# Patient Record
Sex: FEMALE | Race: WHITE | HISPANIC OR LATINO | Employment: OTHER | URBAN - METROPOLITAN AREA
[De-identification: names, ages, dates, MRNs, and addresses within clinical notes are randomized per-mention and may not be internally consistent; named-entity substitution may affect disease eponyms.]

---

## 2017-01-12 ENCOUNTER — GENERIC CONVERSION - ENCOUNTER (OUTPATIENT)
Dept: OTHER | Facility: OTHER | Age: 57
End: 2017-01-12

## 2017-01-20 ENCOUNTER — ALLSCRIPTS OFFICE VISIT (OUTPATIENT)
Dept: OTHER | Facility: OTHER | Age: 57
End: 2017-01-20

## 2017-01-31 ENCOUNTER — TRANSCRIBE ORDERS (OUTPATIENT)
Dept: ADMINISTRATIVE | Facility: HOSPITAL | Age: 57
End: 2017-01-31

## 2017-01-31 DIAGNOSIS — R06.83 SNORES: Primary | ICD-10-CM

## 2017-02-10 ENCOUNTER — GENERIC CONVERSION - ENCOUNTER (OUTPATIENT)
Dept: OTHER | Facility: OTHER | Age: 57
End: 2017-02-10

## 2017-02-14 ENCOUNTER — GENERIC CONVERSION - ENCOUNTER (OUTPATIENT)
Dept: OTHER | Facility: OTHER | Age: 57
End: 2017-02-14

## 2017-02-14 LAB
LEFT EYE DIABETIC RETINOPATHY: NORMAL
RIGHT EYE DIABETIC RETINOPATHY: NORMAL

## 2017-02-22 ENCOUNTER — ALLSCRIPTS OFFICE VISIT (OUTPATIENT)
Dept: OTHER | Facility: OTHER | Age: 57
End: 2017-02-22

## 2017-02-22 DIAGNOSIS — L29.9 PRURITUS: ICD-10-CM

## 2017-02-25 LAB
A/G RATIO (HISTORICAL): 1.5 (CALC) (ref 1–2.5)
ALBUMIN SERPL BCP-MCNC: 4.4 G/DL (ref 3.6–5.1)
ALP SERPL-CCNC: 167 U/L (ref 33–130)
ALT SERPL W P-5'-P-CCNC: 15 U/L (ref 6–29)
AST SERPL W P-5'-P-CCNC: 21 U/L (ref 10–35)
BILIRUB SERPL-MCNC: 0.9 MG/DL (ref 0.2–1.2)
BILIRUBIN DIRECT (HISTORICAL): 0.2 MG/DL
GAMMA GLOBULIN (HISTORICAL): 2.9 G/DL (CALC) (ref 1.9–3.7)
INDIRECT BILIRUBIN (HISTORICAL): 0.7 MG/DL (CALC) (ref 0.2–1.2)
TOTAL PROTEIN (HISTORICAL): 7.3 G/DL (ref 6.1–8.1)

## 2017-02-27 ENCOUNTER — GENERIC CONVERSION - ENCOUNTER (OUTPATIENT)
Dept: OTHER | Facility: OTHER | Age: 57
End: 2017-02-27

## 2017-03-06 ENCOUNTER — ALLSCRIPTS OFFICE VISIT (OUTPATIENT)
Dept: OTHER | Facility: OTHER | Age: 57
End: 2017-03-06

## 2017-03-08 ENCOUNTER — GENERIC CONVERSION - ENCOUNTER (OUTPATIENT)
Dept: OTHER | Facility: OTHER | Age: 57
End: 2017-03-08

## 2017-03-10 ENCOUNTER — GENERIC CONVERSION - ENCOUNTER (OUTPATIENT)
Dept: OTHER | Facility: OTHER | Age: 57
End: 2017-03-10

## 2017-03-13 ENCOUNTER — ALLSCRIPTS OFFICE VISIT (OUTPATIENT)
Dept: OTHER | Facility: OTHER | Age: 57
End: 2017-03-13

## 2017-03-13 LAB — HCG, QUALITATIVE (HISTORICAL): NEGATIVE

## 2017-03-13 PROCEDURE — 87624 HPV HI-RISK TYP POOLED RSLT: CPT | Performed by: FAMILY MEDICINE

## 2017-03-13 PROCEDURE — G0145 SCR C/V CYTO,THINLAYER,RESCR: HCPCS | Performed by: FAMILY MEDICINE

## 2017-03-15 ENCOUNTER — GENERIC CONVERSION - ENCOUNTER (OUTPATIENT)
Dept: OTHER | Facility: OTHER | Age: 57
End: 2017-03-15

## 2017-03-15 ENCOUNTER — LAB REQUISITION (OUTPATIENT)
Dept: LAB | Facility: HOSPITAL | Age: 57
End: 2017-03-15
Payer: COMMERCIAL

## 2017-03-15 DIAGNOSIS — Z12.4 ENCOUNTER FOR SCREENING FOR MALIGNANT NEOPLASM OF CERVIX: ICD-10-CM

## 2017-03-21 LAB — HPV RRNA GENITAL QL NAA+PROBE: ABNORMAL

## 2017-03-22 LAB
LAB AP GYN PRIMARY INTERPRETATION: NORMAL
Lab: NORMAL
PATH INTERP SPEC-IMP: NORMAL

## 2017-04-17 ENCOUNTER — TRANSCRIBE ORDERS (OUTPATIENT)
Dept: SLEEP CENTER | Facility: CLINIC | Age: 57
End: 2017-04-17

## 2017-04-17 ENCOUNTER — HOSPITAL ENCOUNTER (OUTPATIENT)
Dept: SLEEP CENTER | Facility: CLINIC | Age: 57
Discharge: HOME | End: 2017-04-17
Payer: COMMERCIAL

## 2017-04-17 DIAGNOSIS — G47.33 OBSTRUCTIVE SLEEP APNEA (ADULT) (PEDIATRIC): Primary | ICD-10-CM

## 2017-04-17 DIAGNOSIS — R06.83 SNORES: ICD-10-CM

## 2017-04-18 ENCOUNTER — HOSPITAL ENCOUNTER (OUTPATIENT)
Dept: SLEEP CENTER | Facility: CLINIC | Age: 57
Discharge: HOME | End: 2017-04-18
Payer: COMMERCIAL

## 2017-04-18 DIAGNOSIS — G47.33 OBSTRUCTIVE SLEEP APNEA (ADULT) (PEDIATRIC): ICD-10-CM

## 2017-04-18 PROCEDURE — 95810 POLYSOM 6/> YRS 4/> PARAM: CPT

## 2017-04-25 ENCOUNTER — GENERIC CONVERSION - ENCOUNTER (OUTPATIENT)
Dept: OTHER | Facility: OTHER | Age: 57
End: 2017-04-25

## 2017-04-25 ENCOUNTER — LAB CONVERSION - ENCOUNTER (OUTPATIENT)
Dept: OTHER | Facility: OTHER | Age: 57
End: 2017-04-25

## 2017-04-25 LAB
INR PPP: 3.2
PROTHROMBIN TIME: 32.8 SEC (ref 9–11.5)

## 2017-04-26 ENCOUNTER — GENERIC CONVERSION - ENCOUNTER (OUTPATIENT)
Dept: OTHER | Facility: OTHER | Age: 57
End: 2017-04-26

## 2017-04-27 ENCOUNTER — LAB REQUISITION (OUTPATIENT)
Dept: LAB | Facility: HOSPITAL | Age: 57
End: 2017-04-27
Payer: COMMERCIAL

## 2017-04-27 ENCOUNTER — ALLSCRIPTS OFFICE VISIT (OUTPATIENT)
Dept: OTHER | Facility: OTHER | Age: 57
End: 2017-04-27

## 2017-04-27 DIAGNOSIS — Z08 ENCOUNTER FOR FOLLOW-UP EXAMINATION AFTER COMPLETED TREATMENT FOR MALIGNANT NEOPLASM: ICD-10-CM

## 2017-04-27 LAB
BILIRUB UR QL STRIP: NORMAL
CLARITY UR: NORMAL
COLOR UR: YELLOW
GLUCOSE (HISTORICAL): NORMAL
HGB UR QL STRIP.AUTO: NORMAL
KETONES UR STRIP-MCNC: NORMAL MG/DL
LEUKOCYTE ESTERASE UR QL STRIP: NORMAL
NITRITE UR QL STRIP: NORMAL
PH UR STRIP.AUTO: 5 [PH]
PROT UR STRIP-MCNC: NORMAL MG/DL
SP GR UR STRIP.AUTO: 1.01
UROBILINOGEN UR QL STRIP.AUTO: 0.2

## 2017-04-27 PROCEDURE — 88305 TISSUE EXAM BY PATHOLOGIST: CPT | Performed by: FAMILY MEDICINE

## 2017-05-02 ENCOUNTER — ALLSCRIPTS OFFICE VISIT (OUTPATIENT)
Dept: OTHER | Facility: OTHER | Age: 57
End: 2017-05-02

## 2017-05-08 ENCOUNTER — ALLSCRIPTS OFFICE VISIT (OUTPATIENT)
Dept: OTHER | Facility: OTHER | Age: 57
End: 2017-05-08

## 2017-06-01 ENCOUNTER — ALLSCRIPTS OFFICE VISIT (OUTPATIENT)
Dept: OTHER | Facility: OTHER | Age: 57
End: 2017-06-01

## 2017-06-01 DIAGNOSIS — R10.32 LEFT LOWER QUADRANT PAIN: ICD-10-CM

## 2017-06-01 DIAGNOSIS — E11.9 TYPE 2 DIABETES MELLITUS WITHOUT COMPLICATIONS (HCC): ICD-10-CM

## 2017-06-01 DIAGNOSIS — M25.562 PAIN IN LEFT KNEE: ICD-10-CM

## 2017-06-01 DIAGNOSIS — M25.561 PAIN IN RIGHT KNEE: ICD-10-CM

## 2017-06-02 ENCOUNTER — ALLSCRIPTS OFFICE VISIT (OUTPATIENT)
Dept: OTHER | Facility: OTHER | Age: 57
End: 2017-06-02

## 2017-06-07 ENCOUNTER — LAB CONVERSION - ENCOUNTER (OUTPATIENT)
Dept: OTHER | Facility: OTHER | Age: 57
End: 2017-06-07

## 2017-06-07 ENCOUNTER — GENERIC CONVERSION - ENCOUNTER (OUTPATIENT)
Dept: OTHER | Facility: OTHER | Age: 57
End: 2017-06-07

## 2017-06-07 LAB
INR PPP: 3.2
PROTHROMBIN TIME: 32.8 SEC (ref 9–11.5)

## 2017-06-08 ENCOUNTER — LAB CONVERSION - ENCOUNTER (OUTPATIENT)
Dept: OTHER | Facility: OTHER | Age: 57
End: 2017-06-08

## 2017-06-08 LAB
A/G RATIO (HISTORICAL): 1.6 (CALC) (ref 1–2.5)
ALBUMIN SERPL BCP-MCNC: 4.2 G/DL (ref 3.6–5.1)
ALP SERPL-CCNC: 156 U/L (ref 33–130)
ALT SERPL W P-5'-P-CCNC: 14 U/L (ref 6–29)
AST SERPL W P-5'-P-CCNC: 19 U/L (ref 10–35)
BASOPHILS # BLD AUTO: 0.9 %
BASOPHILS # BLD AUTO: 57 CELLS/UL (ref 0–200)
BILIRUB SERPL-MCNC: 0.8 MG/DL (ref 0.2–1.2)
BUN SERPL-MCNC: 12 MG/DL (ref 7–25)
BUN/CREA RATIO (HISTORICAL): ABNORMAL (CALC) (ref 6–22)
CALCIUM SERPL-MCNC: 9.2 MG/DL (ref 8.6–10.4)
CHLORIDE SERPL-SCNC: 101 MMOL/L (ref 98–110)
CHOLEST SERPL-MCNC: 202 MG/DL (ref 125–200)
CHOLEST/HDLC SERPL: 3.3 (CALC)
CO2 SERPL-SCNC: 30 MMOL/L (ref 20–31)
CREAT SERPL-MCNC: 0.8 MG/DL (ref 0.5–1.05)
CREATININE, RANDOM URINE (HISTORICAL): 213 MG/DL (ref 20–320)
DEPRECATED RDW RBC AUTO: 15.4 % (ref 11–15)
EGFR AFRICAN AMERICAN (HISTORICAL): 94 ML/MIN/1.73M2
EGFR-AMERICAN CALC (HISTORICAL): 81 ML/MIN/1.73M2
EOSINOPHIL # BLD AUTO: 139 CELLS/UL (ref 15–500)
EOSINOPHIL # BLD AUTO: 2.2 %
GAMMA GLOBULIN (HISTORICAL): 2.7 G/DL (CALC) (ref 1.9–3.7)
GLUCOSE (HISTORICAL): 160 MG/DL (ref 65–99)
HBA1C MFR BLD HPLC: 7.2 % OF TOTAL HGB
HCT VFR BLD AUTO: 39.8 % (ref 35–45)
HDLC SERPL-MCNC: 62 MG/DL
HGB BLD-MCNC: 12.9 G/DL (ref 11.7–15.5)
LDL CHOLESTEROL (HISTORICAL): 108 MG/DL (CALC)
LYMPHOCYTES # BLD AUTO: 1405 CELLS/UL (ref 850–3900)
LYMPHOCYTES # BLD AUTO: 22.3 %
MAGNESIUM, UR (HISTORICAL): 1.4 MG/DL
MCH RBC QN AUTO: 26.1 PG (ref 27–33)
MCHC RBC AUTO-ENTMCNC: 32.5 G/DL (ref 32–36)
MCV RBC AUTO: 80.5 FL (ref 80–100)
MICROALBUMIN/CREATININE RATIO (HISTORICAL): 7 MCG/MG CREAT
MONOCYTES # BLD AUTO: 391 CELLS/UL (ref 200–950)
MONOCYTES (HISTORICAL): 6.2 %
NEUTROPHILS # BLD AUTO: 4309 CELLS/UL (ref 1500–7800)
NEUTROPHILS # BLD AUTO: 68.4 %
NON-HDL-CHOL (CHOL-HDL) (HISTORICAL): 140 MG/DL (CALC)
PLATELET # BLD AUTO: 257 THOUSAND/UL (ref 140–400)
PMV BLD AUTO: 9.4 FL (ref 7.5–12.5)
POTASSIUM SERPL-SCNC: 3.9 MMOL/L (ref 3.5–5.3)
RBC # BLD AUTO: 4.95 MILLION/UL (ref 3.8–5.1)
SODIUM SERPL-SCNC: 139 MMOL/L (ref 135–146)
TOTAL PROTEIN (HISTORICAL): 6.9 G/DL (ref 6.1–8.1)
TRIGL SERPL-MCNC: 160 MG/DL
TSH SERPL DL<=0.05 MIU/L-ACNC: 1.6 MIU/L (ref 0.4–4.5)
WBC # BLD AUTO: 6.3 THOUSAND/UL (ref 3.8–10.8)

## 2017-06-15 ENCOUNTER — ALLSCRIPTS OFFICE VISIT (OUTPATIENT)
Dept: OTHER | Facility: OTHER | Age: 57
End: 2017-06-15

## 2017-06-15 PROCEDURE — 88305 TISSUE EXAM BY PATHOLOGIST: CPT | Performed by: OBSTETRICS & GYNECOLOGY

## 2017-06-16 ENCOUNTER — LAB REQUISITION (OUTPATIENT)
Dept: LAB | Facility: HOSPITAL | Age: 57
End: 2017-06-16
Payer: COMMERCIAL

## 2017-06-16 DIAGNOSIS — R87.612 LOW GRADE SQUAMOUS INTRAEPITHELIAL LESION ON CYTOLOGIC SMEAR OF CERVIX (LGSIL): ICD-10-CM

## 2017-06-17 ENCOUNTER — GENERIC CONVERSION - ENCOUNTER (OUTPATIENT)
Dept: OTHER | Facility: OTHER | Age: 57
End: 2017-06-17

## 2017-06-20 ENCOUNTER — HOSPITAL ENCOUNTER (OUTPATIENT)
Dept: SLEEP CENTER | Facility: CLINIC | Age: 57
Discharge: HOME | End: 2017-06-20
Payer: COMMERCIAL

## 2017-06-20 DIAGNOSIS — G47.33 OBSTRUCTIVE SLEEP APNEA (ADULT) (PEDIATRIC): ICD-10-CM

## 2017-06-26 ENCOUNTER — APPOINTMENT (OUTPATIENT)
Dept: PHYSICAL THERAPY | Facility: REHABILITATION | Age: 57
End: 2017-06-26
Payer: COMMERCIAL

## 2017-06-26 DIAGNOSIS — M25.561 PAIN IN RIGHT KNEE: ICD-10-CM

## 2017-06-26 DIAGNOSIS — M25.562 PAIN IN LEFT KNEE: ICD-10-CM

## 2017-06-26 PROCEDURE — 97110 THERAPEUTIC EXERCISES: CPT

## 2017-06-26 PROCEDURE — 97162 PT EVAL MOD COMPLEX 30 MIN: CPT

## 2017-06-28 ENCOUNTER — ALLSCRIPTS OFFICE VISIT (OUTPATIENT)
Dept: OTHER | Facility: OTHER | Age: 57
End: 2017-06-28

## 2017-07-03 ENCOUNTER — APPOINTMENT (OUTPATIENT)
Dept: PHYSICAL THERAPY | Facility: REHABILITATION | Age: 57
End: 2017-07-03
Payer: COMMERCIAL

## 2017-07-06 ENCOUNTER — APPOINTMENT (OUTPATIENT)
Dept: PHYSICAL THERAPY | Facility: REHABILITATION | Age: 57
End: 2017-07-06
Payer: COMMERCIAL

## 2017-07-13 ENCOUNTER — APPOINTMENT (OUTPATIENT)
Dept: PHYSICAL THERAPY | Facility: REHABILITATION | Age: 57
End: 2017-07-13
Payer: COMMERCIAL

## 2017-07-13 PROCEDURE — 97110 THERAPEUTIC EXERCISES: CPT

## 2017-07-13 PROCEDURE — 97112 NEUROMUSCULAR REEDUCATION: CPT

## 2017-07-14 ENCOUNTER — GENERIC CONVERSION - ENCOUNTER (OUTPATIENT)
Dept: OTHER | Facility: OTHER | Age: 57
End: 2017-07-14

## 2017-07-14 ENCOUNTER — LAB CONVERSION - ENCOUNTER (OUTPATIENT)
Dept: OTHER | Facility: OTHER | Age: 57
End: 2017-07-14

## 2017-07-14 LAB
INR PPP: 3.6
PROTHROMBIN TIME: 37.3 SEC (ref 9–11.5)

## 2017-07-27 ENCOUNTER — APPOINTMENT (OUTPATIENT)
Dept: PHYSICAL THERAPY | Facility: REHABILITATION | Age: 57
End: 2017-07-27
Payer: COMMERCIAL

## 2017-08-07 ENCOUNTER — LAB CONVERSION - ENCOUNTER (OUTPATIENT)
Dept: OTHER | Facility: OTHER | Age: 57
End: 2017-08-07

## 2017-08-07 LAB
INR PPP: 3.5
PROTHROMBIN TIME: 36.1 SEC (ref 9–11.5)

## 2017-08-08 ENCOUNTER — GENERIC CONVERSION - ENCOUNTER (OUTPATIENT)
Dept: OTHER | Facility: OTHER | Age: 57
End: 2017-08-08

## 2017-08-16 ENCOUNTER — TRANSCRIBE ORDERS (OUTPATIENT)
Dept: ADMINISTRATIVE | Facility: HOSPITAL | Age: 57
End: 2017-08-16

## 2017-08-16 DIAGNOSIS — R10.32 ABDOMINAL PAIN, LEFT LOWER QUADRANT: Primary | ICD-10-CM

## 2017-08-28 ENCOUNTER — GENERIC CONVERSION - ENCOUNTER (OUTPATIENT)
Dept: OTHER | Facility: OTHER | Age: 57
End: 2017-08-28

## 2017-08-30 ENCOUNTER — ALLSCRIPTS OFFICE VISIT (OUTPATIENT)
Dept: OTHER | Facility: OTHER | Age: 57
End: 2017-08-30

## 2017-09-07 ENCOUNTER — GENERIC CONVERSION - ENCOUNTER (OUTPATIENT)
Dept: OTHER | Facility: OTHER | Age: 57
End: 2017-09-07

## 2017-09-07 LAB
INR PPP: 3.9
PROTHROMBIN TIME: 39.6 SEC (ref 9–11.5)

## 2017-09-22 ENCOUNTER — LAB CONVERSION - ENCOUNTER (OUTPATIENT)
Dept: OTHER | Facility: OTHER | Age: 57
End: 2017-09-22

## 2017-09-22 LAB
INR PPP: 2.7
PROTHROMBIN TIME: 27.4 SEC (ref 9–11.5)

## 2017-09-25 ENCOUNTER — GENERIC CONVERSION - ENCOUNTER (OUTPATIENT)
Dept: OTHER | Facility: OTHER | Age: 57
End: 2017-09-25

## 2017-10-12 ENCOUNTER — GENERIC CONVERSION - ENCOUNTER (OUTPATIENT)
Dept: OTHER | Facility: OTHER | Age: 57
End: 2017-10-12

## 2017-11-07 ENCOUNTER — GENERIC CONVERSION - ENCOUNTER (OUTPATIENT)
Dept: OTHER | Facility: OTHER | Age: 57
End: 2017-11-07

## 2017-11-08 ENCOUNTER — GENERIC CONVERSION - ENCOUNTER (OUTPATIENT)
Dept: OTHER | Facility: OTHER | Age: 57
End: 2017-11-08

## 2017-11-08 LAB
INR PPP: 3.5
PROTHROMBIN TIME: 35.8 SEC (ref 9–11.5)

## 2017-12-07 ENCOUNTER — GENERIC CONVERSION - ENCOUNTER (OUTPATIENT)
Dept: OTHER | Facility: OTHER | Age: 57
End: 2017-12-07

## 2017-12-07 DIAGNOSIS — G40.119 LOCALZ-RLTD SYMPTOMATIC EPILEPSY W SMPL PART SZ, INTRACT, WO STATUS (HCC): ICD-10-CM

## 2017-12-07 DIAGNOSIS — E11.9 TYPE 2 DIABETES MELLITUS WITHOUT COMPLICATIONS (HCC): ICD-10-CM

## 2017-12-15 ENCOUNTER — TRANSCRIBE ORDERS (OUTPATIENT)
Dept: ADMINISTRATIVE | Facility: HOSPITAL | Age: 57
End: 2017-12-15

## 2017-12-15 ENCOUNTER — GENERIC CONVERSION - ENCOUNTER (OUTPATIENT)
Dept: OTHER | Facility: OTHER | Age: 57
End: 2017-12-15

## 2017-12-15 ENCOUNTER — ALLSCRIPTS OFFICE VISIT (OUTPATIENT)
Dept: OTHER | Facility: OTHER | Age: 57
End: 2017-12-15

## 2017-12-15 DIAGNOSIS — G40.119 EPILEPSIA PARTIALIS CONTINUA WITH INTRACTABLE EPILEPSY (HCC): Primary | ICD-10-CM

## 2018-01-09 NOTE — RESULT NOTES
Verified Results  (1) URINALYSIS w URINE C/S REFLEX (will reflex a microscopy if leukocytes, occult blood, or nitrites are not within normal limits) [id number] 07:56PM Cheryl Lung Order Number: [id number]     Test Name Result Flag Reference   COLOR Yellow     CLARITY Clear     PH UA 6 0  4 5-8 0   LEUKOCYTE ESTERASE UA Negative  Negative   NITRITE UA Negative  Negative   PROTEIN UA Negative mg/dl  Negative   GLUCOSE UA Negative mg/dl  Negative   KETONES UA Negative mg/dl  Negative   UROBILINOGEN UA 0 2 E U /dl  0 2, 1 0 E U /dl   BILIRUBIN UA Negative  Negative   BLOOD UA Negative  Negative   SPECIFIC GRAVITY UA 1 008  1 003-1 030

## 2018-01-09 NOTE — RESULT NOTES
Verified Results  (1) PT WITH INR [id number] 10:39AM Colie Marsing   REPORT COMMENT:  FASTING:NO     Test Name Result Flag Reference   INR 3 1 H    Reference Range                     0 9-1 1  Moderate-intensity Warfarin Therapy 2 0-3 0  Higher-intensity Warfarin Therapy   3 0-4 0   PT 32 7 sec H 9 0-11 5   For more information on this test, go to:  [email or web address]

## 2018-01-10 NOTE — RESULT NOTES
Verified Results  (1) PT WITH INR [id number] 11:01AM Masha Kruger   REPORT COMMENT:  FASTING:NO     Test Name Result Flag Reference   INR 3 3 H    Reference Range                     0 9-1 1  Moderate-intensity Warfarin Therapy 2 0-3 0  Higher-intensity Warfarin Therapy   3 0-4 0   PT 33 8 sec H 9 0-11 5   For more information on this test, go to:  [email or web address]

## 2018-01-10 NOTE — RESULT NOTES
Verified Results  (Q) CBC (INCLUDES DIFF/PLT) (REFL) [id number] 09:41AM Stewart Roman   REPORT COMMENT:  FASTING:NO     Test Name Result Flag Reference   WHITE BLOOD CELL COUNT 6 3 Thousand/uL  3 8-10 8   RED BLOOD CELL COUNT 4 60 Million/uL  3 80-5 10   HEMOGLOBIN 12 1 g/dL  11 7-15 5   HEMATOCRIT 38 0 %  35 0-45 0   MCV 82 7 fL  80 0-100 0   MCH 26 4 pg L 27 0-33 0   MCHC 31 9 g/dL L 32 0-36 0   RDW 15 3 % H 11 0-15 0   PLATELET COUNT 125 Thousand/uL  140-400   MPV 9 2 fL  7 5-11 5   ABSOLUTE NEUTROPHILS 4473 cells/uL  9451-2471   ABSOLUTE LYMPHOCYTES 1254 cells/uL  850-3900   ABSOLUTE MONOCYTES 384 cells/uL  200-950   ABSOLUTE EOSINOPHILS 120 cells/uL     ABSOLUTE BASOPHILS 69 cells/uL  0-200   NEUTROPHILS 71 0 %     LYMPHOCYTES 19 9 %     MONOCYTES 6 1 %     EOSINOPHILS 1 9 %     BASOPHILS 1 1 %       (Q) LEVETIRACETAM ( KEPPRA) [id number] 09:41AM Stewart Roman     Test Name Result Flag Reference   LEVETIRACETAM (KEPPRA) 18 1 mcg/mL     ** Unable to flag abnormal result(s), please      refer to reference range(s) below:     Therapeutic Levels:     Drug Dosage       Trough             Peak      500 mg BID   3 1-10 0 mcg/mL   10 0-25 0 mcg/mL  1000 mg BID   4 9-37 1 mcg/mL   30 0-40 0 mcg/mL  1500 mg BID   7 0-34 0 mcg/mL   36 1-70 0 mcg/mL        Toxic level:  Not established

## 2018-01-10 NOTE — RESULT NOTES
Verified Results  (1) PT WITH INR [id number] 10:55AM Mae Gallardo   REPORT COMMENT:  FASTING:NO     Test Name Result Flag Reference   INR 3 5 H    Reference Range                     0 9-1 1  Moderate-intensity Warfarin Therapy 2 0-3 0  Higher-intensity Warfarin Therapy   3 0-4 0   PT 35 8 sec H 9 0-11 5   For more information on this test, go to:  [email or web address]

## 2018-01-10 NOTE — RESULT NOTES
Verified Results  (1) PT WITH INR 19Oct2016 09:41AM Lacie Garrison   REPORT COMMENT:  FASTING:NO     Test Name Result Flag Reference   INR 1 8 H    Reference Range                     0 9-1 1  Moderate-intensity Warfarin Therapy 2 0-3 0  Higher-intensity Warfarin Therapy   3 0-4 0   PT 18 2 sec H 9 0-11 5   For more information on this test, go to:  [email or web address]/[email or web address]

## 2018-01-10 NOTE — PROCEDURES
Procedures by Elmira Ledesma MD  at 2016  4:03 PM      Author:  Elmira Ledesma MD Service:  Neurology Author Type:  Physician     Filed:  2016  4:09 PM Date of Service:  2016  4:03 PM Status:  Signed     :  Elmira Ledesma MD (Physician)            ELECTROENCEPHALOGRAM (EEG)      Patient Name:  Rosalino King  MRN: [id number]   :  1960 File #: Nasima Kerns    Age: 61 y o  Encounter #: [id number]   Date performed:            Report date: 2016          Study type: Routine EEG    ICD 10 diagnosis: Spells/Fit NOS R56 9    Start time: 7:52 End time: 11:12     -------------------------------------------------------------------------------------------------------------------   Patient History:  61year old female with mesial temporal sclerosis and history of convulsions and complex partial seizures  She is recently started experiencing  new symptoms as  she falls asleep: she has abdominal rising, anxiety,fear, palpitations,and occasional right arm / leg jerking  Patient did have several episodes of right arm and leg jumping with associated feelings of anxiety during this 3 hour video EEG      -------------------------------------------------------------------------------------------------------------------   Description of Procedure:  ·  32 channel digital recording with electrodes placed according to the International 10-20 system with additional  T1/T2 electrodes, EOG, EKG, and simultaneous  video  The recording was technically satisfactory  -------------------------------------------------------------------------------------------------------------------   Results:  Background Activity:  The recording was performed with the patient awake, drowsy, and asleep  During wakefulness, there were long runs of well regulated/regular,  low amplitude, posteriorly dominant, symmetric 10-12 Hz activity that  did attenuate with eye opening       Drowsiness is characterized by attenuation and irregularity of the background, and the development of irregular intermixed theta slowing  Stage 2 sleep is characterized by symmetric sleep spindles  and K-complexes  -------------------------------------------------------------------------------------------------------------------   Activation Procedures:  Hyperventilation was not performed  Stepped photic stimulation between 1-20 cps was performed and did not induce  any changes  -------------------------------------------------------------------------------------------------------------------   Abnormal Findings:  No focal abnormalities or interictal epileptiform discharges were noted  No electrographic seizures were observed during this recording     -------------------------------------------------------------------------------------------------------------------  Other Findings: The single lead ECG demonstrated a regular rhythm with rare premature narrow complex beats  -------------------------------------------------------------------------------------------------------------------  Events: On several occations the patient experienced jumping of one leg or one arm, usually preceded by anxiety  EEG was normal during all of the episodes  -------------------------------------------------------------------------------------------------------------------   EEG Interpretation:  Normal 3 hour EEG  Several episodes of jerking of one arm or one leg were not epileptic seizures  Scribe Attestation:  Documented by Gamal Snyder, acting as a scribe for Dr Lamin Oconnell on 11/8/2016  at 4:08 PM     Physician Attestation:  All documentation recorded by the scribe accurately reflects the service I personally performed and the decisions made by me  I was present during the time the encounter was recorded and have reviewed all the documentation and confirm that it is all accurate  and representative of the encounter        Jewels Hernandez MD Medical Director  [facility]  Pager # Jon STINSON    Nov 8 2016  4:09PM [facility] Standard Time

## 2018-01-10 NOTE — RESULT NOTES
Message   2014 mammogram WNL  2015 TVUS w/2 cm uterine subserosal fibroid  (+) FOBT    --> Plan for GI referral    Iron-deficiency anemia: Already started on iron last visit  Verified Results  (1) IRON SATURATION %, TIBC 14Apr2016 09:32AM Holly Brito   REPORT COMMENT:  FASTING:NO     Test Name Result Flag Reference   IRON, TOTAL 31 mcg/dL L    IRON BINDING CAPACITY 412 mcg/dL  250-450   % SATURATION 8 % (calc) L 11-50     (1) OP JOSÉ LUIS FERRITIN 14Apr2016 09:32AM Holly Brito   REPORT COMMENT:  FASTING:NO     Test Name Result Flag Reference   FERRITIN 12 ng/mL         Discussion/Summary   2014 mammogram WNL  2015 TVUS w/2 cm uterine subserosal fibroid  (+) FOBT    --> Plan for GI referral      Signatures   Electronically signed by : Esa Hunter DO;  Apr 15 2016 12:25PM EST                       (Author)

## 2018-01-11 NOTE — RESULT NOTES
Verified Results  (1) PT WITH INR [id number] 10:24AM Dejuan Elma   REPORT COMMENT:  FASTING:NO     Test Name Result Flag Reference   INR 2 6 H    Reference Range                     0 9-1 1  Moderate-intensity Warfarin Therapy 2 0-3 0  Higher-intensity Warfarin Therapy   3 0-4 0   PT 27 0 sec H 9 0-11 5   For more information on this test, go to:  [email or web address]

## 2018-01-11 NOTE — RESULT NOTES
Verified Results  (1) THIN PREP PAP WITH IMAGING 06Apr2016 10:18AM Carlos Waterman     Test Name Result Flag Reference   LAB AP CASE REPORT (Report)     Gynecologic Cytology Report            Case: [id number]                  Authorizing Provider: Sherrie Evangelsita DO     Collected:      04/06/2016 1018        First Screen:     Cement Meliton, CT    Received:      04/08/2016 1018        Specimen:  LIQUID-BASED PAP, SCREENING, Cervix   HPV HIGH RISK RESULT (Report)     HPV, High Risk: HPV NEG, HPV16 NEG, HPV18 NEG      Other High Risk HPV Negative, HPV 16 Negative, HPV 18 Negative  HPV types: 16,18,31,33,35,39,45,51,52,56,58,59,66 and 68 DNA are undetectable or below the pre-set threshold  [employer]'s FDA approved Larisa 4800 is utilized with strict adherence to the 's instruction  manual to test for the presence of High-Risk HPV DNA, as well as HPV 16 and HPV 18  This instrument  has been validated by our laboratory and/or by the   A negative result does not preclude the presence of HPV infection because results depend on adequate  specimen collection, absence of inhibitors and sufficient DNA to be detected  Additionally, HPV negative  results are not intended to prevent women from proceeding to colposcopy if clinically warranted  Positive HPV test results indicate the presence of any one or more of the high risk types, but since patients  are often co-infected with low-risk types it does not rule out the presence of low-risk types in patients  with mixed infections  LAB AP GYN PRIMARY INTERPRETATION      Negative for intraepithelial lesion or malignancy   LAB AP GYN SPECIMEN ADEQUACY      Satisfactory for evaluation  Absence of endocervical/transformation zone component     LAB AP GYN ADDITIONAL INFORMATION (Report)     [employer]'s FDA approved ,  and ThinPrep Imaging System are   utilized with strict adherence to the 's instruction manual to   prepare gynecologic and non-gynecologic cytology specimens for the   production of ThinPrep slides as well as for gynecologic ThinPrep imaging  These processes have been validated by our laboratory and/or by the     The Pap test is not a diagnostic procedure and should not be used as the   sole means to detect cervical cancer  It is only a screening procedure to   aid in the detection of cervical cancer and its precursors  Both   false-negative and false-positive results have been experienced  Your   patient's test result should be interpreted in this context together with   the history and clinical findings     LAB AP LMP not given     not given

## 2018-01-11 NOTE — CONSULTS
I had the pleasure of evaluating your patient, Vianney Orozco  My full evaluation follows:      Chief Complaint  Patient here today for 12 mo f/u, Dental Procedure Clearance  Patient c/o occ SOB with walking, occ Swelling in LE  History of Present Illness  The patient states she has been generally stable since the last visit  Comorbid Illnesses: a stroke and mitral stenosis s/p mitral valve valvuloplasty in March 2014  Her valve gradient at that time was 14, reduced to 6mmHg  Valvuloplasty was complicated by a CVA at the time of the procedure with right side weakness  Now s/p [facility] MVR  Interval Events: doing well, no specific complaints today  will be having oral surgery to have wisdom teeth taken out  Symptoms: denies chest pain at rest, denies exertional chest pain, denies dyspnea, denies fatigue, denies exercise intolerance, denies palpitations, denies edema, denies orthopnea, denies claudication, denies dizziness and denies orthostatic dizziness  Associated symptoms: tendency for easy bleeding and tendency for easy bruising, but no syncope, no PND, no TIA symptoms and no recent weight gain  Disease Monitoring: The patient's INR goal is 2 5-3 5  Medications: the patient is adherent with her medication regimen  She denies medication side effects  Review of Systems      Cardiac: No complaints of chest pain, no palpitations, no fainting  Skin: No complaints of nonhealing sores or skin rash  Genitourinary: No complaints of recurrent urinary tract infections, frequent urination at night, difficult urination, blood in urine, kidney stones, loss of bladder control, kidney problems, denies any birth control or hormone replacement, is not post menopausal, not currently pregnant  Psychological: No complaints of feeling depressed, anxiety, panic attacks, or difficulty concentrating     General: No complaints of trouble sleeping, lack of energy, fatigue, appetite changes, weight changes, fever, frequent infections, or night sweats  Respiratory: no shortness of breath, no cough/sputum, no wheezing, no phlegm and no hemoptysis  HEENT: No complaints of serious problems, hearing problems, nose problems, throat problems, or snoring  Gastrointestinal: No complaints of liver problems, nausea, vomiting, heartburn, constipation, bloody stools, diarrhea, problems swallowing, adbominal pain, or rectal bleeding  Hematologic: No complaints of bleeding disorders, anemia, blood clots, or excessive brusing  Neurological: No complaints of numbness, tingling, dizziness, weakness, seizures, headaches, syncope or fainting, AM fatigue, daytime sleepiness, no witnessed apnea episodes  Musculoskeletal: No complaints of arthritis, back pain, or painfull swelling  ROS reviewed  Active Problems    1  Abdominal pain, LLQ (left lower quadrant) (789 04) (R10 32)   2  Abnormal mammogram (793 80) (R92 8)   3  Allergic reaction (995 3) (T78 40XA)   4  Asthma, persistent (493 90) (J45 909)   5  Asymptomatic stenosis of left carotid artery (433 10) (I65 22)   6  Back pain (724 5) (M54 9)   7  Bilateral knee pain (719 46) (M25 561,M25 562)   8  Cervical high risk HPV (human papillomavirus) test positive (795 05) (R87 810)   9  Controlled diabetes mellitus due to underlying condition with diabetic neuropathic   arthropathy, unspecified long term insulin use status (249 60,713 5) (E08 610)   10  Depression with anxiety (300 4) (F41 8)   11  Elevated blood pressure   12  Encounter for follow-up surveillance of cervical cancer (V67 9,V10 41) (G44,Y99 28)   13  Encounter for routine gynecological examination with Papanicolaou smear of cervix    (V72 31,V76 2) (Z01 419)   14  Encounter to discuss test results (V65 49) (Z71 89)   15  Esophageal reflux (530 81) (K21 9)   16  Excessive daytime sleepiness (780 54) (G47 19)   17  Fatigue (780 79) (R53 83)   18  Fecal occult blood test positive (792 1) (R19 5)   19   H/O mitral valve replacement (V43 3) (Z95 2)   20  Hypokalemia (276 8) (E87 6)   21  Insomnia (780 52) (G47 00)   22  Intertrigo (695 89) (L30 4)   23  Iron deficiency anemia, unspecified iron deficiency anemia type (280 9) (D50 9)   24  Itching (698 9) (L29 9)   25  Localization-related epilepsy, intractable (345 51) (G40 119)   26  Low grade squamous intraepithelial lesion (LGSIL) on cervical Pap smear (795 03)    (R87 612)   27  Microcytic hypochromic anemia (280 9) (D50 9)   28  Mitral valve stenosis (394 0) (I05 0)   29  Need for prophylactic vaccination and inoculation against influenza (V04 81) (Z23)   30  Obesity (278 00) (E66 9)   31  Osteoarthritis of knee (715 36) (M17 10)   32  Overactive bladder (596 51) (N32 81)   33  Pap smear for cervical cancer screening (V76 2) (Z12 4)   34  Paronychia of fourth finger, right (681 02) (L03 011)   35  Plantar fasciitis (728 71) (M72 2)   36  Postnasal drip (784 91) (R09 82)   37  Pre-operative cardiovascular examination (V72 81) (Z01 810)   38  Right ankle pain (719 47) (M25 571)   39  SOB (shortness of breath) (786 05) (R06 02)   40  Social problem (V62 9) (Z65 9)   41  Type 2 diabetes mellitus (250 00) (E11 9)   42  Urge incontinence of urine (788 31) (N39 41)   43  Urinary retention (788 20) (R33 9)   44  Urinary urgency (788 63) (R39 15)   45  Urticaria, chronic (708 8) (L50 8)   46  Uterine fibroid (218 9) (D25 9)   47  Visit for screening mammogram (V76 12) (Z12 31)   48  Vitamin D insufficiency (268 9) (E55 9)   49   Xerosis of skin (706 8) (L85 3)    Past Medical History    · History of Abnormal ultrasound of pelvis (793 5) (R93 8)   · Acute pharyngitis (462) (J02 9)   · Acute sinusitis (461 9) (J01 90)   · Acute upper respiratory infection (465 9) (J06 9)   · History of Ankle sprain (845 00) (S93 409A)   · History of Candida rash of groin (112 89) (B37 89)   · History of Cerebral infarction, unspecified (434 91) (I63 9)   · History of Dietary counseling (V65 3) (Z71 3)   · History of Dysuria (788 1) (R30 0)   · Exposure to potentially hazardous body fluids (V15 85) (Z77 21)   · History of Follow-up examination (V67 9) (Z09)   · History of Heat rash (705 1) (L74 0)   · History of Hematoma of arm (923 9) (S40 029A)   · History of dizziness (V13 89) (G82 756)   · History of pneumonia (V12 61) (Z87 01)   · History of upper respiratory infection (V12 09) (Z87 09)   · History of Itchy skin (698 9) (L29 9)   · History of Left ear pain (388 70) (H92 02)   · History of Pap smear for cervical cancer screening (V76 2) (Z12 4)   · Personal history of scoliosis (V13 59) (Z87 39)   · History of Postoperative examination (V67 00) (Z09)   · History of Pre-op testing (V72 84) (Z01 818)   · History of Scar, hypertrophic (701 4) (L91 0)   · History of Vaginal candidiasis (112 1) (B37 3)   · History of Vaginal odor (625 8) (N89 8)   · History of Vision problems (V41 0) (H54 7)    The active problems and past medical history were reviewed and updated today  Surgical History    · History of Mitral Valve Replacement   · History of Tonsillectomy   · History of Transluminal Valvular Angioplasty Mitral Valve   · History of Tubal Ligation    The surgical history was reviewed and updated today         Family History    · Family history of dementia (V17 2) (Z81 8)   · Family history of diabetes mellitus (V18 0) (Z83 3)   · Family history of hypertension (V17 49) (Z82 49)   · Family history of malignant neoplasm of cervix uteri (V16 49) (Z80 49)   · Family history of High cholesterol    · Family history of coronary artery disease (V17 3) (Z82 49)   · Family history of hypertension (V17 49) (Z82 49)   · Family history of myocardial infarction (V17 3) (Z82 49)    · Family history of seizures (V19 8) (Z84 89)   · Family history of Siriasis    · Family history of diabetes mellitus (V18 0) (Z83 3)   · Family history of malignant neoplasm of breast (V16 3) (Z80 3)    · Family history of diabetes mellitus (V18 0) (Z83 3)   · Family history of hypertension (V17 49) (Z82 49)    · Family history of diabetes mellitus (V18 0) (Z83 3)    · Family history of malignant neoplasm of breast (V16 3) (Z80 3)    The family history was reviewed and updated today  Social History    · Caffeine use (V49 89) (F15 90)   · Never smoker   · No alcohol use   · Not currently sexually active   · Single   · Social problem (V62 9) (Z65 9)  The social history was reviewed and updated today  The social history was reviewed and is unchanged  Current Meds   1  Accu-Chek FastClix Lancets Miscellaneous; USE DAILY USE AS DIRECTED DX E11 9; Therapy: 68NWU1845 to (Shakira Newsome)  Requested for: 51Izd1746; Last   Rx:39Ixi3671 Ordered   2  Accu-Chek Darrius SmartView w/Device Kit; ACCU-CHEK DARRIUS SMART VIEW   GLUCOMETER USE DAILY PRN DX E11 9; Therapy: 39ELD1141 to (Evaluate:10Vxq3648)  Requested for: 34Imx0719; Last   Rx:20Zct7294 Ordered   3  Accu-Chek SmartView In Vitro Strip; USE 1 STRIP DAILY dx e11 9; Therapy: 28NWW8187 to (Evaluate:44Xvv3445)  Requested for: 77Jub1535; Last   Rx:91Uhx2493 Ordered   4  All Day Allergy 10 MG Oral Tablet; TAKE ONE TABLET   BY MOUTH   DAILY   AS   DIRECTED; Therapy: 19Wmr7923 to (Last Rx:08Aug2017)  Requested for: 13Jrf8675 Ordered   5  Amoxicillin 500 MG Oral Tablet; TAKE 4 TABLETS 1 HOUR PRIOR TO PROCEDURE; Therapy: 20WQG3796 to (Evaluate:21Mar2017)  Requested for: 20Mar2017; Last   Rx:20Mar2017 Ordered   6  Cetirizine HCl - 10 MG Oral Tablet; TAKE ONE TABLET   BY MOUTH   DAILY   AS   DIRECTED; Therapy: 45LDI2560 to (Last Rx:13Jun2017)  Requested for: 77Ckj9180 Ordered   7  Ferrous Sulfate 325 (65 Fe) MG Oral Tablet; TAKE ONE TABLET BY MOUTH DAILY WITH   FOOD; Therapy: 95QCL5677 to (Last Rx:50Dql4718)  Requested for: 09Lcs1841 Ordered   8  Flovent  MCG/ACT Inhalation Aerosol; INHALE 1 PUFF DAILY  RINSE MOUTH   AFTER USING;    Therapy: 90TTB9828 to (Evaluate:65Sup0036)  Requested for: 19ACR4943; Last   Rx:31Lat7524 Ordered   9  Fluticasone Propionate 50 MCG/ACT Nasal Suspension; USE TWO SPRAYS IN EACH   NOSTRIL ONCE DAILY; Therapy: 62HBT1669 to (Last IP:56RLB3914)  Requested for: 02VDS7201 Ordered   10  HydrOXYzine HCl - 25 MG Oral Tablet; TAKE ONE TABLET   BY MOUTH   EVERY 6    HOURS AS NEEDED; Therapy: 83JOA8445 to (Last Rx:39Nwv0334)  Requested for: 61Lca6302 Ordered   11  Keppra 500 MG Oral Tablet; TAKE 1 TABLET FOUR TIMES A DAY; Therapy: 17OUO1210 to (Last WL:28QQX0580)  Requested for: 74NAE7119 Ordered   12  MetFORMIN HCl - 500 MG Oral Tablet; take 1 tablet by mouth twice a day; Therapy: 78RYR4531 to (Last Rx:55Trz5532)  Requested for: 06AJG1963 Ordered   13  Metoprolol Tartrate 25 MG Oral Tablet; TAKE HALF TABLET   BY MOUTH   TWICE A DAY; Therapy: 21Jun2015 to (Last Rx:44Yru6117)  Requested for: 82Edg5255 Ordered   14  Omeprazole 40 MG Oral Capsule Delayed Release Recorded   15  Potassium Chloride Tahira ER 20 MEQ Oral Tablet Extended Release; take one tablet by    mouth daily; Therapy: 27MFJ2164 to (Last Rx:48Cuf2890)  Requested for: 14Juz5919 Ordered   16  Torsemide 20 MG Oral Tablet; TAKE ONE TABLET   BY MOUTH   DAILY; Therapy: 73AQD1383 to (Last VO:89OTC3187)  Requested for: 01Fnu9020 Ordered   17  Ventolin  (90 Base) MCG/ACT Inhalation Aerosol Solution; INHALE 1 PUFFS    Every 4 hours PRN shortness of breath; Therapy: 77Yor3988 to (Evaluate:24Zrx6388)  Requested for: 96Rzr9492; Last    Rx:27Oqo8960 Ordered   18  Warfarin Sodium 5 MG Oral Tablet; take one tablet by mouth daily; Therapy: 21Jun2015 to (Last Rx:81Tqx8084)  Requested for: 67Liw2359 Ordered    The medication list was reviewed and updated today  Allergies    1   Codeine Derivatives   2  predniSONE    Vitals   Recorded: 30Aug2017 03:56PM   Heart Rate 89   Systolic 327, LUE, Sitting   Diastolic 66, LUE, Sitting   Height 4 ft 11 in   Weight 221 lb 6 oz   BMI Calculated 44 71   BSA Calculated 1 93     Physical Exam    Constitutional - General appearance: No acute distress, well appearing and well nourished  Eyes - Conjunctiva and Sclera examination: Conjunctiva pink, sclera anicteric  Neck - Normal, no JVD   Pulmonary - Respiratory effort: No signs of respiratory distress  Auscultation of lungs: Clear to auscultation  Cardiovascular - Auscultation of heart: Abnormal   The heart rate was normal  The rhythm was regular  Heart sounds: normal S1 and normal S2  Prosthetic valve: prosthetic mitral valve heard  no murmurs were heard  Pedal pulses: Normal, 2+ bilaterally  Examination of extremities for edema and/or varicosities: Normal     Abdomen - Soft  Musculoskeletal - Gait and station: Normal gait  Skin - Skin: Normal without rashes  Skin is warm and well perfused  Neurologic - Speech normal  No focal deficits  Psychiatric - Orientation to person, place, and time: Normal       Results/Data  A 12 lead ECG was performed and was normal    Rhythm and rate:  normal sinus rhythm  P-waves:   the P wave is normal  AL interval is normal    Axis: the QRS axis is normal    QRS: the QRS is normal   ST segment: the ST segments are normal    T waves:   normal       Assessment    1  H/O mitral valve replacement (V43 3) (Z95 2)   2  Mitral valve stenosis (394 0) (I05 0)   3  Pre-operative cardiovascular examination (V72 81) (Z01 810)   4  History of Cerebral infarction, unspecified (434 91) (I63 9)    Plan  H/O mitral valve replacement    · Enoxaparin Sodium 100 MG/ML Subcutaneous Solution; Inject twice  a day as  instructed   Rx By: Rosemary Holding; Dispense: 0 Days ; #:10 X 1 ML Syringe (10 Syringes); Refill: 0; For: H/O mitral valve replacement; BRANNON = N; Verified Transmission to [address] [place] [address]; Msg to Pharmacy: Start injections the day after last dose of coumadin  continue taking injections twice a day  do not take the injection the morning of the surgery   resume injections and coumadin the evening of surgery ; Last Updated By: System, [employer]; 8/30/2017 4:17:48 PM   · Follow-up visit in 1 year Evaluation and Treatment  Follow-up  Status: Hold For -  Scheduling  Requested for: 83Mpe4757   Ordered; For: H/O mitral valve replacement; Ordered ByStephanie Junior Performed:  Due: 76DNZ9986   · A diet that is low in fat, cholesterol, and sodium is considered a cardiac diet ;  Status:Complete;   Done: 71WLO3597   Ordered;  For:H/O mitral valve replacement; Ordered By:Andria Ordoñez;   · Begin or continue regular aerobic exercise  Gradually work up to at least {count1}  sessions of {dur1} of exercise a week ; Status:Complete;   Done: 00AHK4287   Ordered;  For:H/O mitral valve replacement; Ordered By:Andria Ordoñez;  Mitral valve stenosis    · Amoxicillin 500 MG Oral Tablet; TAKE 4 TABLETS 1 HOUR PRIOR TO  PROCEDURE   Rx By: Rosemary Gelelr; Dispense: 1 Days ; #:4 Tablet; Refill: 0; For: Mitral valve stenosis; BRANNON = N; Sent To: [facility]    Discussion/Summary    Severe mitral stenosis: on echo in March 2015 revealing a gradient of 14mmHg  With symptoms of shortness of breath with exertion, s/p mechanical mitral valve replacement, continue coumadin with goal INR 2 5-3 5  Completed cardiac rehab  Encouraged to continue exercise  Pre-op risk eval: She will need antibiotics prior to dental cleanings and dental surgeries  She is at intermediate cardiac risk for low risk wisdom teeth removal  Proceed with planned surgery without any further cardiac work-up  She will need to be anticoagulated with her mechanical MVR, therefore, I will give her a script for Lovenox shots that she will need to start once she is off coumadin  She may stop coumadin 5 days prior to surgery, and start lovenox the day after  She will take her last dose of lovenox pre-op the night before her dental surgery and then resume both lovenox and coumadin the evening after her surgery   She will have her INR checked 3 days later to confirm that it is >2 before stopping the Lovenox shots  h/o CVA: complication from prior valvuloplasty  Stable  The patient was counseled regarding diagnostic results, instructions for management, risk factor reductions, impressions  total time of encounter was 25 minutes and 15 minutes was spent counseling  Thank you very much for allowing me to participate in the care of this patient  If you have any questions, please do not hesitate to contact me        Future Appointments    Signatures   Electronically signed by : JOSE LUIS Keene ; Aug 30 2017  4:22PM EST                       (Author)

## 2018-01-11 NOTE — RESULT NOTES
Verified Results  (1) PT WITH INR 96Rqo3691 12:04PM Justus Foote   REPORT COMMENT:  FASTING:NO     Test Name Result Flag Reference   INR 3 2 H    Reference Range                     0 9-1 1  Moderate-intensity Warfarin Therapy 2 0-3 0  Higher-intensity Warfarin Therapy   3 0-4 0   PT 32 8 sec H 9 0-11 5   For more information on this test, go to:  [email or web address]/[email or web address]

## 2018-01-12 ENCOUNTER — GENERIC CONVERSION - ENCOUNTER (OUTPATIENT)
Dept: OTHER | Facility: OTHER | Age: 58
End: 2018-01-12

## 2018-01-12 ENCOUNTER — HOSPITAL ENCOUNTER (OUTPATIENT)
Dept: NEUROLOGY | Facility: AMBULATORY SURGERY CENTER | Age: 58
Discharge: HOME | End: 2018-01-12
Payer: COMMERCIAL

## 2018-01-12 DIAGNOSIS — G40.119 EPILEPSIA PARTIALIS CONTINUA WITH INTRACTABLE EPILEPSY (HCC): ICD-10-CM

## 2018-01-12 PROCEDURE — 95816 EEG AWAKE AND DROWSY: CPT

## 2018-01-12 NOTE — PROGRESS NOTES
Plan    1  DSMT/MNT Time Record; Status:Complete;   Done: 91JHS3499 01:00PM    Discussion/Summary    PATIENT EDUCATION RECORD   Living Well with Diabetes Class 1 Education Content: What is diabetes, Types of diabetes, How is diabetes diagnosed, Management skills, Role of exercise, Glucose monitoring, Target range goals        Chief Complaint  Florecita attended LWD class 1 this afternoon  End of Encounter Meds    1  Flovent  MCG/ACT Inhalation Aerosol; INHALE 1 PUFF DAILY  RINSE MOUTH   AFTER USING; Therapy: 40KRM4245 to (Evaluate:20Rem8420)  Requested for: 96QXH7915; Last   Rx:35Jkq0306 Ordered   2  Ventolin  (90 Base) MCG/ACT Inhalation Aerosol Solution; INHALE 1 PUFFS   Every 4 hours PRN shortness of breath; Therapy: 16Uwe3161 to (Evaluate:04Oct2016)  Requested for: 07Apr2016; Last   Rx:78Rgk9161 Ordered    3  Potassium Chloride Tahira ER 20 MEQ Oral Tablet Extended Release; TAKE ONE TABLET     BY MOUTH   DAILY; Therapy: 12EQD3963 to (Last Rx:07Nov2016)  Requested for: 23JDF3676 Ordered    4  Gabapentin 300 MG Oral Capsule; take 1 cap at bedtime for 3 days, then 1 cap twice a   day for 3 days, then 1 cap three times a day,;   Therapy: 63IVS2410 to (Last Rx:06Oct2016)  Requested for: 15SYA0768 Ordered   5  Keppra 500 MG Oral Tablet (LevETIRAcetam); TAKE 1 TABLET FOUR TIMES A DAY; Therapy: 28Apr2016 to (Evaluate:95Qrp9759)  Requested for: 83Qgv4925; Last   Rx:73Ang4197 Ordered    6  Ferrous Sulfate 325 (65 Fe) MG Oral Tablet; TAKE 1 TABLET DAILY WITH FOOD; Therapy: 41KYP9123 to (Evaluate:08Cwq4850)  Requested for: 29Noa6507; Last   Rx:38Rcl4557 Ordered    7  Metoprolol Tartrate 25 MG Oral Tablet; TAKE 1/2 TABLET BY MOUTH   TWICE A DAY; Therapy: 21Jun2015 to (Evaluate:13Kvy6765)  Requested for: 69KUC6310; Last   Rx:13Qxu6042; Status: ACTIVE - Transmit to [facility] Ordered   8  Torsemide 20 MG Oral Tablet; TAKE ONE TABLET   BY MOUTH   DAILY;    Therapy: 60UTC5377 to (Last Rx:97Spl4791)  Requested for: 29WBZ2508; Status: ACTIVE -   Transmit to [facility] Ordered   9  Warfarin Sodium 5 MG Oral Tablet (Coumadin); take 1 tablet by mouth once daily as   directed; Therapy: 21Jun2015 to (Evaluate:04Oct2016)  Requested for: 43Vnl6152; Last   Rx:01Tkq0418 Ordered    10  Accu-Chek FastClix Lancets Miscellaneous; USE DAILY USE AS DIRECTED DX E11 9; Therapy: 10NKY0565 to (585 812 351)  Requested for: 74Jcd7844; Last    Rx:92Dvg8079 Ordered   11  Accu-Chek Christina SmartView w/Device Kit; ACCU-CHEK CHRISTINA SMART VIEW    GLUCOMETER USE DAILY PRN DX E11 9; Therapy: 08ZBI0575 to (Evaluate:51Dyy4904)  Requested for: 72Otz3098; Last    Rx:63Fmq6877 Ordered   12  Accu-Chek SmartView In Vitro Strip; USE 1 STRIP DAILY dx e11 9; Therapy: 82JVN9004 to (Evaluate:72Alh1954)  Requested for: 63Zcn7425; Last    Rx:31Pny4430 Ordered   13  Blood Glucose Monitor System w/Device Kit; USE AS DIRECTED; Therapy: 15YGT1496 to (Last Rx:32Pph8744)  Requested for: 54Azw2668 Ordered   14  Blood Glucose Test In Vitro Strip; USE AS DIRECTED; Therapy: 14HSR2016 to (Last Rx:42Vbm7613)  Requested for: 38Fpz6775 Ordered   15  Fingerstix Lancets Miscellaneous; USE AS DIRECTED; Therapy: 60MIF6808 to (Last Rx:42Wtp5224)  Requested for: 29Vwl3968 Ordered   16  MetFORMIN HCl - 500 MG Oral Tablet; take 1 tablet by mouth twice a day; Therapy: 56WZE2885 to (Last Rx:11Ypp5068)  Requested for: 67KHE5181 Ordered    17  Omeprazole 40 MG Oral Capsule Delayed Release Recorded    Future Appointments    Date/Time Provider Specialty Site   11/16/2016 01:00 PM Hilaria Huitron RD Diabetes Educator [facility] ENDOCRINOLOGY   11/14/2016 01:00 PM Mark Hensley RD, LDN Diabetes Educator [facility] ENDOCRINOLOGY   12/15/2016 11:10 AM JOSE LUIS Gonzalez  [address] [place] [address] Rd   01/20/2017 01:00 PM JOSE LUIS Meade   Neurology [facility] NEUROLOGY [facility] 71   04/21/2017 02:00 PM JOSE LUIS Escobar   Neurology [facility] 5156     Signatures   Electronically signed by : Lalo Jonas RD; Nov 10 2016  8:15AM EST                       (Author)    Electronically signed by : Lalo Jonas RD; Nov 10 2016  8:16AM EST                       (Author)    Electronically signed by : JOSE LUIS Alfonso ; Nov 10 2016  1:23PM EST

## 2018-01-12 NOTE — RESULT NOTES
Message   Suni ordered f/u testing to be completed  Verified Results  * MAMMO SCREENING BILATERAL W CAD 20HTL3850 11:47AM Ally Bustos Order Number: [id number]     Test Name Result Flag Reference   MAMMO SCREENING BILATERAL W CAD (Report)     Patient History:   Patient is postmenopausal    Family history of endometrial cancer in mother at age 48, breast    cancer in maternal grandmother at age 79, breast cancer in sister   at age 48, and ovaries removed from maternal mother at age 48  Excisional biopsy  Patient has never smoked  Patient's BMI is 43 2  Reason for exam: screening (asymptomatic)  Screening     Mammo Screening Bilateral W CAD: May 3, 2016 - Check In #:    [de-identified]   Bilateral CC and MLO view(s) were taken  Technologist: RT Vitaly(R)(M)   Prior study comparison: September 10, 2014, digital bilateral    screening mammogram performed at [address] [place] [address]  May 17, 2013, digital bilateral screening mammogram, performed at   [facility]  April 26, 2012, digital bilateral    screening mammogram, performed at [facility]  April 26, 2011, digital bilateral screening mammogram, performed at    [facility]  October 20, 2010, bilateral digital    diagnostic mammogram, performed at [facility]  April 26, 2010, digital bilateral screening mammogram, performed at    [facility]      There are scattered fibroglandular densities  No dominant soft    tissue mass, architectural distortion or suspicious    calcifications are noted in either breast  There is a tissue    asymmetry in the upper right breast middle 3rd in depth    approximately 5 cm from the nipple  Questionable correlate in    the outer right breast on the CC view  Further evaluation with    compression views and ultrasound if warranted at that that time    are recommended   There is a grouping of new calcifications    within the upper outer left breast middle 3rd in depth  Further    evaluation with magnification views are recommended  The skin    and nipple contours are within normal limits  ASSESSMENT: BiRad:0 - Incomplete: needs additional imaging    evaluation     Recommendation:   Further imaging of both breasts  A breast health care nurse from our facility will be contacting    the patient regarding the need for additional imaging  Analyzed by CAD     8-10% of cancers will be missed on mammography  Management of a    palpable abnormality must be based on clinical grounds  Patients   will be notified of their results via letter from our facility  Accredited by [employer] of Radiology and FDA  Transcription Location: [facility] 98: [id number]     Risk Value(s):   Tyrer-Cuzick 10 Year: 6 275%, Tyrer-Cuzick Lifetime: 17 693%,    Myriad Table: 1 5%, ARNOLD 5 Year: 3 0%, NCI Lifetime: 17 6%, MRS    : Based on personal and/or family history,    consideration of hereditary risk assessment may be warranted

## 2018-01-12 NOTE — MISCELLANEOUS
Provider Comments  Provider Comments:   PT NO SHOW FOR APPT      Signatures   Electronically signed by :  John Argueta, ; Mar 24 2016 11:50AM EST                       (Author)    Electronically signed by : JOSE LUIS Brower ; Mar 25 2016  9:24AM EST                       (Author)

## 2018-01-12 NOTE — RESULT NOTES
Verified Results  (1) PT WITH INR [id number] 10:46AM Fernanda Read   REPORT COMMENT:  FASTING:NO     Test Name Result Flag Reference   INR 2 6 H    Reference Range                     0 9-1 1  Moderate-intensity Warfarin Therapy 2 0-3 0  Higher-intensity Warfarin Therapy   3 0-4 0   PT 26 9 sec H 9 0-11 5   For more information on this test, go to:  http://[employer]/faq/[email or web address]

## 2018-01-12 NOTE — PROCEDURES
ELECTROENCEPHALOGRAM (EEG)      Patient Name:  Stuart Leonard  MRN: [id number]   :  1960 File #: Marcos Ngo    Age: 60 y o  Encounter #: [id number]   Date performed: 2018            Report date: 2018          Study type: Routine EEG    ICD 10 diagnosis: Spells/Fit NOS R56 9    Start time: 1233 End time: 7325     -------------------------------------------------------------------------------------------------------------------   Patient History:   61year old female with localization related epilepsy diagnosed about 15 years ago, recent new event: staring spells unclear if seizures    -------------------------------------------------------------------------------------------------------------------   Description of Procedure:  · 32 channel digital recording with electrodes placed according to the International 10-20 system with additional T1/T2 electrodes, EOG, EKG, and simultaneous video  A monitoring technologist supervised the continuous recording  The recording was technically satisfactory  -------------------------------------------------------------------------------------------------------------------   Results:   Background Activity:   During wakefulness, background activity consists of continuous, well-formed, low voltage, posteriorly dominant, symmetric, reactive 10 Hz alpha activity with AP gradient present  During drowsiness and light sleep, background activity attenuated and was replaced by diffusely distributed theta activity  Activation Procedures:   Hyperventilation was not performed  Stepped photic stimulation between 1-30 cps was performed and did not alter the tracing  Abnormal Findings:  No focal abnormalities nor interictal epileptiform discharges were noted  No electrographic seizures occurred during this recording  Other findings: The single lead EKG demonstrated a regular  rhythm      Events:   No push button events occurred during this study     -------------------------------------------------------------------------------------------------------------------   Interpretation:    Normal 34 minute awake and drowsy EEG            Susanna Manzo, [address] [facility]  Pager # [phone]

## 2018-01-13 VITALS
TEMPERATURE: 96.1 F | WEIGHT: 217.5 LBS | HEIGHT: 59 IN | BODY MASS INDEX: 43.85 KG/M2 | DIASTOLIC BLOOD PRESSURE: 74 MMHG | RESPIRATION RATE: 16 BRPM | HEART RATE: 60 BPM | SYSTOLIC BLOOD PRESSURE: 110 MMHG

## 2018-01-13 VITALS
HEART RATE: 81 BPM | OXYGEN SATURATION: 96 % | HEIGHT: 59 IN | BODY MASS INDEX: 45.41 KG/M2 | SYSTOLIC BLOOD PRESSURE: 126 MMHG | TEMPERATURE: 97.3 F | DIASTOLIC BLOOD PRESSURE: 78 MMHG | RESPIRATION RATE: 18 BRPM | WEIGHT: 225.25 LBS

## 2018-01-13 VITALS
SYSTOLIC BLOOD PRESSURE: 138 MMHG | DIASTOLIC BLOOD PRESSURE: 87 MMHG | WEIGHT: 221 LBS | BODY MASS INDEX: 44.55 KG/M2 | HEIGHT: 59 IN

## 2018-01-13 VITALS
DIASTOLIC BLOOD PRESSURE: 84 MMHG | RESPIRATION RATE: 16 BRPM | BODY MASS INDEX: 44 KG/M2 | HEART RATE: 68 BPM | WEIGHT: 218.25 LBS | TEMPERATURE: 97.9 F | HEIGHT: 59 IN | SYSTOLIC BLOOD PRESSURE: 130 MMHG

## 2018-01-13 VITALS
SYSTOLIC BLOOD PRESSURE: 126 MMHG | HEART RATE: 84 BPM | DIASTOLIC BLOOD PRESSURE: 80 MMHG | WEIGHT: 222 LBS | BODY MASS INDEX: 44.84 KG/M2 | TEMPERATURE: 97.2 F | RESPIRATION RATE: 16 BRPM

## 2018-01-13 VITALS
RESPIRATION RATE: 18 BRPM | HEIGHT: 59 IN | BODY MASS INDEX: 45.16 KG/M2 | DIASTOLIC BLOOD PRESSURE: 80 MMHG | HEART RATE: 76 BPM | SYSTOLIC BLOOD PRESSURE: 114 MMHG | WEIGHT: 224 LBS | TEMPERATURE: 96.7 F

## 2018-01-13 VITALS
SYSTOLIC BLOOD PRESSURE: 119 MMHG | BODY MASS INDEX: 45.24 KG/M2 | HEIGHT: 59 IN | WEIGHT: 224.38 LBS | DIASTOLIC BLOOD PRESSURE: 77 MMHG

## 2018-01-13 NOTE — PROGRESS NOTES
Plan    1  DSMT/MNT Time Record; Status:Complete;   Done: [id number] 01:56PM    Discussion/Summary    PATIENT EDUCATION RECORD   Indication for Services: type 2 Diabetes Mellitus  She is ready to learn  Diabetes Disease Process:   She understands the pathophysiology of diabetes: Method: Instruction  Response: Needs Review   Discussed patient's type of diabetes: Method: Instruction  Response: Verbalizes Understanding   Discussed diagnosis criteria: Method: Instruction  Response: Verbalizes Understanding   Discussed treatment goals: Method: Instruction  Response: Verbalizes Understanding   Discussed benefits of control: Method: Instruction  Response: Verbalizes Understanding   Discussed treatment options: Method: Instruction  Response: Verbalizes Understanding   Healthy Eating:   Discussed general nutrition topics: Method: Instruction  Response: Verbalizes Understanding   Being Active:   Stated the benefits of exercise: Method: Instruction  Response: Verbalizes Understanding      Chief Complaint  Newly diagnosed type 2 diabetes - appointment for class assessment      History of Present Illness  Recently diagnosed with type 2 diabetes, A1c 7 5  Session today covered the following topics, type 2 diabetes, target ranges, metformin, and monitoring  She does walk a short distance for exercise and is willing to increase her activity  She will be attending classes  Results/Data  DSMT/MNT Time Record [id number] 01:56PM Serapio Koyanagi     Test Name Result Flag Reference   Date of Service 8/7/16/     Start - Stop Time 1 pm- 2 pm     Total MInutes 60     Group Or Individual Instruction ind       End of Encounter Meds    1  HydrOXYzine HCl - 25 MG Oral Tablet; TAKE 1 TABLET Every 6 hours PRN; Therapy: 16CPZ1370 to (Evaluate:16Zif8184)  Requested for: 53ZEW5494; Last   Rx:01Nwu9772 Ordered    2  Cetirizine HCl - 10 MG Oral Tablet; take 1 tablet at bedtime;    Therapy: 02EKH0662 to (Evaluate:85Xpi7496)  Requested for: 07OTI5454; Last   Rx:15Jun2016 Ordered   3  Flovent  MCG/ACT Inhalation Aerosol; INHALE 1 PUFF DAILY  RINSE MOUTH   AFTER USING; Therapy: 85UGY8423 to (Evaluate:36Imm2357)  Requested for: 52SRP0994; Last   Rx:91Xrj2258 Ordered   4  Ventolin  (90 Base) MCG/ACT Inhalation Aerosol Solution; INHALE 1 PUFFS   Every 4 hours PRN shortness of breath; Therapy: 63Kuz1574 to (Evaluate:32Zpo4847)  Requested for: 70Auk6741; Last   Rx:07Apr2016 Ordered    5  Klor-Con M20 20 MEQ Oral Tablet Extended Release; TAKE 1 TABLET DAILY; Therapy: 32IIY2696 to (Evaluate:13Oxj6771)  Requested for: 31HOH3019; Last   Rx:87Mtn1409 Ordered   6  Potassium Chloride Tahira ER 20 MEQ Oral Tablet Extended Release; TAKE ONE TABLET     BY MOUTH   DAILY; Therapy: 68EKG1890 to (Last Rx:03Rdd0485)  Requested for: 60ARZ1291 Ordered    7  Zolpidem Tartrate 5 MG Oral Tablet; TAKE 1 TABLET AT  BEDTIME AS NEEDED FOR   INSOMNIA; Therapy: 80GWS3185 to (Evaluate:23Hrj4765); Last Rx:96Cvj1986 Ordered    8  Ammonium Lactate 12 % External Cream; APPLY  AND RUB  IN A THIN FILM TO   AFFECTED AREAS TWICE DAILY  (AM AND PM); Therapy: 33LYP3657 to (Evaluate:41Vrg7091)  Requested for: 20NVT5979; Last   Rx:07Rxi5551 Ordered    9  Keppra 500 MG Oral Tablet (LevETIRAcetam); TAKE 1 TABLET FOUR TIMES A DAY; Therapy: 21Xhh2181 to (Evaluate:26Wjy8013)  Requested for: 78Kdz8535; Last   Rx:74Ipe3609 Ordered    10  Ferrous Sulfate 325 (65 Fe) MG Oral Tablet; TAKE 1 TABLET DAILY WITH FOOD; Therapy: 51AMT4436 to (Evaluate:28Bcy9116)  Requested for: 20Apr2016; Last    Rx:95Thp5022 Ordered    11  Metoprolol Tartrate 25 MG Oral Tablet; TAKE 1/2 TABLET BY MOUTH   TWICE A DAY; Therapy: 75Qbq2257 to (Evaluate:71Mis4315)  Requested for: 19SHP5212; Last    Rx:31Tuq5521; Status: ACTIVE - Transmit to [facility] Verification Ordered   12  Torsemide 20 MG Oral Tablet; TAKE ONE TABLET   BY MOUTH   DAILY;     Therapy: 72BWR9900 to (Last Rx:19Jul2016) Requested for: 16RIS1831; Status: ACTIVE -    Transmit to [facility] Verification Ordered   13  Warfarin Sodium 5 MG Oral Tablet (Coumadin); take 1 tablet by mouth once daily as    directed; Therapy: 73Npc0511 to (Evaluate:32Awg2697)  Requested for: 65Pqa8004; Last    Rx:07Crs6816 Ordered    14  Accu-Chek FastClix Lancets Miscellaneous; USE DAILY USE AS DIRECTED DX E11 9; Therapy: 27BRO1175 to ([phone])  Requested for: 21Wlr8087; Last    Rx:70Fbi0887 Ordered   15  Accu-Chek Darrius SmartView w/Device Kit; ACCU-CHEK DARRIUS SMART VIEW    GLUCOMETER USE DAILY PRN DX E11 9; Therapy: 83TRN2486 to (Evaluate:19Zlb8696)  Requested for: 04Dcq7258; Last    Rx:52Iuk3998 Ordered   16  Accu-Chek SmartView In Vitro Strip; USE 1 STRIP DAILY dx e11 9; Therapy: 89JOI6578 to ([phone])  Requested for: 44Dwi9370; Last    Rx:05Mab0550 Ordered   17  Blood Glucose Monitor System w/Device Kit; USE AS DIRECTED; Therapy: 44KVO3597 to (Last Rx:45Nrf7218)  Requested for: 91Hrg0435 Ordered   18  Blood Glucose Test In Vitro Strip; USE AS DIRECTED; Therapy: 89QED0547 to (Last Rx:67Xgy6236)  Requested for: 80Fzs9395 Ordered   19  Fingerstix Lancets Miscellaneous; USE AS DIRECTED; Therapy: 89CDS4535 to (Last Rx:27Zjv7445)  Requested for: 47Ixk8322 Ordered   20  MetFORMIN HCl - 500 MG Oral Tablet; TAKE 1 TABLET DAILY WITH FOOD; Therapy: 83Qyr3897 to (Evaluate:67Fbo0997)  Requested for: 41Hdq5348; Last    Rx:73Vew2769 Ordered    21  Vitamin D (Ergocalciferol) 79588 UNIT Oral Capsule; TAKE 1 CAPSULE WEEKLY; Therapy: 86Leg4153 to (Last Rx:67Gvj0460)  Requested for: 02Vrc7873 Ordered    22  Multiple Vitamins TABS; Therapy: (Recorded:10Iqq0337) to Recorded   23   Omeprazole 40 MG Oral Capsule Delayed Release Recorded    Future Appointments    Date/Time Provider Specialty Site   09/07/2016 01:00 PM Noah Gonzales RD Diabetes Educator [facility] [address] [facility] ENDOCRINOLOGY   09/21/2016 01:00 PM Noah Gonzales RD Diabetes Educator [facility] ENDOCRINOLOGY   09/14/2016 01:00 PM Angel Borja RD, LDN Diabetes Educator [facility] ENDOCRINOLOGY   09/28/2016 01:00 PM Angel Borja RD, SCOTT Diabetes Educator [facility] ENDOCRINOLOGY   09/15/2016 10:30 AM JOSE LUIS Cobian  [address] [place] [address]   04/21/2017 02:00 PM JOSE LUIS Peters  Neurology [address] [facility]   08/12/2016 02:20 PM JOS ELUIS Bradford  [address]     Signatures   Electronically signed by : DUNIA Brown;  Aug 15 2016 11:56AM EST                       (Author)    Electronically signed by : JOSE LUIS Tran ; Aug 17 2016 12:54PM EST

## 2018-01-13 NOTE — RESULT NOTES
Verified Results  (1) PT WITH INR 42KTR3279 12:56PM Dellar Apley   REPORT COMMENT:  FASTING:NO     Test Name Result Flag Reference   INR 3 6 H    Reference Range                     0 9-1 1  Moderate-intensity Warfarin Therapy 2 0-3 0  Higher-intensity Warfarin Therapy   3 0-4 0   PT 38 0 sec H 9 0-11 5   For more information on this test, go to:  http://[employer]/faq/IDX401

## 2018-01-13 NOTE — MISCELLANEOUS
Provider Comments  Provider Comments:   Dear Ludy Mckeon,    We called you about your scheduled appointment for today but were unable to reach you  It is very important that you follow up with us so that we can assess your physical and nutritional safety  Please call our office at [phone] to reschedule your appointment       Sincerely,     [facility]          Signatures   Electronically signed by : Debra Branch, ; Oct 12 2017 10:23AM EST                       (Author)

## 2018-01-14 VITALS
DIASTOLIC BLOOD PRESSURE: 78 MMHG | WEIGHT: 222 LBS | SYSTOLIC BLOOD PRESSURE: 120 MMHG | HEIGHT: 59 IN | BODY MASS INDEX: 44.76 KG/M2

## 2018-01-14 VITALS
HEIGHT: 59 IN | BODY MASS INDEX: 44.63 KG/M2 | DIASTOLIC BLOOD PRESSURE: 66 MMHG | SYSTOLIC BLOOD PRESSURE: 126 MMHG | WEIGHT: 221.38 LBS | HEART RATE: 89 BPM

## 2018-01-14 VITALS
DIASTOLIC BLOOD PRESSURE: 84 MMHG | HEART RATE: 76 BPM | RESPIRATION RATE: 16 BRPM | OXYGEN SATURATION: 99 % | SYSTOLIC BLOOD PRESSURE: 122 MMHG | HEIGHT: 59 IN | TEMPERATURE: 97.6 F | BODY MASS INDEX: 45.18 KG/M2 | WEIGHT: 224.13 LBS

## 2018-01-14 VITALS
HEART RATE: 96 BPM | RESPIRATION RATE: 16 BRPM | WEIGHT: 218 LBS | SYSTOLIC BLOOD PRESSURE: 118 MMHG | DIASTOLIC BLOOD PRESSURE: 76 MMHG | HEIGHT: 59 IN | BODY MASS INDEX: 43.95 KG/M2

## 2018-01-14 NOTE — RESULT NOTES
Verified Results  (1) PT WITH INR 07Jun2017 09:47AM Dejuan Elam   REPORT COMMENT:  FASTING:YES     Test Name Result Flag Reference   INR 3 2 H    Reference Range                     0 9-1 1  Moderate-intensity Warfarin Therapy 2 0-3 0  Higher-intensity Warfarin Therapy   3 0-4 0   PT 32 8 sec H 9 0-11 5   For more information on this test, go to:  http://[employer]/faq/MKU912

## 2018-01-14 NOTE — RESULT NOTES
Message   Discussed results and manged case at f/u appt       Verified Results  (1) TRANSFERRIN 56Szb3337 09:32AM Holly Brito   REPORT COMMENT:  FASTING:NO     Test Name Result Flag Reference   TRANSFERRIN 346 mg/dL H 188-341

## 2018-01-15 ENCOUNTER — GENERIC CONVERSION - ENCOUNTER (OUTPATIENT)
Dept: OTHER | Facility: OTHER | Age: 58
End: 2018-01-15

## 2018-01-15 NOTE — RESULT NOTES
Verified Results  (1) PT WITH INR 86Ena8697 10:47AM Masha Kruger   REPORT COMMENT:  FASTING:NO     Test Name Result Flag Reference   INR 3 5 H    Reference Range                     0 9-1 1  Moderate-intensity Warfarin Therapy 2 0-3 0  Higher-intensity Warfarin Therapy   3 0-4 0   PT 36 1 sec H 9 0-11 5   For more information on this test, go to:  http://[employer]/faq/ZAR274

## 2018-01-15 NOTE — RESULT NOTES
Verified Results  (1) PT WITH INR 05Nrv4256 10:15AM Dylan Cleverly   REPORT COMMENT:  FASTING:NO     Test Name Result Flag Reference   INR 3 9 H    Reference Range                     0 9-1 1  Moderate-intensity Warfarin Therapy 2 0-3 0  Higher-intensity Warfarin Therapy   3 0-4 0   PT 39 6 sec H 9 0-11 5   For more information on this test, go to:  http://[employer]/faq/GKC441

## 2018-01-15 NOTE — RESULT NOTES
Verified Results  (1) OCCULT BLOOD, FECAL IMMUNOCHEMICAL TEST 46Vjg1996 10:13AM Angela Burnham   Performed by Fecal Immunochemical Test      Test Name Result Flag Reference   OCCULT BLD, FECAL IMMUNOLOGICAL Positive A Negative   POSITIVE CONTROL Positive     NEGATIVE CONTROL Negative

## 2018-01-15 NOTE — RESULT NOTES
Verified Results  (Q) CBC (INCLUDES DIFF/PLT) (REFL) 93VBW4824 09:41AM Bee Valdes   REPORT COMMENT:  FASTING:NO     Test Name Result Flag Reference   WHITE BLOOD CELL COUNT 6 3 Thousand/uL  3 8-10 8   RED BLOOD CELL COUNT 4 60 Million/uL  3 80-5 10   HEMOGLOBIN 12 1 g/dL  11 7-15 5   HEMATOCRIT 38 0 %  35 0-45 0   MCV 82 7 fL  80 0-100 0   MCH 26 4 pg L 27 0-33 0   MCHC 31 9 g/dL L 32 0-36 0   RDW 15 3 % H 11 0-15 0   PLATELET COUNT 699 Thousand/uL  140-400   MPV 9 2 fL  7 5-11 5   ABSOLUTE NEUTROPHILS 4473 cells/uL  9969-4419   ABSOLUTE LYMPHOCYTES 1254 cells/uL  850-3900   ABSOLUTE MONOCYTES 384 cells/uL  200-950   ABSOLUTE EOSINOPHILS 120 cells/uL     ABSOLUTE BASOPHILS 69 cells/uL  0-200   NEUTROPHILS 71 0 %     LYMPHOCYTES 19 9 %     MONOCYTES 6 1 %     EOSINOPHILS 1 9 %     BASOPHILS 1 1 %

## 2018-01-15 NOTE — RESULT NOTES
Verified Results  (1) THIN PREP PAP WITH IMAGING 06Apr2016 10:18AM Asia Cameron     Test Name Result Flag Reference   LAB AP CASE REPORT (Report)     Gynecologic Cytology Report            Case: [id number]                  Authorizing Provider: Kofi Carias DO     Collected:      04/06/2016 1018        First Screen:     ROSIE Chahal    Received:      04/08/2016 1018        Specimen:  LIQUID-BASED PAP, SCREENING, Cervix   HPV HIGH RISK RESULT (Report)     HPV, High Risk: HPV NEG, HPV16 NEG, HPV18 NEG      Other High Risk HPV Negative, HPV 16 Negative, HPV 18 Negative  HPV types: 16,18,31,33,35,39,45,51,52,56,58,59,66 and 68 DNA are undetectable or below the pre-set threshold  Roche?s FDA approved Larisa 4800 is utilized with strict adherence to the ?s instruction  manual to test for the presence of High-Risk HPV DNA, as well as HPV 16 and HPV 18  This instrument  has been validated by our laboratory and/or by the   A negative result does not preclude the presence of HPV infection because results depend on adequate  specimen collection, absence of inhibitors and sufficient DNA to be detected  Additionally, HPV negative  results are not intended to prevent women from proceeding to colposcopy if clinically warranted  Positive HPV test results indicate the presence of any one or more of the high risk types, but since patients  are often co-infected with low-risk types it does not rule out the presence of low-risk types in patients  with mixed infections  LAB AP GYN PRIMARY INTERPRETATION      Negative for intraepithelial lesion or malignancy   LAB AP GYN SPECIMEN ADEQUACY      Satisfactory for evaluation  Absence of endocervical/transformation zone component     LAB AP GYN ADDITIONAL INFORMATION (Report)     [employer] FDA approved ,  and ThinPrep Imaging System are   utilized with strict adherence to the 's instruction manual to   prepare gynecologic and non-gynecologic cytology specimens for the   production of ThinPrep slides as well as for gynecologic ThinPrep imaging  These processes have been validated by our laboratory and/or by the     The Pap test is not a diagnostic procedure and should not be used as the   sole means to detect cervical cancer  It is only a screening procedure to   aid in the detection of cervical cancer and its precursors  Both   false-negative and false-positive results have been experienced  Your   patient's test result should be interpreted in this context together with   the history and clinical findings     LAB AP LMP

## 2018-01-15 NOTE — PROGRESS NOTES
Assessment  Assessed    1  Mitral valve stenosis (394 0) (I05 0)   2  History of Mitral Valve Replacement   3  History of Cerebral infarction, unspecified (434 91) (I63 9)    Plan  Mitral valve stenosis    · Amoxicillin 500 MG Oral Tablet; TAKE 4 TABLETS 1 HOUR BEFORE DENTAL  APPOINTMENT   Rx By: Saumya Martinez; Dispense: 1 Days ; #:4 Tablet; Refill: 0; For: Mitral valve stenosis; BRANNON = N; Sent To: [facility]  · Follow-up visit in 6 months Evaluation and Treatment  Follow-up  Status: Hold For -  Scheduling  Requested for: 21XIR7214   Ordered; For: Mitral valve stenosis; Ordered By: Saumya Martinez Performed:  Due: 65FGM5148   · A diet that is low in fat, cholesterol, and sodium is considered a cardiac diet ;  Status:Complete;   Done: 69SWE6874 11:47AM   Ordered; For:Mitral valve stenosis; Ordered By:Andria Ordoñez;   · Begin or continue regular aerobic exercise  Gradually work up to at least count1  sessions of dur1 of exercise a week ; Status:Complete;   Done: 78AIO0144 11:47AM   Ordered; For:Mitral valve stenosis; Ordered By:Andria Ordoñez; Discussion/Summary  Cardiology Discussion Summary Free Text Note Form [facility]:   Severe mitral stenosis: on echo in March 2015 revealing a gradient of 14mmHg  With symptoms of shortness of breath with exertion, s/p mechanical mitral valve replacement, continue coumadin with goal INR 2 5-3 5  Completed cardiac rehab  Encouraged to continue exercise  She will need antibiotics prior to dental cleanings  h/o CVA: complication from prior valvuloplasty  Stable  Counseling Documentation With Imm: The patient was counseled regarding diagnostic results, instructions for management, risk factor reductions, impressions  total time of encounter was 25 minutes and 15 minutes was spent counseling  Chief Complaint  Chief Complaint Free Text Note Form: Pt here for a 4 month f/u  Pt c/o Right leg cramps at night, swelling of her legs, headaches, and dizziness  History of Present Illness  Cardiology (Follow-Up): The patient states she has been generally stable since the last visit  Comorbid Illnesses: a stroke and mitral stenosis s/p mitral valve valvuloplasty in March 2014  Her valve gradient at that time was 14, reduced to 6mmHg  Valvuloplasty was complicated by a CVA at the time of the procedure with right side weakness  Now s/p [facility]  Interval Events: completed cardiac rehab, feels well  s/p bronchitis and antibiotics  Symptoms: denies chest pain at rest, denies exertional chest pain, resolved dyspnea, resolved fatigue, resolved exercise intolerance, denies palpitations, denies edema, denies orthopnea, denies claudication, denies dizziness and denies orthostatic dizziness  Associated symptoms: no syncope, no PND, no tendency for easy bleeding, no tendency for easy bruising, no TIA symptoms and no recent weight gain  Disease Monitoring: The patient's INR goal is 2 5-3 5  Medications: the patient is adherent with her medication regimen  She denies medication side effects  Review of Systems  Cardiology Female ROS:     Cardiac: No complaints of chest pain, no palpitations, no fainting  Skin: No complaints of nonhealing sores or skin rash  Genitourinary: No complaints of recurrent urinary tract infections, frequent urination at night, difficult urination, blood in urine, kidney stones, loss of bladder control, kidney problems, denies any birth control or hormone replacement, is not post menopausal, not currently pregnant  Psychological: No complaints of feeling depressed, anxiety, panic attacks, or difficulty concentrating  General: No complaints of trouble sleeping, lack of energy, fatigue, appetite changes, weight changes, fever, frequent infections, or night sweats  Respiratory: no shortness of breath, no cough/sputum, no wheezing, no phlegm and no hemoptysis     HEENT: No complaints of serious problems, hearing problems, nose problems, throat problems, or snoring  Gastrointestinal: No complaints of liver problems, nausea, vomiting, heartburn, constipation, bloody stools, diarrhea, problems swallowing, adbominal pain, or rectal bleeding  Hematologic: No complaints of bleeding disorders, anemia, blood clots, or excessive brusing  Neurological: No complaints of numbness, tingling, dizziness, weakness, seizures, headaches, syncope or fainting, AM fatigue, daytime sleepiness, no witnessed apnea episodes  Musculoskeletal: No complaints of arthritis, back pain, or painfull swelling  ROS Reviewed:   ROS reviewed  Active Problems  Problems    1  Abnormal ultrasound of pelvis (793 5) (R93 8)   2  Asthma (493 90) (J45 909)   3  Asymptomatic stenosis of left carotid artery (433 10) (I65 22)   4  Back pain (724 5) (M54 9)   5  Bacterial vaginosis (616 10,041 9) (N76 0,A49 9)   6  Candida rash of groin (112 89) (B37 89)   7  Cough (786 2) (R05)   8  Dental disorder (525 9) (K08 9)   9  Depression with anxiety (300 4) (F41 8)   10  Dizziness (780 4) (R42)   11  Drug-induced constipation (564 09,E980 5) (K59 09)   12  Elevated blood pressure (796 2) (I10)   13  Elevated fasting glucose (790 21) (R73 01)   14  Encounter for routine gynecological examination with Papanicolaou smear of cervix    (V72 31,V76 2) (Z01 419,Z12 4)   15  Esophageal reflux (530 81) (K21 9)   16  Exposure to potentially hazardous body fluids (V15 85) (Z77 21)   17  Fatigue (780 79) (R53 83)   18  Follow-up examination (V67 9) (Z09)   19  Hypokalemia (276 8) (E87 6)   20  Intertrigo (695 89) (L30 4)   21  Left lower quadrant pain (789 04) (R10 32)   22  Localization-related epilepsy, intractable (345 51) (G40 119)   23  Mitral stenosis (394 0) (I05 0)   24  Mitral valve stenosis (394 0) (I05 0)   25  Obesity (278 00) (E66 9)   26  Osteoarthritis of knee (715 36) (M17 9)   27  Overactive bladder (596 51) (N32 81)   28  Paronychia of fourth finger, right (371 02) (L03 011)   29  Postoperative examination (V67 00) (Z09)   30  Scar, hypertrophic (701 4) (L91 0)   31  SOB (shortness of breath) (786 05) (R06 02)   32  Social problem (V62 9) (Z60 9)   33  Upper respiratory infection (465 9) (J06 9)   34  Urge incontinence of urine (788 31) (N39 41)   35  Urinary retention (788 20) (R33 9)   36  Urinary urgency (788 63) (R39 15)   37  Uterine fibroid (218 9) (D25 9)   38  Vaginal odor (625 8) (N94 89)   39  Visit for screening mammogram (V76 12) (Z12 31)   40  Xerosis of skin (706 8) (L85 3)    Past Medical History  Problems    1  Acute pharyngitis (462) (J02 9)   2  Acute sinusitis (461 9) (J01 90)   3  Acute upper respiratory infection (465 9) (J06 9)   4  History of Cerebral infarction, unspecified (434 91) (I63 9)   5  History of pneumonia (V12 61) (Z87 01)   6  Personal history of scoliosis (V13 59) (Z87 39)   7  History of Pre-op testing (V72 84) (Z01 818)   8  History of Vision problems (V41 0) (H54 7)  Active Problems And Past Medical History Reviewed: The active problems and past medical history were reviewed and updated today  Surgical History  Problems    1  History of Mitral Valve Replacement   2  History of Tonsillectomy   3  History of Transluminal Valvular Angioplasty Mitral Valve   4  History of Tubal Ligation  Surgical History Reviewed: The surgical history was reviewed and updated today  Family History  Mother    1  Family history of dementia (V17 2) (Z81 8)   2  Family history of diabetes mellitus (V18 0) (Z83 3)   3  Family history of hypertension (V17 49) (Z82 49)   4  Family history of malignant neoplasm of cervix uteri (V16 49) (Z80 49)   5  Family history of High cholesterol  Father    6  Family history of coronary artery disease (V17 3) (Z82 49)   7  Family history of hypertension (V17 49) (Z82 49)   8  Family history of myocardial infarction (V17 3) (Z82 49)  Son    5  Family history of seizures (V19 8) (Z84 89)   10   Family history of Siriasis  Sister 11  Family history of diabetes mellitus (V18 0) (Z83 3)   12  Family history of malignant neoplasm of breast (V16 3) (Z80 3)  Brother    15  Family history of diabetes mellitus (V18 0) (Z83 3)   14  Family history of hypertension (V17 49) (Z82 49)  Grandmother    13  Family history of diabetes mellitus (V18 0) (Z83 3)  Maternal Grandmother    12  Family history of malignant neoplasm of breast (V16 3) (Z80 3)  Family History Reviewed: The family history was reviewed and updated today  Social History  Problems    · Caffeine use (V49 89) (F15 90)   · Never smoker   · No alcohol use   · Not currently sexually active   · Single   · Social problem (V62 9) (Z60 9)  Social History Reviewed: The social history was reviewed and updated today  Current Meds   1  Aspirin 81 MG Oral Tablet Delayed Release; TAKE 1 TABLET DAILY; Therapy: 98LJQ3366 to (Evaluate:78Nku5107)  Requested for: 20Jan2016; Last   Rx:20Jan2016 Ordered   2  Cetirizine HCl - 10 MG Oral Tablet; take 1 tablet at bedtime; Therapy: 32FOQ4220 to (Jaswinder Cook)  Requested for: 11KRI7603; Last   Rx:07Wjl7705 Ordered   3  [facility] Stool Softener 100 MG Oral Capsule; TAKE 1 CAPSULE TWICE DAILY AS NEEDED; Therapy: 50OUA5845 to (Marika Chan)  Requested for: 90EQO2506; Last   Rx:46Ndr9196 Ordered   4  Fish Oil CAPS; Therapy: (Recorded:13Oct2015) to Recorded   5  Flovent  MCG/ACT Inhalation Aerosol; INHALE 1 PUFF DAILY  RINSE MOUTH   AFTER USING; Therapy: 17WUS1057 to (Evaluate:14Mar2016)  Requested for: 54UNV9123; Last   Rx:14Jan2016 Ordered   6  Keppra 500 MG Oral Tablet; TAKE 1 TABLET FOUR TIMES A DAY  Requested for:   95Qaq5022; Last Rx:89Xyw2652 Ordered   7  Klor-Con M20 20 MEQ Oral Tablet Extended Release; TAKE 1 TABLET DAILY; Therapy: 30XFN0517 to (Evaluate:47Uah6870)  Requested for: 88EXL0166; Last   Rx:86Bav8137 Ordered   8  Metoprolol Tartrate 25 MG Oral Tablet; take 1/2 tablet twice a day;    Therapy: 21Jun2015 to (Evaluate:14Mar2016)  Requested for: 69RTU7037; Last   Rx:61Ykl6189 Ordered   9  Multiple Vitamins TABS; Therapy: (Recorded:87Qbw2350) to Recorded   10  Omeprazole 20 MG Oral Capsule Delayed Release; TAKE 1 CAPSULE DAILY; Therapy: 65Rwo8591 to (Evaluate:12Zgf0938)  Requested for: 20Jan2016; Last    Rx:19Suk7859 Ordered   11  Torsemide 20 MG Oral Tablet; TAKE 1 TABLET DAILY; Therapy: 43JSX8808 to (Evaluate:98Jgp4334)  Requested for: 68Yxc7514; Last    Rx:39Iig5642 Ordered   12  Ventolin  (90 Base) MCG/ACT Inhalation Aerosol Solution; INHALE 1 PUFFS    Every 4 hours PRN shortness of breath; Therapy: 15Nia9131 to (Evaluate:13Hwc8579)  Requested for: 46ZYW7375; Last    Rx:26Mar2015 Ordered   13  Warfarin Sodium 5 MG Oral Tablet; take 1 tablet by mouth once daily as directed; Therapy: 21Jun2015 to (Evaluate:19Oct2015)  Requested for: 21Jun2015; Last    FO:22AYC5723 Ordered  Medication List Reviewed: The medication list was reviewed and updated today  Allergies  Medication    1  Codeine Derivatives    Vitals  Vital Signs [Data Includes: Current Encounter]    Recorded: 84OKS4291 11:36AM   Heart Rate 80, L Radial   Systolic 530, RUE, Sitting   Diastolic 80, RUE, Sitting   Height 4 ft 11 5 in   Weight 212 lb 6 oz   BMI Calculated 42 18   BSA Calculated 1 9     Physical Exam    Constitutional - General appearance: No acute distress, well appearing and well nourished  Eyes - Conjunctiva and Sclera examination: Conjunctiva pink, sclera anicteric  Neck - Normal, no JVD   Pulmonary - Respiratory effort: No signs of respiratory distress  Auscultation of lungs: Clear to auscultation  Cardiovascular - Auscultation of heart: Abnormal   The heart rate was normal  The rhythm was regular  Heart sounds: normal S1 and normal S2  Prosthetic valve: prosthetic mitral valve heard  no murmurs were heard  Pedal pulses: Normal, 2+ bilaterally    Examination of extremities for edema and/or varicosities: Normal     Abdomen - Soft  Musculoskeletal - Gait and station: Normal gait  Skin - Skin: Normal without rashes  Skin is warm and well perfused  Neurologic - Speech normal  No focal deficits  Psychiatric - Orientation to person, place, and time: Normal       Results/Data  ECG Report:   Rhythm and rate:  normal sinus rhythm  P-waves: left atrial hypertrophy (FARIDEH)   SD interval is normal    Axis: the QRS axis is normal    QRS: the QRS is normal   ST segment: the ST segments are normal    T waves:   normal       Future Appointments    Date/Time Provider Specialty Site   02/08/2016 10:30 AM JOSE LUIS [place]  [address]   04/19/2016 02:00 PM JOSE LUIS [place]   [facility]     Signatures   Electronically signed by : JOSE LUIS [place] ; Feb  3 2016 11:53AM EST                       (Author)

## 2018-01-15 NOTE — CONSULTS
I had the pleasure of evaluating your patient, Tara Nikita  My full evaluation follows:      Chief Complaint  Pt here for a 4 month f/u  Pt c/o Right leg cramps at night, swelling of her legs, headaches, and dizziness  History of Present Illness  The patient states she has been generally stable since the last visit  Comorbid Illnesses: a stroke and mitral stenosis s/p mitral valve valvuloplasty in March 2014  Her valve gradient at that time was 14, reduced to 6mmHg  Valvuloplasty was complicated by a CVA at the time of the procedure with right side weakness  Now s/p [facility] MVR  Interval Events: completed cardiac rehab, feels well  s/p bronchitis and antibiotics  Symptoms: denies chest pain at rest, denies exertional chest pain, resolved dyspnea, resolved fatigue, resolved exercise intolerance, denies palpitations, denies edema, denies orthopnea, denies claudication, denies dizziness and denies orthostatic dizziness  Associated symptoms: no syncope, no PND, no tendency for easy bleeding, no tendency for easy bruising, no TIA symptoms and no recent weight gain  Disease Monitoring: The patient's INR goal is 2 5-3 5  Medications: the patient is adherent with her medication regimen  She denies medication side effects  Review of Systems      Cardiac: No complaints of chest pain, no palpitations, no fainting  Skin: No complaints of nonhealing sores or skin rash  Genitourinary: No complaints of recurrent urinary tract infections, frequent urination at night, difficult urination, blood in urine, kidney stones, loss of bladder control, kidney problems, denies any birth control or hormone replacement, is not post menopausal, not currently pregnant  Psychological: No complaints of feeling depressed, anxiety, panic attacks, or difficulty concentrating  General: No complaints of trouble sleeping, lack of energy, fatigue, appetite changes, weight changes, fever, frequent infections, or night sweats  Respiratory: no shortness of breath, no cough/sputum, no wheezing, no phlegm and no hemoptysis  HEENT: No complaints of serious problems, hearing problems, nose problems, throat problems, or snoring  Gastrointestinal: No complaints of liver problems, nausea, vomiting, heartburn, constipation, bloody stools, diarrhea, problems swallowing, adbominal pain, or rectal bleeding  Hematologic: No complaints of bleeding disorders, anemia, blood clots, or excessive brusing  Neurological: No complaints of numbness, tingling, dizziness, weakness, seizures, headaches, syncope or fainting, AM fatigue, daytime sleepiness, no witnessed apnea episodes  Musculoskeletal: No complaints of arthritis, back pain, or painfull swelling  ROS reviewed  Active Problems    1  Abnormal ultrasound of pelvis (793 5) (R93 8)   2  Asthma (493 90) (J45 909)   3  Asymptomatic stenosis of left carotid artery (433 10) (I65 22)   4  Back pain (724 5) (M54 9)   5  Bacterial vaginosis (616 10,041 9) (N76 0,A49 9)   6  Candida rash of groin (112 89) (B37 89)   7  Cough (786 2) (R05)   8  Dental disorder (525 9) (K08 9)   9  Depression with anxiety (300 4) (F41 8)   10  Dizziness (780 4) (R42)   11  Drug-induced constipation (564 09,E980 5) (K59 09)   12  Elevated blood pressure (796 2) (I10)   13  Elevated fasting glucose (790 21) (R73 01)   14  Encounter for routine gynecological examination with Papanicolaou smear of cervix    (V72 31,V76 2) (Z01 419,Z12 4)   15  Esophageal reflux (530 81) (K21 9)   16  Exposure to potentially hazardous body fluids (V15 85) (Z77 21)   17  Fatigue (780 79) (R53 83)   18  Follow-up examination (V67 9) (Z09)   19  Hypokalemia (276 8) (E87 6)   20  Intertrigo (695 89) (L30 4)   21  Left lower quadrant pain (789 04) (R10 32)   22  Localization-related epilepsy, intractable (345 51) (G40 119)   23  Mitral stenosis (394 0) (I05 0)   24  Mitral valve stenosis (394 0) (I05 0)   25   Obesity (278 00) (E66 9) 26  Osteoarthritis of knee (715 36) (M17 9)   27  Overactive bladder (596 51) (N32 81)   28  Paronychia of fourth finger, right (681 02) (L03 011)   29  Postoperative examination (V67 00) (Z09)   30  Scar, hypertrophic (701 4) (L91 0)   31  SOB (shortness of breath) (786 05) (R06 02)   32  Social problem (V62 9) (Z60 9)   33  Upper respiratory infection (465 9) (J06 9)   34  Urge incontinence of urine (788 31) (N39 41)   35  Urinary retention (788 20) (R33 9)   36  Urinary urgency (788 63) (R39 15)   37  Uterine fibroid (218 9) (D25 9)   38  Vaginal odor (625 8) (N94 89)   39  Visit for screening mammogram (V76 12) (Z12 31)   40  Xerosis of skin (706 8) (L85 3)    Past Medical History    · Acute pharyngitis (462) (J02 9)   · Acute sinusitis (461 9) (J01 90)   · Acute upper respiratory infection (465 9) (J06 9)   · History of Cerebral infarction, unspecified (434 91) (I63 9)   · History of pneumonia (V12 61) (Z87 01)   · Personal history of scoliosis (V13 59) (Z87 39)   · History of Pre-op testing (V72 84) (Z01 818)   · History of Vision problems (V41 0) (H54 7)    The active problems and past medical history were reviewed and updated today  Surgical History    · History of Mitral Valve Replacement   · History of Tonsillectomy   · History of Transluminal Valvular Angioplasty Mitral Valve   · History of Tubal Ligation    The surgical history was reviewed and updated today         Family History    · Family history of dementia (V17 2) (Z81 8)   · Family history of diabetes mellitus (V18 0) (Z83 3)   · Family history of hypertension (V17 49) (Z82 49)   · Family history of malignant neoplasm of cervix uteri (V16 49) (Z80 49)   · Family history of High cholesterol    · Family history of coronary artery disease (V17 3) (Z82 49)   · Family history of hypertension (V17 49) (Z82 49)   · Family history of myocardial infarction (V17 3) (Z82 49)    · Family history of seizures (V19 8) (Z84 89)   · Family history of Siriasis    · Family history of diabetes mellitus (V18 0) (Z83 3)   · Family history of malignant neoplasm of breast (V16 3) (Z80 3)    · Family history of diabetes mellitus (V18 0) (Z83 3)   · Family history of hypertension (V17 49) (Z82 49)    · Family history of diabetes mellitus (V18 0) (Z83 3)    · Family history of malignant neoplasm of breast (V16 3) (Z80 3)    The family history was reviewed and updated today  Social History    · Caffeine use (V49 89) (F15 90)   · Never smoker   · No alcohol use   · Not currently sexually active   · Single   · Social problem (V62 9) (Z60 9)  The social history was reviewed and updated today  Current Meds   1  Aspirin 81 MG Oral Tablet Delayed Release; TAKE 1 TABLET DAILY; Therapy: 32BWF4182 to (Evaluate:19Ncs3687)  Requested for: 20Jan2016; Last   Rx:20Jan2016 Ordered   2  Cetirizine HCl - 10 MG Oral Tablet; take 1 tablet at bedtime; Therapy: 90HWI2504 to (Yuri Radford)  Requested for: 68UIT0443; Last   Rx:81Szj8095 Ordered   3  [employer] Stool Softener 100 MG Oral Capsule; TAKE 1 CAPSULE TWICE DAILY AS NEEDED; Therapy: 69YEA6004 to ([phone])  Requested for: 57ZOU5644; Last   Rx:55Opn6713 Ordered   4  Fish Oil CAPS; Therapy: (Recorded:13Oct2015) to Recorded   5  Flovent  MCG/ACT Inhalation Aerosol; INHALE 1 PUFF DAILY  RINSE MOUTH   AFTER USING; Therapy: 12ZYW2587 to (Evaluate:14Mar2016)  Requested for: 94TSG3704; Last   Rx:14Jan2016 Ordered   6  Keppra 500 MG Oral Tablet; TAKE 1 TABLET FOUR TIMES A DAY  Requested for:   76Czj4798; Last Rx:71Cuy4201 Ordered   7  Klor-Con M20 20 MEQ Oral Tablet Extended Release; TAKE 1 TABLET DAILY; Therapy: 72RUP2533 to (Evaluate:85Qtk1751)  Requested for: 08CBM6465; Last   Rx:63Uqn4308 Ordered   8  Metoprolol Tartrate 25 MG Oral Tablet; take 1/2 tablet twice a day; Therapy: 21Jun2015 to (Evaluate:14Mar2016)  Requested for: 66WSM3745; Last   Rx:53Bpt9318 Ordered   9   Multiple Vitamins TABS; Therapy: (Recorded:13Oct2015) to Recorded   10  Omeprazole 20 MG Oral Capsule Delayed Release; TAKE 1 CAPSULE DAILY; Therapy: 73Dvk7477 to (Evaluate:00Vsc8772)  Requested for: 20Jan2016; Last    Rx:20Jan2016 Ordered   11  Torsemide 20 MG Oral Tablet; TAKE 1 TABLET DAILY; Therapy: 80PSB7813 to (Evaluate:35Suy8643)  Requested for: 06Dnl1382; Last    Rx:82Mdf6124 Ordered   12  Ventolin  (90 Base) MCG/ACT Inhalation Aerosol Solution; INHALE 1 PUFFS    Every 4 hours PRN shortness of breath; Therapy: 23Qnn2392 to (Evaluate:77Eyw7949)  Requested for: 99APB2536; Last    Rx:26Mar2015 Ordered   13  Warfarin Sodium 5 MG Oral Tablet; take 1 tablet by mouth once daily as directed; Therapy: 21Jun2015 to (Evaluate:03Ypt9913)  Requested for: 21Jun2015; Last    Rx:21Jun2015 Ordered    The medication list was reviewed and updated today  Allergies    1  Codeine Derivatives    Vitals   Recorded: 22EBW0907 11:36AM   Heart Rate 80, L Radial   Systolic 076, RUE, Sitting   Diastolic 80, RUE, Sitting   Height 4 ft 11 5 in   Weight 212 lb 6 oz   BMI Calculated 42 18   BSA Calculated 1 9     Physical Exam    Constitutional - General appearance: No acute distress, well appearing and well nourished  Eyes - Conjunctiva and Sclera examination: Conjunctiva pink, sclera anicteric  Neck - Normal, no JVD   Pulmonary - Respiratory effort: No signs of respiratory distress  Auscultation of lungs: Clear to auscultation  Cardiovascular - Auscultation of heart: Abnormal   The heart rate was normal  The rhythm was regular  Heart sounds: normal S1 and normal S2  Prosthetic valve: prosthetic mitral valve heard  no murmurs were heard  Pedal pulses: Normal, 2+ bilaterally  Examination of extremities for edema and/or varicosities: Normal     Abdomen - Soft  Musculoskeletal - Gait and station: Normal gait  Skin - Skin: Normal without rashes  Skin is warm and well perfused  Neurologic - Speech normal  No focal deficits  Psychiatric - Orientation to person, place, and time: Normal       Results/Data    Rhythm and rate:  normal sinus rhythm  P-waves: left atrial hypertrophy (FARIDEH)   NM interval is normal    Axis: the QRS axis is normal    QRS: the QRS is normal   ST segment: the ST segments are normal    T waves:   normal       Assessment    1  Mitral valve stenosis (394 0) (I05 0)   2  History of Mitral Valve Replacement   · MVR with 27 mm Arun [employer] Optiform mechanical prosthetic   3  History of Cerebral infarction, unspecified (434 91) (I63 9)    Plan  Mitral valve stenosis    · Amoxicillin 500 MG Oral Tablet; TAKE 4 TABLETS 1 HOUR BEFORE DENTAL  APPOINTMENT   Rx By: Dora Willams; Dispense: 1 Days ; #:4 Tablet; Refill: 0; For: Mitral valve stenosis; BRANNON = N; [id number]  · Follow-up visit in 6 months Evaluation and Treatment  Follow-up  Status: Hold For -  Scheduling  Requested for: 17WKO3970   Ordered; For: Mitral valve stenosis; Ordered By: Dora Willams Performed:  Due: 75OCA4642   · A diet that is low in fat, cholesterol, and sodium is considered a cardiac diet ;  Status:Complete;   Done: 79BPH6872 11:47AM   Ordered; For:Mitral valve stenosis; Ordered By:Andria Ordoñez;   · Begin or continue regular aerobic exercise  Gradually work up to at least count1  sessions of dur1 of exercise a week ; Status:Complete;   Done: 96ZDL1567 11:47AM   Ordered; For:Mitral valve stenosis; Ordered By:Andria Ordoñez; Discussion/Summary    Severe mitral stenosis: on echo in March 2015 revealing a gradient of 14mmHg  With symptoms of shortness of breath with exertion, s/p mechanical mitral valve replacement, continue coumadin with goal INR 2 5-3 5  Completed cardiac rehab  Encouraged to continue exercise  She will need antibiotics prior to dental cleanings  h/o CVA: complication from prior valvuloplasty  Stable     The patient was counseled regarding diagnostic results, instructions for management, risk factor reductions, impressions  total time of encounter was 25 minutes and 15 minutes was spent counseling  Thank you very much for allowing me to participate in the care of this patient  If you have any questions, please do not hesitate to contact me        Future Appointments    Signatures   Electronically signed by : JOSE LUIS Shultz ; Feb  3 2016 11:53AM EST                       (Author)

## 2018-01-16 NOTE — RESULT NOTES
Verified Results  (1) HEPATIC FUNCTION PANEL [id number] 10:28AM Lala Rahman   REPORT COMMENT:  FASTING:NO     Test Name Result Flag Reference   PROTEIN, TOTAL 7 3 g/dL  6 1-8 1   ALBUMIN 4 4 g/dL  3 6-5 1   GLOBULIN 2 9 g/dL (calc)  1 9-3 7   ALBUMIN/GLOBULIN RATIO 1 5 (calc)  1 0-2 5   BILIRUBIN, TOTAL 0 9 mg/dL  0 2-1 2   BILIRUBIN, DIRECT 0 2 mg/dL  < OR = 0 2   BILIRUBIN, INDIRECT 0 7 mg/dL (calc)  0 2-1 2   ALKALINE PHOSPHATASE 167 U/L H    AST 21 U/L  10-35   ALT 15 U/L  6-29

## 2018-01-16 NOTE — MISCELLANEOUS
Reason For Visit  Reason For Visit Free Text Note Form: Received referral from MD to arrange diabetic education for pt  Pt  referred to [facility]  per her request and appointments obtained for 8/8/16 and 8/9/16 to begin diabetic and nutrition counseling  Supportive counseling provided to pt  Pt  reports she will utilize [employer] for transportation to appointments  She denies further needs at this time  Will continue to be available to provide support  Active Problems    1  Abnormal mammogram (793 80) (R92 8)   2  Abnormal ultrasound of pelvis (793 5) (R93 8)   3  Acute maxillary sinusitis (461 0) (J01 00)   4  Allergic reaction (995 3) (T78 40XA)   5  Asthma (493 90) (J45 909)   6  Asymptomatic stenosis of left carotid artery (433 10) (I65 22)   7  Back pain (724 5) (M54 9)   8  Bacterial vaginosis (616 10,041 9) (N76 0,B96 89)   9  Candida rash of groin (112 89) (B37 89)   10  Cervical cancer screening (V76 2) (Z12 4)   11  Cervical high risk HPV (human papillomavirus) test positive (795 05) (R87 810)   12  Cough (786 2) (R05)   13  Dental disorder (525 9) (K08 9)   14  Depression with anxiety (300 4) (F41 8)   15  Dietary counseling (V65 3) (Z71 3)   16  Dizziness (780 4) (R42)   17  Drug-induced constipation (564 09,E980 5) (K59 09)   18  Elevated blood pressure (401 9) (I10)   19  Elevated fasting glucose (790 21) (R73 01)   20  Encounter for follow-up surveillance of cervical cancer (V67 9,V10 41) (I89,H44 27)   21  Encounter for routine gynecological examination with Papanicolaou smear of cervix    (V72 31,V76 2) (Z01 419)   22  Esophageal reflux (530 81) (K21 9)   23  Exposure to potentially hazardous body fluids (V15 85) (Z77 21)   24  Fatigue (780 79) (R53 83)   25  Fecal occult blood test positive (792 1) (R19 5)   26  Follow-up examination (V67 9) (Z09)   27  Heat rash (705 1) (L74 0)   28  Hematoma of arm (923 9) (S40 029A)   29  Hypokalemia (276 8) (E87 6)   30   Insomnia (780 52) (G47 00)   31  Intertrigo (695 89) (L30 4)   32  Iron deficiency anemia, unspecified iron deficiency anemia type (280 9) (D50 9)   33  Itchy skin (698 9) (L29 9)   34  Left ear pain (388 70) (H92 02)   35  Left lower quadrant pain (789 04) (R10 32)   36  Localization-related epilepsy, intractable (345 51) (G40 119)   37  Microcytic hypochromic anemia (280 9) (D50 9)   38  Mitral valve stenosis (394 0) (I05 0)   39  Obesity (278 00) (E66 9)   40  Osteoarthritis of knee (715 36) (M17 9)   41  Overactive bladder (596 51) (N32 81)   42  Pap smear for cervical cancer screening (V76 2) (Z12 4)   43  Paronychia of fourth finger, right (681 02) (L03 011)   44  Postoperative examination (V67 00) (Z09)   45  Scar, hypertrophic (701 4) (L91 0)   46  SOB (shortness of breath) (786 05) (R06 02)   47  Social problem (V62 9) (Z65 9)   48  Type 2 diabetes mellitus (250 00) (E11 9)   49  Upper respiratory infection (465 9) (J06 9)   50  Urge incontinence of urine (788 31) (N39 41)   51  Urinary retention (788 20) (R33 9)   52  Urinary urgency (788 63) (R39 15)   53  Uterine fibroid (218 9) (D25 9)   54  Vaginal candidiasis (112 1) (B37 3)   55  Vaginal odor (625 8) (N89 8)   56  Visit for screening mammogram (V76 12) (Z12 31)   57  Vitamin D insufficiency (268 9) (E55 9)   58  Xerosis of skin (706 8) (L85 3)    Current Meds   1  Ammonium Lactate 12 % External Cream; APPLY  AND RUB  IN A THIN FILM TO   AFFECTED AREAS TWICE DAILY  (AM AND PM); Therapy: 86TUW5002 to (Evaluate:11Hrh0874)  Requested for: 24LWN6774; Last   Rx:15Rzz1278 Ordered   2  Blood Glucose Monitor System w/Device Kit; USE AS DIRECTED; Therapy: 86XMZ1467 to (Last Rx:63Ncr5808)  Requested for: 76Jbc9083 Ordered   3  Blood Glucose Test In Vitro Strip; USE AS DIRECTED; Therapy: 16YOE7491 to (Last Rx:08Srl3888)  Requested for: 18Qqn4799 Ordered   4  Cetirizine HCl - 10 MG Oral Tablet; take 1 tablet at bedtime;    Therapy: 00YXN5506 to (Evaluate:67Yzp7300)  Requested for: 57LVU0786; Last   Rx:95Nxt8370 Ordered   5  Ferrous Sulfate 325 (65 Fe) MG Oral Tablet; TAKE 1 TABLET DAILY WITH FOOD; Therapy: 86PJB4771 to (Evaluate:23Pvm5860)  Requested for: 55Sso7911; Last   Rx:07Ptz7651 Ordered   6  Fingerstix Lancets Miscellaneous; USE AS DIRECTED; Therapy: 50OWP0432 to (Last Rx:84Vkj8902)  Requested for: 24Puw4016 Ordered   7  Flovent  MCG/ACT Inhalation Aerosol; INHALE 1 PUFF DAILY  RINSE MOUTH   AFTER USING; Therapy: 43KEG1743 to (Evaluate:37Ify5445)  Requested for: 18LKK5678; Last   Rx:22Osg2963 Ordered   8  HydrOXYzine HCl - 25 MG Oral Tablet; TAKE 1 TABLET Every 6 hours PRN; Therapy: 79JYN4966 to (Evaluate:15Oao1030)  Requested for: 09POB7093; Last   Rx:04Hgt2472 Ordered   9  Keppra 500 MG Oral Tablet (LevETIRAcetam); TAKE 1 TABLET FOUR TIMES A DAY; Therapy: 98Kce1306 to (Evaluate:28Mrm5128)  Requested for: 83Zpd3472; Last   Rx:61Ldf0879 Ordered   10  Klor-Con M20 20 MEQ Oral Tablet Extended Release; TAKE 1 TABLET DAILY; Therapy: 16RWY6224 to (Evaluate:98Ing4577)  Requested for: 71PAY0438; Last    Rx:73Rbp5430 Ordered   11  MetFORMIN HCl - 500 MG Oral Tablet; TAKE 1 TABLET DAILY WITH FOOD; Therapy: 25Rpk4500 to (Evaluate:96Zqs4982)  Requested for: 30Gji9011; Last    Rx:37Buj1811 Ordered   12  Metoprolol Tartrate 25 MG Oral Tablet; TAKE 1/2 TABLET BY MOUTH   TWICE A DAY; Therapy: 08Qme4990 to (Evaluate:77Egs6017)  Requested for: 02WZU7031; Last    Rx:61Ykd7182; Status: ACTIVE - Transmit to [facility] Verification Ordered   13  Multiple Vitamins TABS; Therapy: (Recorded:95Poe6228) to Recorded   14  Omeprazole 40 MG Oral Capsule Delayed Release Recorded   15  Potassium Chloride Tahira ER 20 MEQ Oral Tablet Extended Release; TAKE ONE TABLET      BY MOUTH   DAILY; Therapy: 49ANP4190 to (Last Rx:01Agl9156)  Requested for: 86XRM5583 Ordered   16  Torsemide 20 MG Oral Tablet; TAKE ONE TABLET   BY MOUTH   DAILY;     Therapy: 95CYE4981 to (Last Rx: 70CEK4762)  Requested for: 34INV9525; Status: ACTIVE -    Transmit to [facility] Verification Ordered   17  Ventolin  (90 Base) MCG/ACT Inhalation Aerosol Solution; INHALE 1 PUFFS    Every 4 hours PRN shortness of breath; Therapy: 27Mxv6607 to (Evaluate:04Oct2016)  Requested for: 07Apr2016; Last    Rx:07Apr2016 Ordered   18  Vitamin D (Ergocalciferol) 24459 UNIT Oral Capsule; TAKE 1 CAPSULE WEEKLY; Therapy: 20Apr2016 to (Last Rx:20Apr2016)  Requested for: 20Apr2016 Ordered   19  Warfarin Sodium 5 MG Oral Tablet (Coumadin); take 1 tablet by mouth once daily as    directed; Therapy: 43Hnx3145 to (Evaluate:04Oct2016)  Requested for: 07Apr2016; Last    Rx:07Apr2016 Ordered   20  Zolpidem Tartrate 5 MG Oral Tablet; TAKE 1 TABLET AT  BEDTIME AS NEEDED FOR    INSOMNIA; Therapy: 37ZTP6107 to (Evaluate:18Apr2016); Last Rx:91Sbm5871 Ordered    Allergies    1   Codeine Derivatives    Signatures   Electronically signed by : SATISH Francois; Jul 29 2016 11:39AM EST                       (Author)

## 2018-01-16 NOTE — MISCELLANEOUS
Provider Comments  Pt no showed for class on 11-14-16        Signatures   Electronically signed by : Lary Alvarez, ; Nov 15 2016  8:19AM EST                       (Author)

## 2018-01-17 NOTE — RESULT NOTES
Verified Results  (1) PT WITH INR 05Oct2016 09:56AM Winston Gordon   REPORT COMMENT:  FASTING:NO     Test Name Result Flag Reference   INR 1 7 H    Reference Range                     0 9-1 1  Moderate-intensity Warfarin Therapy 2 0-3 0  Higher-intensity Warfarin Therapy   3 0-4 0   PT 17 5 sec H 9 0-11 5   For more information on this test, go to:  [email or web address]

## 2018-01-17 NOTE — RESULT NOTES
Verified Results  (1) PT WITH INR [id number] 10:46AM Tabatha Mane   REPORT COMMENT:  FASTING:UNKNOWN     Test Name Result Flag Reference   INR 3 1 H    Reference Range                     0 9-1 1  Moderate-intensity Warfarin Therapy 2 0-3 0  Higher-intensity Warfarin Therapy   3 0-4 0   PT 32 6 sec H 9 0-11 5   For more information on this test, go to:  [email or web address]/[email or web address]

## 2018-01-18 NOTE — RESULT NOTES
Verified Results  (1) PT WITH INR 91BZE4661 10:24AM Natasha Miller   REPORT COMMENT:  FASTING:NO     Test Name Result Flag Reference   INR 3 1 H    Reference Range                     0 9-1 1  Moderate-intensity Warfarin Therapy 2 0-3 0  Higher-intensity Warfarin Therapy   3 0-4 0   PT 32 1 sec H 9 0-11 5   For more information on this test, go to:  [email or web address]/[email or web address]

## 2018-01-20 ENCOUNTER — ANTICOAG VISIT (OUTPATIENT)
Dept: OTHER | Facility: HOSPITAL | Age: 58
End: 2018-01-20

## 2018-01-20 DIAGNOSIS — Z95.2 HISTORY OF PROSTHETIC HEART VALVE: ICD-10-CM

## 2018-01-23 NOTE — PROGRESS NOTES
Recorded as Task  Date: 01/15/2018 02:18 PM, Created By: Gali Lopez  Task Name: Care Coordination  Assigned To: Gali Lopez  Regarding Patient: Moraima Babcock, Status: Active  Comment:   Gali Lopez -  15 Graham 2018 2:18 PM    TASK CREATED  EEG is normal   Is the patient still having recurrent staring spells? How often are they happening? If she is still having staring spells, the plan was to refer her for a 72 hours ambulatory since,  the routine EEG was insufficient in capturing a clinical event or seizure  Is she willing to have a 72 hours ambulatory EEG? Rachelle Soliman - 15 Graham 2018 2:55 PM    TASK REPLIED TO: Previously Assigned To Neurology Clinical,Team  Patient  made aware  She reports she is no longer having the starring spells  Electronically signed Luc STINSON    Graham 15 2018  3:21PM EST Author

## 2018-01-24 VITALS
DIASTOLIC BLOOD PRESSURE: 80 MMHG | HEART RATE: 88 BPM | BODY MASS INDEX: 44.17 KG/M2 | HEIGHT: 60 IN | RESPIRATION RATE: 16 BRPM | WEIGHT: 225 LBS | SYSTOLIC BLOOD PRESSURE: 120 MMHG | TEMPERATURE: 96.4 F

## 2018-01-24 VITALS
DIASTOLIC BLOOD PRESSURE: 73 MMHG | RESPIRATION RATE: 16 BRPM | HEIGHT: 59 IN | BODY MASS INDEX: 45.36 KG/M2 | HEART RATE: 70 BPM | SYSTOLIC BLOOD PRESSURE: 132 MMHG | WEIGHT: 225 LBS

## 2018-01-25 DIAGNOSIS — L50.0 ALLERGIC URTICARIA: Primary | ICD-10-CM

## 2018-01-25 DIAGNOSIS — J30.89 CHRONIC NONSEASONAL ALLERGIC RHINITIS DUE TO OTHER ALLERGEN: Primary | ICD-10-CM

## 2018-01-25 RX ORDER — CETIRIZINE HYDROCHLORIDE 10 MG/1
TABLET, FILM COATED ORAL
Qty: 30 TABLET | Refills: 1 | Status: SHIPPED | OUTPATIENT
Start: 2018-01-25 | End: 2018-01-30 | Stop reason: SDUPTHER

## 2018-01-26 ENCOUNTER — TELEPHONE (OUTPATIENT)
Dept: FAMILY MEDICINE CLINIC | Facility: CLINIC | Age: 58
End: 2018-01-26

## 2018-01-26 RX ORDER — HYDROXYZINE HYDROCHLORIDE 25 MG/1
TABLET, FILM COATED ORAL
Qty: 60 TABLET | Refills: 3 | Status: SHIPPED | OUTPATIENT
Start: 2018-01-26 | End: 2018-01-30 | Stop reason: SDUPTHER

## 2018-01-30 DIAGNOSIS — L50.0 ALLERGIC URTICARIA: Primary | ICD-10-CM

## 2018-01-30 DIAGNOSIS — J30.89 ENVIRONMENTAL AND SEASONAL ALLERGIES: ICD-10-CM

## 2018-01-30 DIAGNOSIS — J30.89 CHRONIC NONSEASONAL ALLERGIC RHINITIS DUE TO OTHER ALLERGEN: ICD-10-CM

## 2018-01-30 RX ORDER — CETIRIZINE HYDROCHLORIDE 10 MG/1
10 TABLET ORAL DAILY
Qty: 30 TABLET | Refills: 2 | Status: SHIPPED | OUTPATIENT
Start: 2018-01-30 | End: 2018-04-23 | Stop reason: SDUPTHER

## 2018-01-30 RX ORDER — FLUTICASONE PROPIONATE 110 UG/1
AEROSOL, METERED RESPIRATORY (INHALATION)
COMMUNITY
Start: 2015-06-02 | End: 2018-02-02 | Stop reason: SDUPTHER

## 2018-01-30 RX ORDER — HYDROXYZINE HYDROCHLORIDE 25 MG/1
TABLET, FILM COATED ORAL
COMMUNITY
Start: 2016-07-15 | End: 2018-03-08

## 2018-01-30 RX ORDER — HYDROXYZINE HYDROCHLORIDE 25 MG/1
25 TABLET, FILM COATED ORAL EVERY 6 HOURS PRN
Qty: 60 TABLET | Refills: 0 | Status: SHIPPED | OUTPATIENT
Start: 2018-01-30 | End: 2018-02-26 | Stop reason: SDUPTHER

## 2018-01-30 RX ORDER — LEVETIRACETAM 500 MG/1
1 TABLET ORAL 4 TIMES DAILY
COMMUNITY
Start: 2016-04-28 | End: 2018-01-30

## 2018-01-30 RX ORDER — DICYCLOMINE HCL 20 MG
1 TABLET ORAL 2 TIMES DAILY
COMMUNITY
Start: 2017-06-01 | End: 2018-03-08

## 2018-01-30 RX ORDER — OMEPRAZOLE 40 MG/1
40 CAPSULE, DELAYED RELEASE ORAL DAILY
COMMUNITY
End: 2019-05-30 | Stop reason: SDUPTHER

## 2018-01-30 RX ORDER — POTASSIUM CHLORIDE 20 MEQ/1
1 TABLET, EXTENDED RELEASE ORAL DAILY
COMMUNITY
Start: 2016-07-19 | End: 2018-02-02 | Stop reason: ALTCHOICE

## 2018-01-31 ENCOUNTER — ANTICOAG VISIT (OUTPATIENT)
Dept: CARDIOLOGY CLINIC | Facility: CLINIC | Age: 58
End: 2018-01-31

## 2018-01-31 DIAGNOSIS — Z95.2 HISTORY OF PROSTHETIC HEART VALVE: ICD-10-CM

## 2018-01-31 LAB
INR PPP: 3.3
INR PPP: 3.3 (ref 0.86–1.16)
PROTHROMBIN TIME: 34.1 SEC (ref 9–11.5)

## 2018-02-01 ENCOUNTER — TELEPHONE (OUTPATIENT)
Dept: FAMILY MEDICINE CLINIC | Facility: CLINIC | Age: 58
End: 2018-02-01

## 2018-02-01 LAB
ALBUMIN/CREAT UR: 5 MCG/MG CREAT
BASOPHILS # BLD AUTO: 90 CELLS/UL (ref 0–200)
BASOPHILS NFR BLD AUTO: 1.5 %
CHOLEST SERPL-MCNC: 214 MG/DL
CHOLEST/HDLC SERPL: 4 (CALC)
CREAT UR-MCNC: 114 MG/DL (ref 20–320)
EOSINOPHIL # BLD AUTO: 192 CELLS/UL (ref 15–500)
EOSINOPHIL NFR BLD AUTO: 3.2 %
ERYTHROCYTE [DISTWIDTH] IN BLOOD BY AUTOMATED COUNT: 14.3 % (ref 11–15)
HBA1C MFR BLD: 6.3 % OF TOTAL HGB
HCT VFR BLD AUTO: 40.7 % (ref 35–45)
HDLC SERPL-MCNC: 54 MG/DL
HGB BLD-MCNC: 13.3 G/DL (ref 11.7–15.5)
LDLC SERPL CALC-MCNC: 127 MG/DL (CALC)
LYMPHOCYTES # BLD AUTO: 1440 CELLS/UL (ref 850–3900)
LYMPHOCYTES NFR BLD AUTO: 24 %
MCH RBC QN AUTO: 25.9 PG (ref 27–33)
MCHC RBC AUTO-ENTMCNC: 32.7 G/DL (ref 32–36)
MCV RBC AUTO: 79.2 FL (ref 80–100)
MICROALBUMIN UR-MCNC: 0.6 MG/DL
MONOCYTES # BLD AUTO: 390 CELLS/UL (ref 200–950)
MONOCYTES NFR BLD AUTO: 6.5 %
NEUTROPHILS # BLD AUTO: 3888 CELLS/UL (ref 1500–7800)
NEUTROPHILS NFR BLD AUTO: 64.8 %
NONHDLC SERPL-MCNC: 160 MG/DL (CALC)
PLATELET # BLD AUTO: 274 THOUSAND/UL (ref 140–400)
PMV BLD REES-ECKER: 11.5 FL (ref 7.5–12.5)
RBC # BLD AUTO: 5.14 MILLION/UL (ref 3.8–5.1)
TRIGL SERPL-MCNC: 192 MG/DL
TSH SERPL-ACNC: 1.85 MIU/L (ref 0.4–4.5)
WBC # BLD AUTO: 6 THOUSAND/UL (ref 3.8–10.8)

## 2018-02-01 NOTE — TELEPHONE ENCOUNTER
Pt calling to speak with Dr Lucio Else about hip pain, she didn't want appt has no transportaion  I told her Dr would rather she be seen   She insists Dr Lucio Else call her

## 2018-02-02 DIAGNOSIS — J45.30 ASTHMA IN ADULT, MILD PERSISTENT, UNCOMPLICATED: ICD-10-CM

## 2018-02-02 DIAGNOSIS — M25.551 HIP PAIN, RIGHT: Primary | ICD-10-CM

## 2018-02-02 RX ORDER — FLUTICASONE PROPIONATE 110 UG/1
2 AEROSOL, METERED RESPIRATORY (INHALATION) 2 TIMES DAILY
Qty: 1 INHALER | Refills: 5 | Status: SHIPPED | OUTPATIENT
Start: 2018-02-02 | End: 2018-09-04 | Stop reason: SDUPTHER

## 2018-02-02 NOTE — TELEPHONE ENCOUNTER
I called patient, and gave her the name and number of orthopedics (Dr Theda Habermann at [facility]) to make appointment for R hip pain  Please mail referral I just put in for her      Thanks,  Tate Ochoa

## 2018-02-03 LAB
ALBUMIN SERPL-MCNC: 4.2 G/DL (ref 3.6–5.1)
ALBUMIN/GLOB SERPL: 1.4 (CALC) (ref 1–2.5)
ALP SERPL-CCNC: 145 U/L (ref 33–130)
ALT SERPL-CCNC: 13 U/L (ref 6–29)
AST SERPL-CCNC: 18 U/L (ref 10–35)
BILIRUB SERPL-MCNC: 0.6 MG/DL (ref 0.2–1.2)
BUN SERPL-MCNC: 16 MG/DL (ref 7–25)
BUN/CREAT SERPL: ABNORMAL (CALC) (ref 6–22)
CALCIUM SERPL-MCNC: 9.7 MG/DL (ref 8.6–10.4)
CHLORIDE SERPL-SCNC: 104 MMOL/L (ref 98–110)
CO2 SERPL-SCNC: 29 MMOL/L (ref 20–31)
CREAT SERPL-MCNC: 0.82 MG/DL (ref 0.5–1.05)
ERYTHROCYTE [DISTWIDTH] IN BLOOD BY AUTOMATED COUNT: 14.1 % (ref 11–15)
GLOBULIN SER CALC-MCNC: 2.9 G/DL (CALC) (ref 1.9–3.7)
GLUCOSE SERPL-MCNC: 119 MG/DL (ref 65–99)
HCT VFR BLD AUTO: 40 % (ref 35–45)
HGB BLD-MCNC: 13.2 G/DL (ref 11.7–15.5)
LEVETIRACETAM SERPL-MCNC: 20.8 MCG/ML
MCH RBC QN AUTO: 26 PG (ref 27–33)
MCHC RBC AUTO-ENTMCNC: 33 G/DL (ref 32–36)
MCV RBC AUTO: 78.7 FL (ref 80–100)
PLATELET # BLD AUTO: 265 THOUSAND/UL (ref 140–400)
PMV BLD REES-ECKER: 11.1 FL (ref 7.5–12.5)
POTASSIUM SERPL-SCNC: 4.2 MMOL/L (ref 3.5–5.3)
PROT SERPL-MCNC: 7.1 G/DL (ref 6.1–8.1)
RBC # BLD AUTO: 5.08 MILLION/UL (ref 3.8–5.1)
SL AMB EGFR AFRICAN AMERICAN: 91 ML/MIN/1.73M2
SL AMB EGFR NON AFRICAN AMERICAN: 78 ML/MIN/1.73M2
SODIUM SERPL-SCNC: 139 MMOL/L (ref 135–146)
WBC # BLD AUTO: 6.4 THOUSAND/UL (ref 3.8–10.8)

## 2018-02-12 ENCOUNTER — APPOINTMENT (OUTPATIENT)
Dept: RADIOLOGY | Facility: CLINIC | Age: 58
End: 2018-02-12
Payer: COMMERCIAL

## 2018-02-12 ENCOUNTER — OFFICE VISIT (OUTPATIENT)
Dept: OBGYN CLINIC | Facility: MEDICAL CENTER | Age: 58
End: 2018-02-12
Payer: COMMERCIAL

## 2018-02-12 DIAGNOSIS — M54.5 LOW BACK PAIN, UNSPECIFIED BACK PAIN LATERALITY, UNSPECIFIED CHRONICITY, WITH SCIATICA PRESENCE UNSPECIFIED: Primary | ICD-10-CM

## 2018-02-12 DIAGNOSIS — E87.6 HYPOKALEMIA: Primary | ICD-10-CM

## 2018-02-12 DIAGNOSIS — M25.551 HIP PAIN, RIGHT: ICD-10-CM

## 2018-02-12 DIAGNOSIS — M25.551 PAIN IN RIGHT HIP: ICD-10-CM

## 2018-02-12 PROCEDURE — 72100 X-RAY EXAM L-S SPINE 2/3 VWS: CPT

## 2018-02-12 PROCEDURE — 73502 X-RAY EXAM HIP UNI 2-3 VIEWS: CPT

## 2018-02-12 PROCEDURE — 99204 OFFICE O/P NEW MOD 45 MIN: CPT | Performed by: ORTHOPAEDIC SURGERY

## 2018-02-12 RX ORDER — POTASSIUM CHLORIDE 20 MEQ/1
TABLET, EXTENDED RELEASE ORAL
Qty: 30 TABLET | Refills: 1 | Status: SHIPPED | OUTPATIENT
Start: 2018-02-12 | End: 2018-03-08

## 2018-02-12 RX ORDER — ACETAMINOPHEN 500 MG
TABLET ORAL
Qty: 60 TABLET | Refills: 2 | Status: SHIPPED | OUTPATIENT
Start: 2018-02-12 | End: 2019-05-30 | Stop reason: SDUPTHER

## 2018-02-12 NOTE — PROGRESS NOTES
Assessment/Plan     1  Low back pain, unspecified back pain laterality, unspecified chronicity, with sciatica presence unspecified    2  Hip pain, right    3  Pain in right hip      Orders Placed This Encounter   Procedures    XR hip/pelv 2-3 vws right if performed    XR spine lumbar 2 or 3 views injury    Ambulatory referral to Physical Therapy     Patient will go to physical therapy  She will take Tylenol as needed for pain control  She will continue with Tylenol p m  at night  She knows not to exceed 3000 mg of Tylenol a day  She will follow up in 6 weeks or sooner if needed  We may consider injection and/or MRI at that time  Return in about 6 weeks (around 3/26/2018)  History of Present Illness   Chief complaint:   Chief Complaint   Patient presents with    Right Hip - Pain    Lower Back - Pain       HPI: Rolando Muniz is a 60 y o  female that c/o right hip pain  She states that her pain is on the side of her torso in in her low back  If in radiates up the lateral aspect of her hip and into the lateral aspect of her thigh  It does not radiate past her knee  She has had pain for past 5 years over the past 2-3 weeks is worsened  No trauma or falls  Her pain is intermittent and severe at times  Prolonged sitting and standing as well as stairs worsens her pain  She describes as a sharp, stabbing pain  Going from a sitting to standing position also worsens her pain  She denies any numbness or tingling in her right lower extremity  She denies any recent bowel or bladder changes  She denies saddle anesthesia  She has tried Tylenol as needed  She is unable to take NSAIDs secondary to being on warfarin  She has a history of heart surgery  She has not had injections or surgery on her back or hip       ROS:    See HPI for musculoskeletal review, HA, difficulty concentrating  All other systems reviewed are negative     Historical Information   Past Medical History:   Diagnosis Date    Asthma     Heart disease     Seizures (HCC)      Past Surgical History:   Procedure Laterality Date    MITRAL VALVE REPLACEMENT      TONSILLECTOMY       Social History   History   Alcohol Use No     History   Drug Use No     History   Smoking Status    Never Smoker   Smokeless Tobacco    Never Used     Family History:   Family History   Problem Relation Age of Onset    Cancer Mother     Heart disease Father        Meds/Allergies     (Not in a hospital admission)  Allergies   Allergen Reactions    Codeine     Prednisone Itching    Codeine Polt-Chlorphen Polt Er Irritability, Itching and Rash       Objective   Vitals: There were no vitals taken for this visit  ,There is no height or weight on file to calculate BMI      PE:  AAOx 3  WDWN  Hearing intact, no drainage from eyes  Regular rate  no audible wheezing  no abdominal distension  LE compartments soft, skin intact    right hip:   No dislocation/deformity  full ROM  Pain with extreme flexion and ER  No pain with IR  Negative impingement sign  No crepitus, no snapping  Generalized TTP over lateral thigh, greater trochanter, abductor musculature  AT/GS intact    Back:    No TTP over lumbar spinous processes, paraspinal musculature  Negative SLR      Imaging Studies: I have personally reviewed pertinent films in PACS  XR R hip:  No acute osseous abnormality  XR lumbar spine: multi-level degenerative changes

## 2018-02-26 DIAGNOSIS — L50.0 ALLERGIC URTICARIA: ICD-10-CM

## 2018-02-26 RX ORDER — HYDROXYZINE HYDROCHLORIDE 25 MG/1
TABLET, FILM COATED ORAL
Qty: 60 TABLET | Refills: 3 | Status: SHIPPED | OUTPATIENT
Start: 2018-02-26 | End: 2018-07-18 | Stop reason: SDUPTHER

## 2018-02-26 NOTE — PROCEDURES
Procedures by Ebonie Saha MD  at 2018  4:29 PM      Author:  Ebonie Saha MD Service:  Neurology Author Type:  Physician    Filed:  2018  4:38 PM Date of Service:  2018  4:29 PM Status:  Signed    :  Ebonie Saha MD (Physician)        Procedure Orders:       1  EEG Routine and awake [[id number]] ordered by  at 12/15/17 1651                  ELECTROENCEPHALOGRAM (EEG)      Patient Name:  Vargas Ramsey  MRN: [id number]   :  1960 File #: [id number]   Age: 60 y o  Encounter #: [id number]   Date performed: 2018            Report date: 2018          Study type: Routine EEG    ICD 10 diagnosis: Spells/Fit NOS R56 9    Start time: 1233 End time: 2360     -------------------------------------------------------------------------------------------------------------------   Patient History:   61year old female with localization related epilepsy diagnosed about 15 years ago, recent new event: staring spells unclear if seizures    -------------------------------------------------------------------------------------------------------------------   Description of Procedure:  ·  32 channel digital recording with electrodes placed according to the International 10-20 system with additional  T1/T2 electrodes, EOG, EKG, and simultaneous  video  A monitoring technologist supervised the continuous recording  The recording was technically satisfactory  -------------------------------------------------------------------------------------------------------------------   Results:   Background Activity:   During wakefulness, background activity consists of continuous, well-formed,  low voltage, posteriorly dominant, symmetric, reactive 10 Hz alpha activity  with AP gradient present  During drowsiness and light sleep, background activity attenuated and was replaced by diffusely distributed theta activity  Activation Procedures:   Hyperventilation was not performed      Stepped photic stimulation between 1-30 cps was performed and did not alter the tracing  Abnormal Findings:  No focal abnormalities nor interictal epileptiform discharges were noted  No electrographic seizures occurred during this recording  Other findings: The single lead EKG demonstrated a regular  rhythm  Events:   No push button events occurred during this study  -------------------------------------------------------------------------------------------------------------------   Interpretation:    Normal 34 minute  awake and drowsy EEG  Pippa Phelps MD   [address] [facility]  Pager # [phone]    Jan 12 2018  4:38PM [facility] Standard Time

## 2018-02-27 ENCOUNTER — TELEPHONE (OUTPATIENT)
Dept: FAMILY MEDICINE CLINIC | Facility: CLINIC | Age: 58
End: 2018-02-27

## 2018-02-27 NOTE — TELEPHONE ENCOUNTER
PT ASKED TO SPEAK WITH DR CUMMINGS REGARDING HAVING HEADACHES FOR 3 WEEKS AND NOW SHE'S DIZZY WITH CHILLS     OFFERED HER AN APPT SHE SAID SHE HAS NO TRANSPORTATION

## 2018-02-28 DIAGNOSIS — I05.0 MITRAL VALVE STENOSIS, UNSPECIFIED ETIOLOGY: Primary | ICD-10-CM

## 2018-03-01 NOTE — TELEPHONE ENCOUNTER
I called patient and answered all question  Advice her to take tylenol 500 every 6 hours around the clock for the next 2 days  If headache, dizziness persist, please let me know by Friday

## 2018-03-08 ENCOUNTER — HOSPITAL ENCOUNTER (EMERGENCY)
Facility: HOSPITAL | Age: 58
Discharge: HOME | End: 2018-03-08
Attending: EMERGENCY MEDICINE | Admitting: EMERGENCY MEDICINE
Payer: COMMERCIAL

## 2018-03-08 ENCOUNTER — APPOINTMENT (EMERGENCY)
Dept: RADIOLOGY | Facility: HOSPITAL | Age: 58
End: 2018-03-08
Payer: COMMERCIAL

## 2018-03-08 VITALS
TEMPERATURE: 99.5 F | WEIGHT: 219 LBS | BODY MASS INDEX: 44.15 KG/M2 | DIASTOLIC BLOOD PRESSURE: 70 MMHG | HEART RATE: 95 BPM | RESPIRATION RATE: 16 BRPM | HEIGHT: 59 IN | OXYGEN SATURATION: 97 % | SYSTOLIC BLOOD PRESSURE: 136 MMHG

## 2018-03-08 DIAGNOSIS — J06.9 UPPER RESPIRATORY INFECTION: ICD-10-CM

## 2018-03-08 DIAGNOSIS — R50.9 FEVER: ICD-10-CM

## 2018-03-08 DIAGNOSIS — J11.1 INFLUENZA-LIKE ILLNESS: Primary | ICD-10-CM

## 2018-03-08 LAB
ANION GAP SERPL CALCULATED.3IONS-SCNC: 7 MMOL/L (ref 4–13)
APTT PPP: 70 SECONDS (ref 23–35)
BASOPHILS # BLD AUTO: 0.03 THOUSANDS/ΜL (ref 0–0.1)
BASOPHILS NFR BLD AUTO: 1 % (ref 0–1)
BUN SERPL-MCNC: 9 MG/DL (ref 5–25)
CALCIUM SERPL-MCNC: 8.7 MG/DL (ref 8.3–10.1)
CHLORIDE SERPL-SCNC: 104 MMOL/L (ref 100–108)
CO2 SERPL-SCNC: 29 MMOL/L (ref 21–32)
CREAT SERPL-MCNC: 0.79 MG/DL (ref 0.6–1.3)
EOSINOPHIL # BLD AUTO: 0.08 THOUSAND/ΜL (ref 0–0.61)
EOSINOPHIL NFR BLD AUTO: 2 % (ref 0–6)
ERYTHROCYTE [DISTWIDTH] IN BLOOD BY AUTOMATED COUNT: 14.7 % (ref 11.6–15.1)
GFR SERPL CREATININE-BSD FRML MDRD: 82 ML/MIN/1.73SQ M
GLUCOSE SERPL-MCNC: 159 MG/DL (ref 65–140)
HCT VFR BLD AUTO: 38.7 % (ref 34.8–46.1)
HGB BLD-MCNC: 12.8 G/DL (ref 11.5–15.4)
INR PPP: 2.7 (ref 0.86–1.16)
LYMPHOCYTES # BLD AUTO: 0.64 THOUSANDS/ΜL (ref 0.6–4.47)
LYMPHOCYTES NFR BLD AUTO: 16 % (ref 14–44)
MCH RBC QN AUTO: 26.6 PG (ref 26.8–34.3)
MCHC RBC AUTO-ENTMCNC: 33.1 G/DL (ref 31.4–37.4)
MCV RBC AUTO: 80 FL (ref 82–98)
MONOCYTES # BLD AUTO: 0.33 THOUSAND/ΜL (ref 0.17–1.22)
MONOCYTES NFR BLD AUTO: 8 % (ref 4–12)
NEUTROPHILS # BLD AUTO: 2.84 THOUSANDS/ΜL (ref 1.85–7.62)
NEUTS SEG NFR BLD AUTO: 73 % (ref 43–75)
NRBC BLD AUTO-RTO: 0 /100 WBCS
PLATELET # BLD AUTO: 195 THOUSANDS/UL (ref 149–390)
PMV BLD AUTO: 10 FL (ref 8.9–12.7)
POTASSIUM SERPL-SCNC: 3.7 MMOL/L (ref 3.5–5.3)
PROTHROMBIN TIME: 29 SECONDS (ref 12.1–14.4)
RBC # BLD AUTO: 4.82 MILLION/UL (ref 3.81–5.12)
SODIUM SERPL-SCNC: 140 MMOL/L (ref 136–145)
WBC # BLD AUTO: 3.93 THOUSAND/UL (ref 4.31–10.16)

## 2018-03-08 PROCEDURE — 96361 HYDRATE IV INFUSION ADD-ON: CPT

## 2018-03-08 PROCEDURE — 85610 PROTHROMBIN TIME: CPT | Performed by: EMERGENCY MEDICINE

## 2018-03-08 PROCEDURE — 96360 HYDRATION IV INFUSION INIT: CPT

## 2018-03-08 PROCEDURE — 99284 EMERGENCY DEPT VISIT MOD MDM: CPT

## 2018-03-08 PROCEDURE — 71045 X-RAY EXAM CHEST 1 VIEW: CPT

## 2018-03-08 PROCEDURE — 85025 COMPLETE CBC W/AUTO DIFF WBC: CPT | Performed by: EMERGENCY MEDICINE

## 2018-03-08 PROCEDURE — 85730 THROMBOPLASTIN TIME PARTIAL: CPT | Performed by: EMERGENCY MEDICINE

## 2018-03-08 PROCEDURE — 36415 COLL VENOUS BLD VENIPUNCTURE: CPT | Performed by: EMERGENCY MEDICINE

## 2018-03-08 PROCEDURE — 80048 BASIC METABOLIC PNL TOTAL CA: CPT | Performed by: EMERGENCY MEDICINE

## 2018-03-08 RX ORDER — LEVETIRACETAM 500 MG/1
500 TABLET ORAL 3 TIMES DAILY
COMMUNITY
End: 2018-12-12 | Stop reason: SDUPTHER

## 2018-03-08 RX ORDER — ACETAMINOPHEN 325 MG/1
975 TABLET ORAL ONCE
Status: COMPLETED | OUTPATIENT
Start: 2018-03-08 | End: 2018-03-08

## 2018-03-08 RX ORDER — TRAMADOL HYDROCHLORIDE 50 MG/1
50 TABLET ORAL EVERY 6 HOURS PRN
COMMUNITY
End: 2019-09-26

## 2018-03-08 RX ORDER — WARFARIN SODIUM 5 MG/1
5 TABLET ORAL
COMMUNITY
End: 2018-05-22 | Stop reason: SDUPTHER

## 2018-03-08 RX ORDER — WARFARIN SODIUM 2.5 MG/1
2.5 TABLET ORAL
COMMUNITY
End: 2020-04-06 | Stop reason: SDUPTHER

## 2018-03-08 RX ADMIN — ACETAMINOPHEN 975 MG: 325 TABLET, FILM COATED ORAL at 12:42

## 2018-03-08 RX ADMIN — SODIUM CHLORIDE 1000 ML: 0.9 INJECTION, SOLUTION INTRAVENOUS at 12:49

## 2018-03-08 NOTE — ED PROVIDER NOTES
Emergency Department Note- Guzman Eastman 60 y o  female MRN: [id number]    Unit/Bed#: ED 26 Encounter: [id number]        History of Present Illness   HPI:  Guzman Eastman is a 60 y o  female who presents with flu-like illness, patient states she had a fever yesterday  The patient has had a productive cough no hemoptysis she has had body aches mild frontal headache past medical history includes mitral valve replacement with mechanical valve and hypertension she is on Coumadin  Patient states she has had a viral illness approximately 3 weeks ago got better and over the last couple days has gotten worse again  She denies chest pain she denies shortness of breath she denies abdominal pain she denies vomiting or diarrhea there have been multiple sick contacts in the household    The patient did not have influenza vaccine        Review of Systems  REVIEW OF SYSTEMS  Constitutional: positive fever, chills, no weight loss   Eyes:  Denies visual changes, denies eye pain    HENT:   positive nasal congestion or sore throat   Respiratory:  Denies positive cough or negative shortness of breath, denies hemoptysis    Cardiovascular:  Denies chest pain, palpitations, or leg edema    GI:  Denies abdominal pain, nausea, vomiting, bloody stools, melena, or diarrhea   :  Denies dysuria, hematuria, polyuria   Musculoskeletal:  Denies back pain or joint pain   Integument:  Denies rash, denies color change    Neurologic:  Denies headache, focal weakness or sensory changes   Endocrine:  Denies polyuria or polydipsia   Lymphatic:  Denies swollen glands   Psychiatric:  Denies depression or anxiety     All system reviewed and negative except as noted above or in HPI    Historical Information   Past Medical History:   Diagnosis Date    Asthma     Heart disease     [facility] ([facility] [address]     [facility]      Past Surgical History:   Procedure Laterality Date    MITRAL VALVE REPLACEMENT      TONSILLECTOMY       Social History History   Alcohol Use No     History   Drug Use No     History   Smoking Status    Never Smoker   Smokeless Tobacco    Never Used     Family History:   Family History   Problem Relation Age of Onset   [place] Cancer Mother     Dementia Mother     Diabetes Mother     Hypertension Mother     Hyperlipidemia Mother     Heart disease Father     Coronary artery disease Father     Hypertension Father     Heart attack Father     Diabetes Sister     Breast cancer Sister     Diabetes Brother     Hypertension Brother     Hyperlipidemia Brother     Diabetes Maternal Grandmother     Breast cancer Maternal Grandmother        Meds/Allergies     (Not in a hospital admission)  Allergies   Allergen Reactions    Codeine     Prednisone Itching    Codeine Polt-Chlorphen Polt Er Irritability, Itching and Rash       Objective   Vitals: Blood pressure 136/70, pulse 95, temperature 99 5 °F (37 5 °C), temperature source Oral, resp  rate 16, height 4' 11" (1 499 m), weight 99 3 kg (219 lb), SpO2 97 %  PHYSICAL EXAM  Constitutional:  Well developed, well nourished, no acute distress, non toxic appearance   Eyes:   PERRL, EOMI, conjunctiva normal, sclera anicteric, no proptosis   HENT:  Atraumatic, external ears normal, nose normal, oropharynx moist, no pharyngeal exudates  Neck  no JVD, no bruits,  normal range of motion, no tenderness, supple, thyroid normal    Respiratory:  No respiratory distress, normal breath sounds B/L, no rales, no wheezing     Cardiovascular:  NSR, no M/G/R  GI:  Soft,  Normal BS,  ND,  NT,  No mass or bruits   :  No costovertebral angle tenderness   Extremities: No edema, no tenderness, no deformities   Normal pulses   Musculoskeletal:   Back - no midline tenderness,  FROM  Upper and lower extremities   SKIN:   no rash, warm and dry    Neurologic:  Alert & oriented x 3, CN 2-12 intact, normal motor function, normal sensory function, no focal deficits noted, gait normal   Psychiatric:  Speech and behavior appropriate normal judgement and insight      Lab Results: Lab Results: I have personally reviewed pertinent lab results  Labs Reviewed   CBC AND DIFFERENTIAL - Abnormal        Result Value Ref Range Status    WBC 3 93 (*) 4 31 - 10 16 Thousand/uL Final    RBC 4 82  3 81 - 5 12 Million/uL Final    Hemoglobin 12 8  11 5 - 15 4 g/dL Final    Hematocrit 38 7  34 8 - 46 1 % Final    MCV 80 (*) 82 - 98 fL Final    MCH 26 6 (*) 26 8 - 34 3 pg Final    MCHC 33 1  31 4 - 37 4 g/dL Final    RDW 14 7  11 6 - 15 1 % Final    MPV 10 0  8 9 - 12 7 fL Final    Platelets 575  484 - 390 Thousands/uL Final    nRBC 0  /100 WBCs Final    Neutrophils Relative 73  43 - 75 % Final    Lymphocytes Relative 16  14 - 44 % Final    Monocytes Relative 8  4 - 12 % Final    Eosinophils Relative 2  0 - 6 % Final    Basophils Relative 1  0 - 1 % Final    Neutrophils Absolute 2 84  1 85 - 7 62 Thousands/µL Final    Lymphocytes Absolute 0 64  0 60 - 4 47 Thousands/µL Final    Monocytes Absolute 0 33  0 17 - 1 22 Thousand/µL Final    Eosinophils Absolute 0 08  0 00 - 0 61 Thousand/µL Final    Basophils Absolute 0 03  0 00 - 0 10 Thousands/µL Final   PROTIME-INR - Abnormal     Protime 29 0 (*) 12 1 - 14 4 seconds Final    INR 2 70 (*) 0 86 - 1 16 Final   APTT - Abnormal     PTT 70 (*) 23 - 35 seconds Final    Narrative: Therapeutic Heparin Range = 60-90 seconds   BASIC METABOLIC PANEL - Abnormal     Sodium 140  136 - 145 mmol/L Final    Potassium 3 7  3 5 - 5 3 mmol/L Final    Chloride 104  100 - 108 mmol/L Final    CO2 29  21 - 32 mmol/L Final    Anion Gap 7  4 - 13 mmol/L Final    BUN 9  5 - 25 mg/dL Final    Creatinine 0 79  0 60 - 1 30 mg/dL Final    Comment: Standardized to IDMS reference method    Glucose 159 (*) 65 - 140 mg/dL Final    Comment:   If the patient is fasting, the ADA then defines impaired fasting glucose as > 100 mg/dL and diabetes as > or equal to 123 mg/dL    Specimen collection should occur prior to Sulfasalazine administration due to the potential for falsely depressed results  Specimen collection should occur prior to Sulfapyridine administration due to the potential for falsely elevated results  Calcium 8 7  8 3 - 10 1 mg/dL Final    eGFR 82  ml/min/1 73sq m Final    Narrative:     National Kidney Disease Education Program recommendations are as follows:  GFR calculation is accurate only with a steady state creatinine  Chronic Kidney disease less than 60 ml/min/1 73 sq  meters  Kidney failure less than 15 ml/min/1 73 sq  meters  Imaging: I have personally reviewed pertinent reports  XR chest portable   Final Result      No acute cardiopulmonary disease  Workstation performed: [id number]           EKG, Pathology, and Other Studies: I have personally reviewed pertinent films in PACS  ED Course        Assessment/Plan     ED Medical Decision Making:  Flu-like illness rule out pneumonia Will check chest x-ray and labs will give IV fluids and Tylenol will re-evaluate  1  Influenza-like illness    2  Fever    3   Upper respiratory infection           Peri Anne MD  03/08/18 9146

## 2018-03-08 NOTE — DISCHARGE INSTRUCTIONS
Acute Bronchitis   WHAT YOU NEED TO KNOW:   Acute bronchitis is swelling and irritation in the air passages of your lungs  This irritation may cause you to cough or have other breathing problems  Acute bronchitis often starts because of another illness, such as a cold or the flu  The illness spreads from your nose and throat to your windpipe and airways  Bronchitis is often called a chest cold  Acute bronchitis lasts about 3 to 6 weeks and is usually not a serious illness  Your cough can last for several weeks  DISCHARGE INSTRUCTIONS:   Return to the emergency department if:   · You cough up blood  · Your lips or fingernails turn blue  · You feel like you are not getting enough air when you breathe  Contact your healthcare provider if:   · You have a fever  · Your breathing problems do not go away or get worse  · Your cough does not get better within 4 weeks  · You have questions or concerns about your condition or care  Self-care:   · Get more rest   Rest helps your body to heal  Slowly start to do more each day  Rest when you feel it is needed  · Avoid irritants in the air  Avoid chemicals, fumes, and dust  Wear a face mask if you must work around dust or fumes  Stay inside on days when air pollution levels are high  If you have allergies, stay inside when pollen counts are high  Do not use aerosol products, such as spray-on deodorant, bug spray, and hair spray  · Do not smoke or be around others who smoke  Nicotine and other chemicals in cigarettes and cigars damages the cilia that move mucus out of your lungs  Ask your healthcare provider for information if you currently smoke and need help to quit  E-cigarettes or smokeless tobacco still contain nicotine  Talk to your healthcare provider before you use these products  · Drink liquids as directed  Liquids help keep your air passages moist and help you cough up mucus   You may need to drink more liquids when you have acute bronchitis  Ask how much liquid to drink each day and which liquids are best for you  · Use a humidifier or vaporizer  Use a cool mist humidifier or a vaporizer to increase air moisture in your home  This may make it easier for you to breathe and help decrease your cough  Decrease risk for acute bronchitis:   · Get the vaccinations you need  Ask your healthcare provider if you should get vaccinated against the flu or pneumonia  · Prevent the spread of germs  You can decrease your risk of acute bronchitis and other illnesses by doing the following:     [facility] AUTHORITY your hands often with soap and water  Carry germ-killing hand lotion or gel with you  You can use the lotion or gel to clean your hands when soap and water are not available  ¨ Do not touch your eyes, nose, or mouth unless you have washed your hands first     ¨ Always cover your mouth when you cough to prevent the spread of germs  It is best to cough into a tissue or your shirt sleeve instead of into your hand  Ask those around you cover their mouths when they cough  ¨ Try to avoid people who have a cold or the flu  If you are sick, stay away from others as much as possible  Medicines: Your healthcare provider may  give you any of the following:  · Ibuprofen or acetaminophen  are medicines that help lower your fever  They are available without a doctor's order  Ask your healthcare provider which medicine is right for you  Ask how much to take and how often to take it  Follow directions  These medicines can cause stomach bleeding if not taken correctly  Ibuprofen can cause kidney damage  Do not take ibuprofen if you have kidney disease, an ulcer, or allergies to aspirin  Acetaminophen can cause liver damage  Do not take more than 4,000 milligrams in 24 hours  · Decongestants  help loosen mucus in your lungs and make it easier to cough up  This can help you breathe easier  · Cough suppressants  decrease your urge to cough   If your cough produces mucus, do not take a cough suppressant unless your healthcare provider tells you to  Your healthcare provider may suggest that you take a cough suppressant at night so you can rest     · Inhalers  may be given  Your healthcare provider may give you one or more inhalers to help you breathe easier and cough less  An inhaler gives your medicine to open your airways  Ask your healthcare provider to show you how to use your inhaler correctly  · Take your medicine as directed  Contact your healthcare provider if you think your medicine is not helping or if you have side effects  Tell him of her if you are allergic to any medicine  Keep a list of the medicines, vitamins, and herbs you take  Include the amounts, and when and why you take them  Bring the list or the pill bottles to follow-up visits  Carry your medicine list with you in case of an emergency  Follow up with your healthcare provider as directed:  Write down questions you have so you will remember to ask them during your follow-up visits  © 2017 [address] [employer] Information is for End User's use only and may not be sold, redistributed or otherwise used for commercial purposes  All illustrations and images included in CareNotes® are the copyrighted property of [employer]  or [employer]  The above information is an  only  It is not intended as medical advice for individual conditions or treatments  Talk to your doctor, nurse or pharmacist before following any medical regimen to see if it is safe and effective for you

## 2018-03-09 ENCOUNTER — ANTICOAG VISIT (OUTPATIENT)
Dept: CARDIOLOGY CLINIC | Facility: CLINIC | Age: 58
End: 2018-03-09

## 2018-03-09 DIAGNOSIS — Z95.2 HISTORY OF PROSTHETIC HEART VALVE: ICD-10-CM

## 2018-03-11 DIAGNOSIS — E11.9 TYPE 2 DIABETES MELLITUS WITHOUT COMPLICATION, WITHOUT LONG-TERM CURRENT USE OF INSULIN (HCC): Primary | ICD-10-CM

## 2018-03-11 PROBLEM — R09.82 POSTNASAL DRIP: Status: ACTIVE | Noted: 2017-03-06

## 2018-03-11 PROBLEM — R87.612 LOW GRADE SQUAMOUS INTRAEPITHELIAL LESION (LGSIL) ON CERVICAL PAP SMEAR: Status: ACTIVE | Noted: 2017-04-27

## 2018-03-11 PROBLEM — G47.19 EXCESSIVE DAYTIME SLEEPINESS: Status: ACTIVE | Noted: 2017-01-20

## 2018-03-11 PROBLEM — G47.21 SLEEP DISORDER, CIRCADIAN, DELAYED SLEEP PHASE TYPE: Status: ACTIVE | Noted: 2017-12-15

## 2018-03-11 RX ORDER — TORSEMIDE 20 MG/1
1 TABLET ORAL DAILY
COMMUNITY
Start: 2018-02-12 | End: 2018-04-13 | Stop reason: SDUPTHER

## 2018-03-12 ENCOUNTER — OFFICE VISIT (OUTPATIENT)
Dept: FAMILY MEDICINE CLINIC | Facility: CLINIC | Age: 58
End: 2018-03-12
Payer: COMMERCIAL

## 2018-03-12 VITALS
DIASTOLIC BLOOD PRESSURE: 76 MMHG | BODY MASS INDEX: 43.04 KG/M2 | TEMPERATURE: 97.5 F | WEIGHT: 219.2 LBS | SYSTOLIC BLOOD PRESSURE: 122 MMHG | RESPIRATION RATE: 20 BRPM | HEIGHT: 60 IN | HEART RATE: 72 BPM

## 2018-03-12 DIAGNOSIS — M79.10 MYALGIA: ICD-10-CM

## 2018-03-12 DIAGNOSIS — R05.9 COUGH: Primary | ICD-10-CM

## 2018-03-12 DIAGNOSIS — R50.9 FEVER, UNSPECIFIED FEVER CAUSE: ICD-10-CM

## 2018-03-12 PROCEDURE — 87798 DETECT AGENT NOS DNA AMP: CPT | Performed by: FAMILY MEDICINE

## 2018-03-12 PROCEDURE — 99213 OFFICE O/P EST LOW 20 MIN: CPT | Performed by: FAMILY MEDICINE

## 2018-03-12 RX ORDER — FLUTICASONE PROPIONATE 50 MCG
1 SPRAY, SUSPENSION (ML) NASAL DAILY
Qty: 16 G | Refills: 1 | Status: SHIPPED | OUTPATIENT
Start: 2018-03-12 | End: 2019-05-30 | Stop reason: SDUPTHER

## 2018-03-12 NOTE — PROGRESS NOTES
Maria Eugenia Graham 1960 female MRN: [id number]    Family Medicine Acute Visit    ASSESSMENT/PLAN  Problem List Items Addressed This Visit     Cough - Primary     - In setting of fever, mylagias, diarrhea, could possibly be the flu    - Flu PCR sent; however, pt is too far out from onset of symptoms for Tamiflu treatment  - No evidence of asthma exacerbation at this time --> no shortness of breath, wheezes on exam   - Could also be a sinusitis, given nasal turbinate edema on exam, which is causing post-nasal drip and throat irritation --> will send Flonase to promote nasal drainage instead of post-nasal drip, and encouraged good hydration to thin mucus   - Pt will follow up on Friday --> if symptoms still persistent, could consider antibiotic treatment of sinusitis given length of symptoms          Relevant Medications    fluticasone (FLONASE) 50 mcg/act nasal spray    Other Relevant Orders    Influenza A/B and RSV by PCR      Other Visit Diagnoses     Myalgia        Relevant Orders    Influenza A/B and RSV by PCR    Fever, unspecified fever cause        Relevant Orders    Influenza A/B and RSV by PCR                Future Appointments  Date Time Provider Izabel Villalobos   3/19/2018 2:00 PM Efrem Elkins, PT [facility] [place]   3/21/2018 1:20 PM Dax Canales [facility]   3/26/2018 11:45 AM Trina Amaya DO [facility]   5/21/2018 11:30 AM Walter Arredondo MD [facility]          SUBJECTIVE  CC: Cold Like Symptoms      HPI:  Maria Eugenia Graham is a 60 y o  female who presents for an acute visit  Dry cough x3-4 weeks, (+) sore throat (+) head pressure, (+) fever/chills   (+) nausea without vomiting, (+) loose stools, (+) myalgias      Seen in ER 3/9 -- diagnosed with viral illness and discharged to home      Pt did not have her flu shot this year     Review of Systems   Constitutional: Positive for chills, fatigue and fever     HENT: Positive for congestion, ear pain, postnasal drip, rhinorrhea, sinus pain and sore throat  Respiratory: Positive for cough  Negative for shortness of breath  Gastrointestinal: Positive for nausea  Negative for vomiting         Historical Information   The patient history was reviewed as follows:  Past Medical History:   Diagnosis Date    Abnormal ultrasound of pelvis     last assessed - 26Mar2015    Asthma     Cerebral infarction [facility])     unspecified; last assessed - 30Aug2017    Dizziness     last assessed - 25Aug2015    Dysuria     last assessed - [id number]    Exposure to potentially hazardous body fluids     last assessed - [id number]    Heart disease     Hematoma     of arm; last assessed - 20Jun2016    Personal history of scoliosis     Pneumonia     Scar, hypertrophic     last assessed - [id number]    [facility]ures [facility] [address] [facility]     Type 2 diabetes mellitus ([facility] [address] )     Vision problems          Past Surgical History:   Procedure Laterality Date    MITRAL VALVE REPLACEMENT  06/17/2015    MVR with 27 mm [employer] Optiform mechanical prostethic;  last assessed - [id number]    TONSILLECTOMY      TRANSLUMINAL ANGIOPLASTY      mitral valve; last assessed - 01Aug2014   [address] [place]    last assessed - [id number]     Family History   Problem Relation Age of Onset    Cancer Mother     Dementia Mother     Diabetes Mother     Hypertension Mother     Hyperlipidemia Mother     Cervical cancer Mother     Heart disease Father     Coronary artery disease Father     Hypertension Father     Heart attack Father     Diabetes Sister     Breast cancer Sister     Diabetes Brother     Hypertension Brother     Hyperlipidemia Brother     Diabetes Maternal Grandmother     Breast cancer Maternal Grandmother     Diabetes Other     Seizures Son     Psoriasis Son       Social History   History   Alcohol Use No     History   Drug Use No     History   Smoking Status    Never Smoker   Smokeless Tobacco    Never Used       Medications:     Current Outpatient Prescriptions:     acetaminophen (TYLENOL) 500 mg tablet, Take 2 tablets by mouth daily as needed for pain, Disp: 60 tablet, Rfl: 2    cetirizine (ALL DAY ALLERGY) 10 mg tablet, Take 1 tablet (10 mg total) by mouth daily As directed, Disp: 30 tablet, Rfl: 2    fluticasone (FLOVENT HFA) 110 MCG/ACT inhaler, Inhale 2 puffs 2 (two) times a day, Disp: 1 Inhaler, Rfl: 5    hydrOXYzine HCL (ATARAX) 25 mg tablet, TAKE ONE TABLET BY MOUTH EVERY 6 HOURS AS NEEDED, Disp: 60 tablet, Rfl: 3    levETIRAcetam (KEPPRA) 500 mg tablet, Take 500 mg by mouth 3 (three) times a day, Disp: , Rfl:     metFORMIN (GLUCOPHAGE) 500 mg tablet, TAKE ONE TABLET BY MOUTH TWICE A DAY, Disp: 180 tablet, Rfl: 2    metoprolol tartrate (LOPRESSOR) 25 mg tablet, Take 0 5 tablets (12 5 mg total) by mouth every 12 (twelve) hours, Disp: 30 tablet, Rfl: 5    omeprazole (PriLOSEC) 40 MG capsule, Take by mouth, Disp: , Rfl:     torsemide (DEMADEX) 20 mg tablet, Take 1 tablet by mouth daily, Disp: , Rfl:     traMADol (ULTRAM) 50 mg tablet, Take 50 mg by mouth every 6 (six) hours as needed for moderate pain, Disp: , Rfl:     warfarin (COUMADIN) 2 5 mg tablet, Take 2 5 mg by mouth daily, Disp: , Rfl:     warfarin (COUMADIN) 5 mg tablet, Take 5 mg by mouth, Disp: , Rfl:     Allergies   Allergen Reactions    Codeine     Prednisone Itching    Codeine Polt-Chlorphen Polt Er Irritability, Itching and Rash       OBJECTIVE  Vitals:   Vitals:    03/12/18 1440   BP: 122/76   Pulse: 72   Resp: 20   Temp: 97 5 °F (36 4 °C)   Weight: 99 4 kg (219 lb 3 2 oz)   Height: 5' 0 1" (1 527 m)         Physical Exam   Constitutional: She appears well-developed and well-nourished  No distress  HENT:   Right Ear: External ear normal    Left Ear: External ear normal    Nose: Mucosal edema present  Mouth/Throat: Posterior oropharyngeal erythema (mild) present  No oropharyngeal exudate  Cardiovascular: Normal rate and regular rhythm      Pulmonary/Chest: Effort normal  No respiratory distress  Diffuse crackles without decreased breath sounds, wheezes   Abdominal: Soft  Bowel sounds are normal  She exhibits no distension  There is no tenderness  There is no rebound and no guarding  Neurological: She is alert  Skin: Skin is warm and dry  No rash noted  Psychiatric: She has a normal mood and affect                    Mia Hatch DO, PGY-2  [facility]   3/12/2018

## 2018-03-12 NOTE — ASSESSMENT & PLAN NOTE
- In setting of fever, mylagias, diarrhea, could possibly be the flu    - Flu PCR sent; however, pt is too far out from onset of symptoms for Tamiflu treatment  - No evidence of asthma exacerbation at this time --> no shortness of breath, wheezes on exam   - Could also be a sinusitis, given nasal turbinate edema on exam, which is causing post-nasal drip and throat irritation --> will send Flonase to promote nasal drainage instead of post-nasal drip, and encouraged good hydration to thin mucus   - Pt will follow up on Friday --> if symptoms still persistent, could consider antibiotic treatment of sinusitis given length of symptoms

## 2018-03-13 LAB
FLUAV AG SPEC QL: NORMAL
FLUBV AG SPEC QL: NORMAL
RSV B RNA SPEC QL NAA+PROBE: NORMAL

## 2018-03-16 ENCOUNTER — OFFICE VISIT (OUTPATIENT)
Dept: FAMILY MEDICINE CLINIC | Facility: CLINIC | Age: 58
End: 2018-03-16
Payer: COMMERCIAL

## 2018-03-16 VITALS
HEART RATE: 78 BPM | RESPIRATION RATE: 14 BRPM | TEMPERATURE: 96.6 F | HEIGHT: 60 IN | DIASTOLIC BLOOD PRESSURE: 70 MMHG | WEIGHT: 220.4 LBS | SYSTOLIC BLOOD PRESSURE: 130 MMHG | BODY MASS INDEX: 43.27 KG/M2

## 2018-03-16 DIAGNOSIS — J45.30 MILD PERSISTENT ASTHMA WITHOUT COMPLICATION: ICD-10-CM

## 2018-03-16 DIAGNOSIS — J06.9 VIRAL URI WITH COUGH: Primary | ICD-10-CM

## 2018-03-16 PROCEDURE — 99213 OFFICE O/P EST LOW 20 MIN: CPT | Performed by: FAMILY MEDICINE

## 2018-03-16 NOTE — PROGRESS NOTES
Wei Morrison 1960 female MRN: [id number]    Family medicine Follow up Visit        ASSESSMENT/PLAN  Problem List Items Addressed This Visit     Asthma, persistent      Patient has mild persistent asthma  Advised patient to use albuterol inhaler every 4-6 hours for shortness of breath, wheezing, cough  Advised to use Flovent inhaler 2 puffs b i d  with spacer  Advised to wash mouth every time after inhaler use  Discussed asthma action plan with patient  Will be provided with copy of asthma action plan on discharge with AVS          Viral URI with cough - Primary       Symptoms are improving  Advised to continue supportive care  Drink 6-8 glasses of water to maintain hydration  Take tylenol every 6 hours for fever and body aches  Frequent hand washing to prevent spread of infection  Honey with hot water or tea for cough  Return to office and ER precautions discussed  Follow-up with Dr Naa Galan in 3 months for chronic problems  Future Appointments  Date Time Provider Izabel Villalobos   3/19/2018 2:00 PM Uzair Baird, PT [facility]   3/21/2018 1:20 PM Mary Rivera MD [facility]   3/26/2018 11:45 AM Emory Preciado DO [facility]   5/21/2018 11:30 AM Jerrell Obrien MD [facility]        SUBJECTIVE  CC:  Follow-up for cough, myalgias, fever  HPI  Wei Morrison is a 60 y o  female   here for follow-up for cough, myalgias, fever  Patient was seen in office 4 days ago for  URI symptoms  Flu was ordered, patient is flu and RSV negative  She reports feeling better, her myalgias and postnasal drip are better but still having some cough  She has history of asthma and uses Flovent, she reported that she used albuterol inhaler last night and she felt better, was able to sleep  She denies fever  She has been eating and drinking as per normal     Review of Systems   Constitutional: Negative  HENT: Negative  Eyes: Negative      Respiratory: Positive for cough  Negative for shortness of breath and wheezing  Cardiovascular: Negative  Gastrointestinal: Negative  Genitourinary: Negative  Musculoskeletal: Negative  Allergic/Immunologic: Negative  Neurological: Negative  Hematological: Negative  Psychiatric/Behavioral: Negative          Historical Information   The patient history was reviewed as follows:  Past Medical History:   Diagnosis Date    Abnormal ultrasound of pelvis     last assessed - 26Mar2015    Asthma     Cerebral infarction [facility])     unspecified; last assessed - [id number]    Dizziness     last assessed - [id number]    Dysuria     last assessed - [id number]    Exposure to potentially hazardous body fluids     last assessed - [id number]    Heart disease     Hematoma     of arm; last assessed - 20Jun2016    Personal history of scoliosis     Pneumonia     Scar, hypertrophic     last assessed - [id number]    [facility] [address] [facility]     Type 2 diabetes mellitus ([facility] [address] [facility]     Vision problems      Past Surgical History:   Procedure Laterality Date    MITRAL VALVE REPLACEMENT  06/17/2015    MVR with 27 mm [employer] Optiform mechanical prostethic;  last assessed - [id number]    TONSILLECTOMY      TRANSLUMINAL ANGIOPLASTY      mitral valve; last assessed - [id number]    [address]    last assessed - [id number]     Family History   Problem Relation Age of Onset    Cancer Mother     Dementia Mother     Diabetes Mother     Hypertension Mother     Hyperlipidemia Mother     Cervical cancer Mother     Heart disease Father     Coronary artery disease Father     Hypertension Father     Heart attack Father     Diabetes Sister     Breast cancer Sister     Diabetes Brother     Hypertension Brother     Hyperlipidemia Brother     Diabetes Maternal Grandmother     Breast cancer Maternal Grandmother     Diabetes Other     Seizures Son     Psoriasis Son       Social History   History   Alcohol Use No     History   Drug Use No     History   Smoking Status    Never Smoker   Smokeless Tobacco    Never Used       Medications:   Meds/Allergies   Current Outpatient Prescriptions   Medication Sig Dispense Refill    acetaminophen (TYLENOL) 500 mg tablet Take 2 tablets by mouth daily as needed for pain 60 tablet 2    cetirizine (ALL DAY ALLERGY) 10 mg tablet Take 1 tablet (10 mg total) by mouth daily As directed 30 tablet 2    fluticasone (FLONASE) 50 mcg/act nasal spray 1 spray into each nostril daily 16 g 1    fluticasone (FLOVENT HFA) 110 MCG/ACT inhaler Inhale 2 puffs 2 (two) times a day 1 Inhaler 5    hydrOXYzine HCL (ATARAX) 25 mg tablet TAKE ONE TABLET BY MOUTH EVERY 6 HOURS AS NEEDED 60 tablet 3    levETIRAcetam (KEPPRA) 500 mg tablet Take 500 mg by mouth 3 (three) times a day      metFORMIN (GLUCOPHAGE) 500 mg tablet TAKE ONE TABLET BY MOUTH TWICE A  tablet 2    metoprolol tartrate (LOPRESSOR) 25 mg tablet Take 0 5 tablets (12 5 mg total) by mouth every 12 (twelve) hours 30 tablet 5    omeprazole (PriLOSEC) 40 MG capsule Take by mouth      torsemide (DEMADEX) 20 mg tablet Take 1 tablet by mouth daily      traMADol (ULTRAM) 50 mg tablet Take 50 mg by mouth every 6 (six) hours as needed for moderate pain      warfarin (COUMADIN) 2 5 mg tablet Take 2 5 mg by mouth daily      warfarin (COUMADIN) 5 mg tablet Take 5 mg by mouth       No current facility-administered medications for this visit          Allergies   Allergen Reactions    Codeine     Prednisone Itching    Codeine Polt-Chlorphen Polt Er Irritability, Itching and Rash       OBJECTIVE  Vitals:   Vitals:    03/16/18 1336   BP: 130/70   BP Location: Left arm   Patient Position: Sitting   Cuff Size: Large   Pulse: 78   Resp: 14   Temp: (!) 96 6 °F (35 9 °C)   TempSrc: Tympanic   Weight: 100 kg (220 lb 6 4 oz)   Height: 4' 11 8" (1 519 m)       Invasive Devices          No matching active lines, drains, or airways          Physical Exam   Constitutional: She is oriented to person, place, and time  She appears well-developed and well-nourished  HENT:   Head: Normocephalic and atraumatic  Right Ear: External ear normal    Left Ear: External ear normal    Nose: Mucosal edema and rhinorrhea present  Mouth/Throat: Oropharynx is clear and moist  No oropharyngeal exudate  Eyes: Conjunctivae are normal  Right eye exhibits no discharge  Left eye exhibits no discharge  Neck: Neck supple  Cardiovascular: Normal rate, regular rhythm and normal heart sounds  Pulmonary/Chest: Effort normal and breath sounds normal  No respiratory distress  Mild expiratory wheezing   Abdominal: Soft  Bowel sounds are normal    Musculoskeletal: Normal range of motion  She exhibits no deformity  Lymphadenopathy:     She has no cervical adenopathy  Neurological: She is alert and oriented to person, place, and time  She exhibits normal muscle tone  Skin: Skin is warm  No rash noted  Psychiatric: She has a normal mood and affect   Her behavior is normal           _____________________________________________________________________     Himanshu Elkins MD, PGY-2  [address]   3/16/2018

## 2018-03-16 NOTE — ASSESSMENT & PLAN NOTE
Symptoms are improving  Advised to continue supportive care  Drink 6-8 glasses of water to maintain hydration  Take tylenol every 6 hours for fever and body aches  Frequent hand washing to prevent spread of infection  Honey with hot water or tea for cough  Return to office and ER precautions discussed

## 2018-03-16 NOTE — ASSESSMENT & PLAN NOTE
Patient has mild persistent asthma  Advised patient to use albuterol inhaler every 4-6 hours for shortness of breath, wheezing, cough  Advised to use Flovent inhaler 2 puffs b i d  with spacer  Advised to wash mouth every time after inhaler use  Discussed asthma action plan with patient    Will be provided with copy of asthma action plan on discharge with AVS

## 2018-03-16 NOTE — PATIENT INSTRUCTIONS
Asthma Action Plan for Morgan Hernandez    Printed: 3/16/2018  Doctor's Name: Crissy Sheldon MD, Phone Number: [phone]    Please bring this plan and all your medications to each visit to our office or the emergency room  GREEN ZONE: Doing Well   No cough, wheeze, chest tightness or shortness of breath during the day or night  Can do your usual activities      Take these long-term-control medicines each day   Medicine How much to take When to take it   Flovent (brown) 2 puffs Twice daily  Take these medicines before exercise if your asthma is exercise-induced   Medicine How much to take When to take it   albuterol (PROVENTIL,VENTOLIN) 2 puffs 20 minutes before exercise   (blue)         YELLOW ZONE: Asthma is Getting Worse   Cough, wheeze, chest tightness or shortness of breath or  Waking at night due to asthma, or  Can do some, but not all, usual activities,   First:  Take quick-relief medicine - and keep taking your GREEN ZONE medicines  · Take the albuterol (PROVENTIL,VENTOLIN) (blue) inhaler 2 puffs every 4-6 hours  Second: If your symptoms (and peak flows) return to Green Zone after 1 hour of above treatment, continue monitoring to be sure you stay in the green zone     -Or,     If your symptoms (and peak flows) do not return to Green Zone after 1 hour of above treatment:  · Take the albuterol (PROVENTIL,VENTOLIN) (blue) inhaler 2 puffs every 20 minutes for up to 1 hour  Call the doctor to schedule appointment  RED ZONE: Medical Alert! Very short of breath, or  Quick relief medications have not helped, or  Cannot do usual activities, or  Symptoms are same or worse after 24 hours in the Yellow Zone,    First, take these medicines:  · Take the albuterol (PROVENTIL,VENTOLIN) inhaler 2 puffs every 20 minutes for up to 1 hour  Then call your medical provider NOW!  Go to the hospital or call an ambulance if:  You are still in the Red Zone after 15 minutes, AND  You have not reached your medical provider    DANGER SIGNS   Trouble walking and talking due to shortness of breath, or  Lips or fingernails are blue    Take 2 puffs of your quick relief medicine, AND  Go to the hospital or call for an ambulance (call 911) NOW!

## 2018-03-19 ENCOUNTER — EVALUATION (OUTPATIENT)
Dept: PHYSICAL THERAPY | Age: 58
End: 2018-03-19
Payer: COMMERCIAL

## 2018-03-19 DIAGNOSIS — M25.551 PAIN IN RIGHT HIP: ICD-10-CM

## 2018-03-19 DIAGNOSIS — M54.5 LOW BACK PAIN, UNSPECIFIED BACK PAIN LATERALITY, UNSPECIFIED CHRONICITY, WITH SCIATICA PRESENCE UNSPECIFIED: ICD-10-CM

## 2018-03-19 PROCEDURE — G8978 MOBILITY CURRENT STATUS: HCPCS | Performed by: PHYSICAL THERAPIST

## 2018-03-19 PROCEDURE — G8979 MOBILITY GOAL STATUS: HCPCS | Performed by: PHYSICAL THERAPIST

## 2018-03-19 PROCEDURE — 97163 PT EVAL HIGH COMPLEX 45 MIN: CPT | Performed by: PHYSICAL THERAPIST

## 2018-03-19 NOTE — PROGRESS NOTES
PT Evaluation     Today's date: 3/19/2018  Patient name: Anastasiia Ureña  : 1960  MRN: [id number]  Referring provider: Eugene Anderson DO  Dx:   Encounter Diagnosis     ICD-10-CM    1  Pain in right hip M25 551 Ambulatory referral to Physical Therapy   2  Low back pain, unspecified back pain laterality, unspecified chronicity, with sciatica presence unspecified M54 5 Ambulatory referral to Physical Therapy                  Assessment  Impairments: activity intolerance, impaired physical strength and pain with function  Functional limitations: squatting, bending, lifting, standing and sitting for greater than 30 min, ambulating long distances  Patient is irritable  Assessment details: 60 yr old female patient reports to PT with R sided low back and R hip pain  Patient had reproduction of pain during lumbar flexion and lumbar extension, and patient's lumbar extension was limited  Patient had positive scours and emmett on R, indicating some of patient's symptoms are originating from her R hip  Neural tension tests were negative and patient's hip passive ROM was WNL  Patient had increased irritability during objective measures as all lumbar CPA mobility assessment increased patient's symptoms  Patient was also TTP to her R greater trochanter and glut med region, but was able to complete SLS without increase in symptoms  Patient's primary impairment seems to be core and hip weakness, as patient had decreased symptoms when incorporating an abdominal brace into her movements  Patient will benefit from skilled PT services to address current impairments and functional limitations to help patient return to her PLOF  Understanding of Dx/Px/POC: good   Prognosis: fair  Prognosis details: chronicity    Goals  STG  1  Patient will be independent with completion of HEP throughout therapy  2  Patient will bend over to pick something up without increase in symptoms in 3 weeks  LTG  1   Patient will have at worst 6/10 pain when completing functional activities in 6 weeks  2  Patient will increase her hip strength by at least 1/2 grade so patient can ambulate longer distances without discomfort in 6 weeks  Plan  Patient would benefit from: skilled PT  Planned therapy interventions: abdominal trunk stabilization, home exercise program, graded exercise, graded activity, flexibility, functional ROM exercises, joint mobilization, manual therapy, neuromuscular re-education, strengthening, stretching, therapeutic activities and therapeutic exercise  Frequency: 2x week  Duration in weeks: 6  Treatment plan discussed with: patient        Subjective Evaluation    History of Present Illness  Mechanism of injury: Patient reports to PT with right sided low back and right hip pain which patient states began "a long time ago "  Patient states she is unable to lie on her R side without pain, and is unable to stand or sit for longer than 30 minutes without increase in her symptoms  Patient denies numbness and tingling  Patient states she tries to walk to remain active, but walking also aggravates her symptoms  Patient states she received an xray which showed some mild degenerative changes in her lumbar spine and R hip  Patient is most limited in squatting, bending, and lifting things off the floor  Patient has trouble sleeping due to her pain  Quality of life: good    Pain  Current pain ratin  At best pain ratin  At worst pain rating: 10  Location: right low back and right hip  Quality: pressure and sharp  Relieving factors: medications, change in position, relaxation and rest  Aggravating factors: standing, sitting, walking, stair climbing and lifting  Progression: worsening          Objective     Palpation   Left   No palpable tenderness to the quadratus lumborum  Tenderness of the erector spinae and piriformis  Right   No palpable tenderness to the quadratus lumborum   Tenderness of the erector spinae, gluteus medius and piriformis  Tenderness     Right Hip   Tenderness in the greater trochanter  Additional Tenderness Details  Patient symptoms were very irritable during lumbar mobility testing, as L1-L5 were painful   Patient was TTP to R greater trochanter region, but didn't have any increase pain when completed SLS on R    Active Range of Motion     Lumbar   Flexion: WFL and with pain  Extension: 20 degrees with pain  Left Hip   Flexion: WFL    Right Hip   Flexion: WFL    Passive Range of Motion   Left Hip   Flexion: WFL  Extension: [place] [place]  External rotation (prone): [facility] [place]  Internal rotation (prone): WFL    Right Hip   Flexion: WFL  Extension: WFL  External rotation (prone): [employer] [place]  Internal rotation (prone): [employer] [place]    Joint Play     Right Hip     Hypomobile in the long axis distraction    Comments  Right long axis distraction comments: helped relieve some of patient's symptoms  Strength/Myotome Testing     Left Hip   Planes of Motion   Flexion: 3+  Extension: 3  Abduction: 3-    Right Hip   Planes of Motion   Flexion: 3+  Extension: 3  Abduction: 3-    Left Knee   Extension: 4    Right Knee   Extension: 4    Left Ankle/Foot   Dorsiflexion: 4    Right Ankle/Foot   Dorsiflexion: 4    Tests       Thoracic   Negative slump  Lumbar   Negative slump  Left   Negative crossed SLR, femoral stretch and passive SLR  Right   Negative crossed SLR, femoral stretch and passive SLR  Left Hip   Negative EVE, femoral nerve tension and scour  Right Hip   Positive EVE and scour  Negative femoral nerve tension       Additional Tests Details  Both repeated flexion and repeated extension increased patient's pain, but didn't provoke any neural symptoms into LEs      Flowsheet Rows    Flowsheet Row Most Recent Value   PT/OT G-Codes   Current Score  42   Projected Score  50   FOTO information reviewed  Yes   Assessment Type  Evaluation   G code set  Mobility: Walking & Moving Around   Mobility: Walking and Moving Around Current Status ()  CK   Mobility: Walking and Moving Around Goal Status ()  CK          Precautions: none    Daily Treatment Diary     Manual                                                                                   Exercise Diary  3/19            Prone brace w/ altern LE 10x            Standing brace with altern LE 10x            Prone press ups 10x            clamshells 10x                                                                                                                                                                                                                                Modalities                                                         4/6/18 - Patient is being absent discharged due to not showing up to scheduled appointments for the last 4 visits

## 2018-03-23 ENCOUNTER — APPOINTMENT (OUTPATIENT)
Dept: PHYSICAL THERAPY | Age: 58
End: 2018-03-23
Payer: COMMERCIAL

## 2018-03-30 ENCOUNTER — APPOINTMENT (OUTPATIENT)
Dept: PHYSICAL THERAPY | Age: 58
End: 2018-03-30
Payer: COMMERCIAL

## 2018-04-13 DIAGNOSIS — I08.0 MITRAL AND AORTIC VALVE DISEASE: Primary | ICD-10-CM

## 2018-04-16 RX ORDER — TORSEMIDE 20 MG/1
20 TABLET ORAL DAILY
Qty: 90 TABLET | Refills: 3 | Status: SHIPPED | OUTPATIENT
Start: 2018-04-16 | End: 2020-04-06 | Stop reason: SDUPTHER

## 2018-04-23 DIAGNOSIS — J30.9 ALLERGIC RHINITIS, UNSPECIFIED SEASONALITY, UNSPECIFIED TRIGGER: Primary | ICD-10-CM

## 2018-04-23 RX ORDER — CETIRIZINE HYDROCHLORIDE 10 MG/1
TABLET, FILM COATED ORAL
Qty: 30 TABLET | Refills: 5 | Status: SHIPPED | OUTPATIENT
Start: 2018-04-23 | End: 2018-11-18 | Stop reason: SDUPTHER

## 2018-05-07 ENCOUNTER — TRANSCRIBE ORDERS (OUTPATIENT)
Dept: LAB | Facility: HOSPITAL | Age: 58
End: 2018-05-07

## 2018-05-07 ENCOUNTER — ANTICOAG VISIT (OUTPATIENT)
Dept: CARDIOLOGY CLINIC | Facility: CLINIC | Age: 58
End: 2018-05-07

## 2018-05-07 ENCOUNTER — APPOINTMENT (OUTPATIENT)
Dept: LAB | Facility: HOSPITAL | Age: 58
End: 2018-05-07
Attending: INTERNAL MEDICINE
Payer: COMMERCIAL

## 2018-05-07 DIAGNOSIS — Z95.2 HEART VALVE REPLACED BY TRANSPLANT: Primary | ICD-10-CM

## 2018-05-07 DIAGNOSIS — Z95.2 HEART VALVE REPLACED BY TRANSPLANT: ICD-10-CM

## 2018-05-07 DIAGNOSIS — E11.8 TYPE 2 DIABETES MELLITUS WITH COMPLICATION, WITH LONG-TERM CURRENT USE OF INSULIN (HCC): Primary | ICD-10-CM

## 2018-05-07 DIAGNOSIS — Z95.2 HISTORY OF PROSTHETIC HEART VALVE: ICD-10-CM

## 2018-05-07 DIAGNOSIS — Z79.4 TYPE 2 DIABETES MELLITUS WITH COMPLICATION, WITH LONG-TERM CURRENT USE OF INSULIN (HCC): Primary | ICD-10-CM

## 2018-05-07 LAB
INR PPP: 2.8 (ref 0.86–1.16)
PROTHROMBIN TIME: 29.9 SECONDS (ref 12.1–14.4)

## 2018-05-07 PROCEDURE — 36415 COLL VENOUS BLD VENIPUNCTURE: CPT

## 2018-05-07 PROCEDURE — 85610 PROTHROMBIN TIME: CPT

## 2018-05-09 DIAGNOSIS — E87.6 HYPOKALEMIA: ICD-10-CM

## 2018-05-09 RX ORDER — POTASSIUM CHLORIDE 20 MEQ/1
TABLET, EXTENDED RELEASE ORAL
Qty: 30 TABLET | Refills: 1 | Status: SHIPPED | OUTPATIENT
Start: 2018-05-09 | End: 2018-05-18 | Stop reason: ALTCHOICE

## 2018-05-10 ENCOUNTER — TELEPHONE (OUTPATIENT)
Dept: FAMILY MEDICINE CLINIC | Facility: CLINIC | Age: 58
End: 2018-05-10

## 2018-05-10 DIAGNOSIS — R53.83 FATIGUE, UNSPECIFIED TYPE: ICD-10-CM

## 2018-05-10 DIAGNOSIS — G40.119 EPILEPSIA PARTIALIS CONTINUA WITH INTRACTABLE EPILEPSY (HCC): Primary | ICD-10-CM

## 2018-05-10 DIAGNOSIS — G47.21 CIRCADIAN RHYTHM SLEEP DISORDER, DELAYED SLEEP PHASE TYPE: ICD-10-CM

## 2018-05-10 NOTE — TELEPHONE ENCOUNTER
Calling from [facility] neurology they need us to put an order in epic  Dx codes g40 119, g47 21, r53 83  Pt has no appointment on 05/21  [phone]

## 2018-05-18 ENCOUNTER — OFFICE VISIT (OUTPATIENT)
Dept: CARDIOLOGY CLINIC | Facility: CLINIC | Age: 58
End: 2018-05-18
Payer: COMMERCIAL

## 2018-05-18 VITALS
BODY MASS INDEX: 44.49 KG/M2 | HEART RATE: 76 BPM | DIASTOLIC BLOOD PRESSURE: 78 MMHG | WEIGHT: 220.7 LBS | OXYGEN SATURATION: 98 % | SYSTOLIC BLOOD PRESSURE: 118 MMHG | HEIGHT: 59 IN

## 2018-05-18 DIAGNOSIS — I05.0 MITRAL VALVE STENOSIS, UNSPECIFIED ETIOLOGY: Primary | ICD-10-CM

## 2018-05-18 DIAGNOSIS — Z95.2 HISTORY OF PROSTHETIC HEART VALVE: ICD-10-CM

## 2018-05-18 DIAGNOSIS — Z86.73 HISTORY OF STROKE: ICD-10-CM

## 2018-05-18 DIAGNOSIS — Z95.2 S/P MVR (MITRAL VALVE REPLACEMENT): Primary | ICD-10-CM

## 2018-05-18 PROCEDURE — 99214 OFFICE O/P EST MOD 30 MIN: CPT | Performed by: INTERNAL MEDICINE

## 2018-05-18 NOTE — PROGRESS NOTES
Cardiology Follow Up    [facility]  1960  [id number]  [address] [facility] MELANY Clarke6 [address] [place] [address] [address]    1  Mitral valve stenosis, unspecified etiology     2  History of prosthetic heart valve     3  History of stroke         Interval History: Patient feels well, no complaints  stroke and mitral stenosis s/p mitral valve valvuloplasty in March 2014  Her valve gradient at that time was 14, reduced to 6mmHg  Valvuloplasty was complicated by a CVA at the time of the procedure with right side weakness  [facility]       Patient Active Problem List   Diagnosis    History of prosthetic heart valve    Abnormal mammogram    Asthma, persistent    Asymptomatic stenosis of left carotid artery    Cervical high risk HPV (human papillomavirus) test positive    Controlled diabetes mellitus due to underlying condition with diabetic neuropathic arthropathy (HCC)    Depression with anxiety    Elevated blood pressure reading    Esophageal reflux    Excessive daytime sleepiness    Fatigue    Hypokalemia    Fecal occult blood test positive    Insomnia    Intertrigo    Iron deficiency anemia    Localization-related epilepsy, intractable (HCC)    Low grade squamous intraepithelial lesion (LGSIL) on cervical Pap smear    Microcytic hypochromic anemia    Mitral valve stenosis    Obesity    Osteoarthritis of knee    Overactive bladder    Plantar fasciitis    Postnasal drip    Sleep disorder, circadian, delayed sleep phase type    Urge incontinence of urine    Urinary retention    Urticaria, chronic    Uterine fibroid    Vitamin D insufficiency    Xerosis of skin    Cough    Viral URI with cough    History of stroke     Past Medical History:   Diagnosis Date    Abnormal ultrasound of pelvis     last assessed - [id number]    Asthma     Cerebral infarction ([facility] [address] )     unspecified; last assessed - [id number]    Dizziness     last assessed - [id number]    Dysuria     last assessed - [id number]    Exposure to potentially hazardous body fluids     last assessed - 58Rew2259    Heart disease     Hematoma     of arm; last assessed - 20Jun2016    Personal history of scoliosis     Pneumonia     Scar, hypertrophic     last assessed - [id number]    Seizures ([facility] [address] [facility]     Type 2 diabetes mellitus ([facility] [address] )     Vision problems      Social History     Social History    Marital status: Single     Spouse name: N/A    Number of children: N/A    Years of education: N/A     Occupational History    Not on file       Social History Main Topics    Smoking status: Never Smoker    Smokeless tobacco: Never Used    Alcohol use No    Drug use: No    Sexual activity: Not Currently     Other Topics Concern    Not on file     Social History Narrative    Caffeine use      Family History   Problem Relation Age of Onset   [facility] Cancer Mother     Dementia Mother     Diabetes Mother     Hypertension Mother     Hyperlipidemia Mother     Cervical cancer Mother     Heart disease Father     Coronary artery disease Father     Hypertension Father     Heart attack Father     Diabetes Sister     Breast cancer Sister     Diabetes Brother     Hypertension Brother     Hyperlipidemia Brother     Diabetes Maternal Grandmother     Breast cancer Maternal Grandmother     Diabetes Other     Seizures Son     Psoriasis Son      Past Surgical History:   Procedure Laterality Date    MITRAL VALVE REPLACEMENT  06/17/2015    MVR with 27 mm [employer] Optiform mechanical prostethic;  last assessed - [id number]    TONSILLECTOMY      TRANSLUMINAL ANGIOPLASTY      mitral valve; last assessed - 01Aug2014   [address] [place]    last assessed - 01Aug2014       Current Outpatient Prescriptions:     acetaminophen (TYLENOL) 500 mg tablet, Take 2 tablets by mouth daily as needed for pain, Disp: 60 tablet, Rfl: 2    ALL DAY ALLERGY 10 MG tablet, TAKE ONE TABLET BY MOUTH DAILY AS DIRECTED, Disp: 30 tablet, Rfl: 5    fluticasone (FLOVENT HFA) 110 MCG/ACT inhaler, Inhale 2 puffs 2 (two) times a day, Disp: 1 Inhaler, Rfl: 5    glucose blood (ACCU-CHEK SMARTVIEW) test strip, Use daily as instructed, Disp: 100 each, Rfl: 5    hydrOXYzine HCL (ATARAX) 25 mg tablet, TAKE ONE TABLET BY MOUTH EVERY 6 HOURS AS NEEDED, Disp: 60 tablet, Rfl: 3    levETIRAcetam (KEPPRA) 500 mg tablet, Take 500 mg by mouth 3 (three) times a day, Disp: , Rfl:     metFORMIN (GLUCOPHAGE) 500 mg tablet, TAKE ONE TABLET BY MOUTH TWICE A DAY, Disp: 180 tablet, Rfl: 2    metoprolol tartrate (LOPRESSOR) 25 mg tablet, Take 0 5 tablets (12 5 mg total) by mouth every 12 (twelve) hours, Disp: 30 tablet, Rfl: 5    omeprazole (PriLOSEC) 40 MG capsule, Take by mouth, Disp: , Rfl:     torsemide (DEMADEX) 20 mg tablet, Take 1 tablet (20 mg total) by mouth daily, Disp: 90 tablet, Rfl: 3    warfarin (COUMADIN) 2 5 mg tablet, Take 2 5 mg by mouth daily, Disp: , Rfl:     warfarin (COUMADIN) 5 mg tablet, Take 5 mg by mouth, Disp: , Rfl:     fluticasone (FLONASE) 50 mcg/act nasal spray, 1 spray into each nostril daily, Disp: 16 g, Rfl: 1    traMADol (ULTRAM) 50 mg tablet, Take 50 mg by mouth every 6 (six) hours as needed for moderate pain, Disp: , Rfl:   Allergies   Allergen Reactions    Codeine     Prednisone Itching    Codeine Polt-Chlorphen Polt Er Irritability, Itching and Rash       Labs:  Appointment on 05/07/2018   Component Date Value    Protime 05/07/2018 29 9*    INR 05/07/2018 2 80*   Office Visit on 03/12/2018   Component Date Value    INFLU A PCR 03/12/2018 None Detected     INFLU B PCR 03/12/2018 None Detected     RSV PCR 03/12/2018 None Detected    Admission on 03/08/2018, Discharged on 03/08/2018   Component Date Value    WBC 03/08/2018 3 93*    RBC 03/08/2018 4 82     Hemoglobin 03/08/2018 12 8     Hematocrit 03/08/2018 38 7     MCV 03/08/2018 80*    MCH 03/08/2018 26 6*    MCHC 03/08/2018 33 1     RDW 03/08/2018 14 7     MPV 03/08/2018 10 0     Platelets 70/[birth year]/1943 195     nRBC 03/08/2018 0     Neutrophils Relative 03/08/2018 73     Lymphocytes Relative 03/08/2018 16     Monocytes Relative 03/08/2018 8     Eosinophils Relative 03/08/2018 2     Basophils Relative 03/08/2018 1     Neutrophils Absolute 03/08/2018 2 84     Lymphocytes Absolute 03/08/2018 0 64     Monocytes Absolute 03/08/2018 0 33     Eosinophils Absolute 03/08/2018 0 08     Basophils Absolute 03/08/2018 0 03     Protime 03/08/2018 29 0*    INR 03/08/2018 2 70*    PTT 03/08/2018 70*    Sodium 03/08/2018 140     Potassium 03/08/2018 3 7     Chloride 03/08/2018 104     CO2 03/08/2018 29     Anion Gap 03/08/2018 7     BUN 03/08/2018 9     Creatinine 03/08/2018 0 79     Glucose 03/08/2018 159*    Calcium 03/08/2018 8 7     eGFR 03/08/2018 82    Orders Only on 01/31/2018   Component Date Value    SL AMB GLUCOSE 01/31/2018 119*    BUN 01/31/2018 16     Creatinine, Serum 01/31/2018 0 82     eGFR Non African American 01/31/2018 78     SL AMB EGFR  AMER* 01/31/2018 91     SL AMB BUN/CREATININE RA* 11/74/4869 NOT APPLICABLE     SL AMB SODIUM 01/31/2018 139     SL AMB POTASSIUM 01/31/2018 4 2     SL AMB CHLORIDE 01/31/2018 104     SL AMB CARBON DIOXIDE 01/31/2018 29     SL AMB CALCIUM 01/31/2018 9 7     SL AMB PROTEIN, TOTAL 01/31/2018 7 1     Serum Albumin 01/31/2018 4 2     SL AMB GLOBULIN 01/31/2018 2 9     SL AMB ALBUMIN/GLOBULIN * 01/31/2018 1 4     SL AMB BILIRUBIN, TOTAL 01/31/2018 0 6     SL AMB ALKALINE PHOSPHAT* 01/31/2018 145*    SL AMB AST 01/31/2018 18     SL AMB ALT 01/31/2018 13     Levetiracetam Lvl 01/31/2018 20 8     SL AMB LAB WHITE BLOOD C* 01/31/2018 6 4     SL AMB LAB RED BLOOD ROMERO* 01/31/2018 5 08     Hemoglobin 01/31/2018 13 2     Hematocrit 01/31/2018 40 0     MCV 01/31/2018 78 7*    MCH 01/31/2018 26 0*    MCHC 01/31/2018 33 0  RDW 01/31/2018 14 1     Platelet Count 70/88/4029 265     SL AMB MPV 01/31/2018 11 1    Orders Only on 01/31/2018   Component Date Value    Total Cholesterol 01/31/2018 214*    SL AMB HDL CHOLESTEROL 01/31/2018 54     Triglycerides 01/31/2018 192*    SL AMB LDL-CHOLESTEROL 01/31/2018 127*    SL AMB CHOL/HDLC RATIO 01/31/2018 4 0     SL AMB NON HDL CHOLESTER* 01/31/2018 160*    Creatinine, Urine 01/31/2018 114     Microalbum  ,U,Random 01/31/2018 0 6     Microalb/Creat Ratio 01/31/2018 5     SL AMB LAB WHITE BLOOD C* 01/31/2018 6 0     SL AMB LAB RED BLOOD ROMERO* 01/31/2018 5 14*    Hemoglobin 01/31/2018 13 3     Hematocrit 01/31/2018 40 7     MCV 01/31/2018 79 2*    MCH 01/31/2018 25 9*    MCHC 01/31/2018 32 7     RDW 01/31/2018 14 3     Platelet Count 15/73/9704 274     SL AMB MPV 01/31/2018 11 5     Neutrophils (Absolute) 01/31/2018 3888     Lymphocytes (Absolute) 01/31/2018 1440     Monocytes (Absolute) 01/31/2018 390     Eosinophils (Absolute) 01/31/2018 192     Basophils (Absolute) 01/31/2018 90     Neutrophils 01/31/2018 64 8     Lymphocytes 01/31/2018 24 0     Monocytes 01/31/2018 6 5     Eosinophils 01/31/2018 3 2     Basophils 01/31/2018 1 5     TSH 01/31/2018 1 85     Hemoglobin A1C 01/31/2018 6 3*   Anticoag visit on 01/31/2018   Component Date Value    INR 01/31/2018 3 30*   Orders Only on 01/31/2018   Component Date Value    INR 01/31/2018 3 3*    SL AMB PROTHROMBIN TIME 01/31/2018 34 1*     Lab Results   Component Value Date    CHOL 202 (H) 06/07/2017    TRIG 192 (H) 01/31/2018    HDL 62 06/07/2017     Imaging: No results found  Review of Systems:  Review of Systems   Constitutional: Negative for activity change, appetite change, chills, diaphoresis, fatigue and unexpected weight change  HENT: Negative for hearing loss, nosebleeds and sore throat  Eyes: Negative for photophobia and visual disturbance     Respiratory: Negative for cough, chest tightness, shortness of breath and wheezing  Cardiovascular: Negative for chest pain, palpitations and leg swelling  Gastrointestinal: Negative for abdominal pain, diarrhea, nausea and vomiting  Endocrine: Negative for polyuria  Genitourinary: Negative for dysuria, frequency and hematuria  Musculoskeletal: Negative for arthralgias, back pain, gait problem and neck pain  Skin: Negative for pallor and rash  Neurological: Negative for dizziness, syncope and headaches  Hematological: Does not bruise/bleed easily  Psychiatric/Behavioral: Negative for behavioral problems and confusion  Physical Exam:  Physical Exam   Constitutional: She is oriented to person, place, and time  She appears well-developed and well-nourished  HENT:   Head: Normocephalic and atraumatic  Nose: Nose normal    Eyes: EOM are normal  Pupils are equal, round, and reactive to light  No scleral icterus  Neck: Normal range of motion  Neck supple  No JVD present  Cardiovascular: Normal rate and regular rhythm  Exam reveals no gallop and no friction rub  Murmur heard  Systolic murmur is present with a grade of 1/6   +mechanical click   Pulmonary/Chest: Effort normal and breath sounds normal  No respiratory distress  She has no wheezes  She has no rales  Abdominal: Soft  Bowel sounds are normal  She exhibits no distension  There is no tenderness  Musculoskeletal: Normal range of motion  She exhibits no edema or deformity  Neurological: She is alert and oriented to person, place, and time  No cranial nerve deficit  Skin: Skin is warm and dry  No rash noted  She is not diaphoretic  Psychiatric: She has a normal mood and affect  Her behavior is normal    Vitals reviewed  Blood pressure 118/78, pulse 76, height 4' 11" (1 499 m), weight 100 kg (220 lb 11 2 oz), SpO2 98 %  Discussion/Summary:  Severe mitral stenosis: on echo in March 2015 revealing a gradient of 14mmHg   With symptoms of shortness of breath with exertion, s/p mechanical mitral valve replacement, continue coumadin with goal INR 2 5-3 5  Completed cardiac rehab  Encouraged to continue exercise        h/o CVA: complication from prior valvuloplasty  Stable

## 2018-05-22 DIAGNOSIS — Z95.2 H/O MITRAL VALVE REPLACEMENT: Primary | ICD-10-CM

## 2018-05-23 RX ORDER — WARFARIN SODIUM 5 MG/1
TABLET ORAL
Qty: 60 TABLET | Refills: 5 | Status: SHIPPED | OUTPATIENT
Start: 2018-05-23 | End: 2019-06-26 | Stop reason: SDUPTHER

## 2018-06-04 ENCOUNTER — APPOINTMENT (OUTPATIENT)
Dept: LAB | Facility: HOSPITAL | Age: 58
End: 2018-06-04
Attending: INTERNAL MEDICINE
Payer: COMMERCIAL

## 2018-06-04 ENCOUNTER — ANTICOAG VISIT (OUTPATIENT)
Dept: CARDIOLOGY CLINIC | Facility: CLINIC | Age: 58
End: 2018-06-04

## 2018-06-04 DIAGNOSIS — Z95.2 HISTORY OF PROSTHETIC HEART VALVE: ICD-10-CM

## 2018-06-07 ENCOUNTER — OFFICE VISIT (OUTPATIENT)
Dept: FAMILY MEDICINE CLINIC | Facility: CLINIC | Age: 58
End: 2018-06-07
Payer: COMMERCIAL

## 2018-06-07 VITALS
HEART RATE: 84 BPM | DIASTOLIC BLOOD PRESSURE: 82 MMHG | TEMPERATURE: 96.2 F | RESPIRATION RATE: 16 BRPM | WEIGHT: 222 LBS | BODY MASS INDEX: 43.59 KG/M2 | SYSTOLIC BLOOD PRESSURE: 124 MMHG | HEIGHT: 60 IN

## 2018-06-07 DIAGNOSIS — R53.83 FATIGUE, UNSPECIFIED TYPE: ICD-10-CM

## 2018-06-07 DIAGNOSIS — E11.8 TYPE 2 DIABETES MELLITUS WITH COMPLICATION, WITHOUT LONG-TERM CURRENT USE OF INSULIN (HCC): Primary | ICD-10-CM

## 2018-06-07 PROCEDURE — 99214 OFFICE O/P EST MOD 30 MIN: CPT | Performed by: FAMILY MEDICINE

## 2018-06-07 PROCEDURE — 3725F SCREEN DEPRESSION PERFORMED: CPT | Performed by: FAMILY MEDICINE

## 2018-06-07 NOTE — ASSESSMENT & PLAN NOTE
Lab in January was normal, but will recheck  Advice patient to try maintaining 3 meals daily, advice on what a healthy plate looks like (1/2 fruit, veggie, 1/4 grain, 1/4 good protein)  Patient agrees to try a healthier diet

## 2018-06-14 DIAGNOSIS — G40.019 LOCALIZATION-RELATED EPILEPSY, INTRACTABLE (HCC): Primary | ICD-10-CM

## 2018-06-14 RX ORDER — LEVETIRACETAM 500 MG
500 TABLET ORAL 4 TIMES DAILY
Qty: 120 TABLET | Refills: 3 | Status: SHIPPED | OUTPATIENT
Start: 2018-06-14 | End: 2018-11-02 | Stop reason: SDUPTHER

## 2018-06-14 RX ORDER — LEVETIRACETAM 500 MG/1
500 TABLET ORAL 4 TIMES DAILY
Qty: 120 TABLET | Refills: 5 | Status: CANCELLED | OUTPATIENT
Start: 2018-06-14

## 2018-07-16 ENCOUNTER — APPOINTMENT (OUTPATIENT)
Dept: LAB | Facility: HOSPITAL | Age: 58
End: 2018-07-16
Attending: INTERNAL MEDICINE
Payer: COMMERCIAL

## 2018-07-16 ENCOUNTER — ANTICOAG VISIT (OUTPATIENT)
Dept: CARDIOLOGY CLINIC | Facility: CLINIC | Age: 58
End: 2018-07-16

## 2018-07-16 DIAGNOSIS — Z95.2 S/P MVR (MITRAL VALVE REPLACEMENT): ICD-10-CM

## 2018-07-16 DIAGNOSIS — G40.119 LOCALZ-RLTD SYMPTOMATIC EPILEPSY W SMPL PART SZ, INTRACT, WO STATUS (HCC): ICD-10-CM

## 2018-07-16 DIAGNOSIS — Z95.2 HISTORY OF PROSTHETIC HEART VALVE: ICD-10-CM

## 2018-07-16 DIAGNOSIS — E11.9 TYPE 2 DIABETES MELLITUS WITHOUT COMPLICATIONS (HCC): ICD-10-CM

## 2018-07-16 DIAGNOSIS — R53.83 FATIGUE, UNSPECIFIED TYPE: ICD-10-CM

## 2018-07-16 DIAGNOSIS — E11.8 TYPE 2 DIABETES MELLITUS WITH COMPLICATION, WITHOUT LONG-TERM CURRENT USE OF INSULIN (HCC): ICD-10-CM

## 2018-07-16 LAB
ALBUMIN SERPL BCP-MCNC: 3.5 G/DL (ref 3.5–5)
ALP SERPL-CCNC: 134 U/L (ref 46–116)
ALT SERPL W P-5'-P-CCNC: 19 U/L (ref 12–78)
ANION GAP SERPL CALCULATED.3IONS-SCNC: 3 MMOL/L (ref 4–13)
AST SERPL W P-5'-P-CCNC: 20 U/L (ref 5–45)
BASOPHILS # BLD AUTO: 0.09 THOUSANDS/ΜL (ref 0–0.1)
BASOPHILS NFR BLD AUTO: 1 % (ref 0–1)
BILIRUB SERPL-MCNC: 0.68 MG/DL (ref 0.2–1)
BUN SERPL-MCNC: 11 MG/DL (ref 5–25)
CALCIUM SERPL-MCNC: 9.3 MG/DL (ref 8.3–10.1)
CHLORIDE SERPL-SCNC: 104 MMOL/L (ref 100–108)
CHOLEST SERPL-MCNC: 190 MG/DL (ref 50–200)
CO2 SERPL-SCNC: 31 MMOL/L (ref 21–32)
CREAT SERPL-MCNC: 0.83 MG/DL (ref 0.6–1.3)
CREAT UR-MCNC: 64.2 MG/DL
EOSINOPHIL # BLD AUTO: 0.18 THOUSAND/ΜL (ref 0–0.61)
EOSINOPHIL NFR BLD AUTO: 3 % (ref 0–6)
ERYTHROCYTE [DISTWIDTH] IN BLOOD BY AUTOMATED COUNT: 14.5 % (ref 11.6–15.1)
EST. AVERAGE GLUCOSE BLD GHB EST-MCNC: 131 MG/DL
GFR SERPL CREATININE-BSD FRML MDRD: 77 ML/MIN/1.73SQ M
GLUCOSE P FAST SERPL-MCNC: 121 MG/DL (ref 65–99)
HBA1C MFR BLD: 6.2 % (ref 4.2–6.3)
HCT VFR BLD AUTO: 39 % (ref 34.8–46.1)
HDLC SERPL-MCNC: 54 MG/DL (ref 40–60)
HGB BLD-MCNC: 12.2 G/DL (ref 11.5–15.4)
IMM GRANULOCYTES # BLD AUTO: 0.01 THOUSAND/UL (ref 0–0.2)
IMM GRANULOCYTES NFR BLD AUTO: 0 % (ref 0–2)
INR PPP: 3.24 (ref 0.86–1.17)
LDLC SERPL CALC-MCNC: 108 MG/DL (ref 0–100)
LYMPHOCYTES # BLD AUTO: 1.42 THOUSANDS/ΜL (ref 0.6–4.47)
LYMPHOCYTES NFR BLD AUTO: 22 % (ref 14–44)
MCH RBC QN AUTO: 25.5 PG (ref 26.8–34.3)
MCHC RBC AUTO-ENTMCNC: 31.3 G/DL (ref 31.4–37.4)
MCV RBC AUTO: 82 FL (ref 82–98)
MICROALBUMIN UR-MCNC: 5.4 MG/L (ref 0–20)
MICROALBUMIN/CREAT 24H UR: 8 MG/G CREATININE (ref 0–30)
MONOCYTES # BLD AUTO: 0.47 THOUSAND/ΜL (ref 0.17–1.22)
MONOCYTES NFR BLD AUTO: 7 % (ref 4–12)
NEUTROPHILS # BLD AUTO: 4.41 THOUSANDS/ΜL (ref 1.85–7.62)
NEUTS SEG NFR BLD AUTO: 67 % (ref 43–75)
NONHDLC SERPL-MCNC: 136 MG/DL
NRBC BLD AUTO-RTO: 0 /100 WBCS
PLATELET # BLD AUTO: 262 THOUSANDS/UL (ref 149–390)
PMV BLD AUTO: 10.8 FL (ref 8.9–12.7)
POTASSIUM SERPL-SCNC: 4 MMOL/L (ref 3.5–5.3)
PROT SERPL-MCNC: 7.5 G/DL (ref 6.4–8.2)
PROTHROMBIN TIME: 33.1 SECONDS (ref 11.8–14.2)
RBC # BLD AUTO: 4.78 MILLION/UL (ref 3.81–5.12)
SODIUM SERPL-SCNC: 138 MMOL/L (ref 136–145)
TRIGL SERPL-MCNC: 138 MG/DL
TSH SERPL DL<=0.05 MIU/L-ACNC: 2.42 UIU/ML (ref 0.36–3.74)
WBC # BLD AUTO: 6.58 THOUSAND/UL (ref 4.31–10.16)

## 2018-07-16 PROCEDURE — 36415 COLL VENOUS BLD VENIPUNCTURE: CPT

## 2018-07-16 PROCEDURE — 80053 COMPREHEN METABOLIC PANEL: CPT

## 2018-07-16 PROCEDURE — 3061F NEG MICROALBUMINURIA REV: CPT | Performed by: FAMILY MEDICINE

## 2018-07-16 PROCEDURE — 82043 UR ALBUMIN QUANTITATIVE: CPT | Performed by: FAMILY MEDICINE

## 2018-07-16 PROCEDURE — 84443 ASSAY THYROID STIM HORMONE: CPT

## 2018-07-16 PROCEDURE — 82570 ASSAY OF URINE CREATININE: CPT | Performed by: FAMILY MEDICINE

## 2018-07-16 PROCEDURE — 80061 LIPID PANEL: CPT

## 2018-07-16 PROCEDURE — 85610 PROTHROMBIN TIME: CPT

## 2018-07-16 PROCEDURE — 85025 COMPLETE CBC W/AUTO DIFF WBC: CPT

## 2018-07-16 PROCEDURE — 83036 HEMOGLOBIN GLYCOSYLATED A1C: CPT

## 2018-07-18 DIAGNOSIS — L50.0 ALLERGIC URTICARIA: ICD-10-CM

## 2018-07-19 RX ORDER — HYDROXYZINE HYDROCHLORIDE 25 MG/1
TABLET, FILM COATED ORAL
Qty: 60 TABLET | Refills: 0 | Status: SHIPPED | OUTPATIENT
Start: 2018-07-19 | End: 2018-09-13 | Stop reason: SDUPTHER

## 2018-07-26 ENCOUNTER — TELEPHONE (OUTPATIENT)
Dept: FAMILY MEDICINE CLINIC | Facility: CLINIC | Age: 58
End: 2018-07-26

## 2018-07-26 NOTE — TELEPHONE ENCOUNTER
Patient calling about recent lab work  Her INR is managed by cardiology  Reviewed labs with her, she is concerned that in the last few days she has been experiencing episodes of her sugar dropping during the day  She states her nutrition is adequate  Do you have any suggestions?

## 2018-08-01 NOTE — TELEPHONE ENCOUNTER
Spoke with patient, states her blood sugars are running 103-108 in mornings, she has had several events during the day when her sugar drops, she becomes shakey  She would really like to speak with you personally, are you able to call?

## 2018-08-02 DIAGNOSIS — E11.9 TYPE 2 DIABETES MELLITUS WITHOUT COMPLICATION, WITHOUT LONG-TERM CURRENT USE OF INSULIN (HCC): Primary | ICD-10-CM

## 2018-08-02 RX ORDER — LANCETS
EACH MISCELLANEOUS
Qty: 100 EACH | Refills: 0 | Status: SHIPPED | OUTPATIENT
Start: 2018-08-02 | End: 2019-05-30 | Stop reason: SDUPTHER

## 2018-08-16 DIAGNOSIS — I05.0 MITRAL VALVE STENOSIS, UNSPECIFIED ETIOLOGY: ICD-10-CM

## 2018-08-27 ENCOUNTER — OFFICE VISIT (OUTPATIENT)
Dept: FAMILY MEDICINE CLINIC | Facility: CLINIC | Age: 58
End: 2018-08-27
Payer: COMMERCIAL

## 2018-08-27 VITALS
SYSTOLIC BLOOD PRESSURE: 122 MMHG | HEART RATE: 84 BPM | BODY MASS INDEX: 43.78 KG/M2 | DIASTOLIC BLOOD PRESSURE: 78 MMHG | WEIGHT: 223 LBS | RESPIRATION RATE: 16 BRPM | TEMPERATURE: 98 F | HEIGHT: 60 IN

## 2018-08-27 DIAGNOSIS — R07.89 CHEST WALL PAIN: Primary | ICD-10-CM

## 2018-08-27 DIAGNOSIS — E08.610 CONTROLLED DIABETES MELLITUS DUE TO UNDERLYING CONDITION WITH DIABETIC NEUROPATHIC ARTHROPATHY, WITHOUT LONG-TERM CURRENT USE OF INSULIN (HCC): ICD-10-CM

## 2018-08-27 DIAGNOSIS — M25.432 WRIST SWELLING, LEFT: ICD-10-CM

## 2018-08-27 PROCEDURE — 99214 OFFICE O/P EST MOD 30 MIN: CPT | Performed by: FAMILY MEDICINE

## 2018-08-27 PROCEDURE — 3008F BODY MASS INDEX DOCD: CPT | Performed by: FAMILY MEDICINE

## 2018-08-27 NOTE — ASSESSMENT & PLAN NOTE
Lab Results   Component Value Date    HGBA1C 6 2 07/16/2018       No results for input(s): POCGLU in the last 72 hours  Blood Sugar Average: Last 72 hrs:  Fasting average is 115, doing well, continue metformin, recheck lab prior to next visit in 6 months

## 2018-08-27 NOTE — PROGRESS NOTES
Lashonda Mayers 1960 female MRN: [id number]    Family Medicine Follow-up Visit    ASSESSMENT/PLAN  Controlled diabetes mellitus due to underlying condition with diabetic neuropathic arthropathy [facility])  Lab Results   Component Value Date    HGBA1C 6 2 07/16/2018       No results for input(s): POCGLU in the last 72 hours  Blood Sugar Average: Last 72 hrs:  Fasting average is 115, doing well, continue metformin, recheck lab prior to next visit in 6 months  Wrist swelling, left  Tender to palpation at wrist and distal forearm with boggy swelling at dorsal wrist   Check xray to rule out fracture, possible swelling of the Dino quervain tendinitis  Continue wrist splint for support  Chest wall pain  History of cardiac surgery (open): check xray, reassurance given to patient  Future Appointments  Date Time Provider Izabel Villalobos   9/4/2018 11:30 AM Isaac Hernandez MD [facility]          SUBJECTIVE  CC: Follow-up      HPI:  Patient presents for follow-up visit, [place] [employer] form, and left-sided wrist pain and swelling  Sometimes the wrist develops a throbbing sensation  She has some SOB but no CP  She had a colonoscopy two years ago and was told she has a small hernia and a polyp  She additionally mentions that when lying on her stomach or leaning forward she has significant soreness over her chest in the center and on the right side  Review of Systems   Constitutional: Positive for fatigue  Negative for fever  HENT: Negative for ear pain and postnasal drip  Eyes: Negative for visual disturbance  Respiratory: Negative for chest tightness and shortness of breath  Cardiovascular: Negative for chest pain  Musculoskeletal: Positive for joint swelling and myalgias  Tender at dorsal wrist   Skin: Negative for color change  Neurological: Negative for dizziness, syncope and weakness         Historical Information   The patient history was reviewed as follows:    Past Medical History:   Diagnosis Date    Abnormal ultrasound of pelvis     last assessed - 26Mar2015    Asthma     Cerebral infarction [facility])     unspecified; last assessed - 30Aug2017    Dizziness     last assessed - [id number]    Dysuria     last assessed - [id number]    Exposure to potentially hazardous body fluids     last assessed - 09Yox9200    Heart disease     Hematoma     of arm; last assessed - 20Jun2016    Personal history of scoliosis     Pneumonia     Scar, hypertrophic     last assessed - [id number]    Seizures ([facility] [address] )     Type 2 diabetes mellitus ([facility] [address] )     Vision problems      Past Surgical History:   Procedure Laterality Date    MITRAL VALVE REPLACEMENT  06/17/2015    MVR with 27 mm [employer] Optiform mechanical prostethic;  last assessed - 02OTH1721    TONSILLECTOMY      TRANSLUMINAL ANGIOPLASTY      mitral valve; last assessed - 01Aug2014    TUBAL LIGATION  1986    last assessed - 01Aug2014     Family History   Problem Relation Age of Onset    Cancer Mother     Dementia Mother     Diabetes Mother     Hypertension Mother     Hyperlipidemia Mother     Cervical cancer Mother     Heart disease Father     Coronary artery disease Father     Hypertension Father     Heart attack Father     Diabetes Sister     Breast cancer Sister     Diabetes Brother     Hypertension Brother     Hyperlipidemia Brother     Diabetes Maternal Grandmother     Breast cancer Maternal Grandmother     Diabetes Other     Seizures Son     Psoriasis Son       Social History   History   Alcohol Use No     History   Drug Use No     History   Smoking Status    Never Smoker   Smokeless Tobacco    Never Used       Medications:     Current Outpatient Prescriptions:     acetaminophen (TYLENOL) 500 mg tablet, Take 2 tablets by mouth daily as needed for pain, Disp: 60 tablet, Rfl: 2    ALL DAY ALLERGY 10 MG tablet, TAKE ONE TABLET BY MOUTH DAILY AS DIRECTED, Disp: 30 tablet, Rfl: 5    fluticasone (FLONASE) 50 mcg/act nasal spray, 1 spray into each nostril daily, Disp: 16 g, Rfl: 1    fluticasone (FLOVENT HFA) 110 MCG/ACT inhaler, Inhale 2 puffs 2 (two) times a day, Disp: 1 Inhaler, Rfl: 5    glucose blood (ACCU-CHEK SMARTVIEW) test strip, Use daily as instructed, Disp: 100 each, Rfl: 5    hydrOXYzine HCL (ATARAX) 25 mg tablet, TAKE ONE TABLET BY MOUTH EVERY 6 HOURS AS NEEDED, Disp: 60 tablet, Rfl: 0    KEPPRA 500 MG tablet, Take 1 tablet (500 mg total) by mouth 4 (four) times a day, Disp: 120 tablet, Rfl: 3    Lancets (ACCU-CHEK MULTICLIX) lancets, Use as instructed, Disp: 100 each, Rfl: 0    levETIRAcetam (KEPPRA) 500 mg tablet, Take 500 mg by mouth 3 (three) times a day, Disp: , Rfl:     metFORMIN (GLUCOPHAGE) 500 mg tablet, TAKE ONE TABLET BY MOUTH TWICE A DAY, Disp: 180 tablet, Rfl: 2    metoprolol tartrate (LOPRESSOR) 25 mg tablet, TAKE HALF TABLET BY MOUTH EVERY 12 HOURS, Disp: 30 tablet, Rfl: 5    omeprazole (PriLOSEC) 40 MG capsule, Take by mouth, Disp: , Rfl:     torsemide (DEMADEX) 20 mg tablet, Take 1 tablet (20 mg total) by mouth daily, Disp: 90 tablet, Rfl: 3    traMADol (ULTRAM) 50 mg tablet, Take 50 mg by mouth every 6 (six) hours as needed for moderate pain, Disp: , Rfl:     warfarin (COUMADIN) 2 5 mg tablet, Take 2 5 mg by mouth daily, Disp: , Rfl:     warfarin (COUMADIN) 5 mg tablet, TAKE ONE TABLET BY MOUTH DAILY, Disp: 60 tablet, Rfl: 5  Allergies   Allergen Reactions    Codeine     Prednisone Itching    Codeine Polt-Chlorphen Polt Er Irritability, Itching and Rash       OBJECTIVE    Vitals:   Vitals:    08/27/18 1132   BP: 122/78   Pulse: 84   Resp: 16   Temp: 98 °F (36 7 °C)   Weight: 101 kg (223 lb)   Height: 5' (1 524 m)           Physical Exam   Constitutional: She is oriented to person, place, and time  She appears well-developed and well-nourished  HENT:   Head: Normocephalic     Right Ear: External ear normal    Left Ear: External ear normal    Eyes: Pupils are equal, round, and reactive to light  Neck: Normal range of motion  Neck supple  Cardiovascular: Normal rate, regular rhythm and normal heart sounds  Pulmonary/Chest: Effort normal and breath sounds normal    Abdominal: Soft  Bowel sounds are normal    Musculoskeletal: She exhibits edema and tenderness  She exhibits no deformity  Left wrist boggy at dorsal side, tender to palpation, somewhat decreased strength due to pain  Feet:   Right Foot:   Skin Integrity: Negative for ulcer, skin breakdown, erythema, warmth, callus or dry skin  Left Foot:   Skin Integrity: Negative for ulcer, skin breakdown, erythema, warmth, callus or dry skin  Neurological: She is alert and oriented to person, place, and time  Skin: Skin is warm  Psychiatric: She has a normal mood and affect  Patient's shoes and socks removed  Right Foot/Ankle   Right Foot Inspection  Skin Exam: skin normal and skin intact no dry skin, no warmth, no callus, no erythema, no maceration, no abnormal color, no pre-ulcer, no ulcer and no callus                          Toe Exam: ROM and strength within normal limits  Sensory       Monofilament testing: intact      Left Foot/Ankle  Left Foot Inspection  Skin Exam: skin normal and skin intactno dry skin, no warmth, no erythema, no maceration, normal color, no pre-ulcer, no ulcer and no callus                         Toe Exam: ROM and strength within normal limits                   Sensory       Monofilament: intact    Assign Risk Category:  ; No loss of protective sensation;        Risk: 0

## 2018-08-27 NOTE — ASSESSMENT & PLAN NOTE
Tender to palpation at wrist and distal forearm with boggy swelling at dorsal wrist   Check xray to rule out fracture, possible swelling of the Dino quervain tendinitis  Continue wrist splint for support

## 2018-08-28 ENCOUNTER — ANTICOAG VISIT (OUTPATIENT)
Dept: CARDIOLOGY CLINIC | Facility: CLINIC | Age: 58
End: 2018-08-28

## 2018-08-28 ENCOUNTER — TRANSCRIBE ORDERS (OUTPATIENT)
Dept: RADIOLOGY | Facility: HOSPITAL | Age: 58
End: 2018-08-28

## 2018-08-28 ENCOUNTER — APPOINTMENT (OUTPATIENT)
Dept: LAB | Facility: HOSPITAL | Age: 58
End: 2018-08-28
Attending: INTERNAL MEDICINE
Payer: COMMERCIAL

## 2018-08-28 ENCOUNTER — HOSPITAL ENCOUNTER (OUTPATIENT)
Dept: RADIOLOGY | Facility: HOSPITAL | Age: 58
Discharge: HOME | End: 2018-08-28
Attending: FAMILY MEDICINE
Payer: COMMERCIAL

## 2018-08-28 DIAGNOSIS — M25.432 WRIST SWELLING, LEFT: ICD-10-CM

## 2018-08-28 DIAGNOSIS — R07.89 CHEST WALL PAIN: ICD-10-CM

## 2018-08-28 DIAGNOSIS — Z95.2 HISTORY OF PROSTHETIC HEART VALVE: ICD-10-CM

## 2018-08-28 PROCEDURE — 71046 X-RAY EXAM CHEST 2 VIEWS: CPT

## 2018-08-28 PROCEDURE — 73110 X-RAY EXAM OF WRIST: CPT

## 2018-08-28 PROCEDURE — 73090 X-RAY EXAM OF FOREARM: CPT

## 2018-09-04 DIAGNOSIS — J45.30 ASTHMA IN ADULT, MILD PERSISTENT, UNCOMPLICATED: ICD-10-CM

## 2018-09-06 ENCOUNTER — TELEPHONE (OUTPATIENT)
Dept: FAMILY MEDICINE CLINIC | Facility: CLINIC | Age: 58
End: 2018-09-06

## 2018-09-06 NOTE — TELEPHONE ENCOUNTER
Patient requesting a call back from Dr Any Bradshaw in regards to Xray results wrist and chest she had done last Thurs  Cindy Rm

## 2018-09-06 NOTE — TELEPHONE ENCOUNTER
I did call pt to let her know that the results are all normal  CXR shows intact sternotomy wires from valve replacement  Pt still having pain in the r anterior wrist and states there is a lump present and she also states she still has r chest discomfort she really did not want to discuss further with a nurse she wanted to speak to you directly as she said several times  Please call patient Thank you

## 2018-09-07 NOTE — TELEPHONE ENCOUNTER
I called and discussed result with patient  Patient also report of feeling chills, tired, feel like under the weather  I recommend her to call for an appointment and she agrees

## 2018-09-13 DIAGNOSIS — L50.0 ALLERGIC URTICARIA: ICD-10-CM

## 2018-09-15 RX ORDER — HYDROXYZINE HYDROCHLORIDE 25 MG/1
TABLET, FILM COATED ORAL
Qty: 60 TABLET | Refills: 1 | Status: SHIPPED | OUTPATIENT
Start: 2018-09-15 | End: 2018-11-18 | Stop reason: SDUPTHER

## 2018-09-18 RX ORDER — DEXAMETHASONE 4 MG/1
TABLET ORAL
Qty: 1 INHALER | Refills: 5 | Status: SHIPPED | OUTPATIENT
Start: 2018-09-18 | End: 2019-02-22 | Stop reason: SDUPTHER

## 2018-09-20 ENCOUNTER — TELEPHONE (OUTPATIENT)
Dept: NEUROLOGY | Facility: CLINIC | Age: 58
End: 2018-09-20

## 2018-09-20 NOTE — TELEPHONE ENCOUNTER
PA needed for [place] from [facility]      Rx bin [id number]  [facility] claimci  Id [id number]  [phone]

## 2018-11-02 DIAGNOSIS — G40.019 LOCALIZATION-RELATED EPILEPSY, INTRACTABLE (HCC): ICD-10-CM

## 2018-11-02 RX ORDER — LEVETIRACETAM 500 MG
TABLET ORAL
Qty: 120 TABLET | Refills: 3 | Status: SHIPPED | OUTPATIENT
Start: 2018-11-02 | End: 2019-02-22 | Stop reason: SDUPTHER

## 2018-11-09 ENCOUNTER — TELEPHONE (OUTPATIENT)
Dept: FAMILY MEDICINE CLINIC | Facility: CLINIC | Age: 58
End: 2018-11-09

## 2018-11-09 NOTE — TELEPHONE ENCOUNTER
Pt requesting to speak with Dr Janet Lewis regarding needing a letter for [place] for bus pass  She said they are giving her a hard time about it   She spoke with Mari Garrison at [phone] and she told her she can't be evaluated until July 2019

## 2018-11-12 NOTE — TELEPHONE ENCOUNTER
Is there some information that you feel would be helpful to get this pt door to door service by bus  Please review  Thank you  WE can write letter if you feel this is valid with current diagnosis to include   Thank you

## 2018-11-13 ENCOUNTER — APPOINTMENT (OUTPATIENT)
Dept: LAB | Facility: HOSPITAL | Age: 58
End: 2018-11-13
Attending: INTERNAL MEDICINE
Payer: COMMERCIAL

## 2018-11-13 ENCOUNTER — ANTICOAG VISIT (OUTPATIENT)
Dept: CARDIOLOGY CLINIC | Facility: CLINIC | Age: 58
End: 2018-11-13

## 2018-11-13 DIAGNOSIS — Z95.2 HISTORY OF PROSTHETIC HEART VALVE: ICD-10-CM

## 2018-11-13 DIAGNOSIS — Z95.2 S/P MVR (MITRAL VALVE REPLACEMENT): ICD-10-CM

## 2018-11-13 DIAGNOSIS — E08.610 CONTROLLED DIABETES MELLITUS DUE TO UNDERLYING CONDITION WITH DIABETIC NEUROPATHIC ARTHROPATHY, WITHOUT LONG-TERM CURRENT USE OF INSULIN (HCC): ICD-10-CM

## 2018-11-13 LAB
CREAT UR-MCNC: 139 MG/DL
INR PPP: 5.03 (ref 0.86–1.17)
MICROALBUMIN UR-MCNC: 8.4 MG/L (ref 0–20)
MICROALBUMIN/CREAT 24H UR: 6 MG/G CREATININE (ref 0–30)
PROTHROMBIN TIME: 46.5 SECONDS (ref 11.8–14.2)
TSH SERPL DL<=0.05 MIU/L-ACNC: 2.19 UIU/ML (ref 0.36–3.74)

## 2018-11-13 PROCEDURE — 84443 ASSAY THYROID STIM HORMONE: CPT

## 2018-11-13 PROCEDURE — 85610 PROTHROMBIN TIME: CPT

## 2018-11-13 PROCEDURE — 82043 UR ALBUMIN QUANTITATIVE: CPT | Performed by: FAMILY MEDICINE

## 2018-11-13 PROCEDURE — 82570 ASSAY OF URINE CREATININE: CPT | Performed by: FAMILY MEDICINE

## 2018-11-13 PROCEDURE — 3061F NEG MICROALBUMINURIA REV: CPT | Performed by: FAMILY MEDICINE

## 2018-11-13 PROCEDURE — 36415 COLL VENOUS BLD VENIPUNCTURE: CPT

## 2018-11-13 NOTE — TELEPHONE ENCOUNTER
Pt is calling about this again today and is saying she would like a call from Dr Mingo Oconnell about letter

## 2018-11-14 NOTE — TELEPHONE ENCOUNTER
I will be in and out for a lots of meeting tomorrow as well  Please call patient and ask what specific information she has to write a letter  I know she has been quite tired and exhausted lately, also leg pain  I am happy to help with the letter, it's just I don't know if I can get around to call her at all tomorrow

## 2018-11-16 NOTE — TELEPHONE ENCOUNTER
Patient returning missed call to nurse  Patient states, the letter must indicate the need for [place] [employer] service please include diagnosis and specilaist she sees for whatever reason      Dr Daniel Virgen cardiologist    Dr Maggie Tyler neurology    Eye doctor? cataracts     Dr Francis Bloodgood monitoring diabetes     Please fax to following:    Roya Hernandez     [phone] (o         [address] W/FAX #

## 2018-11-18 DIAGNOSIS — J30.9 ALLERGIC RHINITIS, UNSPECIFIED SEASONALITY, UNSPECIFIED TRIGGER: ICD-10-CM

## 2018-11-18 DIAGNOSIS — L50.0 ALLERGIC URTICARIA: ICD-10-CM

## 2018-11-19 RX ORDER — HYDROXYZINE HYDROCHLORIDE 25 MG/1
TABLET, FILM COATED ORAL
Qty: 60 TABLET | Refills: 1 | Status: SHIPPED | OUTPATIENT
Start: 2018-11-19 | End: 2019-01-14 | Stop reason: SDUPTHER

## 2018-11-19 RX ORDER — CETIRIZINE HYDROCHLORIDE 10 MG/1
TABLET ORAL
Qty: 30 TABLET | Refills: 6 | Status: SHIPPED | OUTPATIENT
Start: 2018-11-19 | End: 2019-02-22 | Stop reason: SDUPTHER

## 2018-12-05 ENCOUNTER — ANTICOAG VISIT (OUTPATIENT)
Dept: CARDIOLOGY CLINIC | Facility: CLINIC | Age: 58
End: 2018-12-05

## 2018-12-05 DIAGNOSIS — Z95.2 HISTORY OF PROSTHETIC HEART VALVE: ICD-10-CM

## 2018-12-05 RX ORDER — ACETAMINOPHEN 500 MG
500 TABLET ORAL EVERY 6 HOURS PRN
COMMUNITY
End: 2018-12-12 | Stop reason: SDUPTHER

## 2018-12-10 ENCOUNTER — APPOINTMENT (OUTPATIENT)
Dept: LAB | Facility: HOSPITAL | Age: 58
End: 2018-12-10
Attending: INTERNAL MEDICINE
Payer: COMMERCIAL

## 2018-12-10 ENCOUNTER — ANTICOAG VISIT (OUTPATIENT)
Dept: CARDIOLOGY CLINIC | Facility: CLINIC | Age: 58
End: 2018-12-10

## 2018-12-10 DIAGNOSIS — Z95.2 HISTORY OF PROSTHETIC HEART VALVE: ICD-10-CM

## 2018-12-12 ENCOUNTER — OFFICE VISIT (OUTPATIENT)
Dept: CARDIOLOGY CLINIC | Facility: CLINIC | Age: 58
End: 2018-12-12
Payer: COMMERCIAL

## 2018-12-12 VITALS
WEIGHT: 223.8 LBS | DIASTOLIC BLOOD PRESSURE: 82 MMHG | SYSTOLIC BLOOD PRESSURE: 132 MMHG | HEART RATE: 71 BPM | BODY MASS INDEX: 45.12 KG/M2 | HEIGHT: 59 IN

## 2018-12-12 DIAGNOSIS — I05.0 MITRAL VALVE STENOSIS, UNSPECIFIED ETIOLOGY: Primary | ICD-10-CM

## 2018-12-12 DIAGNOSIS — Z95.2 H/O MITRAL VALVE REPLACEMENT WITH MECHANICAL VALVE: ICD-10-CM

## 2018-12-12 PROCEDURE — 99214 OFFICE O/P EST MOD 30 MIN: CPT | Performed by: STUDENT IN AN ORGANIZED HEALTH CARE EDUCATION/TRAINING PROGRAM

## 2018-12-12 PROCEDURE — 93000 ELECTROCARDIOGRAM COMPLETE: CPT | Performed by: STUDENT IN AN ORGANIZED HEALTH CARE EDUCATION/TRAINING PROGRAM

## 2018-12-12 NOTE — PROGRESS NOTES
Cardiology Consultation     Nasreen Groves  [id number]  1960  [facility]      1  Mitral valve stenosis, unspecified etiology  POCT ECG   2  H/O mitral valve replacement with mechanical valve  Echo complete with contrast if indicated    CBC and Platelet       Discussion/Summary:  Ms Keyonna Palomino is a 53-year-old female with past medical history significant for rheumatic mitral stenosis status post balloon valvuloplasty in 2014 in [place] and then requiring mechanical mitral valve replacement with 27 mm [employer] in June 2015, type 2 diabetes, asthma  1  Mitral stenosis with BMV in 2014 in [place] and MVR with 27 mm [employer] in June 2015  - will get a baseline echo to establish gradients across the valve  - currently on Coumadin, INR being managed per Coumadin Clinic, goal INR 2 5-3 5   - on metoprolol tartrate 12 5 mg twice daily, p r n  Torsemide   - Patient reports symptoms of fatigue- check CBC given that she is on Coumadin with h/o iron deficiency  2  Type II Diabetes  - On Metformin  - Lipid panel- LDL-108, HDL-54, discussed lifestyle changes, will repeat panel in next visit and if LDL>100 recommend statin therapy, cath in 2015 with no coronary artery disease    3  H/o CVA with right sided weakness- now with no residual at the time of BMV for MS in 2014  4  Asthma    F/u in six months  History of Present Illness: Ms Keyonna Palomino is a 53-year-old female with past medical history significant for rheumatic mitral stenosis status post balloon valvuloplasty in 2014 in [place] and then requiring mechanical mitral valve replacement with 27 mm [employer] in June 2015, type 2 diabetes, asthma    Patient used to live in [place] prior at which time given symptoms of shortness of breath with volume overload she was seen at [facility] found to have rheumatic mitral stenosis and underwent balloon valvuloplasty in 2014  BMV was complicated by stroke with no residual deficits now, at the time of the procedure she had right-sided weakness  She then moved to [place], had symptoms of shortness of breath with volume overload with a repeat echo showing severe mitral stenosis with diastolic doming consistent with rheumatic appearance with a mean gradient of 15  Patient then underwent replacement with a mechanical 27 mm [employer] valve in June 2015  Since then, patient has been doing well  She now presents for a routine 6 month follow-up visit  She denies any symptoms of chest pain or pressure  No shortness of breath at rest or with exertion  No dizziness or lightheadedness  No swelling of the lower extremities  Been compliant with her medications  No dark-colored stools  No falls  She reports symptoms of fatigue for the past few months  She has been taking care of her mom who was sick in the hospital and she passed away recently- which according to the patient she has been handling well  She lives in a group living facility  No smoking or alcohol or drug use  Family history of CAD in father, aunts and uncles  F/u with our Coumadin clinic here  Previous patient of Dr Prince Mccarthy      EKG in clinic today-normal sinus rhythm  Normal ST T waves        Patient Active Problem List   Diagnosis    History of prosthetic heart valve    Abnormal mammogram    Asthma, persistent    Asymptomatic stenosis of left carotid artery    Cervical high risk HPV (human papillomavirus) test positive    Controlled diabetes mellitus due to underlying condition with diabetic neuropathic arthropathy (HCC)    Depression with anxiety    Elevated blood pressure reading    Esophageal reflux    Excessive daytime sleepiness    Fatigue    Hypokalemia    Fecal occult blood test positive    Insomnia    Intertrigo    Iron deficiency anemia    Localization-related epilepsy, intractable (HCC)    Low grade squamous intraepithelial lesion (LGSIL) on cervical Pap smear    Microcytic hypochromic anemia    Mitral valve stenosis    Obesity    Osteoarthritis of knee    Overactive bladder    Plantar fasciitis    Postnasal drip    Sleep disorder, circadian, delayed sleep phase type    Urge incontinence of urine    Urinary retention    Urticaria, chronic    Uterine fibroid    Vitamin D insufficiency    Xerosis of skin    Cough    Viral URI with cough    History of stroke    Wrist swelling, left    Chest wall pain     Past Medical History:   Diagnosis Date    Abnormal ultrasound of pelvis     last assessed - 26Mar2015    Asthma     Cerebral infarction [facility])     unspecified; last assessed - [id number]    Dizziness     last assessed - [id number]    Dysuria     last assessed - [id number]    Exposure to potentially hazardous body fluids     last assessed - [id number]    Heart disease     Hematoma     of arm; last assessed - 20Jun2016    Mitral valve stenosis     Personal history of scoliosis     Pneumonia     Scar, hypertrophic     last assessed - [id number]    Seizures ([facility] [address] )     Type 2 diabetes mellitus ([facility] [address] )     Vision problems      Social History     Social History    Marital status: Single     Spouse name: N/A    Number of children: N/A    Years of education: N/A     Occupational History    Not on file       Social History Main Topics    Smoking status: Never Smoker    Smokeless tobacco: Never Used    Alcohol use No    Drug use: No    Sexual activity: Not Currently     Other Topics Concern    Not on file     Social History Narrative    Caffeine use      Family History   Problem Relation Age of Onset    Cancer Mother     Dementia Mother     Diabetes Mother     Hypertension Mother     Hyperlipidemia Mother     Cervical cancer Mother     Heart disease Father     Coronary artery disease Father     Hypertension Father     Heart attack Father     Diabetes Sister     Breast cancer Sister    [facility] Diabetes Brother     Hypertension Brother     Hyperlipidemia Brother     Diabetes Maternal Grandmother     Breast cancer Maternal Grandmother     Diabetes Other     Seizures Son     Psoriasis Son      Past Surgical History:   Procedure Laterality Date    MITRAL VALVE REPLACEMENT  06/17/2015    MVR with 27 mm [employer] Optiform mechanical prostethic;  last assessed - [id number]    TONSILLECTOMY      TRANSLUMINAL ANGIOPLASTY      mitral valve; last assessed - 01Aug2014    TUBAL LIGATION  1986    last assessed - 01Aug2014       Current Outpatient Prescriptions:     acetaminophen (TYLENOL) 500 mg tablet, Take 2 tablets by mouth daily as needed for pain, Disp: 60 tablet, Rfl: 2    cetirizine (ZyrTEC) 10 mg tablet, TAKE ONE TABLET BY MOUTH DAILY AS DIRECTED, Disp: 30 tablet, Rfl: 6    FLOVENT  MCG/ACT inhaler, INHALE 2 PUFFS BY MOUTH TWICE A DAY, Disp: 1 Inhaler, Rfl: 5    fluticasone (FLONASE) 50 mcg/act nasal spray, 1 spray into each nostril daily, Disp: 16 g, Rfl: 1    glucose blood (ACCU-CHEK SMARTVIEW) test strip, Use daily as instructed, Disp: 100 each, Rfl: 5    hydrOXYzine HCL (ATARAX) 25 mg tablet, TAKE ONE TABLET BY MOUTH EVERY 6 HOURS AS NEEDED, Disp: 60 tablet, Rfl: 1    KEPPRA 500 MG tablet, TAKE ONE TABLET BY MOUTH FOUR TIMES A DAY (Patient taking differently: TAKE ONE TABLET BY MOUTH THREE TIMES A DAY), Disp: 120 tablet, Rfl: 3    Lancets (ACCU-CHEK MULTICLIX) lancets, Use as instructed, Disp: 100 each, Rfl: 0    metFORMIN (GLUCOPHAGE) 500 mg tablet, TAKE ONE TABLET BY MOUTH TWICE A DAY, Disp: 180 tablet, Rfl: 2    metoprolol tartrate (LOPRESSOR) 25 mg tablet, TAKE HALF TABLET BY MOUTH EVERY 12 HOURS, Disp: 30 tablet, Rfl: 5    omeprazole (PriLOSEC) 40 MG capsule, Take 40 mg by mouth daily  , Disp: , Rfl:     torsemide (DEMADEX) 20 mg tablet, Take 1 tablet (20 mg total) by mouth daily (Patient taking differently: Take 20 mg by mouth as needed  ), Disp: 90 tablet, Rfl: 3    traMADol (ULTRAM) 50 mg tablet, Take 50 mg by mouth every 6 (six) hours as needed for moderate pain, Disp: , Rfl:     warfarin (COUMADIN) 2 5 mg tablet, Take 2 5 mg by mouth daily, Disp: , Rfl:     warfarin (COUMADIN) 5 mg tablet, TAKE ONE TABLET BY MOUTH DAILY, Disp: 60 tablet, Rfl: 5  Allergies   Allergen Reactions    Codeine     Prednisone Itching    Codeine Polt-Chlorphen Polt Er Irritability, Itching and Rash         Labs: Ancillary Orders on 11/23/2018   Component Date Value    Protime 12/10/2018 35 4*    INR 12/10/2018 3 54*   Appointment on 11/13/2018   Component Date Value    TSH 3RD GENERATON 11/13/2018 2 190     Protime 11/13/2018 46 5*    INR 11/13/2018 5 03*   Office Visit on 08/27/2018   Component Date Value    Creatinine, Ur 11/13/2018 139 0     Microalbum  ,U,Random 11/13/2018 8 4     Microalb Creat Ratio 11/13/2018 6    Ancillary Orders on 07/27/2018   Component Date Value    Protime 08/28/2018 35 2*    INR 08/28/2018 3 51*   Appointment on 07/16/2018   Component Date Value    Hemoglobin A1C 07/16/2018 6 2     EAG 07/16/2018 131     Cholesterol 07/16/2018 190     Triglycerides 07/16/2018 138     HDL, Direct 07/16/2018 54     LDL Calculated 07/16/2018 108*    Non-HDL-Chol (CHOL-HDL) 07/16/2018 136     TSH 3RD GENERATON 07/16/2018 2 420     WBC 07/16/2018 6 58     RBC 07/16/2018 4 78     Hemoglobin 07/16/2018 12 2     Hematocrit 07/16/2018 39 0     MCV 07/16/2018 82     MCH 07/16/2018 25 5*    MCHC 07/16/2018 31 3*    RDW 07/16/2018 14 5     MPV 07/16/2018 10 8     Platelets 40/98/2719 262     nRBC 07/16/2018 0     Neutrophils Relative 07/16/2018 67     Immat GRANS % 07/16/2018 0     Lymphocytes Relative 07/16/2018 22     Monocytes Relative 07/16/2018 7     Eosinophils Relative 07/16/2018 3     Basophils Relative 07/16/2018 1     Neutrophils Absolute 07/16/2018 4 41     Immature Grans Absolute 07/16/2018 0 01     Lymphocytes Absolute 07/16/2018 1 42  Monocytes Absolute 07/16/2018 0 47     Eosinophils Absolute 07/16/2018 0 18     Basophils Absolute 07/16/2018 0 09     Sodium 07/16/2018 138     Potassium 07/16/2018 4 0     Chloride 07/16/2018 104     CO2 07/16/2018 31     ANION GAP 07/16/2018 3*    BUN 07/16/2018 11     Creatinine 07/16/2018 0 83     Glucose, Fasting 07/16/2018 121*    Calcium 07/16/2018 9 3     AST 07/16/2018 20     ALT 07/16/2018 19     Alkaline Phosphatase 07/16/2018 134*    Total Protein 07/16/2018 7 5     Albumin 07/16/2018 3 5     Total Bilirubin 07/16/2018 0 68     eGFR 07/16/2018 77     Protime 07/16/2018 33 1*    INR 07/16/2018 3 24*        Imaging: No results found  ECG: normal sinus rhythm  Normal ST T waves    Review of Systems:  Review of Systems   Constitutional: Positive for fatigue  Negative for activity change, appetite change, chills, diaphoresis and fever  HENT: Negative for congestion  Respiratory: Negative for cough, chest tightness, shortness of breath and wheezing  Cardiovascular: Negative for chest pain, palpitations and leg swelling  Gastrointestinal: Negative for abdominal pain, diarrhea, nausea and vomiting  Genitourinary: Negative for difficulty urinating and dysuria  Musculoskeletal: Negative for back pain  Skin: Negative for color change and rash  Neurological: Negative for dizziness, syncope, facial asymmetry, weakness, light-headedness, numbness and headaches  Psychiatric/Behavioral: Negative for confusion           Vitals:    12/12/18 1302   BP: 132/82   BP Location: Right arm   Patient Position: Sitting   Cuff Size: Large   Pulse: 71   Weight: 102 kg (223 lb 12 8 oz)   Height: 4' 11" (1 499 m)     Vitals:    12/12/18 1302   Weight: 102 kg (223 lb 12 8 oz)     Height: 4' 11" (149 9 cm)     Physical Exam:  General appearance:  Appears stated age, alert, well appearing and in no distress  HEENT:  PERRLA, EOMI, no scleral icterus, no conjunctival pallor  NECK:  Supple, No elevated JVP, no thyromegaly, no carotid bruits  HEART:  Regular rate and rhythm, normal mechanical valve click  LUNGS:  Clear to auscultation bilaterally  ABDOMEN:  Soft, non-tender, positive bowel sounds, no rebound or guarding, no organomegaly   EXTREMITIES:  No edema  VASCULAR:  Normal pedal pulses   SKIN: No lesions or rashes on exposed skin  NEURO:  CN II-XII intact, no focal deficits

## 2018-12-28 ENCOUNTER — APPOINTMENT (OUTPATIENT)
Dept: LAB | Facility: HOSPITAL | Age: 58
End: 2018-12-28
Attending: INTERNAL MEDICINE
Payer: COMMERCIAL

## 2018-12-28 ENCOUNTER — ANTICOAG VISIT (OUTPATIENT)
Dept: CARDIOLOGY CLINIC | Facility: CLINIC | Age: 58
End: 2018-12-28

## 2018-12-28 DIAGNOSIS — Z95.2 HISTORY OF PROSTHETIC HEART VALVE: ICD-10-CM

## 2019-01-09 ENCOUNTER — TELEPHONE (OUTPATIENT)
Dept: FAMILY MEDICINE CLINIC | Facility: CLINIC | Age: 59
End: 2019-01-09

## 2019-01-09 NOTE — TELEPHONE ENCOUNTER
Patient was last seen 8/2018 and was to follow up in 6 months  Please schedule follow up appointment  Thank you

## 2019-01-11 ENCOUNTER — ANTICOAG VISIT (OUTPATIENT)
Dept: CARDIOLOGY CLINIC | Facility: CLINIC | Age: 59
End: 2019-01-11

## 2019-01-11 ENCOUNTER — APPOINTMENT (OUTPATIENT)
Dept: LAB | Facility: HOSPITAL | Age: 59
End: 2019-01-11
Attending: INTERNAL MEDICINE
Payer: COMMERCIAL

## 2019-01-11 DIAGNOSIS — Z95.2 HISTORY OF PROSTHETIC HEART VALVE: ICD-10-CM

## 2019-01-12 ENCOUNTER — TRANSCRIBE ORDERS (OUTPATIENT)
Dept: ADMINISTRATIVE | Facility: HOSPITAL | Age: 59
End: 2019-01-12

## 2019-01-14 DIAGNOSIS — L50.0 ALLERGIC URTICARIA: ICD-10-CM

## 2019-01-14 RX ORDER — HYDROXYZINE HYDROCHLORIDE 25 MG/1
TABLET, FILM COATED ORAL
Qty: 60 TABLET | Refills: 1 | Status: SHIPPED | OUTPATIENT
Start: 2019-01-14 | End: 2019-04-24 | Stop reason: SDUPTHER

## 2019-01-18 ENCOUNTER — HOSPITAL ENCOUNTER (EMERGENCY)
Facility: HOSPITAL | Age: 59
Discharge: HOME | End: 2019-01-18
Attending: EMERGENCY MEDICINE
Payer: COMMERCIAL

## 2019-01-18 ENCOUNTER — APPOINTMENT (EMERGENCY)
Dept: RADIOLOGY | Facility: HOSPITAL | Age: 59
End: 2019-01-18
Payer: COMMERCIAL

## 2019-01-18 VITALS
HEART RATE: 80 BPM | BODY MASS INDEX: 45.42 KG/M2 | SYSTOLIC BLOOD PRESSURE: 135 MMHG | RESPIRATION RATE: 16 BRPM | DIASTOLIC BLOOD PRESSURE: 80 MMHG | OXYGEN SATURATION: 100 % | TEMPERATURE: 99.1 F | WEIGHT: 224.87 LBS

## 2019-01-18 DIAGNOSIS — B34.9 VIRAL SYNDROME: Primary | ICD-10-CM

## 2019-01-18 LAB
ANION GAP SERPL CALCULATED.3IONS-SCNC: 7 MMOL/L (ref 4–13)
APTT PPP: 63 SECONDS (ref 26–38)
BASOPHILS # BLD AUTO: 0.06 THOUSANDS/ΜL (ref 0–0.1)
BASOPHILS NFR BLD AUTO: 1 % (ref 0–1)
BUN SERPL-MCNC: 9 MG/DL (ref 5–25)
CALCIUM SERPL-MCNC: 9.3 MG/DL (ref 8.3–10.1)
CHLORIDE SERPL-SCNC: 104 MMOL/L (ref 100–108)
CO2 SERPL-SCNC: 28 MMOL/L (ref 21–32)
CREAT SERPL-MCNC: 0.9 MG/DL (ref 0.6–1.3)
EOSINOPHIL # BLD AUTO: 0.14 THOUSAND/ΜL (ref 0–0.61)
EOSINOPHIL NFR BLD AUTO: 2 % (ref 0–6)
ERYTHROCYTE [DISTWIDTH] IN BLOOD BY AUTOMATED COUNT: 14.7 % (ref 11.6–15.1)
GFR SERPL CREATININE-BSD FRML MDRD: 70 ML/MIN/1.73SQ M
GLUCOSE SERPL-MCNC: 204 MG/DL (ref 65–140)
HCT VFR BLD AUTO: 39.2 % (ref 34.8–46.1)
HGB BLD-MCNC: 12.4 G/DL (ref 11.5–15.4)
IMM GRANULOCYTES # BLD AUTO: 0.01 THOUSAND/UL (ref 0–0.2)
IMM GRANULOCYTES NFR BLD AUTO: 0 % (ref 0–2)
INR PPP: 3.07 (ref 0.86–1.17)
LYMPHOCYTES # BLD AUTO: 0.91 THOUSANDS/ΜL (ref 0.6–4.47)
LYMPHOCYTES NFR BLD AUTO: 14 % (ref 14–44)
MCH RBC QN AUTO: 25.6 PG (ref 26.8–34.3)
MCHC RBC AUTO-ENTMCNC: 31.6 G/DL (ref 31.4–37.4)
MCV RBC AUTO: 81 FL (ref 82–98)
MONOCYTES # BLD AUTO: 0.41 THOUSAND/ΜL (ref 0.17–1.22)
MONOCYTES NFR BLD AUTO: 6 % (ref 4–12)
NEUTROPHILS # BLD AUTO: 4.89 THOUSANDS/ΜL (ref 1.85–7.62)
NEUTS SEG NFR BLD AUTO: 77 % (ref 43–75)
NRBC BLD AUTO-RTO: 0 /100 WBCS
PLATELET # BLD AUTO: 214 THOUSANDS/UL (ref 149–390)
PMV BLD AUTO: 10.2 FL (ref 8.9–12.7)
POTASSIUM SERPL-SCNC: 4.1 MMOL/L (ref 3.5–5.3)
PROTHROMBIN TIME: 31.7 SECONDS (ref 11.8–14.2)
RBC # BLD AUTO: 4.85 MILLION/UL (ref 3.81–5.12)
SODIUM SERPL-SCNC: 139 MMOL/L (ref 136–145)
WBC # BLD AUTO: 6.42 THOUSAND/UL (ref 4.31–10.16)

## 2019-01-18 PROCEDURE — 96361 HYDRATE IV INFUSION ADD-ON: CPT

## 2019-01-18 PROCEDURE — 36415 COLL VENOUS BLD VENIPUNCTURE: CPT | Performed by: EMERGENCY MEDICINE

## 2019-01-18 PROCEDURE — 80048 BASIC METABOLIC PNL TOTAL CA: CPT | Performed by: EMERGENCY MEDICINE

## 2019-01-18 PROCEDURE — 96374 THER/PROPH/DIAG INJ IV PUSH: CPT

## 2019-01-18 PROCEDURE — 85730 THROMBOPLASTIN TIME PARTIAL: CPT | Performed by: EMERGENCY MEDICINE

## 2019-01-18 PROCEDURE — 85610 PROTHROMBIN TIME: CPT | Performed by: EMERGENCY MEDICINE

## 2019-01-18 PROCEDURE — 94640 AIRWAY INHALATION TREATMENT: CPT

## 2019-01-18 PROCEDURE — 85025 COMPLETE CBC W/AUTO DIFF WBC: CPT | Performed by: EMERGENCY MEDICINE

## 2019-01-18 PROCEDURE — 71046 X-RAY EXAM CHEST 2 VIEWS: CPT

## 2019-01-18 PROCEDURE — 99284 EMERGENCY DEPT VISIT MOD MDM: CPT

## 2019-01-18 RX ORDER — KETOROLAC TROMETHAMINE 30 MG/ML
15 INJECTION, SOLUTION INTRAMUSCULAR; INTRAVENOUS ONCE
Status: COMPLETED | OUTPATIENT
Start: 2019-01-18 | End: 2019-01-18

## 2019-01-18 RX ORDER — ALBUTEROL SULFATE 2.5 MG/3ML
5 SOLUTION RESPIRATORY (INHALATION) ONCE
Status: COMPLETED | OUTPATIENT
Start: 2019-01-18 | End: 2019-01-18

## 2019-01-18 RX ADMIN — KETOROLAC TROMETHAMINE 15 MG: 30 INJECTION, SOLUTION INTRAMUSCULAR at 13:02

## 2019-01-18 RX ADMIN — SODIUM CHLORIDE 1000 ML: 0.9 INJECTION, SOLUTION INTRAVENOUS at 13:01

## 2019-01-18 RX ADMIN — ALBUTEROL SULFATE 5 MG: 2.5 SOLUTION RESPIRATORY (INHALATION) at 12:58

## 2019-01-18 RX ADMIN — IPRATROPIUM BROMIDE 0.5 MG: 0.5 SOLUTION RESPIRATORY (INHALATION) at 12:58

## 2019-01-18 NOTE — ED ATTENDING ATTESTATION
Arline Gandhi MD, saw and evaluated the patient  I have discussed the patient with the resident/non-physician practitioner and agree with the resident's/non-physician practitioner's findings, Plan of Care, and MDM as documented in the resident's/non-physician practitioner's note, except where noted  All available labs and Radiology studies were reviewed  At this point I agree with the current assessment done in the Emergency Department  I have conducted an independent evaluation of this patient a history and physical is as follows:  Patient has history of some asthma  Patient states that she has been having increased cough this has been persistent for several days no fevers patient has persisted with cough that is dry in nature chest pain only when coughing PE alert heart regular wheezes at the bases of her lungs bilaterally abdomen soft nondistended nontender MDM will check chest x-ray treat wheezing re-evaluate      Critical Care Time  CritCare Time    Procedures

## 2019-01-18 NOTE — DISCHARGE INSTRUCTIONS
Viral Syndrome, Ambulatory Care   GENERAL INFORMATION:   Viral syndrome  is a term healthcare providers use for general symptoms of a viral infection that has no clear cause  Common symptoms include the following:   · Fever and chills, or a rash    · Runny or stuffy nose     · Cough, sore throat, or hoarseness     · Headache, or pain and pressure around your eyes     · Muscle aches and joint pain     · Shortness of breath or wheezing     · Abdominal pain, cramps, and diarrhea     · Nausea, vomiting, or loss of appetite  Seek immediate care for the following symptoms:   · Continued vomiting and diarrhea    · Chest pain or trouble breathing  Treatment for a viral syndrome  may include medicines to decrease fever, cough, or stuffy nose  You may also need medicines to relieve a rash, itching, or help treat the viral infection  Manage your symptoms:  Drink liquids as directed to help prevent dehydration  Ask how much liquid to drink each day and which liquids are best for you  You may need to drink an oral rehydration solution (ORS)  An ORS has the right amounts of water, salts, and sugar you need to replace body fluids  Prevent the spread of viral syndrome:   · Wash your hands often  Use soap and water  Wash your hands after you use the bathroom, change a child's diapers, or sneeze  Wash your hands before you prepare or eat food  Use a gel hand  if soap and water are not available  · Wear a mask  to help prevent spreading the virus to others  · Cook and handle food properly  Cook food completely through  Clean food preparation surfaces with a disinfectant  · Ask about vaccinations  You may need an influenza (flu) vaccine, pneumococcal vaccine, or meningococcal vaccine  These vaccines help prevent the flu, pneumonia, and meningococcal disease  Follow up with your healthcare provider as directed:  Write down your questions so you remember to ask them during your visits     CARE AGREEMENT:   You have the right to help plan your care  Learn about your health condition and how it may be treated  Discuss treatment options with your caregivers to decide what care you want to receive  You always have the right to refuse treatment  The above information is an  only  It is not intended as medical advice for individual conditions or treatments  Talk to your doctor, nurse or pharmacist before following any medical regimen to see if it is safe and effective for you  © 2014 [address] is for End User's use only and may not be sold, redistributed or otherwise used for commercial purposes  All illustrations and images included in CareNotes® are the copyrighted property of [employer]  or Roldan Bucio

## 2019-01-18 NOTE — ED PROVIDER NOTES
History  Chief Complaint   Patient presents with    Flu Symptoms     Coughing, Sneezing, Runny Nose, Pain from coughing     69-year-old female presents for evaluation of cough, congestion and myalgias for started 6 days ago  Patient reports associated chest pain only when coughing  Denies shortness of breath  She has not taken any medications prior to arrival   Has not received her flu shot yet this year  No alleviating or aggravating factors  Patient is on Coumadin for history of mechanical mitral valve repair  Prior to Admission Medications   Prescriptions Last Dose Informant Patient Reported? Taking?    FLOVENT  MCG/ACT inhaler  Self No No   Sig: INHALE 2 PUFFS BY MOUTH TWICE A DAY   KEPPRA 500 MG tablet  Self No No   Sig: TAKE ONE TABLET BY MOUTH FOUR TIMES A DAY   Patient taking differently: TAKE ONE TABLET BY MOUTH THREE TIMES A DAY   Lancets (ACCU-CHEK MULTICLIX) lancets  Self No No   Sig: Use as instructed   acetaminophen (TYLENOL) 500 mg tablet  Self No No   Sig: Take 2 tablets by mouth daily as needed for pain   cetirizine (ZyrTEC) 10 mg tablet  Self No No   Sig: TAKE ONE TABLET BY MOUTH DAILY AS DIRECTED   fluticasone (FLONASE) 50 mcg/act nasal spray  Self No No   Si spray into each nostril daily   glucose blood (ACCU-CHEK SMARTVIEW) test strip  Self No No   Sig: Use daily as instructed   hydrOXYzine HCL (ATARAX) 25 mg tablet   No No   Sig: TAKE ONE TABLET BY MOUTH EVERY 6 HOURS AS NEEDED   metFORMIN (GLUCOPHAGE) 500 mg tablet  Self No No   Sig: TAKE ONE TABLET BY MOUTH TWICE A DAY   metoprolol tartrate (LOPRESSOR) 25 mg tablet  Self No No   Sig: TAKE HALF TABLET BY MOUTH EVERY 12 HOURS   omeprazole (PriLOSEC) 40 MG capsule  Self Yes No   Sig: Take 40 mg by mouth daily     torsemide (DEMADEX) 20 mg tablet  Self No No   Sig: Take 1 tablet (20 mg total) by mouth daily   Patient taking differently: Take 20 mg by mouth as needed     traMADol (ULTRAM) 50 mg tablet  Self Yes No   Sig: Take 50 mg by mouth every 6 (six) hours as needed for moderate pain   warfarin (COUMADIN) 2 5 mg tablet  Self Yes No   Sig: Take 2 5 mg by mouth daily   warfarin (COUMADIN) 5 mg tablet  Self No No   Sig: TAKE ONE TABLET BY MOUTH DAILY      Facility-Administered Medications: None       Past Medical History:   Diagnosis Date    Abnormal ultrasound of pelvis     last assessed - [id number]    Asthma     Cerebral infarction [facility])     unspecified; last assessed - 92Ocr8810    Dizziness     last assessed - 73Yjb3464    Dysuria     last assessed - 37Oix3230    Exposure to potentially hazardous body fluids     last assessed - 04Gne8640    Heart disease     Hematoma     of arm; last assessed - 20Jun2016    Mitral valve stenosis     Personal history of scoliosis     Pneumonia     Scar, hypertrophic     last assessed - 27PBY8201    [facility]ures [facility] [address] [facility]     Type 2 diabetes mellitus ([facility] [address] )     Vision problems        Past Surgical History:   Procedure Laterality Date    MITRAL VALVE REPLACEMENT  06/17/2015    MVR with 27 mm [employer] Optiform mechanical prostethic;  last assessed - [id number]    TONSILLECTOMY      TRANSLUMINAL ANGIOPLASTY      mitral valve; last assessed - 90Kgg1606    TUBAL LIGATION  1986    last assessed - 61Uwl6556       Family History   Problem Relation Age of Onset    Cancer Mother     Dementia Mother     Diabetes Mother     Hypertension Mother     Hyperlipidemia Mother     Cervical cancer Mother     Heart disease Father     Coronary artery disease Father     Hypertension Father     Heart attack Father     Diabetes Sister     Breast cancer Sister     Diabetes Brother     Hypertension Brother     Hyperlipidemia Brother     Diabetes Maternal Grandmother     Breast cancer Maternal Grandmother     Diabetes Other     Seizures Son     Psoriasis Son      I have reviewed and agree with the history as documented      Social History   Substance Use Topics    Smoking status: Never Smoker    Smokeless tobacco: Never Used    Alcohol use No        Review of Systems   Constitutional: Positive for chills  Negative for diaphoresis and fever  HENT: Positive for congestion and rhinorrhea  Eyes: Negative for pain and visual disturbance  Respiratory: Positive for cough and wheezing  Negative for shortness of breath  Cardiovascular: Positive for chest pain  Negative for leg swelling  Gastrointestinal: Negative for abdominal pain, diarrhea, nausea and vomiting  Genitourinary: Negative for difficulty urinating, dysuria, frequency and urgency  Musculoskeletal: Negative for back pain, neck pain and neck stiffness  Skin: Negative for color change and rash  Neurological: Negative for syncope, numbness and headaches  All other systems reviewed and are negative  Physical Exam  ED Triage Vitals   Temperature Pulse Respirations Blood Pressure SpO2   01/18/19 1427 01/18/19 1311 01/18/19 1311 01/18/19 1311 01/18/19 1254   99 1 °F (37 3 °C) 80 16 135/80 99 %      Temp Source Heart Rate Source Patient Position - Orthostatic VS BP Location FiO2 (%)   01/18/19 1427 -- -- -- --   Oral          Pain Score       --                  Orthostatic Vital Signs  Vitals:    01/18/19 1311   BP: 135/80   Pulse: 80       Physical Exam   Constitutional: She is oriented to person, place, and time  She appears well-developed and well-nourished  No distress  HENT:   Head: Normocephalic and atraumatic  Mouth/Throat: Oropharynx is clear and moist  No oropharyngeal exudate  Eyes: Pupils are equal, round, and reactive to light  Conjunctivae and EOM are normal    Neck: Normal range of motion  Neck supple  Cardiovascular: Normal rate and regular rhythm  Pulmonary/Chest: Effort normal  No respiratory distress  She has wheezes  She has no rales  She exhibits tenderness  Anterior chest wall tenderness  Mild wheezing in bilateral bases   Abdominal: Soft  Bowel sounds are normal  There is no tenderness  There is no guarding  Musculoskeletal: Normal range of motion  She exhibits no edema or tenderness  Neurological: She is alert and oriented to person, place, and time  No cranial nerve deficit  Skin: Skin is warm  She is not diaphoretic  No erythema  Psychiatric: She has a normal mood and affect  Her behavior is normal    Nursing note and vitals reviewed  ED Medications  Medications   sodium chloride 0 9 % bolus 1,000 mL (0 mL Intravenous Stopped 1/18/19 1401)   ketorolac (TORADOL) injection 15 mg (15 mg Intravenous Given 1/18/19 1302)   albuterol inhalation solution 5 mg (5 mg Nebulization Given 1/18/19 1258)   ipratropium (ATROVENT) 0 02 % inhalation solution 0 5 mg (0 5 mg Nebulization Given 1/18/19 1258)       Diagnostic Studies  Results Reviewed     Procedure Component Value Units Date/Time    Basic metabolic panel [[id number]]  (Abnormal) Collected:  01/18/19 1302    Lab Status:  Final result Specimen:  Blood from Arm, Left Updated:  01/18/19 1328     Sodium 139 mmol/L      Potassium 4 1 mmol/L      Chloride 104 mmol/L      CO2 28 mmol/L      ANION GAP 7 mmol/L      BUN 9 mg/dL      Creatinine 0 90 mg/dL      Glucose 204 (H) mg/dL      Calcium 9 3 mg/dL      eGFR 70 ml/min/1 73sq m     Narrative:         National Kidney Disease Education Program recommendations are as follows:  GFR calculation is accurate only with a steady state creatinine  Chronic Kidney disease less than 60 ml/min/1 73 sq  meters  Kidney failure less than 15 ml/min/1 73 sq  meters      Protime-INR [[id number]]  (Abnormal) Collected:  01/18/19 1302    Lab Status:  Final result Specimen:  Blood from Arm, Left Updated:  01/18/19 1327     Protime 31 7 (H) seconds      INR 3 07 (H)    APTT [[id number]]  (Abnormal) Collected:  01/18/19 1302    Lab Status:  Final result Specimen:  Blood from Arm, Left Updated:  01/18/19 1327     PTT 63 (H) seconds     CBC and differential [[id number]]  (Abnormal) Collected:  01/18/19 1302    Lab Status: Final result Specimen:  Blood from Arm, Left Updated:  19 1311     WBC 6 42 Thousand/uL      RBC 4 85 Million/uL      Hemoglobin 12 4 g/dL      Hematocrit 39 2 %      MCV 81 (L) fL      MCH 25 6 (L) pg      MCHC 31 6 g/dL      RDW 14 7 %      MPV 10 2 fL      Platelets 497 Thousands/uL      nRBC 0 /100 WBCs      Neutrophils Relative 77 (H) %      Immat GRANS % 0 %      Lymphocytes Relative 14 %      Monocytes Relative 6 %      Eosinophils Relative 2 %      Basophils Relative 1 %      Neutrophils Absolute 4 89 Thousands/µL      Immature Grans Absolute 0 01 Thousand/uL      Lymphocytes Absolute 0 91 Thousands/µL      Monocytes Absolute 0 41 Thousand/µL      Eosinophils Absolute 0 14 Thousand/µL      Basophils Absolute 0 06 Thousands/µL                  XR chest 2 views   ED Interpretation by Hunter Fagan DO ([id number])   No acute pulm dz  Mech valve present      Final Result by River Mattson MD ( [id number])      No acute cardiopulmonary disease  Workstation performed: [id number]               Procedures  Procedures      Phone Consults  ED Phone Contact    ED Course  ED Course as of    1415 Upon reassessment, patient reports improvement of symptoms  1427 Mild wheezing now completely resolved  Patient refusing decadron at this time  MDM  Number of Diagnoses or Management Options  Viral syndrome:   Diagnosis management comments: 27-year-old female presenting with cough, congestion rhinorrhea with myalgias  symptoms consistent with viral URI, possibly influenza  Patient does not currently work and is on disability  She has no contact with elderly, children  Will obtain labs to check INR, symptomatic control with fluids, Toradol and DuoNeb treatment  Patient is currently refusing oral medications because she states any oral medication causes her to have a seizure  Obtain chest x-ray to assess for overlying pneumonia      Upon reassessment, patient has mild wheezing has completely resolved and she reports improvement of symptoms  She is stable for discharge  Follow up with primary care provider  Mananjanice Time    Disposition  Final diagnoses:   Viral syndrome     Time reflects when diagnosis was documented in both MDM as applicable and the Disposition within this note     Time User Action Codes Description Comment    1/18/2019  2:25 PM Dhaval Echeverria Add [B34 9] Viral syndrome       ED Disposition     ED Disposition Condition Comment    Discharge  [facility] discharge to home/self care      Condition at discharge: Stable        Follow-up Information     Follow up With Specialties Details Why Contact Info Additional Information    David Olsen MD Family Medicine, Obstetrics and Gynecology, Obstetrics, Gynecology In 2 days For reassessment [address] [facility] [phone]       [facility] [address] Emergency Department Emergency Medicine  If symptoms worsen [address]  [phone]  ED, [address] [place] [address], [phone]          Discharge Medication List as of 1/18/2019  2:27 PM      CONTINUE these medications which have NOT CHANGED    Details   acetaminophen (TYLENOL) 500 mg tablet Take 2 tablets by mouth daily as needed for pain, Normal      cetirizine (ZyrTEC) 10 mg tablet TAKE ONE TABLET BY MOUTH DAILY AS DIRECTED, Normal      FLOVENT  MCG/ACT inhaler INHALE 2 PUFFS BY MOUTH TWICE A DAY, Normal      fluticasone (FLONASE) 50 mcg/act nasal spray 1 spray into each nostril daily, Starting Mon 3/12/2018, Normal      glucose blood (ACCU-CHEK SMARTVIEW) test strip Use daily as instructed, Normal      hydrOXYzine HCL (ATARAX) 25 mg tablet TAKE ONE TABLET BY MOUTH EVERY 6 HOURS AS NEEDED, Normal      KEPPRA 500 MG tablet TAKE ONE TABLET BY MOUTH FOUR TIMES A DAY, Normal      Lancets (ACCU-CHEK MULTICLIX) lancets Use as instructed, Normal      metFORMIN (GLUCOPHAGE) 500 mg tablet TAKE ONE TABLET BY MOUTH TWICE A DAY, Normal      metoprolol tartrate (LOPRESSOR) 25 mg tablet TAKE HALF TABLET BY MOUTH EVERY 12 HOURS, Normal      omeprazole (PriLOSEC) 40 MG capsule Take 40 mg by mouth daily  , Historical Med      torsemide (DEMADEX) 20 mg tablet Take 1 tablet (20 mg total) by mouth daily, Starting Mon 4/16/2018, Until Tue 4/16/2019, Normal      traMADol (ULTRAM) 50 mg tablet Take 50 mg by mouth every 6 (six) hours as needed for moderate pain, Historical Med      !! warfarin (COUMADIN) 2 5 mg tablet Take 2 5 mg by mouth daily, Historical Med      !! warfarin (COUMADIN) 5 mg tablet TAKE ONE TABLET BY MOUTH DAILY, Normal       !! - Potential duplicate medications found  Please discuss with provider  No discharge procedures on file  ED Provider  Attending physically available and [facility]  I managed the patient along with the ED Attending      Electronically Signed by         Deanne Mar DO  01/18/19 [address] [place] [address], DO  01/18/19 5570

## 2019-01-28 ENCOUNTER — APPOINTMENT (OUTPATIENT)
Dept: LAB | Facility: HOSPITAL | Age: 59
End: 2019-01-28
Attending: INTERNAL MEDICINE
Payer: COMMERCIAL

## 2019-01-28 ENCOUNTER — ANTICOAG VISIT (OUTPATIENT)
Dept: CARDIOLOGY CLINIC | Facility: CLINIC | Age: 59
End: 2019-01-28

## 2019-01-28 DIAGNOSIS — Z95.2 HISTORY OF PROSTHETIC HEART VALVE: ICD-10-CM

## 2019-01-28 DIAGNOSIS — E08.610 CONTROLLED DIABETES MELLITUS DUE TO UNDERLYING CONDITION WITH DIABETIC NEUROPATHIC ARTHROPATHY, WITHOUT LONG-TERM CURRENT USE OF INSULIN (HCC): ICD-10-CM

## 2019-01-28 LAB
ALBUMIN SERPL BCP-MCNC: 3.7 G/DL (ref 3.5–5)
ALP SERPL-CCNC: 150 U/L (ref 46–116)
ALT SERPL W P-5'-P-CCNC: 21 U/L (ref 12–78)
ANION GAP SERPL CALCULATED.3IONS-SCNC: 3 MMOL/L (ref 4–13)
AST SERPL W P-5'-P-CCNC: 20 U/L (ref 5–45)
BILIRUB SERPL-MCNC: 0.79 MG/DL (ref 0.2–1)
BUN SERPL-MCNC: 14 MG/DL (ref 5–25)
CALCIUM SERPL-MCNC: 9.3 MG/DL (ref 8.3–10.1)
CHLORIDE SERPL-SCNC: 105 MMOL/L (ref 100–108)
CHOLEST SERPL-MCNC: 201 MG/DL (ref 50–200)
CO2 SERPL-SCNC: 28 MMOL/L (ref 21–32)
CREAT SERPL-MCNC: 0.89 MG/DL (ref 0.6–1.3)
EST. AVERAGE GLUCOSE BLD GHB EST-MCNC: 154 MG/DL
GFR SERPL CREATININE-BSD FRML MDRD: 71 ML/MIN/1.73SQ M
GLUCOSE P FAST SERPL-MCNC: 157 MG/DL (ref 65–99)
HBA1C MFR BLD: 7 % (ref 4.2–6.3)
HDLC SERPL-MCNC: 53 MG/DL (ref 40–60)
LDLC SERPL CALC-MCNC: 118 MG/DL (ref 0–100)
NONHDLC SERPL-MCNC: 148 MG/DL
POTASSIUM SERPL-SCNC: 4.3 MMOL/L (ref 3.5–5.3)
PROT SERPL-MCNC: 7.5 G/DL (ref 6.4–8.2)
SODIUM SERPL-SCNC: 136 MMOL/L (ref 136–145)
TRIGL SERPL-MCNC: 151 MG/DL

## 2019-01-28 PROCEDURE — 80053 COMPREHEN METABOLIC PANEL: CPT

## 2019-01-28 PROCEDURE — 80061 LIPID PANEL: CPT

## 2019-01-28 PROCEDURE — 83036 HEMOGLOBIN GLYCOSYLATED A1C: CPT

## 2019-02-07 ENCOUNTER — OFFICE VISIT (OUTPATIENT)
Dept: FAMILY MEDICINE CLINIC | Facility: CLINIC | Age: 59
End: 2019-02-07

## 2019-02-07 VITALS
RESPIRATION RATE: 16 BRPM | SYSTOLIC BLOOD PRESSURE: 122 MMHG | TEMPERATURE: 96.9 F | HEIGHT: 59 IN | WEIGHT: 224.8 LBS | HEART RATE: 72 BPM | BODY MASS INDEX: 45.32 KG/M2 | DIASTOLIC BLOOD PRESSURE: 70 MMHG

## 2019-02-07 DIAGNOSIS — E11.9 TYPE 2 DIABETES MELLITUS WITHOUT COMPLICATION, WITHOUT LONG-TERM CURRENT USE OF INSULIN (HCC): ICD-10-CM

## 2019-02-07 DIAGNOSIS — I05.0 MITRAL VALVE STENOSIS, UNSPECIFIED ETIOLOGY: ICD-10-CM

## 2019-02-07 DIAGNOSIS — G47.19 EXCESSIVE DAYTIME SLEEPINESS: Primary | ICD-10-CM

## 2019-02-07 DIAGNOSIS — E08.610 CONTROLLED DIABETES MELLITUS DUE TO UNDERLYING CONDITION WITH DIABETIC NEUROPATHIC ARTHROPATHY, WITHOUT LONG-TERM CURRENT USE OF INSULIN (HCC): ICD-10-CM

## 2019-02-07 PROCEDURE — 99214 OFFICE O/P EST MOD 30 MIN: CPT | Performed by: FAMILY MEDICINE

## 2019-02-07 NOTE — ASSESSMENT & PLAN NOTE
Advice patient that she should have a repeat sleep assessment with specialist   Patient would like to consider this at next visit

## 2019-02-07 NOTE — ASSESSMENT & PLAN NOTE
Lab Results   Component Value Date    HGBA1C 7 0 (H) 01/28/2019       No results for input(s): POCGLU in the last 72 hours  Blood Sugar Average: Last 72 hrs:  HbA1c7 0%; given patient age, and comorbidity, recommend for HbA1c to be closer to 6 5%  Will increase metformin to 500 mg TID, advice patient to not to skip lunch  Also recommend statin since her ASCVD is 7 9% and she is in statin benefit group  Patient would like to think about it and let me know by next visit

## 2019-02-07 NOTE — PROGRESS NOTES
Carylon Leventhal 1960 female MRN: [id number]    Family Medicine Follow-up Visit    ASSESSMENT/PLAN  Controlled diabetes mellitus due to underlying condition with diabetic neuropathic arthropathy [facility])  Lab Results   Component Value Date    HGBA1C 7 0 (H) 01/28/2019       No results for input(s): POCGLU in the last 72 hours  Blood Sugar Average: Last 72 hrs:  HbA1c7 0%; given patient age, and comorbidity, recommend for HbA1c to be closer to 6 5%  Will increase metformin to 500 mg TID, advice patient to not to skip lunch  Also recommend statin since her ASCVD is 7 9% and she is in statin benefit group  Patient would like to think about it and let me know by next visit  Excessive daytime sleepiness  Advice patient that she should have a repeat sleep assessment with specialist   Patient would like to consider this at next visit  Mitral valve stenosis  Continue on coumadin, follow up with cardiologist   Will have echocardiogram next week  Future Appointments  Date Time Provider Izabel Villalobos   2/11/2019 11:00 AM BE [facility] ECHO 2 BE [facility] [address]   5/13/2019 10:50 AM Abigail King MD  FP BE [facility]          SUBJECTIVE  CC: Follow-up  chronic condition    HPI:    Patient is experiencing one year of tiredness and fatigue  Patient will fall asleep during crocheting for 5 - 10 minutes  Patient experiences a little trouble falling asleep every night  Patient requires walking in order to fall asleep  Patient has not been taking Torsemide, feels tired and dry  Experiences diarrhea and dizziness when taking Torsemide  Upon physical examination, both wrists are swollen due to water  No pain or limited range of motion  Review of Systems   Constitutional: Positive for activity change and fatigue  Negative for appetite change, fever and unexpected weight change  HENT: Negative for congestion, rhinorrhea and sore throat  Eyes: Negative for visual disturbance     Respiratory: Negative for cough, chest tightness and shortness of breath  Cardiovascular: Negative for chest pain and palpitations  Gastrointestinal: Negative for abdominal pain, constipation, diarrhea and nausea  Endocrine: Negative for polyuria  Musculoskeletal: Negative for arthralgias and gait problem  Skin: Negative for color change  Neurological: Negative for seizures and light-headedness  Hematological: Negative for adenopathy  Psychiatric/Behavioral: Negative for agitation         Historical Information   The patient history was reviewed as follows:    Past Medical History:   Diagnosis Date    Abnormal ultrasound of pelvis     last assessed - 26Mar2015    Asthma     Cerebral infarction [facility])     unspecified; last assessed - 37Hxt6544    Dizziness     last assessed - 33Gqh4962    Dysuria     last assessed - 92Tvk3743    Exposure to potentially hazardous body fluids     last assessed - 19Fqo4276    Heart disease     Hematoma     of arm; last assessed - 20Jun2016    Mitral valve stenosis     Personal history of scoliosis     Pneumonia     Scar, hypertrophic     last assessed - 38GPD1180    Se[facility]ures ([facility] [address] [facility]     Type 2 diabetes mellitus ([facility] [address] )     Vision problems      Past Surgical History:   Procedure Laterality Date    MITRAL VALVE REPLACEMENT  06/17/2015    MVR with 27 mm [employer] Optiform mechanical prostethic;  last assessed - 91JMM4179    TONSILLECTOMY      TRANSLUMINAL ANGIOPLASTY      mitral valve; last assessed - 90Gpx4442    TUBAL LIGATION  1986    last assessed - 12Bsg9912     Family History   Problem Relation Age of Onset    Cancer Mother     Dementia Mother     Diabetes Mother     Hypertension Mother     Hyperlipidemia Mother     Cervical cancer Mother     Heart disease Father     Coronary artery disease Father     Hypertension Father     Heart attack Father     Diabetes Sister     Breast cancer Sister     Diabetes Brother     Hypertension Brother     Hyperlipidemia Brother     Diabetes Maternal Grandmother     Breast cancer Maternal Grandmother     Diabetes Other     Seizures Son     Psoriasis Son       Social History   History   Alcohol Use No     History   Drug Use No     History   Smoking Status    Never Smoker   Smokeless Tobacco    Never Used       Medications:     Current Outpatient Prescriptions:     acetaminophen (TYLENOL) 500 mg tablet, Take 2 tablets by mouth daily as needed for pain, Disp: 60 tablet, Rfl: 2    cetirizine (ZyrTEC) 10 mg tablet, TAKE ONE TABLET BY MOUTH DAILY AS DIRECTED, Disp: 30 tablet, Rfl: 6    FLOVENT  MCG/ACT inhaler, INHALE 2 PUFFS BY MOUTH TWICE A DAY, Disp: 1 Inhaler, Rfl: 5    fluticasone (FLONASE) 50 mcg/act nasal spray, 1 spray into each nostril daily, Disp: 16 g, Rfl: 1    glucose blood (ACCU-CHEK SMARTVIEW) test strip, Use daily as instructed, Disp: 100 each, Rfl: 5    hydrOXYzine HCL (ATARAX) 25 mg tablet, TAKE ONE TABLET BY MOUTH EVERY 6 HOURS AS NEEDED, Disp: 60 tablet, Rfl: 1    KEPPRA 500 MG tablet, TAKE ONE TABLET BY MOUTH FOUR TIMES A DAY (Patient taking differently: TAKE ONE TABLET BY MOUTH THREE TIMES A DAY), Disp: 120 tablet, Rfl: 3    Lancets (ACCU-CHEK MULTICLIX) lancets, Use as instructed, Disp: 100 each, Rfl: 0    metFORMIN (GLUCOPHAGE) 500 mg tablet, 1 tablet 3 times daily, Disp: 270 tablet, Rfl: 2    metoprolol tartrate (LOPRESSOR) 25 mg tablet, TAKE HALF TABLET BY MOUTH EVERY 12 HOURS, Disp: 30 tablet, Rfl: 5    omeprazole (PriLOSEC) 40 MG capsule, Take 40 mg by mouth daily  , Disp: , Rfl:     torsemide (DEMADEX) 20 mg tablet, Take 1 tablet (20 mg total) by mouth daily (Patient taking differently: Take 20 mg by mouth as needed  ), Disp: 90 tablet, Rfl: 3    traMADol (ULTRAM) 50 mg tablet, Take 50 mg by mouth every 6 (six) hours as needed for moderate pain, Disp: , Rfl:     warfarin (COUMADIN) 2 5 mg tablet, Take 2 5 mg by mouth daily, Disp: , Rfl:     warfarin (COUMADIN) 5 mg tablet, TAKE ONE TABLET BY MOUTH DAILY, Disp: 60 tablet, Rfl: 5  Allergies   Allergen Reactions    Codeine     Prednisone Itching    Codeine Polt-Chlorphen Polt Er Irritability, Itching and Rash       OBJECTIVE    Vitals:   Vitals:    02/07/19 1021   BP: 122/70   BP Location: Left arm   Patient Position: Sitting   Cuff Size: Large   Pulse: 72   Resp: 16   Temp: (!) 96 9 °F (36 1 °C)   TempSrc: Tympanic   Weight: 102 kg (224 lb 12 8 oz)   Height: 4' 11" (1 499 m)           Physical Exam

## 2019-02-22 DIAGNOSIS — I05.0 MITRAL VALVE STENOSIS, UNSPECIFIED ETIOLOGY: ICD-10-CM

## 2019-02-22 DIAGNOSIS — G40.019 LOCALIZATION-RELATED EPILEPSY, INTRACTABLE (HCC): ICD-10-CM

## 2019-02-22 DIAGNOSIS — M25.551 PAIN IN RIGHT HIP: ICD-10-CM

## 2019-02-22 DIAGNOSIS — J30.9 ALLERGIC RHINITIS, UNSPECIFIED SEASONALITY, UNSPECIFIED TRIGGER: ICD-10-CM

## 2019-02-22 DIAGNOSIS — M54.5 LOW BACK PAIN, UNSPECIFIED BACK PAIN LATERALITY, UNSPECIFIED CHRONICITY, WITH SCIATICA PRESENCE UNSPECIFIED: ICD-10-CM

## 2019-02-22 DIAGNOSIS — J45.30 ASTHMA IN ADULT, MILD PERSISTENT, UNCOMPLICATED: ICD-10-CM

## 2019-02-22 RX ORDER — ACETAMINOPHEN 500 MG
TABLET ORAL
Qty: 60 TABLET | Refills: 2 | OUTPATIENT
Start: 2019-02-22

## 2019-02-22 RX ORDER — DEXAMETHASONE 4 MG/1
TABLET ORAL
Qty: 1 INHALER | Refills: 5 | Status: SHIPPED | OUTPATIENT
Start: 2019-02-22 | End: 2019-05-30 | Stop reason: SDUPTHER

## 2019-02-22 RX ORDER — CETIRIZINE HYDROCHLORIDE 10 MG/1
TABLET ORAL
Qty: 30 TABLET | Refills: 6 | Status: SHIPPED | OUTPATIENT
Start: 2019-02-22 | End: 2019-05-30 | Stop reason: SDUPTHER

## 2019-02-22 RX ORDER — LEVETIRACETAM 500 MG
TABLET ORAL
Qty: 120 TABLET | Refills: 3 | Status: SHIPPED | OUTPATIENT
Start: 2019-02-22 | End: 2020-03-10 | Stop reason: SDUPTHER

## 2019-02-22 NOTE — TELEPHONE ENCOUNTER
Can offer her follow-up with either Dr Terrell Lynn or Dr Reji Iglesias who are still seeing patients in [place]  But re-assure her that Lillian Villafana will be discussing her care with me on the next follow-up appointment  We are incorporating CRNPs into the practice as alternating visits (ALFREDONP then MD, etc)

## 2019-02-22 NOTE — TELEPHONE ENCOUNTER
Called patient and offered appointment with Dr Yazmin Frazier, Dr Tanya Whitman, and Haritha Lebron  Patient agreed to see Haritha Lebron in [facility] in 4/19/19 at 9:30 am  I advised patient once more that Haritha Memory will be discussing her care with Dr Lindsay Wlech on the next follow-up appointment

## 2019-02-22 NOTE — TELEPHONE ENCOUNTER
Kofi Us MD  You 1 hour ago (12:19 PM)      Please reach out to patient for follow-up appointment  Received a refill request for Keppra, she missed her last appointment in December 2018   She can be scheduled with Ashanti Huston  Please reach out to patient and assist with scheduling as indicated  Thank you!

## 2019-02-22 NOTE — TELEPHONE ENCOUNTER
Called patient and offered an appointment with Eufemia Gatica and patient only wants to see Dr Olivier Ponce  Patient does not have [place] transportation and cannot travel to any other locations other than [facility]  I advised patient that Dr Olivier Ponce no longer sees patients in [facility]  Patient then stated that she would like to find another Doctor in [facility] and if she finds a ride to [facility], she will call to make an appointment with Dr Olivier Ponce

## 2019-03-28 ENCOUNTER — TELEPHONE (OUTPATIENT)
Dept: FAMILY MEDICINE CLINIC | Facility: CLINIC | Age: 59
End: 2019-03-28

## 2019-03-28 NOTE — TELEPHONE ENCOUNTER
Please review chart, do you think patient qualifies for door to door service  If you approve, I will draft note

## 2019-03-28 NOTE — TELEPHONE ENCOUNTER
Patient Requesting a letter from Dr Kevin Barton regarding current diagnoses that would qualify for door to door services related to all medical appt and further [facility] bus evaluation services  Please make attention to:   Angel Summers      [facility] [address], [address] [facility]      [place],  [address]         [phone] (F)        [phone] (f)

## 2019-04-01 ENCOUNTER — ANTICOAG VISIT (OUTPATIENT)
Dept: CARDIOLOGY CLINIC | Facility: CLINIC | Age: 59
End: 2019-04-01

## 2019-04-01 ENCOUNTER — APPOINTMENT (OUTPATIENT)
Dept: LAB | Facility: HOSPITAL | Age: 59
End: 2019-04-01
Payer: COMMERCIAL

## 2019-04-01 DIAGNOSIS — Z95.2 HISTORY OF PROSTHETIC HEART VALVE: ICD-10-CM

## 2019-04-01 DIAGNOSIS — E11.9 TYPE 2 DIABETES MELLITUS WITHOUT COMPLICATION, WITHOUT LONG-TERM CURRENT USE OF INSULIN (HCC): ICD-10-CM

## 2019-04-01 LAB
ALBUMIN SERPL BCP-MCNC: 3.6 G/DL (ref 3.5–5)
ALP SERPL-CCNC: 137 U/L (ref 46–116)
ALT SERPL W P-5'-P-CCNC: 21 U/L (ref 12–78)
ANION GAP SERPL CALCULATED.3IONS-SCNC: 1 MMOL/L (ref 4–13)
AST SERPL W P-5'-P-CCNC: 22 U/L (ref 5–45)
BILIRUB SERPL-MCNC: 0.66 MG/DL (ref 0.2–1)
BUN SERPL-MCNC: 11 MG/DL (ref 5–25)
CALCIUM SERPL-MCNC: 9.4 MG/DL (ref 8.3–10.1)
CHLORIDE SERPL-SCNC: 107 MMOL/L (ref 100–108)
CO2 SERPL-SCNC: 31 MMOL/L (ref 21–32)
CREAT SERPL-MCNC: 0.84 MG/DL (ref 0.6–1.3)
EST. AVERAGE GLUCOSE BLD GHB EST-MCNC: 146 MG/DL
GFR SERPL CREATININE-BSD FRML MDRD: 76 ML/MIN/1.73SQ M
GLUCOSE P FAST SERPL-MCNC: 129 MG/DL (ref 65–99)
HBA1C MFR BLD: 6.7 % (ref 4.2–6.3)
POTASSIUM SERPL-SCNC: 4 MMOL/L (ref 3.5–5.3)
PROT SERPL-MCNC: 7.4 G/DL (ref 6.4–8.2)
SODIUM SERPL-SCNC: 139 MMOL/L (ref 136–145)

## 2019-04-01 PROCEDURE — 83036 HEMOGLOBIN GLYCOSYLATED A1C: CPT

## 2019-04-01 PROCEDURE — 80053 COMPREHEN METABOLIC PANEL: CPT

## 2019-04-01 NOTE — TELEPHONE ENCOUNTER
Letter signed by Dr Selwyn Guerra to Aliya @ [phone], original mailed to [facility] [address], [address] [address]   [id number]

## 2019-04-08 NOTE — TELEPHONE ENCOUNTER
Patient calling to thank doctor for the letter written to [facility]   Every thing was on the letter that was needed except the last part  Letter must indicate:   A REQUEST FOR EVALUATION TO CONTINUE DOOR TO DOOR [facility]

## 2019-04-24 DIAGNOSIS — L50.0 ALLERGIC URTICARIA: ICD-10-CM

## 2019-04-24 RX ORDER — HYDROXYZINE HYDROCHLORIDE 25 MG/1
TABLET, FILM COATED ORAL
Qty: 60 TABLET | Refills: 1 | Status: SHIPPED | OUTPATIENT
Start: 2019-04-24 | End: 2019-05-30 | Stop reason: SDUPTHER

## 2019-05-09 ENCOUNTER — HOSPITAL ENCOUNTER (OUTPATIENT)
Dept: NON INVASIVE DIAGNOSTICS | Facility: CLINIC | Age: 59
Discharge: HOME | End: 2019-05-09
Payer: COMMERCIAL

## 2019-05-09 DIAGNOSIS — Z95.2 H/O MITRAL VALVE REPLACEMENT WITH MECHANICAL VALVE: ICD-10-CM

## 2019-05-09 PROCEDURE — 93306 TTE W/DOPPLER COMPLETE: CPT

## 2019-05-09 PROCEDURE — 93306 TTE W/DOPPLER COMPLETE: CPT | Performed by: INTERNAL MEDICINE

## 2019-05-10 ENCOUNTER — ANTICOAG VISIT (OUTPATIENT)
Dept: CARDIOLOGY CLINIC | Facility: CLINIC | Age: 59
End: 2019-05-10

## 2019-05-10 ENCOUNTER — APPOINTMENT (OUTPATIENT)
Dept: LAB | Facility: HOSPITAL | Age: 59
End: 2019-05-10
Attending: INTERNAL MEDICINE
Payer: COMMERCIAL

## 2019-05-10 DIAGNOSIS — Z95.2 HISTORY OF PROSTHETIC HEART VALVE: ICD-10-CM

## 2019-05-30 ENCOUNTER — OFFICE VISIT (OUTPATIENT)
Dept: FAMILY MEDICINE CLINIC | Facility: CLINIC | Age: 59
End: 2019-05-30

## 2019-05-30 VITALS
DIASTOLIC BLOOD PRESSURE: 70 MMHG | RESPIRATION RATE: 16 BRPM | HEIGHT: 59 IN | SYSTOLIC BLOOD PRESSURE: 112 MMHG | WEIGHT: 224.6 LBS | BODY MASS INDEX: 45.28 KG/M2 | TEMPERATURE: 97.7 F | HEART RATE: 76 BPM

## 2019-05-30 DIAGNOSIS — L50.0 ALLERGIC URTICARIA: ICD-10-CM

## 2019-05-30 DIAGNOSIS — R05.9 COUGH: ICD-10-CM

## 2019-05-30 DIAGNOSIS — J06.9 VIRAL UPPER RESPIRATORY TRACT INFECTION: ICD-10-CM

## 2019-05-30 DIAGNOSIS — R60.0 LOWER EXTREMITY EDEMA: ICD-10-CM

## 2019-05-30 DIAGNOSIS — E08.610 CONTROLLED DIABETES MELLITUS DUE TO UNDERLYING CONDITION WITH DIABETIC NEUROPATHIC ARTHROPATHY, WITHOUT LONG-TERM CURRENT USE OF INSULIN (HCC): ICD-10-CM

## 2019-05-30 DIAGNOSIS — E11.9 TYPE 2 DIABETES MELLITUS WITHOUT COMPLICATION, WITHOUT LONG-TERM CURRENT USE OF INSULIN (HCC): Primary | ICD-10-CM

## 2019-05-30 DIAGNOSIS — J30.9 ALLERGIC RHINITIS, UNSPECIFIED SEASONALITY, UNSPECIFIED TRIGGER: ICD-10-CM

## 2019-05-30 DIAGNOSIS — K21.9 GASTROESOPHAGEAL REFLUX DISEASE, ESOPHAGITIS PRESENCE NOT SPECIFIED: ICD-10-CM

## 2019-05-30 DIAGNOSIS — M25.551 PAIN IN RIGHT HIP: ICD-10-CM

## 2019-05-30 DIAGNOSIS — M54.5 LOW BACK PAIN, UNSPECIFIED BACK PAIN LATERALITY, UNSPECIFIED CHRONICITY, WITH SCIATICA PRESENCE UNSPECIFIED: ICD-10-CM

## 2019-05-30 DIAGNOSIS — J45.30 ASTHMA IN ADULT, MILD PERSISTENT, UNCOMPLICATED: ICD-10-CM

## 2019-05-30 PROCEDURE — 3725F SCREEN DEPRESSION PERFORMED: CPT | Performed by: FAMILY MEDICINE

## 2019-05-30 PROCEDURE — 99214 OFFICE O/P EST MOD 30 MIN: CPT | Performed by: FAMILY MEDICINE

## 2019-05-30 RX ORDER — FLUTICASONE PROPIONATE 110 UG/1
2 AEROSOL, METERED RESPIRATORY (INHALATION) 2 TIMES DAILY
Qty: 1 INHALER | Refills: 5 | Status: SHIPPED | OUTPATIENT
Start: 2019-05-30 | End: 2019-12-05 | Stop reason: SDUPTHER

## 2019-05-30 RX ORDER — HYDROXYZINE HYDROCHLORIDE 25 MG/1
25 TABLET, FILM COATED ORAL EVERY 6 HOURS PRN
Qty: 60 TABLET | Refills: 1 | Status: SHIPPED | OUTPATIENT
Start: 2019-05-30 | End: 2019-08-14 | Stop reason: SDUPTHER

## 2019-05-30 RX ORDER — OMEPRAZOLE 40 MG/1
40 CAPSULE, DELAYED RELEASE ORAL DAILY
Qty: 90 CAPSULE | Refills: 1 | Status: SHIPPED | OUTPATIENT
Start: 2019-05-30 | End: 2019-12-05 | Stop reason: SDUPTHER

## 2019-05-30 RX ORDER — CETIRIZINE HYDROCHLORIDE 10 MG/1
10 TABLET ORAL DAILY
Qty: 90 TABLET | Refills: 3 | Status: SHIPPED | OUTPATIENT
Start: 2019-05-30 | End: 2020-04-06 | Stop reason: SDUPTHER

## 2019-05-30 RX ORDER — ACETAMINOPHEN 500 MG
TABLET ORAL
Qty: 180 TABLET | Refills: 3 | Status: SHIPPED | OUTPATIENT
Start: 2019-05-30 | End: 2020-08-17 | Stop reason: SDUPTHER

## 2019-05-30 RX ORDER — FLUTICASONE PROPIONATE 50 MCG
1 SPRAY, SUSPENSION (ML) NASAL DAILY
Qty: 16 G | Refills: 1 | Status: SHIPPED | OUTPATIENT
Start: 2019-05-30 | End: 2020-04-06 | Stop reason: SDUPTHER

## 2019-05-30 RX ORDER — LANCETS
EACH MISCELLANEOUS
Qty: 100 EACH | Refills: 0 | Status: SHIPPED | OUTPATIENT
Start: 2019-05-30 | End: 2019-09-05 | Stop reason: SDUPTHER

## 2019-06-12 ENCOUNTER — OFFICE VISIT (OUTPATIENT)
Dept: CARDIOLOGY CLINIC | Facility: CLINIC | Age: 59
End: 2019-06-12
Payer: COMMERCIAL

## 2019-06-12 VITALS
DIASTOLIC BLOOD PRESSURE: 72 MMHG | BODY MASS INDEX: 44.96 KG/M2 | WEIGHT: 223 LBS | HEART RATE: 71 BPM | SYSTOLIC BLOOD PRESSURE: 110 MMHG | HEIGHT: 59 IN

## 2019-06-12 DIAGNOSIS — E78.5 DYSLIPIDEMIA: ICD-10-CM

## 2019-06-12 DIAGNOSIS — Z95.2 H/O MITRAL VALVE REPLACEMENT WITH MECHANICAL VALVE: Primary | ICD-10-CM

## 2019-06-12 PROCEDURE — 99214 OFFICE O/P EST MOD 30 MIN: CPT | Performed by: INTERNAL MEDICINE

## 2019-06-14 DIAGNOSIS — M54.5 LOW BACK PAIN, UNSPECIFIED BACK PAIN LATERALITY, UNSPECIFIED CHRONICITY, WITH SCIATICA PRESENCE UNSPECIFIED: ICD-10-CM

## 2019-06-14 DIAGNOSIS — M25.551 PAIN IN RIGHT HIP: ICD-10-CM

## 2019-06-14 RX ORDER — ACETAMINOPHEN 500 MG
TABLET ORAL
Qty: 60 TABLET | Refills: 2 | OUTPATIENT
Start: 2019-06-14

## 2019-06-25 DIAGNOSIS — Z95.2 H/O MITRAL VALVE REPLACEMENT: ICD-10-CM

## 2019-06-26 DIAGNOSIS — Z95.2 H/O MITRAL VALVE REPLACEMENT: ICD-10-CM

## 2019-06-27 RX ORDER — WARFARIN SODIUM 5 MG/1
5 TABLET ORAL DAILY
Qty: 60 TABLET | Refills: 5 | Status: SHIPPED | OUTPATIENT
Start: 2019-06-27 | End: 2019-12-30

## 2019-07-02 RX ORDER — WARFARIN SODIUM 5 MG/1
TABLET ORAL
Qty: 60 TABLET | Refills: 6 | Status: SHIPPED | OUTPATIENT
Start: 2019-07-02 | End: 2020-08-17

## 2019-07-03 ENCOUNTER — TRANSCRIBE ORDERS (OUTPATIENT)
Dept: LAB | Facility: HOSPITAL | Age: 59
End: 2019-07-03

## 2019-07-03 ENCOUNTER — ANTICOAG VISIT (OUTPATIENT)
Dept: CARDIOLOGY CLINIC | Facility: CLINIC | Age: 59
End: 2019-07-03

## 2019-07-03 ENCOUNTER — APPOINTMENT (OUTPATIENT)
Dept: LAB | Facility: HOSPITAL | Age: 59
End: 2019-07-03
Attending: INTERNAL MEDICINE
Payer: COMMERCIAL

## 2019-07-03 DIAGNOSIS — Z95.2 S/P MVR (MITRAL VALVE REPLACEMENT): Primary | ICD-10-CM

## 2019-07-03 DIAGNOSIS — Z95.2 S/P MVR (MITRAL VALVE REPLACEMENT): ICD-10-CM

## 2019-07-03 DIAGNOSIS — Z95.2 HISTORY OF PROSTHETIC HEART VALVE: ICD-10-CM

## 2019-07-03 LAB
INR PPP: 3.21 (ref 0.84–1.19)
PROTHROMBIN TIME: 32.3 SECONDS (ref 11.6–14.5)

## 2019-07-03 PROCEDURE — 85610 PROTHROMBIN TIME: CPT

## 2019-07-03 PROCEDURE — 36415 COLL VENOUS BLD VENIPUNCTURE: CPT

## 2019-07-26 ENCOUNTER — TELEPHONE (OUTPATIENT)
Dept: FAMILY MEDICINE CLINIC | Facility: CLINIC | Age: 59
End: 2019-07-26

## 2019-07-26 DIAGNOSIS — Z12.39 SCREENING FOR BREAST CANCER: ICD-10-CM

## 2019-07-26 DIAGNOSIS — R92.2 BREAST DENSITY: ICD-10-CM

## 2019-07-26 DIAGNOSIS — R92.8 ABNORMAL MAMMOGRAM: Primary | ICD-10-CM

## 2019-07-26 NOTE — TELEPHONE ENCOUNTER
I entered US left breast order  Also, patient is due for her screening mammogram so I entered that as well    Thank you,  cam

## 2019-07-26 NOTE — TELEPHONE ENCOUNTER
Dr Maegan Shahid,    Could you place this order for patient? I spoke to her and she states she has been going to the breast center for US for the last 3 years  She asked them if she should get a mammogram and they said no just do an US  She last saw you in May of 2019  Thank you

## 2019-07-26 NOTE — TELEPHONE ENCOUNTER
Patient called to request an order for US breast left limited (diagnostic)  She said she called the Breast center to make an appt and they told her to ask PCP for an order    Please call when done

## 2019-08-02 ENCOUNTER — ANTICOAG VISIT (OUTPATIENT)
Dept: CARDIOLOGY CLINIC | Facility: CLINIC | Age: 59
End: 2019-08-02

## 2019-08-02 ENCOUNTER — APPOINTMENT (OUTPATIENT)
Dept: LAB | Facility: HOSPITAL | Age: 59
End: 2019-08-02
Payer: COMMERCIAL

## 2019-08-02 DIAGNOSIS — E78.5 DYSLIPIDEMIA: ICD-10-CM

## 2019-08-02 DIAGNOSIS — Z95.2 HISTORY OF PROSTHETIC HEART VALVE: ICD-10-CM

## 2019-08-02 LAB
CHOLEST SERPL-MCNC: 202 MG/DL (ref 50–200)
HDLC SERPL-MCNC: 48 MG/DL (ref 40–60)
INR PPP: 4.18 (ref 0.84–1.19)
LDLC SERPL CALC-MCNC: 124 MG/DL (ref 0–100)
PROTHROMBIN TIME: 39.8 SECONDS (ref 11.6–14.5)
TRIGL SERPL-MCNC: 151 MG/DL

## 2019-08-02 PROCEDURE — 85610 PROTHROMBIN TIME: CPT

## 2019-08-02 PROCEDURE — 80061 LIPID PANEL: CPT

## 2019-08-02 PROCEDURE — 36415 COLL VENOUS BLD VENIPUNCTURE: CPT

## 2019-08-14 DIAGNOSIS — L50.0 ALLERGIC URTICARIA: ICD-10-CM

## 2019-08-14 RX ORDER — HYDROXYZINE HYDROCHLORIDE 25 MG/1
TABLET, FILM COATED ORAL
Qty: 60 TABLET | Refills: 1 | Status: SHIPPED | OUTPATIENT
Start: 2019-08-14 | End: 2019-10-09 | Stop reason: SDUPTHER

## 2019-08-17 ENCOUNTER — APPOINTMENT (OUTPATIENT)
Dept: LAB | Facility: HOSPITAL | Age: 59
End: 2019-08-17
Attending: INTERNAL MEDICINE
Payer: COMMERCIAL

## 2019-08-19 ENCOUNTER — ANTICOAG VISIT (OUTPATIENT)
Dept: CARDIOLOGY CLINIC | Facility: CLINIC | Age: 59
End: 2019-08-19

## 2019-08-19 DIAGNOSIS — Z95.2 HISTORY OF PROSTHETIC HEART VALVE: ICD-10-CM

## 2019-09-05 DIAGNOSIS — Z79.4 TYPE 2 DIABETES MELLITUS WITH COMPLICATION, WITH LONG-TERM CURRENT USE OF INSULIN (HCC): ICD-10-CM

## 2019-09-05 DIAGNOSIS — E11.9 TYPE 2 DIABETES MELLITUS WITHOUT COMPLICATION, WITHOUT LONG-TERM CURRENT USE OF INSULIN (HCC): ICD-10-CM

## 2019-09-05 DIAGNOSIS — E11.8 TYPE 2 DIABETES MELLITUS WITH COMPLICATION, WITH LONG-TERM CURRENT USE OF INSULIN (HCC): ICD-10-CM

## 2019-09-16 RX ORDER — LANCETS
EACH MISCELLANEOUS
Qty: 100 EACH | Refills: 5 | Status: SHIPPED | OUTPATIENT
Start: 2019-09-16 | End: 2020-03-30 | Stop reason: SDUPTHER

## 2019-09-25 ENCOUNTER — APPOINTMENT (OUTPATIENT)
Dept: LAB | Facility: HOSPITAL | Age: 59
End: 2019-09-25
Attending: INTERNAL MEDICINE
Payer: COMMERCIAL

## 2019-09-25 ENCOUNTER — ANTICOAG VISIT (OUTPATIENT)
Dept: CARDIOLOGY CLINIC | Facility: CLINIC | Age: 59
End: 2019-09-25

## 2019-09-25 ENCOUNTER — TRANSCRIBE ORDERS (OUTPATIENT)
Dept: LAB | Facility: HOSPITAL | Age: 59
End: 2019-09-25

## 2019-09-25 DIAGNOSIS — Z95.2 HISTORY OF PROSTHETIC HEART VALVE: ICD-10-CM

## 2019-09-26 ENCOUNTER — OFFICE VISIT (OUTPATIENT)
Dept: FAMILY MEDICINE CLINIC | Facility: CLINIC | Age: 59
End: 2019-09-26

## 2019-09-26 VITALS
RESPIRATION RATE: 16 BRPM | HEIGHT: 59 IN | SYSTOLIC BLOOD PRESSURE: 118 MMHG | HEART RATE: 74 BPM | DIASTOLIC BLOOD PRESSURE: 70 MMHG | TEMPERATURE: 97.6 F | BODY MASS INDEX: 45.08 KG/M2 | WEIGHT: 223.6 LBS

## 2019-09-26 DIAGNOSIS — R53.83 FATIGUE, UNSPECIFIED TYPE: ICD-10-CM

## 2019-09-26 DIAGNOSIS — E08.610 CONTROLLED DIABETES MELLITUS DUE TO UNDERLYING CONDITION WITH DIABETIC NEUROPATHIC ARTHROPATHY, WITHOUT LONG-TERM CURRENT USE OF INSULIN (HCC): Primary | ICD-10-CM

## 2019-09-26 DIAGNOSIS — M25.561 POSTERIOR KNEE PAIN, RIGHT: ICD-10-CM

## 2019-09-26 DIAGNOSIS — R10.9 LEFT SIDED ABDOMINAL PAIN: ICD-10-CM

## 2019-09-26 PROCEDURE — 1036F TOBACCO NON-USER: CPT | Performed by: FAMILY MEDICINE

## 2019-09-26 PROCEDURE — 3008F BODY MASS INDEX DOCD: CPT | Performed by: FAMILY MEDICINE

## 2019-09-26 PROCEDURE — 99214 OFFICE O/P EST MOD 30 MIN: CPT | Performed by: FAMILY MEDICINE

## 2019-09-26 NOTE — PROGRESS NOTES
Assessment/Plan:     Problem List Items Addressed This Visit        Endocrine    Controlled diabetes mellitus due to underlying condition with diabetic neuropathic arthropathy ([facility] [address] ) - Primary       Lab Results   Component Value Date    HGBA1C 6 7 (H) 04/01/2019   Well controlled, recheck lab in 1-2 months  Monofilament exam normal          Relevant Orders    Comprehensive metabolic panel    Lipid panel    Hemoglobin A1C    Microalbumin / creatinine urine ratio       Other    Fatigue     With cold intolerance and family history of thyroid, will check lab         Relevant Orders    TSH, 3rd generation with Free T4 reflex    CBC and Platelet    Posterior knee pain, right     Tender to palpation at R posterior and medial knee  Check ultrasound to rule out Baker's cyst          Relevant Orders    US MSK limited    Left sided abdominal pain     Patient reports history of hernia, not tender to palpation in office  will check ultrasound         Relevant Orders    US abdomen complete            Subjective:      Patient ID: Delores Cleveland is a 60 y o  female presenting for a follow-up visit  Today, patient is c/o 2-3 day duration of worsening fatigue, headache, cold intolerance and difficulty falling and staying asleep in the setting of chronic sleep difficulties  Has never been diagnosed with hypothyroidism in the past, but sister has "thyroid problems" and takes medication for them  Also c/o R posterior and medial knee pain and L-sided abdominal pain today  Follow-up:    1  Mitral valve replacement with mechanical valve  - Continuing to take Warfarin as prescribed  - Complies with anticoagulation checks  - Denies CP, SOB, palpitations    2  Esophageal reflux  - Takes PPI 1x day before breakfast - no issues    3   LE edema  - Icy hot cream with self-wrapping has been helping  - Unable to obtain compression stockings suggested at previous visit due to supplier not having pt's size in stock  - Elevation via recliner has helped  - Worsens with sitting/immobility for prolonged periods of time    4  T2DM  - Continuing to take metformin 2x daily  - Pt has no issues  - HbA1c 6 7 in April      Review of Systems   Constitutional: Positive for fatigue  Negative for appetite change, chills, fever and unexpected weight change  Respiratory: Negative for shortness of breath  Cardiovascular: Positive for leg swelling (Unchanged from previous visit)  Negative for chest pain  Gastrointestinal: Positive for abdominal pain (Left-sided)  Negative for blood in stool, constipation, diarrhea, nausea and vomiting  Patient report pain on L side abdomen when she is laying on L side as well as more bulging in the morning on L side  No vomiting, no change in BM  Endocrine: Positive for cold intolerance  Musculoskeletal: Positive for arthralgias (R posterior knee and leg)  Neurological: Positive for headaches  Negative for dizziness and light-headedness  Objective:      /70 (BP Location: Left arm, Patient Position: Sitting, Cuff Size: Large)   Pulse 74   Temp 97 6 °F (36 4 °C) (Tympanic)   Resp 16   Ht 4' 11" (1 499 m)   Wt 101 kg (223 lb 9 6 oz)   BMI 45 16 kg/m²          Physical Exam   Constitutional:   Tired but well-appearing, comfortable   HENT:   Head: Normocephalic and atraumatic  Right Ear: External ear normal    Left Ear: External ear normal    Nose: Nose normal    Neck: No thyromegaly (nonpalpable without nodules) present  Cardiovascular: Normal rate, regular rhythm and normal heart sounds  Mitral click heard   Pulmonary/Chest: Effort normal and breath sounds normal    Abdominal: Soft  Bowel sounds are normal  She exhibits no distension and no mass  There is no tenderness  No mass or tender to palpation noted on exam   Musculoskeletal: She exhibits edema (mild bilateral non-pitting edema of LE) and tenderness (R posterior and medial knee/leg)  Neurological: She is alert     Skin: Skin is warm and dry    Psychiatric: She has a normal mood and affect   Her behavior is normal  Thought content normal

## 2019-09-26 NOTE — ASSESSMENT & PLAN NOTE
Lab Results   Component Value Date    HGBA1C 6 7 (H) 04/01/2019   Well controlled, recheck lab in 1-2 months    Monofilament exam normal

## 2019-10-09 DIAGNOSIS — L50.0 ALLERGIC URTICARIA: ICD-10-CM

## 2019-10-09 RX ORDER — HYDROXYZINE HYDROCHLORIDE 25 MG/1
TABLET, FILM COATED ORAL
Qty: 60 TABLET | Refills: 0 | Status: SHIPPED | OUTPATIENT
Start: 2019-10-09 | End: 2019-11-06 | Stop reason: SDUPTHER

## 2019-10-28 ENCOUNTER — APPOINTMENT (OUTPATIENT)
Dept: LAB | Facility: HOSPITAL | Age: 59
End: 2019-10-28
Attending: FAMILY MEDICINE
Payer: COMMERCIAL

## 2019-10-28 ENCOUNTER — ANTICOAG VISIT (OUTPATIENT)
Dept: CARDIOLOGY CLINIC | Facility: CLINIC | Age: 59
End: 2019-10-28

## 2019-10-28 ENCOUNTER — HOSPITAL ENCOUNTER (OUTPATIENT)
Dept: RADIOLOGY | Facility: HOSPITAL | Age: 59
Discharge: HOME | End: 2019-10-28
Attending: FAMILY MEDICINE
Payer: COMMERCIAL

## 2019-10-28 DIAGNOSIS — E08.610 CONTROLLED DIABETES MELLITUS DUE TO UNDERLYING CONDITION WITH DIABETIC NEUROPATHIC ARTHROPATHY, WITHOUT LONG-TERM CURRENT USE OF INSULIN (HCC): ICD-10-CM

## 2019-10-28 DIAGNOSIS — R10.9 LEFT SIDED ABDOMINAL PAIN: ICD-10-CM

## 2019-10-28 DIAGNOSIS — Z95.2 HISTORY OF PROSTHETIC HEART VALVE: ICD-10-CM

## 2019-10-28 DIAGNOSIS — R53.83 FATIGUE, UNSPECIFIED TYPE: ICD-10-CM

## 2019-10-28 LAB
ALBUMIN SERPL BCP-MCNC: 3.6 G/DL (ref 3.5–5)
ALP SERPL-CCNC: 150 U/L (ref 46–116)
ALT SERPL W P-5'-P-CCNC: 21 U/L (ref 12–78)
ANION GAP SERPL CALCULATED.3IONS-SCNC: 5 MMOL/L (ref 4–13)
AST SERPL W P-5'-P-CCNC: 23 U/L (ref 5–45)
BILIRUB SERPL-MCNC: 0.5 MG/DL (ref 0.2–1)
BUN SERPL-MCNC: 15 MG/DL (ref 5–25)
CALCIUM SERPL-MCNC: 9.5 MG/DL (ref 8.3–10.1)
CHLORIDE SERPL-SCNC: 106 MMOL/L (ref 100–108)
CHOLEST SERPL-MCNC: 218 MG/DL (ref 50–200)
CO2 SERPL-SCNC: 29 MMOL/L (ref 21–32)
CREAT SERPL-MCNC: 0.77 MG/DL (ref 0.6–1.3)
CREAT UR-MCNC: 149 MG/DL
ERYTHROCYTE [DISTWIDTH] IN BLOOD BY AUTOMATED COUNT: 15.1 % (ref 11.6–15.1)
GFR SERPL CREATININE-BSD FRML MDRD: 84 ML/MIN/1.73SQ M
GLUCOSE P FAST SERPL-MCNC: 128 MG/DL (ref 65–99)
HCT VFR BLD AUTO: 38.4 % (ref 34.8–46.1)
HDLC SERPL-MCNC: 58 MG/DL
HGB BLD-MCNC: 11.7 G/DL (ref 11.5–15.4)
LDLC SERPL CALC-MCNC: 132 MG/DL (ref 0–100)
MCH RBC QN AUTO: 24.8 PG (ref 26.8–34.3)
MCHC RBC AUTO-ENTMCNC: 30.5 G/DL (ref 31.4–37.4)
MCV RBC AUTO: 82 FL (ref 82–98)
MICROALBUMIN UR-MCNC: 13.1 MG/L (ref 0–20)
MICROALBUMIN/CREAT 24H UR: 9 MG/G CREATININE (ref 0–30)
NONHDLC SERPL-MCNC: 160 MG/DL
PLATELET # BLD AUTO: 255 THOUSANDS/UL (ref 149–390)
PMV BLD AUTO: 11.3 FL (ref 8.9–12.7)
POTASSIUM SERPL-SCNC: 3.9 MMOL/L (ref 3.5–5.3)
PROT SERPL-MCNC: 7.5 G/DL (ref 6.4–8.2)
RBC # BLD AUTO: 4.71 MILLION/UL (ref 3.81–5.12)
SODIUM SERPL-SCNC: 140 MMOL/L (ref 136–145)
TRIGL SERPL-MCNC: 140 MG/DL
TSH SERPL DL<=0.05 MIU/L-ACNC: 2.43 UIU/ML (ref 0.36–3.74)
WBC # BLD AUTO: 6.69 THOUSAND/UL (ref 4.31–10.16)

## 2019-10-28 PROCEDURE — 3061F NEG MICROALBUMINURIA REV: CPT | Performed by: FAMILY MEDICINE

## 2019-10-28 PROCEDURE — 82570 ASSAY OF URINE CREATININE: CPT | Performed by: FAMILY MEDICINE

## 2019-10-28 PROCEDURE — 82043 UR ALBUMIN QUANTITATIVE: CPT | Performed by: FAMILY MEDICINE

## 2019-10-28 PROCEDURE — 84443 ASSAY THYROID STIM HORMONE: CPT

## 2019-10-28 PROCEDURE — 80061 LIPID PANEL: CPT

## 2019-10-28 PROCEDURE — 85027 COMPLETE CBC AUTOMATED: CPT

## 2019-10-28 PROCEDURE — 76700 US EXAM ABDOM COMPLETE: CPT

## 2019-10-28 PROCEDURE — 80053 COMPREHEN METABOLIC PANEL: CPT

## 2019-10-29 ENCOUNTER — HOSPITAL ENCOUNTER (OUTPATIENT)
Dept: RADIOLOGY | Facility: HOSPITAL | Age: 59
Discharge: HOME | End: 2019-10-29
Attending: FAMILY MEDICINE
Payer: COMMERCIAL

## 2019-10-29 DIAGNOSIS — M25.561 POSTERIOR KNEE PAIN, RIGHT: ICD-10-CM

## 2019-10-29 PROCEDURE — 76882 US LMTD JT/FCL EVL NVASC XTR: CPT

## 2019-11-04 ENCOUNTER — TELEPHONE (OUTPATIENT)
Dept: FAMILY MEDICINE CLINIC | Facility: CLINIC | Age: 59
End: 2019-11-04

## 2019-11-04 NOTE — TELEPHONE ENCOUNTER
I called patient to discuss recent labs, ultrasound of abdomen and right knee  Advice patient to call for appointment if abdominal pain persist especially with prolonged diarrhea to that further workup can be done such as a CT

## 2019-11-06 DIAGNOSIS — E11.9 TYPE 2 DIABETES MELLITUS WITHOUT COMPLICATION, WITHOUT LONG-TERM CURRENT USE OF INSULIN (HCC): ICD-10-CM

## 2019-11-06 DIAGNOSIS — L50.0 ALLERGIC URTICARIA: ICD-10-CM

## 2019-11-11 RX ORDER — HYDROXYZINE HYDROCHLORIDE 25 MG/1
TABLET, FILM COATED ORAL
Qty: 60 TABLET | Refills: 0 | Status: SHIPPED | OUTPATIENT
Start: 2019-11-11 | End: 2019-12-30 | Stop reason: SDUPTHER

## 2019-12-05 DIAGNOSIS — J45.30 ASTHMA IN ADULT, MILD PERSISTENT, UNCOMPLICATED: ICD-10-CM

## 2019-12-05 DIAGNOSIS — K21.9 GASTROESOPHAGEAL REFLUX DISEASE, ESOPHAGITIS PRESENCE NOT SPECIFIED: ICD-10-CM

## 2019-12-06 RX ORDER — DEXAMETHASONE 4 MG/1
TABLET ORAL
Qty: 1 INHALER | Refills: 6 | Status: SHIPPED | OUTPATIENT
Start: 2019-12-06 | End: 2020-04-06 | Stop reason: SDUPTHER

## 2019-12-06 RX ORDER — OMEPRAZOLE 40 MG/1
CAPSULE, DELAYED RELEASE ORAL
Qty: 90 CAPSULE | Refills: 4 | Status: SHIPPED | OUTPATIENT
Start: 2019-12-06 | End: 2020-04-06 | Stop reason: SDUPTHER

## 2019-12-16 ENCOUNTER — TELEPHONE (OUTPATIENT)
Dept: CARDIOLOGY CLINIC | Facility: CLINIC | Age: 59
End: 2019-12-16

## 2019-12-16 NOTE — TELEPHONE ENCOUNTER
Received a call from Anjana  Reports an episode of near syncope which occurred  Yesterday  Hx MVR / mechanical valve  Last seen in June  She was visiting with friends, and she became very dizzy, felt sob, shaking  She thought she might pass out, but she did not  She said she felt this way for a few minutes, and then the feeling passed  She has been ok since  Episode came on suddenly, as she was just sitting down, talking  She did check her BS and it was 235  She does not check her BP at home  She asked for an appt with you  First available is on 1/15 and she was scheduled for that day  NP Edwige Garzon has openings on 12/18  She is very frightened it may happen again      Ph:  [phone]

## 2019-12-16 NOTE — TELEPHONE ENCOUNTER
Per tiger text from bryanna Collado for patient to see Nancyann Cheadle, NP Dorsey Skeeters scheduled for ov with Nancyann Cheadle on 12/18

## 2019-12-22 ENCOUNTER — APPOINTMENT (OUTPATIENT)
Dept: LAB | Facility: HOSPITAL | Age: 59
End: 2019-12-22
Attending: INTERNAL MEDICINE
Payer: COMMERCIAL

## 2019-12-22 DIAGNOSIS — E08.610 CONTROLLED DIABETES MELLITUS DUE TO UNDERLYING CONDITION WITH DIABETIC NEUROPATHIC ARTHROPATHY, WITHOUT LONG-TERM CURRENT USE OF INSULIN (HCC): ICD-10-CM

## 2019-12-22 LAB
EST. AVERAGE GLUCOSE BLD GHB EST-MCNC: 166 MG/DL
HBA1C MFR BLD: 7.4 % (ref 4.2–6.3)

## 2019-12-22 PROCEDURE — 83036 HEMOGLOBIN GLYCOSYLATED A1C: CPT

## 2019-12-23 ENCOUNTER — ANTICOAG VISIT (OUTPATIENT)
Dept: CARDIOLOGY CLINIC | Facility: CLINIC | Age: 59
End: 2019-12-23

## 2019-12-23 DIAGNOSIS — Z95.2 HISTORY OF PROSTHETIC HEART VALVE: ICD-10-CM

## 2019-12-28 DIAGNOSIS — L50.0 ALLERGIC URTICARIA: ICD-10-CM

## 2019-12-30 ENCOUNTER — OFFICE VISIT (OUTPATIENT)
Dept: FAMILY MEDICINE CLINIC | Facility: CLINIC | Age: 59
End: 2019-12-30

## 2019-12-30 VITALS
WEIGHT: 224.6 LBS | HEIGHT: 59 IN | HEART RATE: 80 BPM | DIASTOLIC BLOOD PRESSURE: 90 MMHG | TEMPERATURE: 96.7 F | RESPIRATION RATE: 20 BRPM | SYSTOLIC BLOOD PRESSURE: 150 MMHG | BODY MASS INDEX: 45.28 KG/M2

## 2019-12-30 DIAGNOSIS — Z23 ENCOUNTER FOR IMMUNIZATION: ICD-10-CM

## 2019-12-30 DIAGNOSIS — L50.0 ALLERGIC URTICARIA: ICD-10-CM

## 2019-12-30 DIAGNOSIS — R42 EPISODE OF DIZZINESS: ICD-10-CM

## 2019-12-30 DIAGNOSIS — G47.19 EXCESSIVE DAYTIME SLEEPINESS: ICD-10-CM

## 2019-12-30 DIAGNOSIS — E08.610 CONTROLLED DIABETES MELLITUS DUE TO UNDERLYING CONDITION WITH DIABETIC NEUROPATHIC ARTHROPATHY, WITHOUT LONG-TERM CURRENT USE OF INSULIN (HCC): Primary | ICD-10-CM

## 2019-12-30 DIAGNOSIS — R53.83 FATIGUE, UNSPECIFIED TYPE: ICD-10-CM

## 2019-12-30 DIAGNOSIS — E66.01 CLASS 3 SEVERE OBESITY WITH SERIOUS COMORBIDITY AND BODY MASS INDEX (BMI) OF 45.0 TO 49.9 IN ADULT, UNSPECIFIED OBESITY TYPE (HCC): ICD-10-CM

## 2019-12-30 PROBLEM — J06.9 VIRAL UPPER RESPIRATORY TRACT INFECTION: Status: RESOLVED | Noted: 2018-03-16 | Resolved: 2019-12-30

## 2019-12-30 PROCEDURE — 99214 OFFICE O/P EST MOD 30 MIN: CPT | Performed by: FAMILY MEDICINE

## 2019-12-30 PROCEDURE — 3008F BODY MASS INDEX DOCD: CPT | Performed by: FAMILY MEDICINE

## 2019-12-30 PROCEDURE — 90682 RIV4 VACC RECOMBINANT DNA IM: CPT | Performed by: FAMILY MEDICINE

## 2019-12-30 PROCEDURE — 90471 IMMUNIZATION ADMIN: CPT | Performed by: FAMILY MEDICINE

## 2019-12-30 PROCEDURE — 1036F TOBACCO NON-USER: CPT | Performed by: FAMILY MEDICINE

## 2019-12-30 RX ORDER — HYDROXYZINE HYDROCHLORIDE 25 MG/1
25 TABLET, FILM COATED ORAL EVERY 6 HOURS PRN
Qty: 60 TABLET | Refills: 3 | Status: SHIPPED | OUTPATIENT
Start: 2019-12-30 | End: 2020-04-06 | Stop reason: SDUPTHER

## 2019-12-30 RX ORDER — HYDROXYZINE HYDROCHLORIDE 25 MG/1
TABLET, FILM COATED ORAL
Qty: 60 TABLET | Refills: 0 | OUTPATIENT
Start: 2019-12-30

## 2019-12-30 NOTE — ASSESSMENT & PLAN NOTE
Persistent fatigue with CBC, TSH normal   Check fasting morning cortisol and sleep study  Encourage patient for exercise, healthy diet

## 2019-12-30 NOTE — PROGRESS NOTES
Christianne Ramirez 1960 female MRN: [id number]    Family Medicine Follow-up Visit    ASSESSMENT/PLAN  Controlled diabetes mellitus due to underlying condition with diabetic neuropathic arthropathy [facility])    Lab Results   Component Value Date    HGBA1C 7 4 (H) 12/22/2019   Advice patient that her HbA1c goal should be closer to 6 5%  Encourage patient to participate in medical weight loss program, increase metformin to 1 5 tablet twice daily, recheck lab 3 months  Excessive daytime sleepiness  Given persistent symptoms, reviewed sleep study in spring 2017 showing mild GERMAN  Recommend patient to repeat sleep study  Fatigue  Persistent fatigue with CBC, TSH normal   Check fasting morning cortisol and sleep study  Encourage patient for exercise, healthy diet  Obesity  BMI Counseling: Body mass index is 45 36 kg/m²  The BMI is above normal  Nutrition recommendations include reducing portion sizes, reducing fast food intake, consuming healthier snacks, moderation in carbohydrate intake, reducing intake of saturated fat and trans fat and reducing intake of cholesterol  Refer to weight management  Episode of dizziness  Given recent episode of dizziness and normal cranial and peripheral nerves exam, advice patient to make sure she eats 3 meals daily, taking time with any change of position, and making sure her inhaler is at hand  Caution given and to call if this recurs  Future Appointments   Date Time Provider Izabel Villalobos   1/6/2020  4:00 PM JOSE Loving [facility]   1/15/2020  3:00 PM Ford Sneed MD [facility]   2/13/2020 11:30 AM Bo Crum MD [facility]   3/30/2020 11:10 AM Izaiah Gage MD [facility]          SUBJECTIVE  CC: Dizziness; Medication Refill; and Knee Pain      HPI:  Feeling so tired, couple week ago, feeling so dizzy, lightheadedness, feeling like she would pass out  No recent fall  Tolerated medication ok  Only taking metformin twice daily and sometime skipping meals  Review of Systems   Constitutional: Positive for fatigue  Negative for activity change, appetite change and fever  HENT: Negative for ear pain, postnasal drip and sore throat  Eyes: Negative for photophobia and visual disturbance  Respiratory: Positive for shortness of breath  Negative for cough, chest tightness and wheezing  Cardiovascular: Negative for chest pain and palpitations  Gastrointestinal: Negative for abdominal pain, constipation and diarrhea  Musculoskeletal: Negative for arthralgias and back pain  Skin: Negative for rash  Neurological: Positive for dizziness and light-headedness  Negative for weakness and headaches  Hematological: Negative for adenopathy  Psychiatric/Behavioral: Negative for agitation         Historical Information   The patient history was reviewed as follows:    Past Medical History:   Diagnosis Date    Abnormal ultrasound of pelvis     last assessed - 26Mar2015    Asthma     Cerebral infarction [facility])     unspecified; last assessed - 30Aug2017    Dizziness     last assessed - [id number]    Dysuria     last assessed - [id number]    Exposure to potentially hazardous body fluids     last assessed - [id number]    Heart disease     Hematoma     of arm; last assessed - 20Jun2016    Mitral valve stenosis     Personal history of scoliosis     Pneumonia     Scar, hypertrophic     last assessed - [id number]    Seizures ([facility] [address] )     Type 2 diabetes mellitus ([facility] [address] )     Vision problems      Past Surgical History:   Procedure Laterality Date    MITRAL VALVE REPLACEMENT  06/17/2015    MVR with 27 mm [employer] Optiform mechanical prostethic;  last assessed - [id number]    TONSILLECTOMY      TRANSLUMINAL ANGIOPLASTY      mitral valve; last assessed - [id number]   [address]    last assessed - [id number]     Family History   Problem Relation Age of Onset    Cancer Mother    Harini Raphael Dementia Mother     Diabetes Mother     Hypertension Mother     Hyperlipidemia Mother     Cervical cancer Mother     Heart disease Father     Coronary artery disease Father     Hypertension Father     Heart attack Father     Diabetes Sister     Breast cancer Sister     Diabetes Brother     Hypertension Brother     Hyperlipidemia Brother     Diabetes Maternal Grandmother     Breast cancer Maternal Grandmother     Diabetes Other     Seizures Son     Psoriasis Son       Social History   Social History     Substance and Sexual Activity   Alcohol Use No     Social History     Substance and Sexual Activity   Drug Use No     Social History     Tobacco Use   Smoking Status Never Smoker   Smokeless Tobacco Never Used       Medications:     Current Outpatient Medications:     ACCU-CHEK FASTCLIX LANCETS MISC, USE AS INSTRUCTED, Disp: 100 each, Rfl: 5    acetaminophen (TYLENOL) 500 mg tablet, Take 2 tablets by mouth daily as needed for pain, Disp: 180 tablet, Rfl: 3    cetirizine (ZyrTEC) 10 mg tablet, Take 1 tablet (10 mg total) by mouth daily, Disp: 90 tablet, Rfl: 3    FLOVENT  MCG/ACT inhaler, INHALE TWO PUFFS TWO (TWO) TIMES A DAY RINSE MOUTH AFTER USE , Disp: 1 Inhaler, Rfl: 6    fluticasone (FLONASE) 50 mcg/act nasal spray, 1 spray into each nostril daily, Disp: 16 g, Rfl: 1    glucose blood (ACCU-CHEK SMARTVIEW) test strip, USE DAILY AS INSTRUCTED, Disp: 100 each, Rfl: 11    hydrOXYzine HCL (ATARAX) 25 mg tablet, Take 1 tablet (25 mg total) by mouth every 6 (six) hours as needed for itching, Disp: 60 tablet, Rfl: 3    KEPPRA 500 MG tablet, TAKE ONE TABLET BY MOUTH FOUR TIMES A DAY, Disp: 120 tablet, Rfl: 3    metFORMIN (GLUCOPHAGE) 500 mg tablet, TAKE ONE TABLET BY MOUTH THREE TIMES A DAY, Disp: 270 tablet, Rfl: 1    metoprolol tartrate (LOPRESSOR) 25 mg tablet, TAKE HALF TABLET BY MOUTH EVERY 12 HOURS, Disp: 30 tablet, Rfl: 11    omeprazole (PriLOSEC) 40 MG capsule, TAKE ONE CAPSULE BY MOUTH DAILY, Disp: 90 capsule, Rfl: 4    torsemide (DEMADEX) 20 mg tablet, Take 1 tablet (20 mg total) by mouth daily (Patient taking differently: Take 20 mg by mouth as needed (pt takes it every other day) ), Disp: 90 tablet, Rfl: 3    warfarin (COUMADIN) 2 5 mg tablet, Take 2 5 mg by mouth daily, Disp: , Rfl:     warfarin (COUMADIN) 5 mg tablet, TAKE ONE TABLET BY MOUTH DAILY, Disp: 60 tablet, Rfl: 6  Allergies   Allergen Reactions    Codeine     Prednisone Itching    Codeine Polt-Chlorphen Polt Er Irritability, Itching and Rash       OBJECTIVE    Vitals:   Vitals:    12/30/19 1315   BP: 150/90   BP Location: Right arm   Patient Position: Sitting   Cuff Size: Large   Pulse: 80   Resp: 20   Temp: (!) 96 7 °F (35 9 °C)   TempSrc: Tympanic   Weight: 102 kg (224 lb 9 6 oz)   Height: 4' 11" (1 499 m)           Physical Exam   Constitutional: She is oriented to person, place, and time  She appears well-developed and well-nourished  HENT:   Head: Normocephalic and atraumatic  Right Ear: External ear normal    Left Ear: External ear normal    Nose: Nose normal    Mouth/Throat: Oropharynx is clear and moist    Eyes: Pupils are equal, round, and reactive to light  Conjunctivae and EOM are normal    Neck: Normal range of motion  Neck supple  Cardiovascular: Normal rate, regular rhythm and intact distal pulses  Pulses:       Dorsalis pedis pulses are 2+ on the right side, and 2+ on the left side  Mitral click heard   Pulmonary/Chest: Effort normal and breath sounds normal    Abdominal: Soft  Bowel sounds are normal    Musculoskeletal: Normal range of motion  Feet:   Right Foot:   Skin Integrity: Negative for ulcer, skin breakdown, erythema, warmth, callus or dry skin  Neurological: She is alert and oriented to person, place, and time  Skin: Skin is warm  Psychiatric: She has a normal mood and affect  Her behavior is normal  Judgment and thought content normal    Vitals reviewed             Patient's shoes and socks removed  Right Foot/Ankle   Right Foot Inspection  Skin Exam: skin normal and skin intact no dry skin, no warmth, no callus, no erythema, no maceration, no abnormal color, no pre-ulcer, no ulcer and no callus                          Toe Exam: ROM and strength within normal limits  Sensory       Monofilament testing: intact  Vascular    The right DP pulse is 2+  Left Foot/Ankle  Left Foot Inspection                           Toe Exam: ROM and strength within normal limits                   Sensory       Monofilament: intact  Vascular    The left DP pulse is 2+  Assign Risk Category:  No deformity present;  No loss of protective sensation;        Risk: 0

## 2019-12-30 NOTE — ASSESSMENT & PLAN NOTE
Given recent episode of dizziness and normal cranial and peripheral nerves exam, advice patient to make sure she eats 3 meals daily, taking time with any change of position, and making sure her inhaler is at hand  Caution given and to call if this recurs

## 2019-12-30 NOTE — ASSESSMENT & PLAN NOTE
Given persistent symptoms, reviewed sleep study in spring 2017 showing mild GERMAN  Recommend patient to repeat sleep study

## 2019-12-30 NOTE — ASSESSMENT & PLAN NOTE
BMI Counseling: Body mass index is 45 36 kg/m²  The BMI is above normal  Nutrition recommendations include reducing portion sizes, reducing fast food intake, consuming healthier snacks, moderation in carbohydrate intake, reducing intake of saturated fat and trans fat and reducing intake of cholesterol  Refer to weight management

## 2019-12-30 NOTE — ASSESSMENT & PLAN NOTE
Lab Results   Component Value Date    HGBA1C 7 4 (H) 12/22/2019   Advice patient that her HbA1c goal should be closer to 6 5%  Encourage patient to participate in medical weight loss program, increase metformin to 1 5 tablet twice daily, recheck lab 3 months

## 2020-01-09 ENCOUNTER — TELEPHONE (OUTPATIENT)
Dept: FAMILY MEDICINE CLINIC | Facility: CLINIC | Age: 60
End: 2020-01-09

## 2020-01-15 ENCOUNTER — OFFICE VISIT (OUTPATIENT)
Dept: CARDIOLOGY CLINIC | Facility: CLINIC | Age: 60
End: 2020-01-15
Payer: COMMERCIAL

## 2020-01-15 VITALS
HEIGHT: 59 IN | HEART RATE: 82 BPM | SYSTOLIC BLOOD PRESSURE: 132 MMHG | WEIGHT: 219 LBS | BODY MASS INDEX: 44.15 KG/M2 | DIASTOLIC BLOOD PRESSURE: 78 MMHG

## 2020-01-15 DIAGNOSIS — E78.5 DYSLIPIDEMIA: ICD-10-CM

## 2020-01-15 DIAGNOSIS — I05.0 MITRAL VALVE STENOSIS, UNSPECIFIED ETIOLOGY: Primary | ICD-10-CM

## 2020-01-15 PROCEDURE — 93000 ELECTROCARDIOGRAM COMPLETE: CPT | Performed by: INTERNAL MEDICINE

## 2020-01-15 PROCEDURE — 99214 OFFICE O/P EST MOD 30 MIN: CPT | Performed by: INTERNAL MEDICINE

## 2020-01-15 RX ORDER — ROSUVASTATIN CALCIUM 5 MG/1
5 TABLET, COATED ORAL DAILY
Qty: 90 TABLET | Refills: 3 | Status: SHIPPED | OUTPATIENT
Start: 2020-01-15 | End: 2020-03-10

## 2020-01-15 NOTE — PATIENT INSTRUCTIONS
Please get blood work done    Please start taking Crestor 5 mg daily    Please talk to your primary care doctor about the dizziness and vertigo    Please try to stay active and exercise atleast for 30 minutes- five times a week    Follow-up in six months when we will repeat your blood work for cholesterol numbers and echo (ultrasound of your heart)

## 2020-01-15 NOTE — PROGRESS NOTES
Cardiology Consultation     Darryl Winchester  [id number]  1960  [facility] [place]  [facility] [address] [facility] [place] [id number] [address] [place] [address] [address]      1  Mitral valve stenosis, unspecified etiology  POCT ECG       Discussion/Summary:  Ms Cody Prince is a 26-year-old female with past medical history significant for rheumatic mitral stenosis status post balloon valvuloplasty in 2014 in [address] [place] [address] and then requiring mechanical mitral valve replacement with 27 mm [employer]un [employer] in June 2015, type 2 diabetes, asthma  1  Mitral stenosis with BMV in 2014 in [place] and MVR with 27 mm [employer] in June 2015  - echo from May 9th reviewed-normal LV size with normal function at 60%  Normal functioning mechanical mitral valve with mean gradient of 4  Mild TR with normal PA systolic pressure  - currently on Coumadin and INR being managed by Coumadin Clinic with a goal INR of 2 5-3 5, INR-12/19-3 5  - on metoprolol tartrate 12 5 mg twice daily, p r n  Torsemide- does take it at 10 mg twice a week  - Recheck CBC, BMP    2  Type II Diabetes  - On Metformin, A1C-12/19-7 4  - most recent lipid panel-Jan 28-, HDL 53, triglycerides 151 patient would like to continue lifestyle modifications as she did make some changes after prior office visit and would like lipids to be recheck in 2 months at which time if LDL is still above 100 will start statin therapy  Cath in 2015 with no coronary artery disease    3  Dyslipidemia  - Lipid panel- 10/19- LDL-132, HDL-58  - Discussed initiation of statin therapy, also a diabetic- reports history of muscle pain with atorvastatin, will start Crestor 5 mg daily  - Repeat lipid panel in six months and uptitrate as needed  4  H/o CVA with right sided weakness- now with no residual at the time of BMV for MS in 2014  5  Asthma  6  Seizures  7   Dizziness with vertigo  - Dizziness is not positional, does report symptoms of vertigo with change in head position- likely related to history of BPPV- advised to discuss with her PCP    F/u in six months and will repeat echo and lipid panel then  History of Present Illness: Ms Ana Ricci is a 25-year-old female with past medical history significant for rheumatic mitral stenosis status post balloon valvuloplasty in 2014 in [place] and then requiring mechanical mitral valve replacement with 27 mm [employer] in June 2015, type 2 diabetes, asthma  Patient used to live in [place] prior at which time given symptoms of shortness of breath with volume overload she was seen at [facility] found to have rheumatic mitral stenosis and underwent balloon valvuloplasty in 2014  BMV was complicated by stroke with no residual deficits now, at the time of the procedure she had right-sided weakness  She then moved to [place], had symptoms of shortness of breath with volume overload with a repeat echo showing severe mitral stenosis with diastolic doming consistent with rheumatic appearance with a mean gradient of 15  Patient then underwent replacement with a mechanical 27 mm [employer]un [employer] valve in June 2015  She is here for routine follow-up visit  She reports symptoms of fatigue which has been ongoing for the past several months  No shortness of breath at rest or with exertion  No chest pain or pressure  Reports an episode of dizziness and vertigo while she was at her friends place in December which subsided by itself- reports feeling nauseous- blood sugar checked and was not low  She reports another episode while sitting at home and watching TV- patient has history of BPPV and reports these episodes feeling similar to them  She reports taking torsemide 10 mg twice a week  No orthopnea or PND, no swelling of the lower extremities  Does not exercise as much, reports eating a diet with low fat and sugars    She lives in a group living facility  No smoking or alcohol or drug use    Family history of CAD in father, aunts and uncles  F/u with our Coumadin clinic here    Previous patient of Dr Volodymyr Osman      EKG in clinic- Jan 2020- normal sinus rhythm with PVC      Patient Active Problem List   Diagnosis    History of prosthetic heart valve    Abnormal mammogram    Asthma, persistent    Asymptomatic stenosis of left carotid artery    Cervical high risk HPV (human papillomavirus) test positive    Controlled diabetes mellitus due to underlying condition with diabetic neuropathic arthropathy (HCC)    Depression with anxiety    Elevated blood pressure reading    Esophageal reflux    Excessive daytime sleepiness    Fatigue    Hypokalemia    Fecal occult blood test positive    Insomnia    Intertrigo    Iron deficiency anemia    Localization-related epilepsy, intractable (HCC)    Low grade squamous intraepithelial lesion (LGSIL) on cervical Pap smear    Microcytic hypochromic anemia    Mitral valve stenosis    Obesity    Osteoarthritis of knee    Overactive bladder    Plantar fasciitis    Postnasal drip    Sleep disorder, circadian, delayed sleep phase type    Urge incontinence of urine    Urinary retention    Urticaria, chronic    Uterine fibroid    Vitamin D insufficiency    Xerosis of skin    Cough    History of stroke    Wrist swelling, left    Chest wall pain    Lower extremity edema    H/O mitral valve replacement with mechanical valve    Posterior knee pain, right    Left sided abdominal pain    Episode of dizziness     Past Medical History:   Diagnosis Date    Abnormal ultrasound of pelvis     last assessed - 26Mar2015    Asthma     Cerebral infarction [facility])     unspecified; last assessed - 30Aug2017    Dizziness     last assessed - [id number]    Dysuria     last assessed - [id number]    Exposure to potentially hazardous body fluids     last assessed - [id number]    Heart disease     Hematoma     of arm; last assessed - 20Jun2016    Mitral valve stenosis     Personal history of scoliosis     Pneumonia     Scar, hypertrophic     last assessed - [id number]    Seizures ([facility] [address] )     Type 2 diabetes mellitus ([facility] )     Vision problems      Social History     Socioeconomic History    Marital status: Single     Spouse name: Not on file    Number of children: Not on file    Years of education: Not on file    Highest education level: Not on file   Occupational History    Not on file   Social Needs    Financial resource strain: Not on file    Food insecurity:     Worry: Not on file     Inability: Not on file    Transportation needs:     Medical: Not on file     Non-medical: Not on file   Tobacco Use    Smoking status: Never Smoker    Smokeless tobacco: Never Used   Substance and Sexual Activity    Alcohol use: No    Drug use: No    Sexual activity: Not Currently   Lifestyle    Physical activity:     Days per week: Not on file     Minutes per session: Not on file    Stress: Not on file   Relationships    Social connections:     Talks on phone: Not on file     Gets together: Not on file     Attends Sikh service: Not on file     Active member of club or organization: Not on file     Attends meetings of clubs or organizations: Not on file     Relationship status: Not on file    Intimate partner violence:     Fear of current or ex partner: Not on file     Emotionally abused: Not on file     Physically abused: Not on file     Forced sexual activity: Not on file   Other Topics Concern    Not on file   Social History Narrative    Caffeine use      Family History   Problem Relation Age of Onset    Cancer Mother     Dementia Mother     Diabetes Mother     Hypertension Mother     Hyperlipidemia Mother     Cervical cancer Mother     Heart disease Father     Coronary artery disease Father     Hypertension Father     Heart attack Father     Diabetes Sister     Breast cancer Sister     Diabetes Brother     Hypertension Brother     Hyperlipidemia Brother     Diabetes Maternal Grandmother     Breast cancer Maternal Grandmother     Diabetes Other     Seizures Son     Psoriasis Son      Past Surgical History:   Procedure Laterality Date    MITRAL VALVE REPLACEMENT  06/17/2015    MVR with 27 mm [employer] Optiform mechanical prostethic;  last assessed - [id number]    TONSILLECTOMY      TRANSLUMINAL ANGIOPLASTY      mitral valve; last assessed - 01Aug2014    TUBAL LIGATION  1986    last assessed - 01Aug2014       Current Outpatient Medications:     ACCU-CHEK FASTCLIX LANCETS MISC, USE AS INSTRUCTED, Disp: 100 each, Rfl: 5    acetaminophen (TYLENOL) 500 mg tablet, Take 2 tablets by mouth daily as needed for pain, Disp: 180 tablet, Rfl: 3    cetirizine (ZyrTEC) 10 mg tablet, Take 1 tablet (10 mg total) by mouth daily, Disp: 90 tablet, Rfl: 3    FLOVENT  MCG/ACT inhaler, INHALE TWO PUFFS TWO (TWO) TIMES A DAY RINSE MOUTH AFTER USE , Disp: 1 Inhaler, Rfl: 6    fluticasone (FLONASE) 50 mcg/act nasal spray, 1 spray into each nostril daily, Disp: 16 g, Rfl: 1    glucose blood (ACCU-CHEK SMARTVIEW) test strip, USE DAILY AS INSTRUCTED, Disp: 100 each, Rfl: 11    hydrOXYzine HCL (ATARAX) 25 mg tablet, Take 1 tablet (25 mg total) by mouth every 6 (six) hours as needed for itching, Disp: 60 tablet, Rfl: 3    KEPPRA 500 MG tablet, TAKE ONE TABLET BY MOUTH FOUR TIMES A DAY, Disp: 120 tablet, Rfl: 3    metFORMIN (GLUCOPHAGE) 500 mg tablet, TAKE ONE TABLET BY MOUTH THREE TIMES A DAY, Disp: 270 tablet, Rfl: 1    metoprolol tartrate (LOPRESSOR) 25 mg tablet, TAKE HALF TABLET BY MOUTH EVERY 12 HOURS, Disp: 30 tablet, Rfl: 11    omeprazole (PriLOSEC) 40 MG capsule, TAKE ONE CAPSULE BY MOUTH DAILY, Disp: 90 capsule, Rfl: 4    torsemide (DEMADEX) 20 mg tablet, Take 1 tablet (20 mg total) by mouth daily (Patient taking differently: Take 20 mg by mouth as needed (pt takes it every other day) ), Disp: 90 tablet, Rfl: 3    warfarin (COUMADIN) 2 5 mg tablet, Take 2 5 mg by mouth daily, Disp: , Rfl:     warfarin (COUMADIN) 5 mg tablet, TAKE ONE TABLET BY MOUTH DAILY, Disp: 60 tablet, Rfl: 6  Allergies   Allergen Reactions    Codeine     Prednisone Itching    Codeine Polt-Chlorphen Polt Er Irritability, Itching and Rash         Labs:  Appointment on 12/22/2019   Component Date Value    Hemoglobin A1C 12/22/2019 7 4*    EAG 12/22/2019 166    Ancillary Orders on 11/01/2019   Component Date Value    Protime 12/22/2019 34 6*    INR 12/22/2019 3 50*   Appointment on 10/28/2019   Component Date Value    Sodium 10/28/2019 140     Potassium 10/28/2019 3 9     Chloride 10/28/2019 106     CO2 10/28/2019 29     ANION GAP 10/28/2019 5     BUN 10/28/2019 15     Creatinine 10/28/2019 0 77     Glucose, Fasting 10/28/2019 128*    Calcium 10/28/2019 9 5     AST 10/28/2019 23     ALT 10/28/2019 21     Alkaline Phosphatase 10/28/2019 150*    Total Protein 10/28/2019 7 5     Albumin 10/28/2019 3 6     Total Bilirubin 10/28/2019 0 50     eGFR 10/28/2019 84     Cholesterol 10/28/2019 218*    Triglycerides 10/28/2019 140     HDL, Direct 10/28/2019 58     LDL Calculated 10/28/2019 132*    Non-HDL-Chol (CHOL-HDL) 10/28/2019 160     TSH 3RD GENERATON 10/28/2019 2 430     WBC 10/28/2019 6 69     RBC 10/28/2019 4 71     Hemoglobin 10/28/2019 11 7     Hematocrit 10/28/2019 38 4     MCV 10/28/2019 82     MCH 10/28/2019 24 8*    MCHC 10/28/2019 30 5*    RDW 10/28/2019 15 1     Platelets 51/42/3723 255     MPV 10/28/2019 11 3    Ancillary Orders on 09/27/2019   Component Date Value    Protime 10/28/2019 32 6*    INR 10/28/2019 3 25*   Office Visit on 09/26/2019   Component Date Value    Creatinine, Ur 10/28/2019 149 0     Microalbum  ,U,Random 10/28/2019 13 1     Microalb Creat Ratio 10/28/2019 9    Ancillary Orders on 08/23/2019   Component Date Value    Protime 09/25/2019 30 6*    INR 09/25/2019 3 00*   Ancillary Orders on 08/09/2019   Component Date Value    Protime 08/17/2019 34 6*    INR 08/17/2019 3 50*   Appointment on 08/02/2019   Component Date Value    Cholesterol 08/02/2019 202*    Triglycerides 08/02/2019 151*    HDL, Direct 08/02/2019 48     LDL Calculated 08/02/2019 124*        Imaging: No results found  ECG: normal sinus rhythm  Normal ST T waves    Review of Systems:  Review of Systems   Constitutional: Positive for fatigue  Negative for activity change, appetite change, chills, diaphoresis and fever  HENT: Negative for congestion  Respiratory: Negative for cough, chest tightness, shortness of breath and wheezing  Cardiovascular: Negative for chest pain, palpitations and leg swelling  Gastrointestinal: Negative for abdominal pain, diarrhea, nausea and vomiting  Genitourinary: Negative for difficulty urinating and dysuria  Musculoskeletal: Negative for back pain  Skin: Negative for color change and rash  Neurological: Positive for dizziness  Negative for syncope, facial asymmetry, weakness, light-headedness, numbness and headaches  Positional vertigo     Psychiatric/Behavioral: Negative for confusion           Vitals:    01/15/20 1429   BP: 132/78   BP Location: Left arm   Patient Position: Sitting   Cuff Size: Large   Pulse: 82   Weight: 99 3 kg (219 lb)   Height: 4' 11" (1 499 m)     Vitals:    01/15/20 1429   Weight: 99 3 kg (219 lb)     Height: 4' 11" (149 9 cm)     Physical Exam:  General appearance:  Appears stated age, alert, well appearing and in no distress  HEENT:  PERRLA, EOMI, no scleral icterus, no conjunctival pallor  NECK:  Supple, No elevated JVP, no thyromegaly, no carotid bruits  HEART:  Regular rate and rhythm, normal mechanical valve click  LUNGS:  Clear to auscultation bilaterally  ABDOMEN:  Soft, non-tender, positive bowel sounds, no rebound or guarding, no organomegaly   EXTREMITIES:  No edema  VASCULAR:  Normal pedal pulses   SKIN: No lesions or rashes on exposed skin  NEURO:  CN II-XII intact, no focal deficits

## 2020-01-17 ENCOUNTER — TELEPHONE (OUTPATIENT)
Dept: FAMILY MEDICINE CLINIC | Facility: CLINIC | Age: 60
End: 2020-01-17

## 2020-01-17 NOTE — TELEPHONE ENCOUNTER
Tried calling patient to remind him to come to this Monday's appointment for Diabetes Education    DDS

## 2020-02-07 DIAGNOSIS — I05.0 MITRAL VALVE STENOSIS, UNSPECIFIED ETIOLOGY: ICD-10-CM

## 2020-02-10 ENCOUNTER — TELEPHONE (OUTPATIENT)
Dept: CARDIOLOGY CLINIC | Facility: CLINIC | Age: 60
End: 2020-02-10

## 2020-02-11 DIAGNOSIS — I05.0 MITRAL VALVE STENOSIS, UNSPECIFIED ETIOLOGY: ICD-10-CM

## 2020-02-11 NOTE — TELEPHONE ENCOUNTER
Spoke with the patient  She reports headaches- band like across the head which are constant- started prior to starting Crestor but reports to have gotten worse since she started the medication  She stopped taking crestor but continues to have the headaches  Headaches as side effect to Crestor can be seen in 6-9% of the patients, however she had symptoms prior to starting and still continues have symptoms despite stopping the medication  Reports home blood pressure being around 120-130/80-85  Headaches less likely related to Crestor, emphasized the need to continue statin therapy for primary prevention being a diabetic, she is reluctant and would like to hold off until she discusses with her PCP for headaches  Patient will give us a call back after her visit to the PCP   My recommendation would be to continue Crestor at 5 mg, if patient still reluctant then start Zetia 10 mg daily

## 2020-02-13 ENCOUNTER — OFFICE VISIT (OUTPATIENT)
Dept: SLEEP CENTER | Facility: CLINIC | Age: 60
End: 2020-02-13
Payer: COMMERCIAL

## 2020-02-13 VITALS
BODY MASS INDEX: 45.56 KG/M2 | SYSTOLIC BLOOD PRESSURE: 120 MMHG | WEIGHT: 226 LBS | HEART RATE: 70 BPM | DIASTOLIC BLOOD PRESSURE: 76 MMHG | HEIGHT: 59 IN

## 2020-02-13 DIAGNOSIS — G47.19 EXCESSIVE DAYTIME SLEEPINESS: ICD-10-CM

## 2020-02-13 DIAGNOSIS — F51.01 PRIMARY INSOMNIA: ICD-10-CM

## 2020-02-13 DIAGNOSIS — G47.33 OSA (OBSTRUCTIVE SLEEP APNEA): Primary | ICD-10-CM

## 2020-02-13 DIAGNOSIS — R51.9 HEADACHE DISORDER: ICD-10-CM

## 2020-02-13 PROCEDURE — 99244 OFF/OP CNSLTJ NEW/EST MOD 40: CPT | Performed by: INTERNAL MEDICINE

## 2020-02-13 NOTE — PROGRESS NOTES
Consultation - [facility] : 1960  MRN: [id number]      Assessment:  The patient has mild obstructive sleep apnea according to polysomnography in 2017  She also has sleep maintenance insomnia, which could be a result of her obstructive sleep apnea  I will obtain a positive airway pressure titration study and see if her sleep quality improves  If she continues to experience sleep maintenance insomnia, I will start Intermezzo 1 75 mg    Plan:  Positive airway pressure titration study    Follow up: After testing    History of Present Illness:   63 y  o female with a history of sleep maintenance insomnia  She does not know when she gets up during the night, but is awake for several hours  She is currently not working  She awakens with morning headache and dry mouth  She has gained some weight  She suffers from anxiety and depression  She complains of pruritus which keeps her awake at night  Those symptoms respond to antihistamines  She underwent polysomnography in  which demonstrated AHI = 5 4  She was never treated  Review of Systems      Genitourinary need to urinate more than twice a night and hot flashes at night   Cardiology ankle/leg swelling   Gastrointestinal frequent heartburn/acid reflux   Neurology frequent headaches, awaken with headache and balance problems   Constitutional claustrophobia, fatigue and weight change   Integumentary rash or dry skin and itching   Psychiatry anxiety and depression   Musculoskeletal joint pain, muscle aches, back pain and leg cramps   Pulmonary shortness of breath with activity, wheezing and difficulty breathing when lying flat    ENT throat clearing   Endocrine excessive thirst and frequent urination   Hematological none         I have reviewed and updated the review of systems as necessary    Historical Information    Past Medical History:   Allergic rhinitis, GERD, asthma    Family History: non-contributory    Social History     Socioeconomic History    Marital status: Single     Spouse name: None    Number of children: None    Years of education: None    Highest education level: None   Occupational History    None   Social Needs    Financial resource strain: None    Food insecurity:     Worry: None     Inability: None    Transportation needs:     Medical: None     Non-medical: None   Tobacco Use    Smoking status: Never Smoker    Smokeless tobacco: Never Used   Substance and Sexual Activity    Alcohol use: No    Drug use: No    Sexual activity: Not Currently   Lifestyle    Physical activity:     Days per week: None     Minutes per session: None    Stress: None   Relationships    Social connections:     Talks on phone: None     Gets together: None     Attends Restorationist service: None     Active member of club or organization: None     Attends meetings of clubs or organizations: None     Relationship status: None    Intimate partner violence:     Fear of current or ex partner: None     Emotionally abused: None     Physically abused: None     Forced sexual activity: None   Other Topics Concern    None   Social History Narrative    Caffeine use         Sleep Schedule: unremarkable    Snoring:  yes    Witnessed Apnea:  Unknown    Medications/Allergies:    Current Outpatient Medications:     ACCU-CHEK FASTCLIX LANCETS MISC, USE AS INSTRUCTED, Disp: 100 each, Rfl: 5    acetaminophen (TYLENOL) 500 mg tablet, Take 2 tablets by mouth daily as needed for pain, Disp: 180 tablet, Rfl: 3    cetirizine (ZyrTEC) 10 mg tablet, Take 1 tablet (10 mg total) by mouth daily, Disp: 90 tablet, Rfl: 3    FLOVENT  MCG/ACT inhaler, INHALE TWO PUFFS TWO (TWO) TIMES A DAY RINSE MOUTH AFTER USE , Disp: 1 Inhaler, Rfl: 6    fluticasone (FLONASE) 50 mcg/act nasal spray, 1 spray into each nostril daily, Disp: 16 g, Rfl: 1    glucose blood (ACCU-CHEK SMARTVIEW) test strip, USE DAILY AS INSTRUCTED, Disp: 100 each, Rfl: 11   hydrOXYzine HCL (ATARAX) 25 mg tablet, Take 1 tablet (25 mg total) by mouth every 6 (six) hours as needed for itching, Disp: 60 tablet, Rfl: 3    KEPPRA 500 MG tablet, TAKE ONE TABLET BY MOUTH FOUR TIMES A DAY, Disp: 120 tablet, Rfl: 3    metFORMIN (GLUCOPHAGE) 500 mg tablet, TAKE ONE TABLET BY MOUTH THREE TIMES A DAY, Disp: 270 tablet, Rfl: 1    metoprolol tartrate (LOPRESSOR) 25 mg tablet, Take 0 5 tablets (12 5 mg total) by mouth every 12 (twelve) hours, Disp: 90 tablet, Rfl: 3    omeprazole (PriLOSEC) 40 MG capsule, TAKE ONE CAPSULE BY MOUTH DAILY, Disp: 90 capsule, Rfl: 4    rosuvastatin (CRESTOR) 5 mg tablet, Take 1 tablet (5 mg total) by mouth daily, Disp: 90 tablet, Rfl: 3    torsemide (DEMADEX) 20 mg tablet, Take 1 tablet (20 mg total) by mouth daily (Patient taking differently: Take 20 mg by mouth as needed (pt takes it every other day) ), Disp: 90 tablet, Rfl: 3    warfarin (COUMADIN) 2 5 mg tablet, Take 2 5 mg by mouth daily, Disp: , Rfl:     warfarin (COUMADIN) 5 mg tablet, TAKE ONE TABLET BY MOUTH DAILY, Disp: 60 tablet, Rfl: 6        No notes on file                  Objective:    Vital Signs:   Vitals:    02/13/20 1100   BP: 120/76   Pulse: 70   Weight: 103 kg (226 lb)   Height: 4' 11" (1 499 m)     Neck Circumference: 17      [place] Sleepiness Scale:  Total score: 14    Physical Exam:    General: Alert, appropriate, cooperative, overweight    Head: NC/AT, no retrognathia    Nose: No septal deviation, nares mildly obstructed, mucosa normal    Throat: Airway normal, tongue base slightly thickened, no tonsils visualized    Neck: Normal, no thyromegaly or lymphadenopathy, no JVD    Heart: RR, normal S1 and S2, no murmurs    Chest: Clear bilaterally    Extremity: No clubbing, cyanosis, trace edema    Skin: Warm, dry    Neuro: No motor abnormalities, cranial nerves appear intact    Sleep Study Results:   AHI = 5 4      Counseling / Coordination of Care  A description of the counseling / coordination of care: We discussed the pathophysiology of obstructive sleep apnea as well as the possible treatment options  We also discussed the rationale for positive airway pressure therapy  Board Certified Sleep Specialist    Portions of the record may have been created with voice recognition software  Occasional wrong word or "sound a like" substitutions may have occurred due to the inherent limitations of voice recognition software  Read the chart carefully and recognize, using context, where substitutions have occurred

## 2020-02-13 NOTE — PROGRESS NOTES
Review of Systems      Genitourinary need to urinate more than twice a night and hot flashes at night   Cardiology ankle/leg swelling   Gastrointestinal frequent heartburn/acid reflux   Neurology frequent headaches, awaken with headache and balance problems   Constitutional claustrophobia, fatigue and weight change   Integumentary rash or dry skin and itching   Psychiatry anxiety and depression   Musculoskeletal joint pain, muscle aches, back pain and leg cramps   Pulmonary shortness of breath with activity, wheezing and difficulty breathing when lying flat    ENT throat clearing   Endocrine excessive thirst and frequent urination   Hematological none

## 2020-02-29 ENCOUNTER — APPOINTMENT (OUTPATIENT)
Dept: LAB | Facility: HOSPITAL | Age: 60
End: 2020-02-29
Attending: INTERNAL MEDICINE
Payer: COMMERCIAL

## 2020-02-29 DIAGNOSIS — I05.0 MITRAL VALVE STENOSIS, UNSPECIFIED ETIOLOGY: ICD-10-CM

## 2020-02-29 LAB
ANION GAP SERPL CALCULATED.3IONS-SCNC: 4 MMOL/L (ref 4–13)
BASOPHILS # BLD AUTO: 0.08 THOUSANDS/ΜL (ref 0–0.1)
BASOPHILS NFR BLD AUTO: 1 % (ref 0–1)
BUN SERPL-MCNC: 12 MG/DL (ref 5–25)
CALCIUM SERPL-MCNC: 9.4 MG/DL (ref 8.3–10.1)
CHLORIDE SERPL-SCNC: 105 MMOL/L (ref 100–108)
CO2 SERPL-SCNC: 31 MMOL/L (ref 21–32)
CREAT SERPL-MCNC: 0.87 MG/DL (ref 0.6–1.3)
EOSINOPHIL # BLD AUTO: 0.16 THOUSAND/ΜL (ref 0–0.61)
EOSINOPHIL NFR BLD AUTO: 2 % (ref 0–6)
ERYTHROCYTE [DISTWIDTH] IN BLOOD BY AUTOMATED COUNT: 15.3 % (ref 11.6–15.1)
GFR SERPL CREATININE-BSD FRML MDRD: 72 ML/MIN/1.73SQ M
GLUCOSE P FAST SERPL-MCNC: 126 MG/DL (ref 65–99)
HCT VFR BLD AUTO: 39.2 % (ref 34.8–46.1)
HGB BLD-MCNC: 12.3 G/DL (ref 11.5–15.4)
IMM GRANULOCYTES # BLD AUTO: 0.01 THOUSAND/UL (ref 0–0.2)
IMM GRANULOCYTES NFR BLD AUTO: 0 % (ref 0–2)
LYMPHOCYTES # BLD AUTO: 1.64 THOUSANDS/ΜL (ref 0.6–4.47)
LYMPHOCYTES NFR BLD AUTO: 25 % (ref 14–44)
MCH RBC QN AUTO: 24.7 PG (ref 26.8–34.3)
MCHC RBC AUTO-ENTMCNC: 31.4 G/DL (ref 31.4–37.4)
MCV RBC AUTO: 79 FL (ref 82–98)
MONOCYTES # BLD AUTO: 0.39 THOUSAND/ΜL (ref 0.17–1.22)
MONOCYTES NFR BLD AUTO: 6 % (ref 4–12)
NEUTROPHILS # BLD AUTO: 4.32 THOUSANDS/ΜL (ref 1.85–7.62)
NEUTS SEG NFR BLD AUTO: 66 % (ref 43–75)
NRBC BLD AUTO-RTO: 0 /100 WBCS
PLATELET # BLD AUTO: 280 THOUSANDS/UL (ref 149–390)
PMV BLD AUTO: 10.7 FL (ref 8.9–12.7)
POTASSIUM SERPL-SCNC: 3.9 MMOL/L (ref 3.5–5.3)
RBC # BLD AUTO: 4.97 MILLION/UL (ref 3.81–5.12)
SODIUM SERPL-SCNC: 140 MMOL/L (ref 136–145)
WBC # BLD AUTO: 6.6 THOUSAND/UL (ref 4.31–10.16)

## 2020-02-29 PROCEDURE — 80048 BASIC METABOLIC PNL TOTAL CA: CPT

## 2020-02-29 PROCEDURE — 85025 COMPLETE CBC W/AUTO DIFF WBC: CPT | Performed by: STUDENT IN AN ORGANIZED HEALTH CARE EDUCATION/TRAINING PROGRAM

## 2020-03-02 ENCOUNTER — ANTICOAG VISIT (OUTPATIENT)
Dept: CARDIOLOGY CLINIC | Facility: CLINIC | Age: 60
End: 2020-03-02

## 2020-03-02 ENCOUNTER — TELEPHONE (OUTPATIENT)
Dept: SLEEP CENTER | Facility: CLINIC | Age: 60
End: 2020-03-02

## 2020-03-02 DIAGNOSIS — Z95.2 HISTORY OF PROSTHETIC HEART VALVE: ICD-10-CM

## 2020-03-03 ENCOUNTER — HOSPITAL ENCOUNTER (OUTPATIENT)
Dept: SLEEP CENTER | Facility: CLINIC | Age: 60
Discharge: HOME | End: 2020-03-03
Payer: COMMERCIAL

## 2020-03-03 DIAGNOSIS — G47.19 EXCESSIVE DAYTIME SLEEPINESS: ICD-10-CM

## 2020-03-03 PROCEDURE — 95811 POLYSOM 6/>YRS CPAP 4/> PARM: CPT

## 2020-03-04 NOTE — PROGRESS NOTES
Sleep Study Documentation    Pre-Sleep Study       Sleep testing procedure explained to patient:YES    Patient napped prior to study:NO    Caffeine:Dayshift worker after 12PM   Caffeine use:YES- coffee  6 ounces    Alcohol:Dayshift workers after 5PM: Alcohol use:NO    Typical day for patient:NO       Study Documentation    Sleep Study Indications: insomnia, obesity, fatigue    Sleep Study: Treatment   Optimal PAP pressure: 8  Leak:Small  Snore:Eliminated  REM Obtained:yes  Supplemental O2: no    Minimum SaO2 90  Baseline SaO2 96  PAP mask tried (list all)res med ar fit f 20 medium, rebolledo and paykel eson 2 small  PAP mask choice (final)rebolledo and paykel osvaldo 2 small  PAP mask type:nasal  PAP pressure at which snoring was eliminated 5  Minimum SaO2 at final PAP pressure 90  Mode of Therapy:CPAP  ETCO2:No  CPAP changed to BiPAP:No    Mode of Therapy:CPAP    EKG abnormalities: no     EEG abnormalities: no    Sleep Study Recorded < 2 hours: N/A    Sleep Study Recorded > 2 hours but incomplete study: N/A    Sleep Study Recorded 6 hours but no sleep obtained: NO    Patient classification: unemployed       Post-Sleep Study    Medication used at bedtime or during sleep study:YES other prescription medications    Patient reports time it took to fall asleep:greater than 60 minutes    Patient reports waking up during study:3 or more times  Patient reports returning to sleep without difficulty  Patient reports sleeping 2 to 4 hours with dreaming  Patient reports sleep during study:better than usual    Patient rated sleepiness: Very sleepy or tired    PAP treatment:yes: Post PAP treatment patient reports feeling unchanged and would wear PAP mask at home

## 2020-03-07 DIAGNOSIS — G47.33 OSA (OBSTRUCTIVE SLEEP APNEA): Primary | ICD-10-CM

## 2020-03-10 ENCOUNTER — OFFICE VISIT (OUTPATIENT)
Dept: NEUROLOGY | Facility: CLINIC | Age: 60
End: 2020-03-10
Payer: COMMERCIAL

## 2020-03-10 ENCOUNTER — TELEPHONE (OUTPATIENT)
Dept: NEUROLOGY | Facility: CLINIC | Age: 60
End: 2020-03-10

## 2020-03-10 ENCOUNTER — TELEPHONE (OUTPATIENT)
Dept: SLEEP CENTER | Facility: CLINIC | Age: 60
End: 2020-03-10

## 2020-03-10 VITALS
HEIGHT: 59 IN | DIASTOLIC BLOOD PRESSURE: 66 MMHG | SYSTOLIC BLOOD PRESSURE: 129 MMHG | BODY MASS INDEX: 44.96 KG/M2 | WEIGHT: 223 LBS | HEART RATE: 63 BPM

## 2020-03-10 DIAGNOSIS — F41.8 DEPRESSION WITH ANXIETY: ICD-10-CM

## 2020-03-10 DIAGNOSIS — G40.019 LOCALIZATION-RELATED EPILEPSY, INTRACTABLE (HCC): Primary | ICD-10-CM

## 2020-03-10 DIAGNOSIS — F41.0 PANIC REACTION: ICD-10-CM

## 2020-03-10 DIAGNOSIS — R40.4 ALTERED AWARENESS, TRANSIENT: ICD-10-CM

## 2020-03-10 PROCEDURE — 3008F BODY MASS INDEX DOCD: CPT | Performed by: PSYCHIATRY & NEUROLOGY

## 2020-03-10 PROCEDURE — 99214 OFFICE O/P EST MOD 30 MIN: CPT | Performed by: PSYCHIATRY & NEUROLOGY

## 2020-03-10 PROCEDURE — 1036F TOBACCO NON-USER: CPT | Performed by: PSYCHIATRY & NEUROLOGY

## 2020-03-10 RX ORDER — LEVETIRACETAM 500 MG
500 TABLET ORAL 3 TIMES DAILY
Qty: 270 TABLET | Refills: 3 | Status: SHIPPED | OUTPATIENT
Start: 2020-03-10 | End: 2021-03-11

## 2020-03-10 RX ORDER — TRAMADOL HYDROCHLORIDE 50 MG/1
50 TABLET ORAL EVERY 6 HOURS PRN
COMMUNITY
End: 2020-04-06

## 2020-03-10 RX ORDER — PIOGLITAZONEHYDROCHLORIDE 15 MG/1
15 TABLET ORAL DAILY
COMMUNITY
End: 2020-04-06 | Stop reason: SDUPTHER

## 2020-03-10 RX ORDER — LISINOPRIL 2.5 MG/1
2.5 TABLET ORAL DAILY
COMMUNITY
End: 2021-05-03 | Stop reason: SDUPTHER

## 2020-03-10 RX ORDER — EZETIMIBE 10 MG/1
10 TABLET ORAL DAILY
COMMUNITY
End: 2020-04-06
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
## 2020-03-10 NOTE — PATIENT INSTRUCTIONS
Plan:   1 - continue with Keppra Brand Name 500mg three times a day  2 - check routine EEG study  3 - plan for 48 hours ambulatory EEG study to determine if she has subclinical seizures  4 - Levetiracetam level  5 - keep a calendar of the frequency of your staring spells (which I'm concerned about being focal unaware type of seizures)  5 - follow-up with Advanced Practitioner in 6 months and Dr Sherif Devlin in 1 year  If she continues to have these focal unaware seizures, then will start Vimpat 50mg twice a day  I reviewed side effects of Vimpat, which include, but not limited to, mood swings, irritability, suicidal ideation, medication interactions, ataxia, incoordination, cognitive impairment, GI upset, nausea, vomiting, abnormal cardiac conduction and double vision

## 2020-03-10 NOTE — PROGRESS NOTES
Review of Systems    {LimROS-complete:96752}      Review of Systems   Constitutional: Negative  Negative for appetite change and fever  HENT: Negative  Negative for hearing loss, tinnitus, trouble swallowing and voice change  Eyes: Negative  Negative for photophobia and pain  Respiratory: Negative  Negative for shortness of breath  Cardiovascular: Negative  Negative for palpitations  Gastrointestinal: Negative  Negative for nausea and vomiting  Endocrine: Negative  Negative for cold intolerance and heat intolerance  Genitourinary: Negative  Negative for dysuria, frequency and urgency  Musculoskeletal: Negative  Negative for myalgias and neck pain  Skin: Negative  Negative for rash  Neurological: Negative  Negative for dizziness, tremors, seizures, syncope, facial asymmetry, speech difficulty, weakness, light-headedness, numbness and headaches  Hematological: Negative  Does not bruise/bleed easily  Psychiatric/Behavioral: Negative  Negative for confusion, hallucinations and sleep disturbance

## 2020-03-11 NOTE — TELEPHONE ENCOUNTER
Patient called back and scheduled DME set up and compliance appointment      Appointment information emailed to [place]

## 2020-03-24 ENCOUNTER — TELEPHONE (OUTPATIENT)
Dept: SLEEP CENTER | Facility: CLINIC | Age: 60
End: 2020-03-24

## 2020-03-24 NOTE — TELEPHONE ENCOUNTER
Patient had her diagnostic study done 2 years ago, we must have a diagnostic within the year to move forward  Informed the patient and Dr Stubbs

## 2020-03-25 DIAGNOSIS — G47.33 OSA (OBSTRUCTIVE SLEEP APNEA): Primary | ICD-10-CM

## 2020-03-30 ENCOUNTER — TELEPHONE (OUTPATIENT)
Dept: FAMILY MEDICINE CLINIC | Facility: CLINIC | Age: 60
End: 2020-03-30

## 2020-03-30 DIAGNOSIS — E11.9 TYPE 2 DIABETES MELLITUS WITHOUT COMPLICATION, WITHOUT LONG-TERM CURRENT USE OF INSULIN (HCC): ICD-10-CM

## 2020-03-30 RX ORDER — LANCETS
EACH MISCELLANEOUS
Qty: 100 EACH | Refills: 5 | Status: SHIPPED | OUTPATIENT
Start: 2020-03-30 | End: 2020-11-02 | Stop reason: SDUPTHER

## 2020-03-30 RX ORDER — BLOOD-GLUCOSE METER
EACH MISCELLANEOUS 2 TIMES DAILY
Qty: 1 KIT | Refills: 0 | Status: SHIPPED | OUTPATIENT
Start: 2020-03-30

## 2020-04-01 ENCOUNTER — NURSE TRIAGE (OUTPATIENT)
Dept: OTHER | Facility: OTHER | Age: 60
End: 2020-04-01

## 2020-04-06 ENCOUNTER — TELEMEDICINE (OUTPATIENT)
Dept: FAMILY MEDICINE CLINIC | Facility: CLINIC | Age: 60
End: 2020-04-06

## 2020-04-06 VITALS — SYSTOLIC BLOOD PRESSURE: 142 MMHG | HEART RATE: 64 BPM | DIASTOLIC BLOOD PRESSURE: 88 MMHG

## 2020-04-06 DIAGNOSIS — R05.9 COUGH: ICD-10-CM

## 2020-04-06 DIAGNOSIS — K21.9 GASTROESOPHAGEAL REFLUX DISEASE, ESOPHAGITIS PRESENCE NOT SPECIFIED: ICD-10-CM

## 2020-04-06 DIAGNOSIS — E08.610 CONTROLLED DIABETES MELLITUS DUE TO UNDERLYING CONDITION WITH DIABETIC NEUROPATHIC ARTHROPATHY, WITHOUT LONG-TERM CURRENT USE OF INSULIN (HCC): ICD-10-CM

## 2020-04-06 DIAGNOSIS — L50.0 ALLERGIC URTICARIA: ICD-10-CM

## 2020-04-06 DIAGNOSIS — H10.13 ALLERGIC CONJUNCTIVITIS AND RHINITIS, BILATERAL: Primary | ICD-10-CM

## 2020-04-06 DIAGNOSIS — I08.0 MITRAL AND AORTIC VALVE DISEASE: ICD-10-CM

## 2020-04-06 DIAGNOSIS — J45.30 ASTHMA IN ADULT, MILD PERSISTENT, UNCOMPLICATED: ICD-10-CM

## 2020-04-06 DIAGNOSIS — J30.9 ALLERGIC RHINITIS, UNSPECIFIED SEASONALITY, UNSPECIFIED TRIGGER: ICD-10-CM

## 2020-04-06 DIAGNOSIS — J30.9 ALLERGIC CONJUNCTIVITIS AND RHINITIS, BILATERAL: Primary | ICD-10-CM

## 2020-04-06 PROCEDURE — 1036F TOBACCO NON-USER: CPT | Performed by: FAMILY MEDICINE

## 2020-04-06 PROCEDURE — 99214 OFFICE O/P EST MOD 30 MIN: CPT | Performed by: FAMILY MEDICINE

## 2020-04-06 RX ORDER — OMEPRAZOLE 40 MG/1
40 CAPSULE, DELAYED RELEASE ORAL DAILY
Qty: 90 CAPSULE | Refills: 4 | Status: SHIPPED | OUTPATIENT
Start: 2020-04-06 | End: 2021-04-23

## 2020-04-06 RX ORDER — HYDROXYZINE HYDROCHLORIDE 25 MG/1
25 TABLET, FILM COATED ORAL EVERY 6 HOURS PRN
Qty: 60 TABLET | Refills: 3 | Status: SHIPPED | OUTPATIENT
Start: 2020-04-06 | End: 2020-08-28

## 2020-04-06 RX ORDER — FLUTICASONE PROPIONATE 50 MCG
1 SPRAY, SUSPENSION (ML) NASAL DAILY
Qty: 16 G | Refills: 1 | Status: SHIPPED | OUTPATIENT
Start: 2020-04-06 | End: 2021-09-23 | Stop reason: SDUPTHER

## 2020-04-06 RX ORDER — KETOTIFEN FUMARATE 0.35 MG/ML
1 SOLUTION/ DROPS OPHTHALMIC 2 TIMES DAILY
Qty: 5 ML | Refills: 3 | Status: SHIPPED | OUTPATIENT
Start: 2020-04-06 | End: 2021-08-16 | Stop reason: ALTCHOICE

## 2020-04-06 RX ORDER — WARFARIN SODIUM 2.5 MG/1
2.5 TABLET ORAL
Qty: 90 TABLET | Refills: 0 | Status: SHIPPED | OUTPATIENT
Start: 2020-04-06 | End: 2021-10-05

## 2020-04-06 RX ORDER — FLUTICASONE PROPIONATE 110 UG/1
2 AEROSOL, METERED RESPIRATORY (INHALATION) 2 TIMES DAILY
Qty: 1 INHALER | Refills: 6 | Status: SHIPPED | OUTPATIENT
Start: 2020-04-06 | End: 2020-06-10

## 2020-04-06 RX ORDER — CETIRIZINE HYDROCHLORIDE 10 MG/1
10 TABLET ORAL DAILY
Qty: 90 TABLET | Refills: 3 | Status: SHIPPED | OUTPATIENT
Start: 2020-04-06 | End: 2021-04-23

## 2020-04-06 RX ORDER — PIOGLITAZONEHYDROCHLORIDE 15 MG/1
15 TABLET ORAL DAILY
Qty: 90 TABLET | Refills: 1 | Status: SHIPPED | OUTPATIENT
Start: 2020-04-06 | End: 2020-10-23

## 2020-04-06 RX ORDER — TORSEMIDE 20 MG/1
20 TABLET ORAL AS NEEDED
Qty: 90 TABLET | Refills: 1 | Status: SHIPPED | OUTPATIENT
Start: 2020-04-06 | End: 2021-03-15

## 2020-05-01 ENCOUNTER — APPOINTMENT (OUTPATIENT)
Dept: LAB | Facility: HOSPITAL | Age: 60
End: 2020-05-01
Attending: INTERNAL MEDICINE
Payer: COMMERCIAL

## 2020-05-01 ENCOUNTER — ANTICOAG VISIT (OUTPATIENT)
Dept: CARDIOLOGY CLINIC | Facility: CLINIC | Age: 60
End: 2020-05-01

## 2020-05-01 DIAGNOSIS — Z95.2 HISTORY OF PROSTHETIC HEART VALVE: ICD-10-CM

## 2020-05-01 DIAGNOSIS — G40.019 LOCALIZATION-RELATED EPILEPSY, INTRACTABLE (HCC): ICD-10-CM

## 2020-05-01 PROCEDURE — 80177 DRUG SCRN QUAN LEVETIRACETAM: CPT

## 2020-05-03 LAB — LEVETIRACETAM SERPL-MCNC: 27.4 UG/ML (ref 10–40)

## 2020-05-04 ENCOUNTER — TELEPHONE (OUTPATIENT)
Dept: NEUROLOGY | Facility: CLINIC | Age: 60
End: 2020-05-04

## 2020-05-12 DIAGNOSIS — E11.9 TYPE 2 DIABETES MELLITUS WITHOUT COMPLICATION, WITHOUT LONG-TERM CURRENT USE OF INSULIN (HCC): ICD-10-CM

## 2020-05-15 ENCOUNTER — ANTICOAG VISIT (OUTPATIENT)
Dept: CARDIOLOGY CLINIC | Facility: CLINIC | Age: 60
End: 2020-05-15

## 2020-05-15 ENCOUNTER — APPOINTMENT (OUTPATIENT)
Dept: LAB | Facility: HOSPITAL | Age: 60
End: 2020-05-15
Attending: INTERNAL MEDICINE
Payer: COMMERCIAL

## 2020-05-15 DIAGNOSIS — Z95.2 HISTORY OF PROSTHETIC HEART VALVE: ICD-10-CM

## 2020-05-22 ENCOUNTER — TELEPHONE (OUTPATIENT)
Dept: NEUROLOGY | Facility: CLINIC | Age: 60
End: 2020-05-22

## 2020-05-22 ENCOUNTER — HOSPITAL ENCOUNTER (OUTPATIENT)
Dept: NEUROLOGY | Facility: CLINIC | Age: 60
Discharge: HOME | End: 2020-05-22
Payer: COMMERCIAL

## 2020-05-22 DIAGNOSIS — G40.019 LOCALIZATION-RELATED EPILEPSY, INTRACTABLE (HCC): ICD-10-CM

## 2020-05-22 PROCEDURE — 95816 EEG AWAKE AND DROWSY: CPT | Performed by: PSYCHIATRY & NEUROLOGY

## 2020-05-22 PROCEDURE — 95816 EEG AWAKE AND DROWSY: CPT

## 2020-06-10 ENCOUNTER — TELEMEDICINE (OUTPATIENT)
Dept: CARDIOLOGY CLINIC | Facility: CLINIC | Age: 60
End: 2020-06-10
Payer: COMMERCIAL

## 2020-06-10 DIAGNOSIS — E78.5 DYSLIPIDEMIA: Primary | ICD-10-CM

## 2020-06-10 DIAGNOSIS — Z95.2 H/O MITRAL VALVE REPLACEMENT WITH MECHANICAL VALVE: ICD-10-CM

## 2020-06-10 DIAGNOSIS — I15.9 SECONDARY HYPERTENSION: ICD-10-CM

## 2020-06-10 PROCEDURE — 99215 OFFICE O/P EST HI 40 MIN: CPT | Performed by: STUDENT IN AN ORGANIZED HEALTH CARE EDUCATION/TRAINING PROGRAM

## 2020-06-10 RX ORDER — EZETIMIBE 10 MG/1
5 TABLET ORAL DAILY
COMMUNITY
End: 2020-06-10 | Stop reason: ALTCHOICE

## 2020-06-10 RX ORDER — LOVASTATIN 20 MG/1
20 TABLET ORAL
Qty: 30 TABLET | Refills: 0 | Status: SHIPPED | OUTPATIENT
Start: 2020-06-10 | End: 2020-07-09 | Stop reason: ALTCHOICE

## 2020-06-27 ENCOUNTER — APPOINTMENT (OUTPATIENT)
Dept: LAB | Facility: HOSPITAL | Age: 60
End: 2020-06-27
Attending: INTERNAL MEDICINE
Payer: COMMERCIAL

## 2020-06-27 DIAGNOSIS — R53.83 FATIGUE, UNSPECIFIED TYPE: ICD-10-CM

## 2020-06-27 DIAGNOSIS — E78.5 DYSLIPIDEMIA: ICD-10-CM

## 2020-06-27 LAB
CHOLEST SERPL-MCNC: 194 MG/DL (ref 50–200)
CORTIS AM PEAK SERPL-MCNC: 4.3 UG/DL (ref 4.2–22.4)
EST. AVERAGE GLUCOSE BLD GHB EST-MCNC: 146 MG/DL
HBA1C MFR BLD: 6.7 %
HDLC SERPL-MCNC: 8 MG/DL
LDLC SERPL CALC-MCNC: 161 MG/DL (ref 0–100)
TRIGL SERPL-MCNC: 123 MG/DL

## 2020-06-27 PROCEDURE — 83036 HEMOGLOBIN GLYCOSYLATED A1C: CPT | Performed by: STUDENT IN AN ORGANIZED HEALTH CARE EDUCATION/TRAINING PROGRAM

## 2020-06-27 PROCEDURE — 3044F HG A1C LEVEL LT 7.0%: CPT | Performed by: INTERNAL MEDICINE

## 2020-06-27 PROCEDURE — 80061 LIPID PANEL: CPT

## 2020-06-27 PROCEDURE — 3044F HG A1C LEVEL LT 7.0%: CPT | Performed by: FAMILY MEDICINE

## 2020-06-27 PROCEDURE — 82533 TOTAL CORTISOL: CPT

## 2020-06-29 ENCOUNTER — ANTICOAG VISIT (OUTPATIENT)
Dept: CARDIOLOGY CLINIC | Facility: CLINIC | Age: 60
End: 2020-06-29

## 2020-06-29 ENCOUNTER — HOSPITAL ENCOUNTER (OUTPATIENT)
Dept: NEUROLOGY | Facility: CLINIC | Age: 60
Discharge: HOME | End: 2020-06-29

## 2020-06-29 DIAGNOSIS — Z95.2 HISTORY OF PROSTHETIC HEART VALVE: ICD-10-CM

## 2020-06-29 DIAGNOSIS — R40.4 ALTERED AWARENESS, TRANSIENT: ICD-10-CM

## 2020-06-29 DIAGNOSIS — G40.019 LOCALIZATION-RELATED EPILEPSY, INTRACTABLE (HCC): ICD-10-CM

## 2020-06-30 ENCOUNTER — HOSPITAL ENCOUNTER (OUTPATIENT)
Dept: NEUROLOGY | Facility: CLINIC | Age: 60
Discharge: HOME | End: 2020-06-30
Payer: COMMERCIAL

## 2020-06-30 DIAGNOSIS — R40.4 ALTERED AWARENESS, TRANSIENT: ICD-10-CM

## 2020-06-30 DIAGNOSIS — G40.019 LOCALIZATION-RELATED EPILEPSY, INTRACTABLE (HCC): ICD-10-CM

## 2020-06-30 PROCEDURE — 95708 EEG WO VID EA 12-26HR UNMNTR: CPT

## 2020-06-30 NOTE — RESULT NOTES
Physician having long conversation with parents.    Verified Results  (1) PT WITH INR [id number] 10:29AM Tiffany Tran   REPORT COMMENT:  FASTING:NO     Test Name Result Flag Reference   INR 2 7 H    Reference Range                     0 9-1 1  Moderate-intensity Warfarin Therapy 2 0-3 0  Higher-intensity Warfarin Therapy   3 0-4 0   PT 27 4 sec H 9 0-11 5   For more information on this test, go to:  [email or web address]

## 2020-07-01 ENCOUNTER — HOSPITAL ENCOUNTER (OUTPATIENT)
Dept: NEUROLOGY | Facility: CLINIC | Age: 60
Discharge: HOME | End: 2020-07-01
Payer: COMMERCIAL

## 2020-07-01 DIAGNOSIS — R40.4 ALTERED AWARENESS, TRANSIENT: ICD-10-CM

## 2020-07-01 DIAGNOSIS — G40.019 LOCALIZATION-RELATED EPILEPSY, INTRACTABLE (HCC): ICD-10-CM

## 2020-07-01 PROCEDURE — 95708 EEG WO VID EA 12-26HR UNMNTR: CPT

## 2020-07-01 PROCEDURE — 95719 EEG PHYS/QHP EA INCR W/O VID: CPT | Performed by: PSYCHIATRY & NEUROLOGY

## 2020-07-06 ENCOUNTER — HOSPITAL ENCOUNTER (OUTPATIENT)
Dept: SLEEP CENTER | Facility: CLINIC | Age: 60
Discharge: HOME | End: 2020-07-06
Payer: COMMERCIAL

## 2020-07-06 DIAGNOSIS — G47.33 OSA (OBSTRUCTIVE SLEEP APNEA): ICD-10-CM

## 2020-07-06 PROCEDURE — 95719 EEG PHYS/QHP EA INCR W/O VID: CPT | Performed by: PSYCHIATRY & NEUROLOGY

## 2020-07-06 PROCEDURE — 95810 POLYSOM 6/> YRS 4/> PARAM: CPT

## 2020-07-07 NOTE — PROGRESS NOTES
Sleep Study Documentation    Pre-Sleep Study       Sleep testing procedure explained to patient:YES    Patient napped prior to study:NO    Caffeine:Dayshift worker after 12PM   Caffeine use:YES- coffee  6 ounces    Alcohol:Dayshift workers after 5PM: Alcohol use:NO    Typical day for patient:YES       Study Documentation    Sleep Study Indications: Snoring, Excessive Daytime Sleepiness    Sleep Study: Diagnostic   Snore: Moderate  Supplemental O2: no    O2 flow rate (L/min) range   O2 flow rate (L/min) final   Minimum SaO2 85%  Baseline SaO2 99%    EKG abnormalities: yes:  EPOCH example and comments: PVC's  Epochs 814, 833, 878, 879    EEG abnormalities: no    Sleep Study Recorded < 2 hours: N/A    Sleep Study Recorded > 2 hours but incomplete study: N/A    Sleep Study Recorded 6 hours but no sleep obtained: NO    Patient classification: retired       Post-Sleep Study    Medication used at bedtime or during sleep study:YES other prescription medications    Patient reports time it took to fall asleep:30 to 60 minutes    Patient reports waking up during study:1 to 2 times  Patient reports returning to sleep in greater than 30 minutes  Patient reports sleeping 2 to 4 hours without dreaming  Patient reports sleep during study:typical    Patient rated sleepiness: Somewhat sleepy or tired    PAP treatment:no

## 2020-07-08 ENCOUNTER — TELEPHONE (OUTPATIENT)
Dept: NEUROLOGY | Facility: CLINIC | Age: 60
End: 2020-07-08

## 2020-07-08 NOTE — TELEPHONE ENCOUNTER
----- Message from Kira Dawkins MD sent at 7/8/2020  2:34 PM EDT -----  May let the patient know that her 48 hours ambulatory EEG study is normal   There was no report of "staring spell" which was the reason for the study  No subclinical seizure during this period of time

## 2020-07-09 ENCOUNTER — OFFICE VISIT (OUTPATIENT)
Dept: FAMILY MEDICINE CLINIC | Facility: CLINIC | Age: 60
End: 2020-07-09

## 2020-07-09 VITALS
HEART RATE: 78 BPM | HEIGHT: 59 IN | WEIGHT: 233.8 LBS | BODY MASS INDEX: 47.13 KG/M2 | TEMPERATURE: 97.9 F | RESPIRATION RATE: 16 BRPM | SYSTOLIC BLOOD PRESSURE: 116 MMHG | DIASTOLIC BLOOD PRESSURE: 60 MMHG

## 2020-07-09 DIAGNOSIS — E08.610 CONTROLLED DIABETES MELLITUS DUE TO UNDERLYING CONDITION WITH DIABETIC NEUROPATHIC ARTHROPATHY, WITHOUT LONG-TERM CURRENT USE OF INSULIN (HCC): ICD-10-CM

## 2020-07-09 DIAGNOSIS — Z11.4 SCREENING FOR HIV (HUMAN IMMUNODEFICIENCY VIRUS): ICD-10-CM

## 2020-07-09 DIAGNOSIS — M25.571 ACUTE RIGHT ANKLE PAIN: ICD-10-CM

## 2020-07-09 DIAGNOSIS — E78.5 DYSLIPIDEMIA: Primary | ICD-10-CM

## 2020-07-09 DIAGNOSIS — Z12.39 SCREENING FOR BREAST CANCER: ICD-10-CM

## 2020-07-09 PROCEDURE — 99214 OFFICE O/P EST MOD 30 MIN: CPT | Performed by: FAMILY MEDICINE

## 2020-07-09 PROCEDURE — 1036F TOBACCO NON-USER: CPT | Performed by: FAMILY MEDICINE

## 2020-07-09 RX ORDER — SIMVASTATIN 10 MG
10 TABLET ORAL
Qty: 90 TABLET | Refills: 3 | Status: SHIPPED | OUTPATIENT
Start: 2020-07-09 | End: 2021-05-03 | Stop reason: SINTOL

## 2020-07-09 NOTE — PROGRESS NOTES
Tiffany Mylesrodrigue 1960 female MRN: [id number]    Family Medicine Follow-up Visit    ASSESSMENT/PLAN  Acute right ankle pain  Within the past 3 weeks, pain at anterior, lateral ligament to lateral malleolus  Check xray to rule out fracture  Controlled diabetes mellitus due to underlying condition with diabetic neuropathic arthropathy (HCC)    Lab Results   Component Value Date    HGBA1C 6 7 (H) 06/27/2020   Well controlled, continue current regimen, encourage patient to continue with lifestyle modification  Dyslipidemia  Reviewed recent labs, given heart disease, diabetes, encourage patient to try another statin  Patient said she tried many statins in the past   Advice to try low dose simvastatin, and patient agreed  Future Appointments   Date Time Provider Izabel Villalobos   7/21/2020  1:00 PM BE [facility] ECHO 1 BE [facility] Car [address]   9/14/2020 12:45 PM JOSE Duncan NEURO [facility]   10/12/2020  9:30 AM Abigail King MD [facility]          SUBJECTIVE  CC: Follow-up and Ankle Swelling (right leg X2-3 weeks )      HPI:  Pt is c/o ankle pain ongoing for the past 2-3 weeks  She noticed swelling and pain with walking  She states that the pain is rated at a 2/10 at rest and increases to 8/10 with walking (2 blocks)  The pain worsens when she stands on her ankle and as her day progresses  Symptoms improve when she elevates her ankle  She also endorses taking Tylenol every 6 hours as needed with minimal relief  She denies ankle trauma and fall  Pt also states that she has an itchy throat which is relieved with a halls cough drop  She states that she was given medication by her cardiologist which gave her headaches so she discontinued it She was also on a diabetes medication given to her by an endocrinologist however she began developing lower extremity cramping so she discontinued it as well  Review of Systems   Constitutional: Positive for fatigue   Negative for activity change, appetite change and fever  HENT: Negative for ear pain, postnasal drip and sore throat  Eyes: Negative for photophobia and visual disturbance  Respiratory: Negative for cough, chest tightness, shortness of breath and wheezing  Cardiovascular: Negative for chest pain and palpitations  Gastrointestinal: Negative for abdominal pain, constipation and diarrhea  Musculoskeletal: Positive for arthralgias and joint swelling  Negative for back pain  Skin: Negative for rash  Neurological: Negative for light-headedness and headaches  Hematological: Negative for adenopathy  Psychiatric/Behavioral: Negative for agitation         Historical Information   The patient history was reviewed as follows:    Past Medical History:   Diagnosis Date    Abnormal ultrasound of pelvis     last assessed - 26Mar2015    Asthma     Cerebral infarction [facility])     unspecified; last assessed - 30Aug2017    Dizziness     last assessed - 25Aug2015    Dysuria     last assessed - 12Aug2016    Exposure to potentially hazardous body fluids     last assessed - [id number]    Heart disease     Hematoma     of arm; last assessed - 20Jun2016    Mitral valve stenosis     Personal history of scoliosis     Pneumonia     Scar, hypertrophic     last assessed - [id number]    [facility] [address] [facility]     Type 2 diabetes mellitus ([facility] [address] [facility]     Vision problems      Past Surgical History:   Procedure Laterality Date    MITRAL VALVE REPLACEMENT  06/17/2015    MVR with 27 mm [employer] Optiform mechanical prostethic;  last assessed - [id number]    TONSILLECTOMY      TRANSLUMINAL ANGIOPLASTY      mitral valve; last assessed - 01Aug2014    TUBAL LIGATION  1986    last assessed - 01Aug2014     Family History   Problem Relation Age of Onset    Cancer Mother     Dementia Mother     Diabetes Mother     Hypertension Mother     Hyperlipidemia Mother     Cervical cancer Mother     Heart disease Father     Coronary artery disease Father     Hypertension Father     Heart attack Father     Diabetes Sister     Breast cancer Sister     Diabetes Brother     Hypertension Brother     Hyperlipidemia Brother     Diabetes Maternal Grandmother     Breast cancer Maternal Grandmother     Diabetes Other     Seizures Son     Psoriasis Son       Social History   Social History     Substance and Sexual Activity   Alcohol Use No     Social History     Substance and Sexual Activity   Drug Use No     Social History     Tobacco Use   Smoking Status Never Smoker   Smokeless Tobacco Never Used       Medications:     Current Outpatient Medications:     Accu-Chek FastClix Lancets MISC, USE AS INSTRUCTED, Disp: 100 each, Rfl: 5    acetaminophen (TYLENOL) 500 mg tablet, Take 2 tablets by mouth daily as needed for pain, Disp: 180 tablet, Rfl: 3    Blood Glucose Monitoring Suppl (ACCU-CHEK EDILMA PLUS) w/Device KIT, by Does not apply route 2 (two) times a day, Disp: 1 kit, Rfl: 0    cetirizine (ZyrTEC) 10 mg tablet, Take 1 tablet (10 mg total) by mouth daily, Disp: 90 tablet, Rfl: 3    fluticasone (FLONASE) 50 mcg/act nasal spray, 1 spray into each nostril daily, Disp: 16 g, Rfl: 1    glucose blood (ACCU-CHEK SMARTVIEW) test strip, USE DAILY AS INSTRUCTED, Disp: 100 each, Rfl: 11    hydrOXYzine HCL (ATARAX) 25 mg tablet, Take 1 tablet (25 mg total) by mouth every 6 (six) hours as needed for itching, Disp: 60 tablet, Rfl: 3    KEPPRA 500 MG tablet, Take 1 tablet (500 mg total) by mouth 3 (three) times a day, Disp: 270 tablet, Rfl: 3    ketotifen (ZADITOR) 0 025 % ophthalmic solution, Administer 1 drop to both eyes 2 (two) times a day, Disp: 5 mL, Rfl: 3    lisinopril (ZESTRIL) 2 5 mg tablet, Take 2 5 mg by mouth daily, Disp: , Rfl:     metFORMIN (GLUCOPHAGE) 500 mg tablet, TAKE ONE TABLET BY MOUTH THREE TIMES A DAY (Patient taking differently: Take 500 mg by mouth daily with breakfast TAKE ONE TABLET BY MOUTH THREE TIMES A DAY), Disp: 270 tablet, Rfl: 0    metoprolol tartrate (LOPRESSOR) 25 mg tablet, Take 0 5 tablets (12 5 mg total) by mouth every 12 (twelve) hours, Disp: 90 tablet, Rfl: 3    omeprazole (PriLOSEC) 40 MG capsule, Take 1 capsule (40 mg total) by mouth daily, Disp: 90 capsule, Rfl: 4    pioglitazone (ACTOS) 15 mg tablet, Take 1 tablet (15 mg total) by mouth daily, Disp: 90 tablet, Rfl: 1    torsemide (DEMADEX) 20 mg tablet, Take 1 tablet (20 mg total) by mouth as needed (pt takes it every other day), Disp: 90 tablet, Rfl: 1    warfarin (COUMADIN) 2 5 mg tablet, Take 1 tablet (2 5 mg total) by mouth daily, Disp: 90 tablet, Rfl: 0    warfarin (COUMADIN) 5 mg tablet, TAKE ONE TABLET BY MOUTH DAILY, Disp: 60 tablet, Rfl: 6    co-enzyme Q-10 50 MG capsule, Take 1 capsule (50 mg total) by mouth daily, Disp: 90 capsule, Rfl: 1    simvastatin (ZOCOR) 10 mg tablet, Take 1 tablet (10 mg total) by mouth daily at bedtime, Disp: 90 tablet, Rfl: 3  Allergies   Allergen Reactions    Codeine     Prednisone Itching    Codeine Polt-Chlorphen Polt Er Irritability, Itching and Rash       OBJECTIVE    Vitals:   Vitals:    07/09/20 0839   BP: 116/60   Pulse: 78   Resp: 16   Temp: 97 9 °F (36 6 °C)   Weight: 106 kg (233 lb 12 8 oz)   Height: 4' 11" (1 499 m)           Physical Exam   Constitutional: She is oriented to person, place, and time  She appears well-developed and well-nourished  HENT:   Head: Normocephalic and atraumatic  Right Ear: External ear normal    Left Ear: External ear normal    Nose: Nose normal    Mouth/Throat: Oropharynx is clear and moist    Eyes: Pupils are equal, round, and reactive to light  Conjunctivae and EOM are normal    Neck: Normal range of motion  Neck supple  Cardiovascular: Normal rate, regular rhythm, normal heart sounds and intact distal pulses  Pulmonary/Chest: Effort normal and breath sounds normal    Abdominal: Soft  Bowel sounds are normal    Musculoskeletal: Normal range of motion   She exhibits edema and tenderness  2 cm lump noted on R ankle anterior to lateral malleolus  Tender to palpation  Neurological: She is alert and oriented to person, place, and time  Skin: Skin is warm  Psychiatric: She has a normal mood and affect  Her behavior is normal  Judgment and thought content normal    Vitals reviewed

## 2020-07-09 NOTE — ASSESSMENT & PLAN NOTE
Within the past 3 weeks, pain at anterior, lateral ligament to lateral malleolus  Check xray to rule out fracture

## 2020-07-10 ENCOUNTER — APPOINTMENT (OUTPATIENT)
Dept: LAB | Facility: HOSPITAL | Age: 60
End: 2020-07-10
Attending: FAMILY MEDICINE
Payer: COMMERCIAL

## 2020-07-10 ENCOUNTER — TRANSCRIBE ORDERS (OUTPATIENT)
Dept: RADIOLOGY | Facility: HOSPITAL | Age: 60
End: 2020-07-10

## 2020-07-10 ENCOUNTER — HOSPITAL ENCOUNTER (OUTPATIENT)
Dept: RADIOLOGY | Facility: HOSPITAL | Age: 60
Discharge: HOME | End: 2020-07-10
Attending: FAMILY MEDICINE
Payer: COMMERCIAL

## 2020-07-10 DIAGNOSIS — Z11.4 SCREENING FOR HIV (HUMAN IMMUNODEFICIENCY VIRUS): ICD-10-CM

## 2020-07-10 DIAGNOSIS — Z95.2 S/P MVR (MITRAL VALVE REPLACEMENT): Primary | ICD-10-CM

## 2020-07-10 DIAGNOSIS — M25.571 ACUTE RIGHT ANKLE PAIN: ICD-10-CM

## 2020-07-10 PROCEDURE — 73610 X-RAY EXAM OF ANKLE: CPT

## 2020-07-10 PROCEDURE — 36415 COLL VENOUS BLD VENIPUNCTURE: CPT

## 2020-07-10 PROCEDURE — 87389 HIV-1 AG W/HIV-1&-2 AB AG IA: CPT

## 2020-07-10 NOTE — ASSESSMENT & PLAN NOTE
Reviewed recent labs, given heart disease, diabetes, encourage patient to try another statin  Patient said she tried many statins in the past   Advice to try low dose simvastatin, and patient agreed

## 2020-07-10 NOTE — ASSESSMENT & PLAN NOTE
Lab Results   Component Value Date    HGBA1C 6 7 (H) 06/27/2020   Well controlled, continue current regimen, encourage patient to continue with lifestyle modification

## 2020-07-12 LAB — HIV 1+2 AB+HIV1 P24 AG SERPL QL IA: NORMAL

## 2020-07-13 ENCOUNTER — TELEPHONE (OUTPATIENT)
Dept: FAMILY MEDICINE CLINIC | Facility: CLINIC | Age: 60
End: 2020-07-13

## 2020-07-13 NOTE — TELEPHONE ENCOUNTER
Patient called to speak with Dr Demetria Treadwell  She wants to know results of xrays of ankle and what she needs to do next  She's in pain  She was told final results not in

## 2020-07-15 DIAGNOSIS — M25.571 ACUTE RIGHT ANKLE PAIN: Primary | ICD-10-CM

## 2020-07-15 NOTE — TELEPHONE ENCOUNTER
I called but no answer  I recommend patient to see orthopedics for soft tissue swelling  In there mean time, please ace wrap and keep leg elevated as much as possible

## 2020-07-15 NOTE — PROGRESS NOTES
I called and left message wanting to discuss result of right ankle xray with patient  I recommend referral to orthopedics  In the mean time, keep leg elevated and ace-wrap if possible

## 2020-07-16 ENCOUNTER — TELEPHONE (OUTPATIENT)
Dept: SLEEP CENTER | Facility: CLINIC | Age: 60
End: 2020-07-16

## 2020-07-16 NOTE — TELEPHONE ENCOUNTER
Called patient, she did previously speak with Dr Gerald Lindsay, has appointment with Ortho on 7/24/20

## 2020-07-16 NOTE — TELEPHONE ENCOUNTER
----- Message from Danielle Kelyl MD sent at 7/8/2020  2:30 PM EDT -----  Follow-up in sleep clinic  APAP not indicated    Thanks

## 2020-07-21 ENCOUNTER — HOSPITAL ENCOUNTER (OUTPATIENT)
Dept: NON INVASIVE DIAGNOSTICS | Facility: CLINIC | Age: 60
Discharge: HOME | End: 2020-07-21
Payer: COMMERCIAL

## 2020-07-21 DIAGNOSIS — Z95.2 H/O MITRAL VALVE REPLACEMENT WITH MECHANICAL VALVE: ICD-10-CM

## 2020-07-21 PROCEDURE — 93306 TTE W/DOPPLER COMPLETE: CPT

## 2020-07-22 PROCEDURE — 93306 TTE W/DOPPLER COMPLETE: CPT | Performed by: INTERNAL MEDICINE

## 2020-07-27 ENCOUNTER — TELEPHONE (OUTPATIENT)
Dept: FAMILY MEDICINE CLINIC | Facility: CLINIC | Age: 60
End: 2020-07-27

## 2020-07-27 NOTE — TELEPHONE ENCOUNTER
Patient in need of a follow up mammogram but in need of an order  There doesn't appear to be one in the system  Patient going to [facility]  Can not schedule until then

## 2020-07-28 ENCOUNTER — TELEMEDICINE (OUTPATIENT)
Dept: FAMILY MEDICINE CLINIC | Facility: CLINIC | Age: 60
End: 2020-07-28

## 2020-07-28 DIAGNOSIS — J45.20 MILD INTERMITTENT ASTHMA WITHOUT COMPLICATION: Primary | ICD-10-CM

## 2020-07-28 PROCEDURE — 99213 OFFICE O/P EST LOW 20 MIN: CPT | Performed by: FAMILY MEDICINE

## 2020-07-28 PROCEDURE — 1036F TOBACCO NON-USER: CPT | Performed by: FAMILY MEDICINE

## 2020-07-28 RX ORDER — ALBUTEROL SULFATE 90 UG/1
2 AEROSOL, METERED RESPIRATORY (INHALATION) EVERY 6 HOURS PRN
Qty: 18 G | Refills: 1 | Status: SHIPPED | OUTPATIENT
Start: 2020-07-28 | End: 2020-07-31 | Stop reason: SDUPTHER

## 2020-07-28 RX ORDER — FLUTICASONE PROPIONATE 110 UG/1
2 AEROSOL, METERED RESPIRATORY (INHALATION) 2 TIMES DAILY
Qty: 1 INHALER | Refills: 2 | Status: SHIPPED | OUTPATIENT
Start: 2020-07-28 | End: 2020-10-23

## 2020-07-28 NOTE — ASSESSMENT & PLAN NOTE
Well controlled with mild exacerbation triggered by heat  Has not needed her albuterol inhaler for more than 5 years  Patient reports she does realized that her albuterol inhaler  in 2015  Otherwise compliant with Flovent b i d     - will provide refill on albuterol and Flovent inhalers  - discussed the need for increased p o   Hydration  - follow-up virtual video visit on

## 2020-07-28 NOTE — PROGRESS NOTES
Virtual Regular Visit      Assessment/Plan:    Problem List Items Addressed This Visit        Respiratory    Mild intermittent asthma - Primary     Well controlled with mild exacerbation triggered by heat  Has not needed her albuterol inhaler for more than 5 years  Patient reports she does realized that her albuterol inhaler  in 2015  Otherwise compliant with Flovent b i d     - will provide refill on albuterol and Flovent inhalers  - discussed the need for increased p o  Hydration  - follow-up virtual video visit on          Relevant Medications    albuterol (Ventolin HFA) 90 mcg/act inhaler    fluticasone (FLOVENT HFA) 110 MCG/ACT inhaler        There is and allergy on file to prednisone however patient reports she has been compliant with Flovent for a long time with no issues  She reports rash to prednisone in the past       Reason for visit is   Chief Complaint   Patient presents with    Virtual Regular Visit        Encounter provider Mary Beth Doherty MD    Provider located at [place] [address]  [address] [facility] [address] [facility]   [phone]      Recent Visits  Date Type Provider Dept   20 Telephone MD Rosa Parker   Showing recent visits within past 7 days and meeting all other requirements     Today's Visits  Date Type Provider Dept   20 Telemedicine MD Rosa Buckley   Showing today's visits and meeting all other requirements     Future Appointments  Date Type Provider Dept   20 Telemedicine MD Rosa Buckley   Showing future appointments within next 150 days and meeting all other requirements        The patient was identified by name and date of birth  Isabella Du was informed that this is a telemedicine visit and that the visit is being conducted through [employer]  My office door was closed  No one else was in the room    She acknowledged consent and understanding of privacy and security of the video platform  The patient has agreed to participate and understands they can discontinue the visit at any time  Patient is aware this is a billable service  Joaquim Tony is a 63 y o  female   HPI   Pt reports dry cough with associated mild shortness of breath onset of about 5 days  Patient reports she has a history of asthma well controlled with last exacerbation was more than 5 years  She reports this current exacerbation is likely triggered by the current heat wave, she has needed to use albuterol inhaler about 3-4 times within the past 2 days  She is also compliant with daily Flovent b i d  Patient reports she however noticed this morning that her albuterol inhaler had  since 2015  However denies fever, nausea vomiting, changes in bowel  or bladder habits but she does admit to intermittent chills and runny nose onset yesterday  Patient denies any sick contacts              Past Medical History:   Diagnosis Date    Abnormal ultrasound of pelvis     last assessed - [id number]    Asthma     Asthma, persistent 2014    Cerebral infarction [facility])     unspecified; last assessed - [id number]    Dizziness     last assessed - [id number]    Dysuria     last assessed - [id number]    Exposure to potentially hazardous body fluids     last assessed - [id number]    Heart disease     Hematoma     of arm; last assessed - [id number]    Mitral valve stenosis     Personal history of scoliosis     Pneumonia     Scar, hypertrophic     last assessed - [id number]    Se[facility]ures ([facility] [address] )     Type 2 diabetes mellitus ([facility] [address] )     Vision problems        Past Surgical History:   Procedure Laterality Date    MITRAL VALVE REPLACEMENT  2015    MVR with 27 mm [employer] Optiform mechanical prostethic;  last assessed - [id number]    TONSILLECTOMY      TRANSLUMINAL ANGIOPLASTY      mitral valve; last assessed - [id number]    TUBAL LIGATION  1986    last assessed - 01Aug2014       Current Outpatient Medications   Medication Sig Dispense Refill    Accu-Chek FastClix Lancets MISC USE AS INSTRUCTED 100 each 5    acetaminophen (TYLENOL) 500 mg tablet Take 2 tablets by mouth daily as needed for pain 180 tablet 3    albuterol (Ventolin HFA) 90 mcg/act inhaler Inhale 2 puffs every 6 (six) hours as needed for wheezing 18 g 1    Blood Glucose Monitoring Suppl (ACCU-CHEK EDILMA PLUS) w/Device KIT by Does not apply route 2 (two) times a day 1 kit 0    cetirizine (ZyrTEC) 10 mg tablet Take 1 tablet (10 mg total) by mouth daily 90 tablet 3    co-enzyme Q-10 50 MG capsule Take 1 capsule (50 mg total) by mouth daily 90 capsule 1    fluticasone (FLONASE) 50 mcg/act nasal spray 1 spray into each nostril daily 16 g 1    fluticasone (FLOVENT HFA) 110 MCG/ACT inhaler Inhale 2 puffs 2 (two) times a day Rinse mouth after use   1 Inhaler 2    glucose blood (ACCU-CHEK SMARTVIEW) test strip USE DAILY AS INSTRUCTED 100 each 11    hydrOXYzine HCL (ATARAX) 25 mg tablet Take 1 tablet (25 mg total) by mouth every 6 (six) hours as needed for itching 60 tablet 3    KEPPRA 500 MG tablet Take 1 tablet (500 mg total) by mouth 3 (three) times a day 270 tablet 3    ketotifen (ZADITOR) 0 025 % ophthalmic solution Administer 1 drop to both eyes 2 (two) times a day 5 mL 3    lisinopril (ZESTRIL) 2 5 mg tablet Take 2 5 mg by mouth daily      metFORMIN (GLUCOPHAGE) 500 mg tablet TAKE ONE TABLET BY MOUTH THREE TIMES A DAY (Patient taking differently: Take 500 mg by mouth daily with breakfast TAKE ONE TABLET BY MOUTH THREE TIMES A DAY) 270 tablet 0    metoprolol tartrate (LOPRESSOR) 25 mg tablet Take 0 5 tablets (12 5 mg total) by mouth every 12 (twelve) hours 90 tablet 3    omeprazole (PriLOSEC) 40 MG capsule Take 1 capsule (40 mg total) by mouth daily 90 capsule 4    pioglitazone (ACTOS) 15 mg tablet Take 1 tablet (15 mg total) by mouth daily 90 tablet 1    simvastatin (ZOCOR) 10 mg tablet Take 1 tablet (10 mg total) by mouth daily at bedtime 90 tablet 3    torsemide (DEMADEX) 20 mg tablet Take 1 tablet (20 mg total) by mouth as needed (pt takes it every other day) 90 tablet 1    warfarin (COUMADIN) 2 5 mg tablet Take 1 tablet (2 5 mg total) by mouth daily 90 tablet 0    warfarin (COUMADIN) 5 mg tablet TAKE ONE TABLET BY MOUTH DAILY 60 tablet 6     No current facility-administered medications for this visit  Allergies   Allergen Reactions    Codeine     Prednisone Itching    Codeine Polt-Chlorphen Polt Er Irritability, Itching and Rash       Review of Systems   Constitutional: Positive for chills  Negative for appetite change and fever  HENT: Positive for rhinorrhea  Respiratory: Positive for cough and shortness of breath  Gastrointestinal: Negative for nausea and vomiting  Neurological: Negative for headaches  Video Exam    There were no vitals filed for this visit  Physical Exam   Constitutional:   Obese appearing in no acute respiratory distress   Pulmonary/Chest: No respiratory distress  I spent 20 minutes directly with the patient during this visit      [address] [place] [address] acknowledges that she has consented to an online visit or consultation  She understands that the online visit is based solely on information provided by her, and that, in the absence of a face-to-face physical evaluation by the physician, the diagnosis she receives is both limited and provisional in terms of accuracy and completeness  This is not intended to replace a full medical face-to-face evaluation by the physician  Osman Pineda understands and accepts these terms

## 2020-07-29 ENCOUNTER — OFFICE VISIT (OUTPATIENT)
Dept: SLEEP CENTER | Facility: CLINIC | Age: 60
End: 2020-07-29
Payer: COMMERCIAL

## 2020-07-29 VITALS
HEART RATE: 81 BPM | DIASTOLIC BLOOD PRESSURE: 60 MMHG | HEIGHT: 59 IN | WEIGHT: 237 LBS | BODY MASS INDEX: 47.78 KG/M2 | SYSTOLIC BLOOD PRESSURE: 112 MMHG

## 2020-07-29 DIAGNOSIS — G47.33 OSA (OBSTRUCTIVE SLEEP APNEA): Primary | ICD-10-CM

## 2020-07-29 PROCEDURE — 3008F BODY MASS INDEX DOCD: CPT | Performed by: INTERNAL MEDICINE

## 2020-07-29 PROCEDURE — 99213 OFFICE O/P EST LOW 20 MIN: CPT | Performed by: INTERNAL MEDICINE

## 2020-07-29 PROCEDURE — 1036F TOBACCO NON-USER: CPT | Performed by: INTERNAL MEDICINE

## 2020-07-29 NOTE — PROGRESS NOTES
Progress Note - Sleep Center   Carlitos Yi :1960 MRN: [id number]      Reason for Visit:    63 y  o female with severe sleep fragmentation and excessive daytime sleepiness    Assessment:  The patient has mild obstructive sleep apnea with an AHI = 5 4  Given her frequent nocturnal arousals and excessive daytime sleepiness, treatment is appropriate  I will start her on APAP with a range of 4 to 20 cm  She requests a nasal mask with chin strap  Plan:  Initiate APAP 4 to 20 cm    Follow up:  Compliance check    History of Present Illness: The patient has severe sleep maintenance insomnia  The AHI from polysomnography in 2017 was 5 4  Given her symptoms, treatment for GERMAN is appropriate        Review of Systems      Genitourinary need to urinate more than twice a night   Cardiology ankle/leg swelling   Gastrointestinal abdominal pain or cramping that disturb sleep    Neurology frequent headaches, awaken with headache, need to move extremities and muscle weakness   Constitutional fatigue   Integumentary itching   Psychiatry anxiety   Musculoskeletal joint pain, muscle aches and back pain   Pulmonary difficulty breathing when lying flat    ENT throat clearing   Endocrine excessive thirst and frequent urination   Hematological none           I have reviewed and updated the review of systems as necessary     Historical Information    Past Medical History:   Diagnosis Date    Abnormal ultrasound of pelvis     last assessed - [id number]    Asthma     Asthma, persistent 2014    Cerebral infarction ([facility] [address] )     unspecified; last assessed - [id number]    Dizziness     last assessed - [id number]    Dysuria     last assessed - 54Olf7601    Exposure to potentially hazardous body fluids     last assessed - 27Nvg1413    Heart disease     Hematoma     of arm; last assessed - [id number]    Mitral valve stenosis     Personal history of scoliosis     Pneumonia     Scar, hypertrophic     last assessed - 85BEP3246    Seizures ([facility] [address] )     Type 2 diabetes mellitus ([facility] )     Vision problems          Past Surgical History:   Procedure Laterality Date    MITRAL VALVE REPLACEMENT  06/17/2015    MVR with 27 mm [employer] Optiform mechanical prostethic;  last assessed - [id number]    TONSILLECTOMY      TRANSLUMINAL ANGIOPLASTY      mitral valve; last assessed - 68Juv7671   [address]    last assessed - 86Ivh8323         Social History     Socioeconomic History    Marital status: Single     Spouse name: None    Number of children: None    Years of education: None    Highest education level: None   Occupational History    None   Social Needs    Financial resource strain: None    Food insecurity:     Worry: None     Inability: None    Transportation needs:     Medical: None     Non-medical: None   Tobacco Use    Smoking status: Never Smoker    Smokeless tobacco: Never Used   Substance and Sexual Activity    Alcohol use: No    Drug use: No    Sexual activity: Not Currently   Lifestyle    Physical activity:     Days per week: None     Minutes per session: None    Stress: None   Relationships    Social connections:     Talks on phone: None     Gets together: None     Attends Worship service: None     Active member of club or organization: None     Attends meetings of clubs or organizations: None     Relationship status: None    Intimate partner violence:     Fear of current or ex partner: None     Emotionally abused: None     Physically abused: None     Forced sexual activity: None   Other Topics Concern    None   Social History Narrative    Caffeine use           History   Alcohol use: Not on file       History   Smoking Status    Not on file   Smokeless Tobacco    Not on file       Family History:   Family History   Problem Relation Age of Onset    Cancer Mother     Dementia Mother     Diabetes Mother     Hypertension Mother     Hyperlipidemia Mother     Cervical cancer Mother  Heart disease Father     Coronary artery disease Father     Hypertension Father     Heart attack Father     Diabetes Sister     Breast cancer Sister     Diabetes Brother     Hypertension Brother     Hyperlipidemia Brother     Diabetes Maternal Grandmother     Breast cancer Maternal Grandmother     Diabetes Other     Seizures Son     Psoriasis Son        Medications/Allergies:      Current Outpatient Medications:     Accu-Chek FastClix Lancets MISC, USE AS INSTRUCTED, Disp: 100 each, Rfl: 5    acetaminophen (TYLENOL) 500 mg tablet, Take 2 tablets by mouth daily as needed for pain, Disp: 180 tablet, Rfl: 3    albuterol (Ventolin HFA) 90 mcg/act inhaler, Inhale 2 puffs every 6 (six) hours as needed for wheezing, Disp: 18 g, Rfl: 1    Blood Glucose Monitoring Suppl (ACCU-CHEK EDILMA PLUS) w/Device KIT, by Does not apply route 2 (two) times a day, Disp: 1 kit, Rfl: 0    cetirizine (ZyrTEC) 10 mg tablet, Take 1 tablet (10 mg total) by mouth daily, Disp: 90 tablet, Rfl: 3    co-enzyme Q-10 50 MG capsule, Take 1 capsule (50 mg total) by mouth daily, Disp: 90 capsule, Rfl: 1    fluticasone (FLONASE) 50 mcg/act nasal spray, 1 spray into each nostril daily, Disp: 16 g, Rfl: 1    fluticasone (FLOVENT HFA) 110 MCG/ACT inhaler, Inhale 2 puffs 2 (two) times a day Rinse mouth after use , Disp: 1 Inhaler, Rfl: 2    glucose blood (ACCU-CHEK SMARTVIEW) test strip, USE DAILY AS INSTRUCTED, Disp: 100 each, Rfl: 11    hydrOXYzine HCL (ATARAX) 25 mg tablet, Take 1 tablet (25 mg total) by mouth every 6 (six) hours as needed for itching, Disp: 60 tablet, Rfl: 3    KEPPRA 500 MG tablet, Take 1 tablet (500 mg total) by mouth 3 (three) times a day, Disp: 270 tablet, Rfl: 3    ketotifen (ZADITOR) 0 025 % ophthalmic solution, Administer 1 drop to both eyes 2 (two) times a day, Disp: 5 mL, Rfl: 3    lisinopril (ZESTRIL) 2 5 mg tablet, Take 2 5 mg by mouth daily, Disp: , Rfl:     metFORMIN (GLUCOPHAGE) 500 mg tablet, TAKE ONE TABLET BY MOUTH THREE TIMES A DAY (Patient taking differently: Take 500 mg by mouth daily with breakfast TAKE ONE TABLET BY MOUTH THREE TIMES A DAY), Disp: 270 tablet, Rfl: 0    metoprolol tartrate (LOPRESSOR) 25 mg tablet, Take 0 5 tablets (12 5 mg total) by mouth every 12 (twelve) hours, Disp: 90 tablet, Rfl: 3    omeprazole (PriLOSEC) 40 MG capsule, Take 1 capsule (40 mg total) by mouth daily, Disp: 90 capsule, Rfl: 4    pioglitazone (ACTOS) 15 mg tablet, Take 1 tablet (15 mg total) by mouth daily, Disp: 90 tablet, Rfl: 1    simvastatin (ZOCOR) 10 mg tablet, Take 1 tablet (10 mg total) by mouth daily at bedtime, Disp: 90 tablet, Rfl: 3    torsemide (DEMADEX) 20 mg tablet, Take 1 tablet (20 mg total) by mouth as needed (pt takes it every other day), Disp: 90 tablet, Rfl: 1    warfarin (COUMADIN) 2 5 mg tablet, Take 1 tablet (2 5 mg total) by mouth daily, Disp: 90 tablet, Rfl: 0    warfarin (COUMADIN) 5 mg tablet, TAKE ONE TABLET BY MOUTH DAILY, Disp: 60 tablet, Rfl: 6      Objective    Vital Signs:   Vitals:    07/29/20 1400   BP: 112/60   Pulse: 81   Weight: 108 kg (237 lb)   Height: 4' 11" (1 499 m)     [place] Sleepiness Scale: Total score: 16    Physical Exam:    General: Alert, appropriate, cooperative, overweight    Head: NC/AT    Skin: Warm, dry    Neuro: No motor abnormalities, cranial nerves appear intact    Psych: Normal affect            JOSE LUIS Avery    Board Certified Sleep Specialist

## 2020-07-31 ENCOUNTER — TELEMEDICINE (OUTPATIENT)
Dept: FAMILY MEDICINE CLINIC | Facility: CLINIC | Age: 60
End: 2020-07-31

## 2020-07-31 DIAGNOSIS — J45.20 MILD INTERMITTENT ASTHMA WITHOUT COMPLICATION: ICD-10-CM

## 2020-07-31 DIAGNOSIS — R05.9 COUGH: Primary | ICD-10-CM

## 2020-07-31 PROCEDURE — 99213 OFFICE O/P EST LOW 20 MIN: CPT | Performed by: FAMILY MEDICINE

## 2020-07-31 RX ORDER — ALBUTEROL SULFATE 90 UG/1
2 AEROSOL, METERED RESPIRATORY (INHALATION) EVERY 6 HOURS PRN
Qty: 18 G | Refills: 1 | Status: SHIPPED | OUTPATIENT
Start: 2020-07-31 | End: 2020-10-23

## 2020-07-31 NOTE — ASSESSMENT & PLAN NOTE
-Reports dry cough for about one week, worse at night  -Previously evaluated on  for cough and SOB and prescribed refill of Ventolin as inhaler was , symptoms may be due to mild asthma exacerbation  -Reports the inhaler is "not the same as before", makes her cough more and wants the brand name versus the generic which was given  -Continues to report cough and shortness of breath with exertion  -No known exposure to COVID however due to symptoms will test to rule out  -Also advised to restart taking Zyrtec and Flonase spray for possible postnasal drip that may be causing the cough at night when laying down due to her symptoms of tickle at back of throat  -Follow up in 2 weeks to reasses  -Advised to contact the clinic if have worsening symptoms

## 2020-07-31 NOTE — PROGRESS NOTES
COVID-19 Virtual Visit     Assessment/Plan:    Problem List Items Addressed This Visit        Respiratory    Mild intermittent asthma    Relevant Medications    albuterol (Ventolin HFA) 90 mcg/act inhaler       Other    Cough - Primary     -Reports dry cough for about one week, worse at night  -Previously evaluated on  for cough and SOB and prescribed refill of Ventolin as inhaler was , symptoms may be due to mild asthma exacerbation  -Reports the inhaler is "not the same as before", makes her cough more and wants the brand name versus the generic which was given  -Continues to report cough and shortness of breath with exertion  -No known exposure to COVID however due to symptoms will test to rule out  -Also advised to restart taking Zyrtec and Flonase spray for possible postnasal drip that may be causing the cough at night when laying down due to her symptoms of tickle at back of throat  -Follow up in 2 weeks to reasses  -Advised to contact the clinic if have worsening symptoms  Relevant Orders    Novel Coronavirus (COVID-19), PCR [facility] - Collected at [facility] or [facility]        This virtual check-in was done via [employer] and patient was informed that this is a secure, HIPAA-compliant platform  She agrees to proceed       Disposition:      I referred Florecita HERNANDEZ to one of our centralized sites for a COVID-19 swab    I spent 20 minutes directly with the patient during this visit    Encounter provider Sole Walls DO    Provider located at [id number] [place] [address]   [phone]    Recent Visits  Date Type Provider Dept   20 Telemedicine MD Rosa Buckley   20 Telephone MD Rosa Parker   Showing recent visits within past 7 days and meeting all other requirements     Today's Visits  Date Type Provider Dept   20 [address] [place] [address],  Rosa Monterroso   Showing today's visits and meeting all other requirements     Future Appointments  No visits were found meeting these conditions  Showing future appointments within next 150 days and meeting all other requirements        Patient agrees to participate in a virtual check in via telephone or video visit instead of presenting to the office to address urgent/immediate medical needs  Patient is aware this is a billable service  After connecting through [employer], the patient was identified by name and date of birth  Leyda Fung was informed that this was a telemedicine visit and that the exam was being conducted confidentially over secure lines  My office door was closed  No one else was in the room  Leyda Fung acknowledged consent and understanding of privacy and security of the telemedicine visit  I informed the patient that I have reviewed her record in Epic and presented the opportunity for her to ask any questions regarding the visit today  The patient agreed to participate  Jhonatan Crooks is a 63 y o  female with history of asthma, T2DM, GERMAN, and HTN who presents for follow up for dry cough  She was last seen on  after presenting with symptoms of dry cough and shortness of breath  She states she has had symptoms for about one week  Last visit patient was concerned that her symptoms may be exacerbated by the heat and her inhaler was  so was attributing her symptoms to mild exacerbation  She presents to day for follow up  She reports that her cough has not improved and states that she believes the new inhaler is "not the right one" as she is used to the blue inhaler versus the red inhaler  Explained that the ingredients of the medication should be similar but she is requesting her usual inhaler  She states her cough is worse at night and her shortness of breath is mainly with ambulation, however her SOB has not worsened since last visit       She reports cough and shortness of breath  She has not experienced fever, chills, headache, fatigue, abdominal pain, diarrhea, nausea and vomiting She has not had contact with a person who is under investigation for or who is positive for COVID-19 within the last 14 days  She has not been hospitalized recently for fever and/or lower respiratory symptoms      Past Medical History:   Diagnosis Date    Abnormal ultrasound of pelvis     last assessed - [id number]    Asthma     Asthma, persistent 8/1/2014    Cerebral infarction [facility])     unspecified; last assessed - 66Nyi3049    Dizziness     last assessed - 25Uhb1738    Dysuria     last assessed - [id number]    Exposure to potentially hazardous body fluids     last assessed - 48Tjv0064    Heart disease     Hematoma     of arm; last assessed - [id number]    Mitral valve stenosis     Personal history of scoliosis     Pneumonia     Scar, hypertrophic     last assessed - 55NTX6914    Seizures ([facility] [address] )     Type 2 diabetes mellitus ([facility] [address] )     Vision problems        Past Surgical History:   Procedure Laterality Date    MITRAL VALVE REPLACEMENT  06/17/2015    MVR with 27 mm [employer] Optiform mechanical prostethic;  last assessed - [id number]    TONSILLECTOMY      TRANSLUMINAL ANGIOPLASTY      mitral valve; last assessed - 59Lbu4703   [address]    last assessed - 12Efz9097       Current Outpatient Medications   Medication Sig Dispense Refill    Accu-Chek FastClix Lancets MISC USE AS INSTRUCTED 100 each 5    acetaminophen (TYLENOL) 500 mg tablet Take 2 tablets by mouth daily as needed for pain 180 tablet 3    albuterol (Ventolin HFA) 90 mcg/act inhaler Inhale 2 puffs every 6 (six) hours as needed for wheezing 18 g 1    Blood Glucose Monitoring Suppl (ACCU-CHEK EDILMA PLUS) w/Device KIT by Does not apply route 2 (two) times a day 1 kit 0    cetirizine (ZyrTEC) 10 mg tablet Take 1 tablet (10 mg total) by mouth daily 90 tablet 3    co-enzyme Q-10 50 MG capsule Take 1 capsule (50 mg total) by mouth daily 90 capsule 1    fluticasone (FLONASE) 50 mcg/act nasal spray 1 spray into each nostril daily 16 g 1    fluticasone (FLOVENT HFA) 110 MCG/ACT inhaler Inhale 2 puffs 2 (two) times a day Rinse mouth after use  1 Inhaler 2    glucose blood (ACCU-CHEK SMARTVIEW) test strip USE DAILY AS INSTRUCTED 100 each 11    hydrOXYzine HCL (ATARAX) 25 mg tablet Take 1 tablet (25 mg total) by mouth every 6 (six) hours as needed for itching 60 tablet 3    KEPPRA 500 MG tablet Take 1 tablet (500 mg total) by mouth 3 (three) times a day 270 tablet 3    ketotifen (ZADITOR) 0 025 % ophthalmic solution Administer 1 drop to both eyes 2 (two) times a day 5 mL 3    lisinopril (ZESTRIL) 2 5 mg tablet Take 2 5 mg by mouth daily      metFORMIN (GLUCOPHAGE) 500 mg tablet TAKE ONE TABLET BY MOUTH THREE TIMES A DAY (Patient taking differently: Take 500 mg by mouth daily with breakfast TAKE ONE TABLET BY MOUTH THREE TIMES A DAY) 270 tablet 0    metoprolol tartrate (LOPRESSOR) 25 mg tablet Take 0 5 tablets (12 5 mg total) by mouth every 12 (twelve) hours 90 tablet 3    omeprazole (PriLOSEC) 40 MG capsule Take 1 capsule (40 mg total) by mouth daily 90 capsule 4    pioglitazone (ACTOS) 15 mg tablet Take 1 tablet (15 mg total) by mouth daily 90 tablet 1    simvastatin (ZOCOR) 10 mg tablet Take 1 tablet (10 mg total) by mouth daily at bedtime 90 tablet 3    torsemide (DEMADEX) 20 mg tablet Take 1 tablet (20 mg total) by mouth as needed (pt takes it every other day) 90 tablet 1    warfarin (COUMADIN) 2 5 mg tablet Take 1 tablet (2 5 mg total) by mouth daily 90 tablet 0    warfarin (COUMADIN) 5 mg tablet TAKE ONE TABLET BY MOUTH DAILY 60 tablet 6     No current facility-administered medications for this visit           Allergies   Allergen Reactions    Codeine     Prednisone Itching    Codeine Polt-Chlorphen Polt Er Irritability, Itching and Rash       Review of Systems   Constitutional: Negative for activity change, appetite change, chills and fever  HENT: Positive for postnasal drip  Negative for congestion, rhinorrhea, sinus pain, sore throat and trouble swallowing  Describes tickling at back of throat at night   Eyes: Negative for visual disturbance  Respiratory: Positive for cough and shortness of breath  Negative for chest tightness  Cardiovascular: Negative for chest pain and palpitations  Gastrointestinal: Negative for abdominal pain  Musculoskeletal: Negative for arthralgias  Neurological: Negative for dizziness and headaches  Video Exam    There were no vitals filed for this visit  Florecita HERNANDEZ appears alert, no distress, cooperative  Physical Exam   Constitutional: She appears well-developed and well-nourished  No distress  Pulmonary/Chest: Effort normal    Neurological: She is alert  Psychiatric: She has a normal mood and affect  Her behavior is normal         VIRTUAL VISIT DISCLAIMER    Florecita Ramirez acknowledges that she has consented to an online visit or consultation  She understands that the online visit is based solely on information provided by her, and that, in the absence of a face-to-face physical evaluation by the physician, the diagnosis she receives is both limited and provisional in terms of accuracy and completeness  This is not intended to replace a full medical face-to-face evaluation by the physician  Jm Mendoza understands and accepts these terms        Iwona Gonzalez DO PGY-2  Jacklyn Miller

## 2020-08-05 ENCOUNTER — HOSPITAL ENCOUNTER (OUTPATIENT)
Dept: MAMMOGRAPHY | Facility: CLINIC | Age: 60
Discharge: HOME | End: 2020-08-05
Payer: COMMERCIAL

## 2020-08-05 VITALS — BODY MASS INDEX: 47.78 KG/M2 | HEIGHT: 59 IN | WEIGHT: 237 LBS | TEMPERATURE: 96.1 F

## 2020-08-05 DIAGNOSIS — R92.8 FOLLOW-UP EXAMINATION OF ABNORMAL MAMMOGRAM: ICD-10-CM

## 2020-08-05 PROCEDURE — 77066 DX MAMMO INCL CAD BI: CPT

## 2020-08-05 PROCEDURE — G0279 TOMOSYNTHESIS, MAMMO: HCPCS

## 2020-08-06 ENCOUNTER — APPOINTMENT (OUTPATIENT)
Dept: LAB | Facility: HOSPITAL | Age: 60
End: 2020-08-06
Attending: INTERNAL MEDICINE
Payer: COMMERCIAL

## 2020-08-06 ENCOUNTER — ANTICOAG VISIT (OUTPATIENT)
Dept: CARDIOLOGY CLINIC | Facility: CLINIC | Age: 60
End: 2020-08-06

## 2020-08-06 ENCOUNTER — TRANSCRIBE ORDERS (OUTPATIENT)
Dept: LAB | Facility: HOSPITAL | Age: 60
End: 2020-08-06

## 2020-08-06 DIAGNOSIS — Z95.2 HISTORY OF PROSTHETIC HEART VALVE: ICD-10-CM

## 2020-08-06 DIAGNOSIS — Z95.2 HISTORY OF PROSTHETIC HEART VALVE: Primary | ICD-10-CM

## 2020-08-06 DIAGNOSIS — Z95.2 S/P MVR (MITRAL VALVE REPLACEMENT): ICD-10-CM

## 2020-08-06 LAB
INR PPP: 3.36 (ref 0.84–1.19)
PROTHROMBIN TIME: 33.8 SECONDS (ref 11.6–14.5)

## 2020-08-06 PROCEDURE — 85610 PROTHROMBIN TIME: CPT

## 2020-08-06 PROCEDURE — 36415 COLL VENOUS BLD VENIPUNCTURE: CPT

## 2020-08-07 ENCOUNTER — OFFICE VISIT (OUTPATIENT)
Dept: OBGYN CLINIC | Facility: CLINIC | Age: 60
End: 2020-08-07
Payer: COMMERCIAL

## 2020-08-07 VITALS
DIASTOLIC BLOOD PRESSURE: 81 MMHG | HEART RATE: 67 BPM | WEIGHT: 235 LBS | HEIGHT: 59 IN | SYSTOLIC BLOOD PRESSURE: 127 MMHG | BODY MASS INDEX: 47.37 KG/M2

## 2020-08-07 DIAGNOSIS — M76.71 PERONEAL TENDINITIS OF RIGHT LOWER EXTREMITY: ICD-10-CM

## 2020-08-07 DIAGNOSIS — R29.898 ANKLE WEAKNESS: Primary | ICD-10-CM

## 2020-08-07 DIAGNOSIS — S93.491A SPRAIN OF ANTERIOR TALOFIBULAR LIGAMENT OF RIGHT ANKLE, INITIAL ENCOUNTER: ICD-10-CM

## 2020-08-07 DIAGNOSIS — M76.821 POSTERIOR TIBIAL TENDINITIS OF RIGHT LOWER EXTREMITY: ICD-10-CM

## 2020-08-07 PROBLEM — M76.70 PERONEAL TENDONITIS: Status: ACTIVE | Noted: 2020-08-07

## 2020-08-07 PROCEDURE — 1036F TOBACCO NON-USER: CPT | Performed by: ORTHOPAEDIC SURGERY

## 2020-08-07 PROCEDURE — 99243 OFF/OP CNSLTJ NEW/EST LOW 30: CPT | Performed by: ORTHOPAEDIC SURGERY

## 2020-08-07 NOTE — PROGRESS NOTES
JOSE LUIS Meade  Attending, Orthopaedic Surgery  [facility] [address] [facility] [address] [facility]        ORTHOPAEDIC FOOT AND ANKLE CLINIC VISIT     Assessment:     Encounter Diagnoses   Name Primary?  Right Ankle weakness Yes    Sprain of anterior talofibular ligament of right ankle, initial encounter     Peroneal tendinitis of right lower extremity     Posterior tibial tendinitis of right lower extremity               Plan:   · The patient verbalized understanding of exam findings and treatment plan  We engaged in the shared decision-making process and treatment options were discussed at length with the patient  Surgical and conservative management discussed today along with risks and benefits  · Patient will be referred to formal physical therapy for generalized ankle weakness when compared to the left ankle, especially in eversion (peroneals and posterior tibialis)  She is also to supplement with a home exercise program    · If symptoms persist at next follow up visit and she can better localize her symptoms then further imaging in the form of an MRI of the ankle may be required  Return in about 7 weeks (around 9/25/2020) for Recheck of right ankle  History of Present Illness:   Chief Complaint:   Chief Complaint   Patient presents with    Right Ankle - Pain     Darlene Alston is a 63 y o  female who is being seen for right ankle pain  Pain is localized diffusely about the ankle and dorsal aspect of the midfoot with minimal radiating and described as sharp and moderate  Patient denies numbness, tingling or radicular pain  Denies history of neuropathy  Patient does not smoke, does not have diabetes and does not take blood thinners  Patient denies family history of anesthesia complications and has not had any complications with anesthesia       Pain/symptom timing:  Worse during the day when active  Pain/symptom context:  Worse with activites and work  Pain/symptom modifying factors:  Rest makes better, activities make worse  Pain/symptom associated signs/symptoms: none    Prior treatment   · NSAIDsYes    · Injections No   · Bracing/Orthotics Yes   · Physical Therapy No     Orthopedic Surgical History:   None    Past Medical, Surgical and Social History:  Past Medical History:  has a past medical history of Abnormal ultrasound of pelvis, Asthma, Asthma, persistent (8/1/2014), Cerebral infarction ([facility] ), Dizziness, Dysuria, Exposure to potentially hazardous body fluids, Heart disease, Hematoma, Mitral valve stenosis, Personal history of scoliosis, Pneumonia, Scar, hypertrophic, Seizures ([facility] ), Type 2 diabetes mellitus ([facility] ), and Vision problems  Problem List: does not have any pertinent problems on file  Past Surgical History:  has a past surgical history that includes Mitral valve replacement (06/17/2015); Tonsillectomy; Transluminal angioplasty; Tubal ligation (1986); and Breast biopsy (Left)  Family History: family history includes Breast cancer in her maternal grandmother and sister; Cancer in her mother; Cervical cancer in her mother; Coronary artery disease in her father; Dementia in her mother; Diabetes in her brother, maternal grandmother, mother, other, and sister; Heart attack in her father; Heart disease in her father, paternal aunt, and paternal aunt; Hyperlipidemia in her brother and mother; Hypertension in her brother, father, and mother; No Known Problems in her daughter; Psoriasis in her son; Seizures in her son  Social History:  reports that she has never smoked  She has never used smokeless tobacco  She reports that she does not drink alcohol or use drugs    Current Medications: has a current medication list which includes the following prescription(s): accu-chek fastclix lancets, acetaminophen, albuterol, accu-chek kayley plus, cetirizine, co-enzyme q-10, fluticasone, fluticasone, glucose blood, hydroxyzine hcl, keppra, ketotifen, lisinopril, metformin, metoprolol tartrate, omeprazole, pioglitazone, simvastatin, torsemide, warfarin, and warfarin  Allergies: is allergic to codeine; prednisone; and codeine polt-chlorphen polt er  Review of Systems:  General- denies fever/chills  HEENT- denies hearing loss or sore throat  Eyes- denies eye pain or visual disturbances, denies red eyes  Respiratory- denies cough or SOB  Cardio- denies chest pain or palpitations  GI- denies abdominal pain  Endocrine- denies urinary frequency  Urinary- denies pain with urination  Musculoskeletal- Negative except noted above  Skin- denies rashes or wounds  Neurological- denies dizziness or headache  Psychiatric- denies anxiety or difficulty concentrating    Physical Exam:   /81   Pulse 67   Ht 4' 11" (1 499 m)   Wt 107 kg (235 lb)   BMI 47 46 kg/m²   General/Constitutional: No apparent distress: well-nourished and well developed  Eyes: normal ocular motion  Cardio: RRR, Normal S1S2, No m/r/g  Lymphatic: No appreciable lymphadenopathy  Respiratory: Non-labored breathing, CTA b/l no w/c/r  Vascular: No edema, swelling or tenderness, except as noted in detailed exam   Integumentary: No impressive skin lesions present, except as noted in detailed exam   Neuro: No ataxia or tremors noted  Psych: Normal mood and affect, oriented to person, place and time  Appropriate affect  Musculoskeletal: Normal, except as noted in detailed exam and in HPI      Examination    Right    Gait Antalgic   Musculoskeletal Tender to palpation at lateral aspect, posterior tibialis and anterior ankle    Skin Normal       Nails Normal    Range of Motion  Normal degrees dorsiflexion, normal degrees plantarflexion  Subtalar motion: normal    Stability Stable    Muscle Strength 5/5 tibialis anterior  5/5 gastrocnemius-soleus  4/5 posterior tibialis  3/5 peroneal/eversion strength  5/5 EHL  5/5 FHL    Neurologic Normal    Sensation Intact to light touch throughout sural, saphenous, superficial peroneal, deep peroneal and medial/lateral plantar nerve distributions  Clayton-Gracie 5 07 filament (10g) testing deferred  Cardiovascular Brisk capillary refill < 2 seconds,intact DP and PT pulses    Special Tests None      Imaging Studies:   3 views of the right ankle were taken, reviewed and interpreted independently that demonstrate no acute osseous abnormality or degenerative changes  Reviewed by me personally  Noelia Clack Lachman, MD  Foot & Ankle Surgery   Department of [place] [address]      I personally performed the service  Noelia Clack Lachman, MD    Scribe Attestation    I,:   Ivone Stoll am acting as a scribe while in the presence of the attending physician :        I,:   Sanchez Zhang MD personally performed the services described in this documentation    as scribed in my presence :

## 2020-08-10 ENCOUNTER — TELEPHONE (OUTPATIENT)
Dept: SLEEP CENTER | Facility: CLINIC | Age: 60
End: 2020-08-10

## 2020-08-10 NOTE — TELEPHONE ENCOUNTER
Patient needed a new SS because when she had her previous diagnostic in 2017 she didn't move forward with getting a machine  Now her new study only shows an AHI of 3 1 which does not qualify for a machine  At this point the only way she'd be able to get a machine is if the patient paid out of pocket  Called patient to inform her and left a message  If she returns call please inform her and cancel DME setup  Thank you

## 2020-08-10 NOTE — TELEPHONE ENCOUNTER
Patient called me back and I informed her at this time the new study does not qualify her  I informed her I did tell her doctor to see what he'd like to do moving forward for her

## 2020-08-13 ENCOUNTER — OFFICE VISIT (OUTPATIENT)
Dept: CARDIOLOGY CLINIC | Facility: CLINIC | Age: 60
End: 2020-08-13
Payer: COMMERCIAL

## 2020-08-13 VITALS
TEMPERATURE: 97.5 F | BODY MASS INDEX: 47 KG/M2 | SYSTOLIC BLOOD PRESSURE: 126 MMHG | HEART RATE: 62 BPM | OXYGEN SATURATION: 98 % | HEIGHT: 60 IN | DIASTOLIC BLOOD PRESSURE: 70 MMHG | WEIGHT: 239.4 LBS

## 2020-08-13 DIAGNOSIS — Z86.73 HISTORY OF STROKE: ICD-10-CM

## 2020-08-13 DIAGNOSIS — I65.22 ASYMPTOMATIC STENOSIS OF LEFT CAROTID ARTERY: ICD-10-CM

## 2020-08-13 DIAGNOSIS — Z95.2 H/O MITRAL VALVE REPLACEMENT WITH MECHANICAL VALVE: Primary | ICD-10-CM

## 2020-08-13 PROCEDURE — 99213 OFFICE O/P EST LOW 20 MIN: CPT | Performed by: INTERNAL MEDICINE

## 2020-08-13 PROCEDURE — 1036F TOBACCO NON-USER: CPT | Performed by: INTERNAL MEDICINE

## 2020-08-13 PROCEDURE — 3008F BODY MASS INDEX DOCD: CPT | Performed by: FAMILY MEDICINE

## 2020-08-13 PROCEDURE — 3008F BODY MASS INDEX DOCD: CPT | Performed by: INTERNAL MEDICINE

## 2020-08-13 NOTE — PROGRESS NOTES
Cardiology Follow Up    Florecita HERNANDEZ Ashley  1960  [id number]  [facility] [id number] [facility]  [phone]    1  H/O mitral valve replacement with mechanical valve     2  History of stroke     3  Asymptomatic stenosis of left carotid artery         Interval History:  Cardiology follow-up, continuation of cardiac care pleasant 56-year-old female who has history of valvular heart disease rheumatic, consult follow-up, she is mostly asymptomatic, midst of sedentary lifestyle, no dyspnea orthopnea or PND  He does have intermittent lower extremity edema for which she uses p r n  Diuretic  Denies any focal neurological deficits, she does not have any history of atrial tachyarrhythmias  She is on chronic warfarin therapy for about a her disease  She is compliant low-sodium diet, blood pressures been well controlled  She also does have a history of seizure disorder that is well controlled on medications      Patient Active Problem List   Diagnosis    History of prosthetic heart valve    Abnormal mammogram    Asymptomatic stenosis of left carotid artery    Cervical high risk HPV (human papillomavirus) test positive    Controlled diabetes mellitus due to underlying condition with diabetic neuropathic arthropathy (HCC)    Depression with anxiety    Elevated blood pressure reading    Esophageal reflux    Excessive daytime sleepiness    Fatigue    Hypokalemia    Fecal occult blood test positive    Insomnia    Intertrigo    Iron deficiency anemia    Localization-related epilepsy, intractable (HCC)    Low grade squamous intraepithelial lesion (LGSIL) on cervical Pap smear    Microcytic hypochromic anemia    Obesity    Osteoarthritis of knee    Overactive bladder    Plantar fasciitis    Postnasal drip    Sleep disorder, circadian, delayed sleep phase type    Urge incontinence of urine  Urinary retention    Urticaria, chronic    Uterine fibroid    Vitamin D insufficiency    Xerosis of skin    Cough    History of stroke    Wrist swelling, left    Chest wall pain    Lower extremity edema    H/O mitral valve replacement with mechanical valve    Posterior knee pain, right    Left sided abdominal pain    Episode of dizziness    GERMAN (obstructive sleep apnea)    Altered awareness, transient    Panic reaction    Allergic conjunctivitis and rhinitis, bilateral    Secondary hypertension    Dyslipidemia    Acute right ankle pain    Mild intermittent asthma    Sprain of anterior talofibular ligament of right ankle    Peroneal tendonitis    Posterior tibial tendinitis of right lower extremity     Past Medical History:   Diagnosis Date    Abnormal ultrasound of pelvis     last assessed - [id number]    Asthma     Asthma, persistent 8/1/2014    Cerebral infarction ([facility] )     unspecified; last assessed - [id number]    Dizziness     last assessed - [id number]    Dysuria     last assessed - [id number]    Exposure to potentially hazardous body fluids     last assessed - [id number]    Heart disease     Hematoma     of arm; last assessed - [id number]    Mitral valve stenosis     Personal history of scoliosis     Pneumonia     Scar, hypertrophic     last assessed - [id number]    Seizures ([facility] )     Type 2 diabetes mellitus ([facility] )     Vision problems      Social History     Socioeconomic History    Marital status: Single     Spouse name: Not on file    Number of children: Not on file    Years of education: Not on file    Highest education level: Not on file   Occupational History    Not on file   Social Needs    Financial resource strain: Not hard at all   [address] insecurity     Worry: Never true     Inability: Never true    Transportation needs     Medical: No     Non-medical: No   Tobacco Use    Smoking status: Never Smoker    Smokeless tobacco: Never Used   Substance and Sexual Activity    Alcohol use: No    Drug use: No    Sexual activity: Not Currently   Lifestyle    Physical activity     Days per week: Not on file     Minutes per session: Not on file    Stress: Not on file   Relationships    Social connections     Talks on phone: Not on file     Gets together: Not on file     Attends [employer] service: Not on file     Active member of club or organization: Not on file     Attends meetings of clubs or organizations: Not on file     Relationship status: Not on file    Intimate partner violence     Fear of current or ex partner: Not on file     Emotionally abused: Not on file     Physically abused: Not on file     Forced sexual activity: Not on file   Other Topics Concern    Not on file   Social History Narrative    Caffeine use      Family History   Problem Relation Age of Onset    Cancer Mother     Dementia Mother     Diabetes Mother     Hypertension Mother     Hyperlipidemia Mother     Cervical cancer Mother     Heart disease Father     Coronary artery disease Father     Hypertension Father     Heart attack Father     Diabetes Sister     Breast cancer Sister     Diabetes Brother     Hypertension Brother     Hyperlipidemia Brother     Diabetes Maternal Grandmother     Breast cancer Maternal Grandmother     Diabetes Other     Seizures Son     Psoriasis Son     No Known Problems Daughter     Heart disease Paternal Aunt     Heart disease Paternal Aunt      Past Surgical History:   Procedure Laterality Date    BREAST BIOPSY Left     [facility] in [facility] not really sure    MITRAL VALVE REPLACEMENT  06/17/2015    MVR with 27 mm [employer] Optiform mechanical prostethic;  last assessed - [id number]    TONSILLECTOMY      TRANSLUMINAL ANGIOPLASTY      mitral valve; last assessed - 01Aug2014   [address]    last assessed - 01Aug2014       Current Outpatient Medications:     acetaminophen (TYLENOL) 500 mg tablet, Take 2 tablets by mouth daily as needed for pain, Disp: 180 tablet, Rfl: 3    albuterol (Ventolin HFA) 90 mcg/act inhaler, Inhale 2 puffs every 6 (six) hours as needed for wheezing, Disp: 18 g, Rfl: 1    cetirizine (ZyrTEC) 10 mg tablet, Take 1 tablet (10 mg total) by mouth daily, Disp: 90 tablet, Rfl: 3    fluticasone (FLONASE) 50 mcg/act nasal spray, 1 spray into each nostril daily, Disp: 16 g, Rfl: 1    fluticasone (FLOVENT HFA) 110 MCG/ACT inhaler, Inhale 2 puffs 2 (two) times a day Rinse mouth after use , Disp: 1 Inhaler, Rfl: 2    hydrOXYzine HCL (ATARAX) 25 mg tablet, Take 1 tablet (25 mg total) by mouth every 6 (six) hours as needed for itching, Disp: 60 tablet, Rfl: 3    KEPPRA 500 MG tablet, Take 1 tablet (500 mg total) by mouth 3 (three) times a day, Disp: 270 tablet, Rfl: 3    ketotifen (ZADITOR) 0 025 % ophthalmic solution, Administer 1 drop to both eyes 2 (two) times a day, Disp: 5 mL, Rfl: 3    lisinopril (ZESTRIL) 2 5 mg tablet, Take 2 5 mg by mouth daily, Disp: , Rfl:     metFORMIN (GLUCOPHAGE) 500 mg tablet, TAKE ONE TABLET BY MOUTH THREE TIMES A DAY (Patient taking differently: Take 500 mg by mouth daily with breakfast TAKE ONE TABLET BY MOUTH THREE TIMES A DAY), Disp: 270 tablet, Rfl: 0    metoprolol tartrate (LOPRESSOR) 25 mg tablet, Take 0 5 tablets (12 5 mg total) by mouth every 12 (twelve) hours, Disp: 90 tablet, Rfl: 3    omeprazole (PriLOSEC) 40 MG capsule, Take 1 capsule (40 mg total) by mouth daily, Disp: 90 capsule, Rfl: 4    pioglitazone (ACTOS) 15 mg tablet, Take 1 tablet (15 mg total) by mouth daily, Disp: 90 tablet, Rfl: 1    torsemide (DEMADEX) 20 mg tablet, Take 1 tablet (20 mg total) by mouth as needed (pt takes it every other day), Disp: 90 tablet, Rfl: 1    warfarin (COUMADIN) 2 5 mg tablet, Take 1 tablet (2 5 mg total) by mouth daily, Disp: 90 tablet, Rfl: 0    warfarin (COUMADIN) 5 mg tablet, TAKE ONE TABLET BY MOUTH DAILY, Disp: 60 tablet, Rfl: 6    Accu-Chek FastClix Lancets MISC, USE AS INSTRUCTED, Disp: 100 each, Rfl: 5    Blood Glucose Monitoring Suppl (ACCU-CHEK EDILMA PLUS) w/Device KIT, by Does not apply route 2 (two) times a day, Disp: 1 kit, Rfl: 0    co-enzyme Q-10 50 MG capsule, Take 1 capsule (50 mg total) by mouth daily (Patient not taking: Reported on 8/13/2020), Disp: 90 capsule, Rfl: 1    glucose blood (ACCU-CHEK SMARTVIEW) test strip, USE DAILY AS INSTRUCTED, Disp: 100 each, Rfl: 11    simvastatin (ZOCOR) 10 mg tablet, Take 1 tablet (10 mg total) by mouth daily at bedtime (Patient not taking: Reported on 8/13/2020), Disp: 90 tablet, Rfl: 3  Allergies   Allergen Reactions    Codeine     Prednisone Itching    Codeine Polt-Chlorphen Polt Er Irritability, Itching and Rash       Labs:  Orders Only on 07/10/2020   Component Date Value    Protime 08/06/2020 33 8*    INR 08/06/2020 3 36*   Appointment on 07/10/2020   Component Date Value    HIV-1/HIV-2 Ab 07/10/2020 Non-Reactive    Appointment on 06/27/2020   Component Date Value    Cholesterol 06/27/2020 194     Triglycerides 06/27/2020 123     HDL, Direct 06/27/2020 8*    LDL Calculated 06/27/2020 161*    Cortisol - AM 06/27/2020 4 3    Telemedicine on 06/10/2020   Component Date Value    Hemoglobin A1C 06/27/2020 6 7*    EAG 06/27/2020 146    Ancillary Orders on 05/22/2020   Component Date Value    Protime 06/27/2020 25 5*    INR 06/27/2020 2 34*   Ancillary Orders on 05/08/2020   Component Date Value    Protime 05/15/2020 27 5*    INR 05/15/2020 2 62*   Appointment on 05/01/2020   Component Date Value    Levetiracetam Lvl 05/01/2020 27 4    Ancillary Orders on 03/06/2020   Component Date Value    Protime 05/01/2020 25 2*    INR 05/01/2020 2 35*   Appointment on 02/29/2020   Component Date Value    Sodium 02/29/2020 140     Potassium 02/29/2020 3 9     Chloride 02/29/2020 105     CO2 02/29/2020 31     ANION GAP 02/29/2020 4     BUN 02/29/2020 12     Creatinine 02/29/2020 0 87     Glucose, Fasting 02/29/2020 126*    Calcium 02/29/2020 9 4     eGFR 02/29/2020 72      Imaging: Mammo Diagnostic Bilateral W 3d & Cad    Result Date: 8/5/2020  Narrative: DIAGNOSIS: Follow-up examination of abnormal mammogram TECHNIQUE: Digital screening mammography was performed  Computer Aided Detection (CAD) analyzed all applicable images  COMPARISONS: Prior breast imaging dated: 12/22/2016, 12/22/2016, 05/25/2016, 05/25/2016, 05/03/2016, and 09/26/2014 RELEVANT HISTORY: Family Breast Cancer History: History of breast cancer in Sister, Maternal Grandmother  Family Medical History: Family medical history includes breast cancer in maternal grandmother and breast cancer in sister  Personal History: No known relevant hormone history  Surgical history includes breast biopsy  No known relevant medical history  RISK ASSESSMENT: 5 Year Tyrer-Cuzick: 2 06 % 10 Year Tyrer-Cuzick: 4 13 % Lifetime Tyrer-Cuzick: 9 87 % TISSUE DENSITY: The breasts are almost entirely fatty  INDICATION: Malia Rea is a 63 y o  female presenting for Follow up from 2016  FINDINGS: Left breast upper outer quadrant coarse calcifications showed expected evolution for a fibroadenoma  There are no suspicious masses, grouped microcalcifications or areas of architectural distortion  The skin and nipple areolar complex are unremarkable  Impression:  Benign findings as above  Patient should return to annual screening mammography  ASSESSMENT/BI-RADS CATEGORY: Left: 2 - Benign Right: 1 - Negative Overall: 2 - Benign RECOMMENDATION:      - Routine screening mammogram in 1 year for both breasts  Workstation ID: [id number]      Review of Systems:  Review of Systems   Constitutional: Negative for fatigue  HENT: Negative for nosebleeds  Eyes: Negative for visual disturbance  Respiratory: Negative for apnea, shortness of breath, wheezing and stridor  Cardiovascular: Positive for leg swelling  Negative for chest pain and palpitations  Gastrointestinal: Negative for abdominal pain, anal bleeding and blood in stool  Endocrine: Negative for cold intolerance  Allergic/Immunologic: Negative for immunocompromised state  Neurological: Positive for seizures  Negative for dizziness, facial asymmetry, speech difficulty, weakness, light-headedness and numbness  Hematological: Bruises/bleeds easily  Psychiatric/Behavioral: Negative for sleep disturbance  The patient is not nervous/anxious  Physical Exam:  Physical Exam  Constitutional:       General: She is not in acute distress  Appearance: She is obese  She is not ill-appearing, toxic-appearing or diaphoretic  Cardiovascular:      Rate and Rhythm: Normal rate and regular rhythm  Heart sounds: No murmur  No friction rub  No gallop  Comments: Bryant Hughes 1, possible left carotid bruit  Pulmonary:      Effort: Pulmonary effort is normal  No respiratory distress  Breath sounds: No stridor  No wheezing, rhonchi or rales  Musculoskeletal:         General: No swelling  Skin:     General: Skin is warm and dry  Capillary Refill: Capillary refill takes less than 2 seconds  Neurological:      Mental Status: She is alert  Psychiatric:         Mood and Affect: Mood normal          Behavior: Behavior normal          Discussion/Summary:  Rheumatic heart disease, mitral stenosis status post valvuloplasty 3044 complicated with a CVA  She went on to have mechanical AVR 2015, number 27  cardio medics valve  Echocardiogram 2017 revealed normal left systolic function with mild left hypertrophy, properly function prosthetic valve  Mild tricuspid insufficiency with estimated normal pulmonary pressures suggested by Doppler criteria  She does not have any history of atrial fibrillation  Continue warfarin therapy coronary arteries were normal before surgery    Reviewed all records tested carotid ultrasound suggested 50-60% stenosis on the left side, will repeat ultrasound, I encouraged to start her statin therapy, last  with an HDL 58  She has been intolerant to several statins in the past with headaches  This note was completed in part utilizing m-modal fluency direct voice recognition software  Grammatical errors, random word insertion, spelling mistakes, and incomplete sentences may be an occasional consequence of the system secondary to software limitations, ambient noise and hardware issues  At the time of dictation, efforts were made to edit, clarify and /or correct errors  Please read the chart carefully and recognize, using context, where substitutions have occurred  If you have any questions or concerns about the context, text or information contained within the body of this dictation, please contact myself, the provider, for further clarification

## 2020-08-17 ENCOUNTER — EVALUATION (OUTPATIENT)
Dept: PHYSICAL THERAPY | Facility: REHABILITATION | Age: 60
End: 2020-08-17
Payer: COMMERCIAL

## 2020-08-17 DIAGNOSIS — M25.551 PAIN IN RIGHT HIP: ICD-10-CM

## 2020-08-17 DIAGNOSIS — M54.50 LOW BACK PAIN: ICD-10-CM

## 2020-08-17 DIAGNOSIS — R29.898 ANKLE WEAKNESS: Primary | ICD-10-CM

## 2020-08-17 DIAGNOSIS — Z95.2 H/O MITRAL VALVE REPLACEMENT: ICD-10-CM

## 2020-08-17 PROCEDURE — 97161 PT EVAL LOW COMPLEX 20 MIN: CPT | Performed by: PHYSICAL THERAPIST

## 2020-08-17 RX ORDER — ACETAMINOPHEN 500 MG
TABLET ORAL
Qty: 180 TABLET | Refills: 3 | Status: SHIPPED | OUTPATIENT
Start: 2020-08-17

## 2020-08-17 RX ORDER — WARFARIN SODIUM 5 MG/1
TABLET ORAL
Qty: 90 TABLET | Refills: 1 | Status: SHIPPED | OUTPATIENT
Start: 2020-08-17 | End: 2021-04-23

## 2020-08-17 NOTE — PROGRESS NOTES
PT Evaluation     Today's date: 2020  Patient name: Gold Balderas  : 1960  MRN: [id number]  Referring provider: Paula Tovar MD  Dx:   Encounter Diagnosis     ICD-10-CM    1  Right Ankle weakness  R29 898 Ambulatory referral to Physical Therapy                  Assessment  Assessment details: Patient is a 63 y o  female referred to PT by Dr Luz Collins with a dx of R ankle weakness  Patient reports onset of pain complaints occurred approximately 2-3 weeks ago for no apparent reason  The pain complaints are aggravated by prolonged walking and stair negotiation  No other referral appears necessary at this time  Impairments: abnormal or restricted ROM, impaired balance, impaired physical strength, lacks appropriate home exercise program, pain with function and weight-bearing intolerance  Functional limitations: IADLs; Gait/Stairs  Symptom irritability: lowBarriers to therapy: None  Understanding of Dx/Px/POC: good   Prognosis: good    Goals  Short Term goals - 4 weeks  1  Patient will be independent and compliant with a HEP  2   Patient will report a 50% decrease in pain complaints  Long Term goals - 8 weeks  1  Patient will report elimination of pain complaints  2   Patient will return to all IADLS without restriction  3   Patient will return to all recreational activities without restriction        Plan  Patient would benefit from: skilled physical therapy  Planned therapy interventions: joint mobilization, manual therapy, neuromuscular re-education, patient education, therapeutic exercise and home exercise program  Frequency: 2x week  Duration in visits: 8  Duration in weeks: 4  Treatment plan discussed with: patient        Subjective Evaluation    History of Present Illness  Date of onset: 2020          Not a recurrent problem   Quality of life: good    Pain  Current pain ratin  At best pain ratin  At worst pain ratin  Location: Diffuse R ankle - dorsum foot  Quality: discomfort and tight  Relieving factors: rest  Aggravating factors: stair climbing and walking  Progression: worsening      Diagnostic Tests  X-ray: normal (soft tissue swelling noted)  Treatments  Current treatment: physical therapy  Patient Goals  Patient goals for therapy: decreased pain, increased motion, increased strength, independence with ADLs/IADLs and return to sport/leisure activities          Objective     Observations     Right Ankle/Foot   Positive for edema       Palpation     Additional Palpation Details  R ATF tenderness    Active Range of Motion   Left Ankle/Foot   Normal active range of motion  Dorsiflexion (ke): 12 degrees   Plantar flexion: 52 degrees   Inversion: 34 degrees   Eversion: 18 degrees     Right Ankle/Foot   Dorsiflexion (ke): 0 degrees   Plantar flexion: 50 degrees   Inversion: 24 degrees with pain  Eversion: 10 degrees with pain    Passive Range of Motion   Left Ankle/Foot  Normal passive range of motion    Right Ankle/Foot    Dorsiflexion (ke): 4 degrees   Plantar flexion: 52 degrees   Inversion: 28 degrees with pain  Eversion: 15 degrees with pain    Strength/Myotome Testing     Left Ankle/Foot   Normal strength    Right Ankle/Foot   Dorsiflexion: 3+  Plantar flexion: 3+  Inversion: 3+  Eversion: 3+      Flowsheet Rows      Most Recent Value   PT/OT G-Codes   Current Score  54   Projected Score  60             Precautions: HTN; DM; Valve replacment      Manuals             Manual R ankle PROM             Retrograde massage - gentle                                       Neuro Re-Ed             Ankle MREs - all planes                                                                                           Ther Ex             bike             Seated heel/toe raises                                                                                           Ther Activity                                       Gait Training                                       Modalities

## 2020-08-17 NOTE — TELEPHONE ENCOUNTER
I spoke with patient, advised will be discussed further at visit scheduled in October  Patient frustrated and did not want to keep that appointment, will not be following with the office

## 2020-08-20 ENCOUNTER — OFFICE VISIT (OUTPATIENT)
Dept: PHYSICAL THERAPY | Facility: REHABILITATION | Age: 60
End: 2020-08-20
Payer: COMMERCIAL

## 2020-08-20 DIAGNOSIS — R29.898 ANKLE WEAKNESS: Primary | ICD-10-CM

## 2020-08-20 PROCEDURE — 97140 MANUAL THERAPY 1/> REGIONS: CPT | Performed by: PHYSICAL THERAPIST

## 2020-08-20 PROCEDURE — 97110 THERAPEUTIC EXERCISES: CPT | Performed by: PHYSICAL THERAPIST

## 2020-08-20 NOTE — PROGRESS NOTES
Daily Note     Today's date: 2020  Patient name: Chidi Quinteros  : 1960  MRN: [id number]  Referring provider: Emilee Sneed MD  Dx:   Encounter Diagnosis     ICD-10-CM    1  Right Ankle weakness  R29 898                   Subjective: Pt reports ankle stiffness when initiating ambulation after prolonged sitting  Pt notes edema starts after being up and ambulating for 5 mins first thing in the morning  Objective: See treatment diary below      Assessment: Tolerated treatment well  Pt reports less soreness post session  Monitor response NV  Patient would benefit from continued PT      Plan: Continue per plan of care        Precautions: HTN; DM; Valve replacment      Manuals             Manual R ankle PROM TP 10 mins            Retrograde massage - gentle TP 5 min                                      Neuro Re-Ed             Ankle MREs - all planes x10ea                                                                                          Ther Ex             bike 10 mins aarom            Seated heel/toe raises 2x15 ea            Seated wobbleboard a/p, m/l, cw/ccw x20ea                                                                             Ther Activity                                       Gait Training                                       Modalities

## 2020-08-24 ENCOUNTER — APPOINTMENT (OUTPATIENT)
Dept: PHYSICAL THERAPY | Facility: REHABILITATION | Age: 60
End: 2020-08-24
Payer: COMMERCIAL

## 2020-08-27 ENCOUNTER — APPOINTMENT (OUTPATIENT)
Dept: PHYSICAL THERAPY | Facility: REHABILITATION | Age: 60
End: 2020-08-27
Payer: COMMERCIAL

## 2020-08-28 ENCOUNTER — TELEMEDICINE (OUTPATIENT)
Dept: FAMILY MEDICINE CLINIC | Facility: CLINIC | Age: 60
End: 2020-08-28

## 2020-08-28 DIAGNOSIS — R42 DIZZINESS: Primary | ICD-10-CM

## 2020-08-28 DIAGNOSIS — L50.0 ALLERGIC URTICARIA: ICD-10-CM

## 2020-08-28 PROCEDURE — 1036F TOBACCO NON-USER: CPT | Performed by: FAMILY MEDICINE

## 2020-08-28 PROCEDURE — 99213 OFFICE O/P EST LOW 20 MIN: CPT | Performed by: FAMILY MEDICINE

## 2020-08-28 RX ORDER — HYDROXYZINE HYDROCHLORIDE 25 MG/1
25 TABLET, FILM COATED ORAL EVERY 6 HOURS PRN
Qty: 60 TABLET | Refills: 2 | Status: SHIPPED | OUTPATIENT
Start: 2020-08-28 | End: 2020-11-06

## 2020-08-28 NOTE — PROGRESS NOTES
Virtual Brief Visit    Assessment/Plan:    Problem List Items Addressed This Visit        Other    Dizziness - Primary     Suspect BPPV vs orthostatic vs dehydration in the setting of diuretic use versus cardiogenic  Discussed need for office evaluation for orthostatic vitals and full exam including [place]-Hallpike maneuver  Encourage increased in p o  Hydration  Also discussed need for slowly getting up slowly from a laying or seated position   Plan to refer to vestibular therapy if Viv-Hallpike positive  Schedule for an office visit early next week, patient agreeable                     Reason for visit is   Chief Complaint   Patient presents with    Virtual Brief Visit        Encounter provider Sonia Ash MD    Provider located at [address] [address] [facility]   [phone]    Recent Visits  No visits were found meeting these conditions  Showing recent visits within past 7 days and meeting all other requirements     Today's Visits  Date Type Provider Dept   08/28/20 Telemedicine Sonia Ash MD [facility] [place]   Showing today's visits and meeting all other requirements     Future Appointments  No visits were found meeting these conditions  Showing future appointments within next 150 days and meeting all other requirements        After connecting through telephone, the patient was identified by name and date of birth  Cely Schmidt was informed that this is a telemedicine visit and that the visit is being conducted through telephone  My office door was closed  No one else was in the room  She acknowledged consent and understanding of privacy and security of the platform  The patient has agreed to participate and understands she can discontinue the visit at any time  Patient is aware this is a billable service       Justin Damian is a 63 y o  female   HPI   Pt  reports she woke up yesterday morning and experienced sudden dizziness upon turning to her left side with associated  nausea, diaphoresis, hot flushes and could not breath and turning to the right also produced similar sx  She describes this as sensation of room spinning  Symptom resolved after staying still for about an hour and getting up slowly  Of note, she has had a hx of dizziness for one year and has been having a few episodes a year  Today, feels tired and dizzy but this is improved compared to yesterday  Reports she is staying well hydrated and drinks about 2 bottles of water/day  She is also takes diuretics for LE edema  She did not check her BP during her episodes but FBG was 116 and today was 113  Of note, pt has a hx of seizures but denies this is a possible seizure because she s usually loses consciousness  Denies tongue biting, bladder/bowel incontinence      Past Medical History:   Diagnosis Date    Abnormal ultrasound of pelvis     last assessed - [id number]    Asthma     Asthma, persistent 8/1/2014    Cerebral infarction [facility])     unspecified; last assessed - [id number]    Dizziness     last assessed - [id number]    Dysuria     last assessed - [id number]    Exposure to potentially hazardous body fluids     last assessed - [id number]    Heart disease     Hematoma     of arm; last assessed - [id number]    Mitral valve stenosis     Personal history of scoliosis     Pneumonia     Scar, hypertrophic     last assessed - [id number]    Seizures ([facility] [address] [facility]     Type 2 diabetes mellitus ([facility] [address] )     Vision problems        Past Surgical History:   Procedure Laterality Date    BREAST BIOPSY Left     many years ago in [place]-pt not really sure    MITRAL VALVE REPLACEMENT  06/17/2015    MVR with 27 mm [employer] Optiform mechanical prostethic;  last assessed - [id number]    TONSILLECTOMY      TRANSLUMINAL ANGIOPLASTY      mitral valve; last assessed - [id number]   [address]    last assessed - 01Aug2014       Current Outpatient Medications   Medication Sig Dispense Refill    Accu-Chek FastClix Lancets MISC USE AS INSTRUCTED 100 each 5    acetaminophen (TYLENOL) 500 mg tablet Take 2 tablets by mouth daily as needed for pain 180 tablet 3    albuterol (Ventolin HFA) 90 mcg/act inhaler Inhale 2 puffs every 6 (six) hours as needed for wheezing 18 g 1    Blood Glucose Monitoring Suppl (ACCU-CHEK EDILMA PLUS) w/Device KIT by Does not apply route 2 (two) times a day 1 kit 0    cetirizine (ZyrTEC) 10 mg tablet Take 1 tablet (10 mg total) by mouth daily 90 tablet 3    co-enzyme Q-10 50 MG capsule Take 1 capsule (50 mg total) by mouth daily (Patient not taking: Reported on 8/13/2020) 90 capsule 1    fluticasone (FLONASE) 50 mcg/act nasal spray 1 spray into each nostril daily 16 g 1    fluticasone (FLOVENT HFA) 110 MCG/ACT inhaler Inhale 2 puffs 2 (two) times a day Rinse mouth after use   1 Inhaler 2    glucose blood (ACCU-CHEK SMARTVIEW) test strip USE DAILY AS INSTRUCTED 100 each 11    hydrOXYzine HCL (ATARAX) 25 mg tablet TAKE 1 TABLET (25 MG TOTAL) BY MOUTH EVERY 6 (SIX) HOURS AS NEEDED FOR ITCHING 60 tablet 2    KEPPRA 500 MG tablet Take 1 tablet (500 mg total) by mouth 3 (three) times a day 270 tablet 3    ketotifen (ZADITOR) 0 025 % ophthalmic solution Administer 1 drop to both eyes 2 (two) times a day 5 mL 3    lisinopril (ZESTRIL) 2 5 mg tablet Take 2 5 mg by mouth daily      metFORMIN (GLUCOPHAGE) 500 mg tablet TAKE ONE TABLET BY MOUTH THREE TIMES A DAY (Patient taking differently: Take 500 mg by mouth daily with breakfast TAKE ONE TABLET BY MOUTH THREE TIMES A DAY) 270 tablet 0    metoprolol tartrate (LOPRESSOR) 25 mg tablet Take 0 5 tablets (12 5 mg total) by mouth every 12 (twelve) hours 90 tablet 3    omeprazole (PriLOSEC) 40 MG capsule Take 1 capsule (40 mg total) by mouth daily 90 capsule 4    pioglitazone (ACTOS) 15 mg tablet Take 1 tablet (15 mg total) by mouth daily 90 tablet 1    simvastatin (ZOCOR) 10 mg tablet Take 1 tablet (10 mg total) by mouth daily at bedtime (Patient not taking: Reported on 8/13/2020) 90 tablet 3    torsemide (DEMADEX) 20 mg tablet Take 1 tablet (20 mg total) by mouth as needed (pt takes it every other day) 90 tablet 1    warfarin (COUMADIN) 2 5 mg tablet Take 1 tablet (2 5 mg total) by mouth daily 90 tablet 0    warfarin (COUMADIN) 5 mg tablet TAKE ONE TABLET BY MOUTH DAILY 90 tablet 1     No current facility-administered medications for this visit  Allergies   Allergen Reactions    Codeine     Prednisone Itching    Codeine Polt-Chlorphen Polt Er Irritability, Itching and Rash       Review of Systems   Constitutional: Negative for appetite change, chills and fever  HENT: Negative for congestion  Gastrointestinal: Positive for nausea  Neurological: Positive for dizziness  There were no vitals filed for this visit  I spent 20 minutes directly with the patient during this visit    [address] [place] [address] acknowledges that she has consented to an online visit or consultation  She understands that the online visit is based solely on information provided by her, and that, in the absence of a face-to-face physical evaluation by the physician, the diagnosis she receives is both limited and provisional in terms of accuracy and completeness  This is not intended to replace a full medical face-to-face evaluation by the physician  Marielena Tello understands and accepts these terms  It was my intent to perform this visit via video technology but the patient was not able to do a video connection so the visit was completed via audio telephone only

## 2020-08-28 NOTE — ASSESSMENT & PLAN NOTE
Suspect BPPV vs orthostatic vs dehydration in the setting of diuretic use versus cardiogenic  Discussed need for office evaluation for orthostatic vitals and full exam including Viv-Hallpike maneuver  Encourage increased in p o   Hydration  Also discussed need for slowly getting up slowly from a laying or seated position   Plan to refer to vestibular therapy if [place]-Hallpike positive  Schedule for an office visit early next week, patient agreeable

## 2020-09-03 ENCOUNTER — OFFICE VISIT (OUTPATIENT)
Dept: FAMILY MEDICINE CLINIC | Facility: CLINIC | Age: 60
End: 2020-09-03

## 2020-09-03 VITALS
HEART RATE: 80 BPM | SYSTOLIC BLOOD PRESSURE: 128 MMHG | RESPIRATION RATE: 18 BRPM | DIASTOLIC BLOOD PRESSURE: 78 MMHG | WEIGHT: 239 LBS | TEMPERATURE: 97.4 F | HEIGHT: 60 IN | BODY MASS INDEX: 46.92 KG/M2

## 2020-09-03 DIAGNOSIS — R42 VERTIGO: Primary | ICD-10-CM

## 2020-09-03 PROBLEM — H81.10 BPPV (BENIGN PAROXYSMAL POSITIONAL VERTIGO): Status: ACTIVE | Noted: 2020-09-03

## 2020-09-03 PROCEDURE — 3008F BODY MASS INDEX DOCD: CPT | Performed by: FAMILY MEDICINE

## 2020-09-03 PROCEDURE — 1036F TOBACCO NON-USER: CPT | Performed by: FAMILY MEDICINE

## 2020-09-03 PROCEDURE — 99213 OFFICE O/P EST LOW 20 MIN: CPT | Performed by: FAMILY MEDICINE

## 2020-09-03 NOTE — ASSESSMENT & PLAN NOTE
· Likely BPPV in the setting of +Inez hallpike with slight nystagmus that lasted a few secs  · /78 --> 110/70, unlikely 2/2 orthostatic hypotension  · Patient reports continuous dizziness, headaches, nausea, and hot flashes with no improvement with increased water intake  · Patient believes condition is worsening due to episodes now occurring in the sitting position with no movement  · Patient educated on BPPV and benefits of vestibular therapy  · Patient agreed and plans to attend therapy   She denies use of meclizine at this time due to fear of increased palpitations and headaches from side effect list  · To follow up in 4 weeks to see improvement post therapy

## 2020-09-03 NOTE — PATIENT INSTRUCTIONS
Benign Paroxysmal Positional Vertigo   WHAT YOU NEED TO KNOW:   BPPV is an inner ear condition that causes you to suddenly feel dizzy  Benign means it is not serious or life-threatening  BPPV is caused by a problem with the nerves and structure of your inner ear  BPPV happens when small pieces of calcium break loose and lump together in one of your inner ear canals  DISCHARGE INSTRUCTIONS:   Return to the emergency department if:   · You fall during a BPPV episode and are injured  · You have a severe headache that does not go away  · You have new changes in your vision or feel weak or confused  · You have problems hearing, or you have ringing or buzzing in your ears  Contact your healthcare provider if:   · Your BPPV symptoms do not go away or they return  · You have problems with your balance, or you are falling often  · You have new or increased nausea or vomiting with vertigo  · You feel anxious or depressed and do not want to leave your home  · You have questions or concerns about your condition or care  Medicines:   · Medicines  may be recommended or prescribed to treat dizziness or nausea  · Take your medicine as directed  Contact your healthcare provider if you think your medicine is not helping or if you have side effects  Tell him of her if you are allergic to any medicine  Keep a list of the medicines, vitamins, and herbs you take  Include the amounts, and when and why you take them  Bring the list or the pill bottles to follow-up visits  Carry your medicine list with you in case of an emergency  Prevent your symptoms:   · Try to avoid sudden head movements  Stand up and lie down slowly  · Raise and support your head when you lie down  Place pillows under your upper back and head or rest in a recliner  · Change your position often when you are lying down  Try not to lie with your head on the same side for long periods of time  Roll over slowly       · Wear protective gear  when you ride a bike or play sports  A helmet helps protect your head from injury  Follow up with your healthcare provider as directed: You may need to return in 1 month to check the progress of your treatment  Write down your questions so you remember to ask them during your visits  © 2017 [facility] Information is for End User's use only and may not be sold, redistributed or otherwise used for commercial purposes  All illustrations and images included in CareNotes® are the copyrighted property of [employer]  or [employer]  The above information is an  only  It is not intended as medical advice for individual conditions or treatments  Talk to your doctor, nurse or pharmacist before following any medical regimen to see if it is safe and effective for you

## 2020-09-03 NOTE — ASSESSMENT & PLAN NOTE
· Patient reports continuous dizziness, headaches, nausea, and hot flashes with no improvement with increased water intake  · Patient believes condition is worsening due to episodes now occurring in the sitting position with no movement  · Patient educated on BPPV and benefits of vestibular therapy  · Patient agreed and plans to attend therapy   She denies use of meclizine at this time due to fear of increased palpitations and headaches from side effect list  · To follow up in 4 weeks to see improvement post therapy

## 2020-09-07 ENCOUNTER — APPOINTMENT (EMERGENCY)
Dept: RADIOLOGY | Facility: HOSPITAL | Age: 60
End: 2020-09-07
Payer: COMMERCIAL

## 2020-09-07 ENCOUNTER — HOSPITAL ENCOUNTER (EMERGENCY)
Facility: HOSPITAL | Age: 60
Discharge: HOME | End: 2020-09-07
Attending: EMERGENCY MEDICINE | Admitting: EMERGENCY MEDICINE
Payer: COMMERCIAL

## 2020-09-07 VITALS
TEMPERATURE: 97.3 F | DIASTOLIC BLOOD PRESSURE: 61 MMHG | SYSTOLIC BLOOD PRESSURE: 106 MMHG | HEART RATE: 62 BPM | OXYGEN SATURATION: 100 % | RESPIRATION RATE: 18 BRPM | WEIGHT: 239 LBS | BODY MASS INDEX: 47.46 KG/M2

## 2020-09-07 DIAGNOSIS — R07.81 RIB PAIN ON LEFT SIDE: ICD-10-CM

## 2020-09-07 DIAGNOSIS — M25.512 LEFT SHOULDER PAIN: Primary | ICD-10-CM

## 2020-09-07 DIAGNOSIS — R51.9 HEADACHE: ICD-10-CM

## 2020-09-07 LAB
BASOPHILS # BLD AUTO: 0.08 THOUSANDS/ΜL (ref 0–0.1)
BASOPHILS NFR BLD AUTO: 1 % (ref 0–1)
EOSINOPHIL # BLD AUTO: 0.15 THOUSAND/ΜL (ref 0–0.61)
EOSINOPHIL NFR BLD AUTO: 3 % (ref 0–6)
ERYTHROCYTE [DISTWIDTH] IN BLOOD BY AUTOMATED COUNT: 15.6 % (ref 11.6–15.1)
HCT VFR BLD AUTO: 36 % (ref 34.8–46.1)
HGB BLD-MCNC: 11.3 G/DL (ref 11.5–15.4)
IMM GRANULOCYTES # BLD AUTO: 0.01 THOUSAND/UL (ref 0–0.2)
IMM GRANULOCYTES NFR BLD AUTO: 0 % (ref 0–2)
INR PPP: 3.44 (ref 0.84–1.19)
LYMPHOCYTES # BLD AUTO: 1.4 THOUSANDS/ΜL (ref 0.6–4.47)
LYMPHOCYTES NFR BLD AUTO: 24 % (ref 14–44)
MCH RBC QN AUTO: 25.8 PG (ref 26.8–34.3)
MCHC RBC AUTO-ENTMCNC: 31.4 G/DL (ref 31.4–37.4)
MCV RBC AUTO: 82 FL (ref 82–98)
MONOCYTES # BLD AUTO: 0.38 THOUSAND/ΜL (ref 0.17–1.22)
MONOCYTES NFR BLD AUTO: 7 % (ref 4–12)
NEUTROPHILS # BLD AUTO: 3.75 THOUSANDS/ΜL (ref 1.85–7.62)
NEUTS SEG NFR BLD AUTO: 65 % (ref 43–75)
NRBC BLD AUTO-RTO: 0 /100 WBCS
PLATELET # BLD AUTO: 250 THOUSANDS/UL (ref 149–390)
PMV BLD AUTO: 10.7 FL (ref 8.9–12.7)
PROTHROMBIN TIME: 34.4 SECONDS (ref 11.6–14.5)
RBC # BLD AUTO: 4.38 MILLION/UL (ref 3.81–5.12)
WBC # BLD AUTO: 5.77 THOUSAND/UL (ref 4.31–10.16)

## 2020-09-07 PROCEDURE — 72125 CT NECK SPINE W/O DYE: CPT

## 2020-09-07 PROCEDURE — 99284 EMERGENCY DEPT VISIT MOD MDM: CPT | Performed by: EMERGENCY MEDICINE

## 2020-09-07 PROCEDURE — 99284 EMERGENCY DEPT VISIT MOD MDM: CPT

## 2020-09-07 PROCEDURE — 70450 CT HEAD/BRAIN W/O DYE: CPT

## 2020-09-07 PROCEDURE — 85025 COMPLETE CBC W/AUTO DIFF WBC: CPT | Performed by: EMERGENCY MEDICINE

## 2020-09-07 PROCEDURE — 85610 PROTHROMBIN TIME: CPT | Performed by: EMERGENCY MEDICINE

## 2020-09-07 PROCEDURE — 36415 COLL VENOUS BLD VENIPUNCTURE: CPT | Performed by: EMERGENCY MEDICINE

## 2020-09-07 PROCEDURE — G1004 CDSM NDSC: HCPCS

## 2020-09-07 PROCEDURE — 73030 X-RAY EXAM OF SHOULDER: CPT

## 2020-09-07 PROCEDURE — 71101 X-RAY EXAM UNILAT RIBS/CHEST: CPT

## 2020-09-07 RX ORDER — ACETAMINOPHEN 325 MG/1
650 TABLET ORAL ONCE
Status: COMPLETED | OUTPATIENT
Start: 2020-09-07 | End: 2020-09-07

## 2020-09-07 RX ADMIN — ACETAMINOPHEN 650 MG: 325 TABLET, FILM COATED ORAL at 14:28

## 2020-09-07 NOTE — ED PROVIDER NOTES
History  Chief Complaint   Patient presents with    Shoulder Pain     pt reports falling on Saturday onto a book shelf on left side, reports left shoulder pain -LOC/headstrike +thinners (coumadin)      58-year-old female with history of valvular heart disease on Coumadin presents to the emergency department for evaluation of shoulder pain after a fall  The patient reports she was at her sister's house 2 days ago and had a mechanical fall  The patient says that she was walking and tripped on her slippers causing her to fall into a book shelf hitting her left side  The patient denies head strike or loss of consciousness  The patient reports she was able to get up with the assistance of her sister and was ambulatory afterwards  The patient reports over the past 2 days she has had pain to her left chest wall, left shoulder as well as an associated headache  She says that she has been treating her symptoms with Tylenol and ice packs with only moderate relief  The patient says 1 day ago she developed a bruise on her left shoulder and says that she came in today because her friends told her that since she is on Coumadin she should be evaluated in the emergency department  The patient denies blurry vision, dizziness, neck pain, chest pain, shortness of breath and localized numbness, tingling or weakness  Prior to Admission Medications   Prescriptions Last Dose Informant Patient Reported? Taking?    Accu-Chek FastClix Lancets MISC  Self No Yes   Sig: USE AS INSTRUCTED   Blood Glucose Monitoring Suppl (ACCU-CHEK EDILMA PLUS) w/Device KIT  Self No Yes   Sig: by Does not apply route 2 (two) times a day   KEPPRA 500 MG tablet 9/7/2020 at Unknown time Self No Yes   Sig: Take 1 tablet (500 mg total) by mouth 3 (three) times a day   acetaminophen (TYLENOL) 500 mg tablet 9/7/2020 at 1030 Self No Yes   Sig: Take 2 tablets by mouth daily as needed for pain   albuterol (Ventolin HFA) 90 mcg/act inhaler  Self No Yes Sig: Inhale 2 puffs every 6 (six) hours as needed for wheezing   cetirizine (ZyrTEC) 10 mg tablet 2020 at Unknown time Self No Yes   Sig: Take 1 tablet (10 mg total) by mouth daily   co-enzyme Q-10 50 MG capsule Not Taking at Unknown time Self No No   Sig: Take 1 capsule (50 mg total) by mouth daily   Patient not taking: Reported on 2020   fluticasone (FLONASE) 50 mcg/act nasal spray  Self No Yes   Si spray into each nostril daily   fluticasone (FLOVENT HFA) 110 MCG/ACT inhaler  Self No Yes   Sig: Inhale 2 puffs 2 (two) times a day Rinse mouth after use     glucose blood (ACCU-CHEK SMARTVIEW) test strip  Self No Yes   Sig: USE DAILY AS INSTRUCTED   hydrOXYzine HCL (ATARAX) 25 mg tablet 2020 at Unknown time Self No Yes   Sig: TAKE 1 TABLET (25 MG TOTAL) BY MOUTH EVERY 6 (SIX) HOURS AS NEEDED FOR ITCHING   ketotifen (ZADITOR) 0 025 % ophthalmic solution  Self No No   Sig: Administer 1 drop to both eyes 2 (two) times a day   lisinopril (ZESTRIL) 2 5 mg tablet 2020 at Unknown time Self Yes Yes   Sig: Take 2 5 mg by mouth daily   metFORMIN (GLUCOPHAGE) 500 mg tablet  Self No No   Sig: TAKE ONE TABLET BY MOUTH THREE TIMES A DAY   Patient taking differently: Take 500 mg by mouth daily with breakfast TAKE ONE TABLET BY MOUTH THREE TIMES A DAY   metoprolol tartrate (LOPRESSOR) 25 mg tablet 2020 at Unknown time Self No Yes   Sig: Take 0 5 tablets (12 5 mg total) by mouth every 12 (twelve) hours   omeprazole (PriLOSEC) 40 MG capsule 2020 at Unknown time Self No Yes   Sig: Take 1 capsule (40 mg total) by mouth daily   pioglitazone (ACTOS) 15 mg tablet 2020 at Unknown time Self No Yes   Sig: Take 1 tablet (15 mg total) by mouth daily   simvastatin (ZOCOR) 10 mg tablet 2020 at Unknown time Self No Yes   Sig: Take 1 tablet (10 mg total) by mouth daily at bedtime   torsemide (DEMADEX) 20 mg tablet Past Week at Unknown time Self No Yes   Sig: Take 1 tablet (20 mg total) by mouth as needed (pt takes it every other day)   warfarin (COUMADIN) 2 5 mg tablet 9/6/2020 at Unknown time Self No Yes   Sig: Take 1 tablet (2 5 mg total) by mouth daily   warfarin (COUMADIN) 5 mg tablet 9/6/2020 at Unknown time Self No Yes   Sig: TAKE ONE TABLET BY MOUTH DAILY      Facility-Administered Medications: None       Past Medical History:   Diagnosis Date    Abnormal ultrasound of pelvis     last assessed - [id number]    Asthma     Asthma, persistent 8/1/2014    Cerebral infarction ([facility] )     unspecified; last assessed - [id number]    Dizziness     last assessed - [id number]    Dysuria     last assessed - [id number]    Exposure to potentially hazardous body fluids     last assessed - [id number]    Heart disease     Hematoma     of arm; last assessed - [id number]    Mitral valve stenosis     Personal history of scoliosis     Pneumonia     Scar, hypertrophic     last assessed - [id number]    Seizures ([facility] )     Type 2 diabetes mellitus ([facility] )     Vision problems        Past Surgical History:   Procedure Laterality Date    BREAST BIOPSY Left     many years ago in [facility] not really sure    MITRAL VALVE REPLACEMENT  06/17/2015    MVR with 27 mm [employer] Optiform mechanical prostethic;  last assessed - [id number]    TONSILLECTOMY      TRANSLUMINAL ANGIOPLASTY      mitral valve; last assessed - [id number]    TUBAL LIGATION  1986    last assessed - [id number]       Family History   Problem Relation Age of Onset    Cancer Mother     Dementia Mother     Diabetes Mother     Hypertension Mother     Hyperlipidemia Mother     Cervical cancer Mother     Heart disease Father     Coronary artery disease Father     Hypertension Father     Heart attack Father     Diabetes Sister     Breast cancer Sister     Diabetes Brother     Hypertension Brother     Hyperlipidemia Brother     Diabetes Maternal Grandmother     Breast cancer Maternal Grandmother     Diabetes Other     Seizures Son     Psoriasis Son    Debbi Bach No Known Problems Daughter     Heart disease Paternal Aunt     Heart disease Paternal Aunt      I have reviewed and agree with the history as documented  E-Cigarette/Vaping    E-Cigarette Use Never User      E-Cigarette/Vaping Substances    Nicotine No     THC No     CBD No     Flavoring No     Other No     Unknown No      Social History     Tobacco Use    Smoking status: Never Smoker    Smokeless tobacco: Never Used   Substance Use Topics    Alcohol use: No    Drug use: No        Review of Systems   Constitutional: Negative for chills and fever  HENT: Negative for congestion, sore throat and voice change  Eyes: Negative for photophobia and visual disturbance  Respiratory: Negative for cough, chest tightness and shortness of breath  Cardiovascular: Positive for leg swelling (chronic)  Negative for palpitations  Gastrointestinal: Negative for abdominal pain, constipation, diarrhea, nausea and vomiting  Endocrine: Negative for polydipsia, polyphagia and polyuria  Genitourinary: Negative for difficulty urinating, dysuria, flank pain and urgency  Musculoskeletal: Negative for back pain, gait problem and neck pain  Skin: Negative for pallor and rash  Neurological: Positive for headaches  Negative for dizziness, syncope, weakness, light-headedness and numbness  Psychiatric/Behavioral: Negative for agitation and confusion  All other systems reviewed and are negative        Physical Exam  ED Triage Vitals   Temperature Pulse Respirations Blood Pressure SpO2   09/07/20 1411 09/07/20 1415 09/07/20 1415 09/07/20 1415 09/07/20 1415   (!) 97 3 °F (36 3 °C) 66 20 142/65 100 %      Temp Source Heart Rate Source Patient Position - Orthostatic VS BP Location FiO2 (%)   09/07/20 1411 -- -- -- --   Oral          Pain Score       09/07/20 1428       6             Orthostatic Vital Signs  Vitals:    09/07/20 1415 09/07/20 1600   BP: 142/65 106/61   Pulse: 66 62       Physical Exam  Vitals signs and nursing note reviewed  Constitutional:       General: She is not in acute distress  Appearance: She is well-developed  HENT:      Head: Normocephalic and atraumatic  Eyes:      Pupils: Pupils are equal, round, and reactive to light  Neck:      Musculoskeletal: Normal range of motion and neck supple  Muscular tenderness present  Cardiovascular:      Rate and Rhythm: Normal rate and regular rhythm  Heart sounds: Normal heart sounds  Pulmonary:      Effort: Pulmonary effort is normal       Breath sounds: Normal breath sounds  Abdominal:      General: Bowel sounds are normal  There is no distension  Palpations: Abdomen is soft  Tenderness: There is no abdominal tenderness  There is no guarding or rebound  Musculoskeletal:      Right shoulder: Normal       Left shoulder: She exhibits tenderness  Left elbow: Normal       Left wrist: Normal       Right hip: Normal       Left hip: Normal         Arms:    Skin:     General: Skin is warm and dry  Capillary Refill: Capillary refill takes less than 2 seconds  Neurological:      Mental Status: She is alert and oriented to person, place, and time  GCS: GCS eye subscore is 4  GCS verbal subscore is 5  GCS motor subscore is 6  Cranial Nerves: Cranial nerves are intact  Sensory: Sensation is intact  Motor: Motor function is intact  Coordination: Coordination is intact  Gait: Gait is intact           ED Medications  Medications   acetaminophen (TYLENOL) tablet 650 mg (650 mg Oral Given 9/7/20 1428)       Diagnostic Studies  Results Reviewed     Procedure Component Value Units Date/Time    Protime-INR [[id number]]  (Abnormal) Collected:  09/07/20 1516    Lab Status:  Final result Specimen:  Blood from Arm, Right Updated:  09/07/20 1554     Protime 34 4 seconds      INR 3 44    CBC and differential [[id number]]  (Abnormal) Collected:  09/07/20 1516    Lab Status:  Final result Specimen:  Blood from Arm, Right Updated:  09/07/20 1533     WBC 5 77 Thousand/uL      RBC 4 38 Million/uL      Hemoglobin 11 3 g/dL      Hematocrit 36 0 %      MCV 82 fL      MCH 25 8 pg      MCHC 31 4 g/dL      RDW 15 6 %      MPV 10 7 fL      Platelets 263 Thousands/uL      nRBC 0 /100 WBCs      Neutrophils Relative 65 %      Immat GRANS % 0 %      Lymphocytes Relative 24 %      Monocytes Relative 7 %      Eosinophils Relative 3 %      Basophils Relative 1 %      Neutrophils Absolute 3 75 Thousands/µL      Immature Grans Absolute 0 01 Thousand/uL      Lymphocytes Absolute 1 40 Thousands/µL      Monocytes Absolute 0 38 Thousand/µL      Eosinophils Absolute 0 15 Thousand/µL      Basophils Absolute 0 08 Thousands/µL                  XR shoulder 2+ views LEFT   Final Result by Edyta Morrison MD (09/07 1524)      Normal left shoulder  Workstation performed: [id number]         XR ribs with pa chest min 3 views LEFT   Final Result by Edyta Morrison MD (09/07 1529)      No active cardiopulmonary disease  No evidence of rib fractures  Workstation performed: [id number]         CT head without contrast   Final Result by Ryley Anne MD (09/07 1516)      No acute intracranial abnormality  Workstation performed: [id number]         CT spine cervical without contrast   Final Result by Ryley Anne MD (09/07 1520)      No cervical spine fracture or traumatic malalignment  Workstation performed: [id number]               Procedures  Procedures      ED Course                                           MDM  Number of Diagnoses or Management Options  Headache:   Left shoulder pain:   Rib pain on left side:   Diagnosis management comments: 50-year-old female presented to the emergency department for evaluation of left shoulder pain after a fall  On arrival the patient was awake, alert, oriented, GCS 15 and in no acute distress  Patient's vital signs were stable    Workup in the emergency department was unremarkable including no acute injuries identified on imaging  The patient was placed in a splint for her comfort with recommendation to follow with her PCP for continued symptoms and recommendation to follow up with her cardiologist regarding her warfarin dose  Return precautions were discussed  Patient agrees with the plan for discharge and feels comfortable to go home with proper f/u  Advised to return for worsening or additional problems  The patient understands instructions and will follow up as directed  Disposition  Final diagnoses:   Left shoulder pain   Rib pain on left side   Headache     Time reflects when diagnosis was documented in both MDM as applicable and the Disposition within this note     Time User Action Codes Description Comment    9/7/2020  4:03 PM Khoi Butt [H77 569] Left shoulder pain     9/7/2020  4:03 PM Sibyl Kanner Add [R07 81] Rib pain on left side     9/7/2020  4:03 PM Sibyl Kanner Add [R51] Headache       ED Disposition     ED Disposition Condition Date/Time Comment    Discharge Stable Mon Sep 7, 2020  4:03 PM Florecita Ramirez discharge to home/self care              Follow-up Information     Follow up With Specialties Details Why Contact Info Additional Information    Gómez Vargas MD Family Medicine, Obstetrics and Gynecology, Obstetrics, Gynecology Schedule an appointment as soon as possible for a visit  As needed [address]       Albert Bella MD Cardiology Call in 1 day  [place] [address]  [phone]       [address] [place] [address] Emergency Department Emergency Medicine Go to  If symptoms worsen [address] [place]  [phone]  ED, [address] [place] [address], [address]   [phone]          Discharge Medication List as of 9/7/2020  4:07 PM      CONTINUE these medications which have NOT CHANGED    Details   Accu-Chek FastClix Lancets MISC USE AS INSTRUCTED, Normal      acetaminophen (TYLENOL) 500 mg tablet Take 2 tablets by mouth daily as needed for pain, Normal      albuterol (Ventolin HFA) 90 mcg/act inhaler Inhale 2 puffs every 6 (six) hours as needed for wheezing, Starting Fri 7/31/2020, Normal      Blood Glucose Monitoring Suppl (ACCU-CHEK EDILMA PLUS) w/Device KIT by Does not apply route 2 (two) times a day, Starting Mon 3/30/2020, Normal      cetirizine (ZyrTEC) 10 mg tablet Take 1 tablet (10 mg total) by mouth daily, Starting Mon 4/6/2020, Normal      fluticasone (FLONASE) 50 mcg/act nasal spray 1 spray into each nostril daily, Starting Mon 4/6/2020, Normal      fluticasone (FLOVENT HFA) 110 MCG/ACT inhaler Inhale 2 puffs 2 (two) times a day Rinse mouth after use , Starting Tue 7/28/2020, Normal      glucose blood (ACCU-CHEK SMARTVIEW) test strip USE DAILY AS INSTRUCTED, Normal      hydrOXYzine HCL (ATARAX) 25 mg tablet TAKE 1 TABLET (25 MG TOTAL) BY MOUTH EVERY 6 (SIX) HOURS AS NEEDED FOR ITCHING, Starting Fri 8/28/2020, Normal      KEPPRA 500 MG tablet Take 1 tablet (500 mg total) by mouth 3 (three) times a day, Starting Tue 3/10/2020, Normal      lisinopril (ZESTRIL) 2 5 mg tablet Take 2 5 mg by mouth daily, Historical Med      metoprolol tartrate (LOPRESSOR) 25 mg tablet Take 0 5 tablets (12 5 mg total) by mouth every 12 (twelve) hours, Starting Tue 2/11/2020, Normal      omeprazole (PriLOSEC) 40 MG capsule Take 1 capsule (40 mg total) by mouth daily, Starting Mon 4/6/2020, Normal      pioglitazone (ACTOS) 15 mg tablet Take 1 tablet (15 mg total) by mouth daily, Starting Mon 4/6/2020, Normal      simvastatin (ZOCOR) 10 mg tablet Take 1 tablet (10 mg total) by mouth daily at bedtime, Starting Thu 7/9/2020, Normal      torsemide (DEMADEX) 20 mg tablet Take 1 tablet (20 mg total) by mouth as needed (pt takes it every other day), Starting Mon 4/6/2020, Until Tue 4/6/2021, Normal      !! warfarin (COUMADIN) 2 5 mg tablet Take 1 tablet (2 5 mg total) by mouth daily, Starting Mon 4/6/2020, Normal      !! warfarin (COUMADIN) 5 mg tablet TAKE ONE TABLET BY MOUTH DAILY, Normal      co-enzyme Q-10 50 MG capsule Take 1 capsule (50 mg total) by mouth daily, Starting Thu 7/9/2020, Normal      ketotifen (ZADITOR) 0 025 % ophthalmic solution Administer 1 drop to both eyes 2 (two) times a day, Starting Mon 4/6/2020, Normal      metFORMIN (GLUCOPHAGE) 500 mg tablet TAKE ONE TABLET BY MOUTH THREE TIMES A DAY, Normal       !! - Potential duplicate medications found  Please discuss with provider  No discharge procedures on file  PDMP Review     None           ED Provider  Attending physically available and evaluated Nona Kodak MEI managed the patient along with the ED Attending      Electronically Signed by         Maggi Negron MD  09/07/20 5563

## 2020-09-07 NOTE — DISCHARGE INSTRUCTIONS
Protime 34 4   INR 3 44   Call Cardiology tomorrow to see if they want to make adjustments to your Coumadin dosing

## 2020-09-07 NOTE — ED NOTES
Patient transported to [facility] [place] [place], RN  09/07/20 3933

## 2020-09-07 NOTE — ED ATTENDING ATTESTATION
9/7/2020  I, Inna Gatica MD, saw and evaluated the patient  I have discussed the patient with the resident/non-physician practitioner and agree with the resident's/non-physician practitioner's findings, Plan of Care, and MDM as documented in the resident's/non-physician practitioner's note, except where noted  All available labs and Radiology studies were reviewed  I was present for key portions of any procedure(s) performed by the resident/non-physician practitioner and I was immediately available to provide assistance  At this point I agree with the current assessment done in the Emergency Department    I have conducted an independent evaluation of this patient a history and physical is as follows:  Lavonne Oneal 2 days ago and hurt left shoulder    No head strike    On coumadin  patient has had a mild headache intermittently she also complains of some mild pain or left leg she has been able walk without difficulty  No neck pain   Has a bruise to the left shoulder   Using tylenol and ice   Exam Janis coma 15 HEENT normocephalic atraumatic pupils equal round react to light neck nontender full range of motion lungs clear chest wall mild tenderness in the posterior lateral aspect left chest wall no bruising noted there heart is regular mechanical heart valve sounds noted abdomen soft nontender pelvis stable mild tenderness over left thigh good range of motion of her hip joints bruise noted over her anterior aspect of her left shoulder full range of motion of the shoulder neurovascular status intact neurologic exam nonfocal  Impression fall on Coumadin  CT head x-ray left shoulder and chest x-ray  INR  Tylenol  ED Course         Critical Care Time  Procedures

## 2020-09-08 ENCOUNTER — ANTICOAG VISIT (OUTPATIENT)
Dept: CARDIOLOGY CLINIC | Facility: CLINIC | Age: 60
End: 2020-09-08

## 2020-09-08 DIAGNOSIS — Z95.2 HISTORY OF PROSTHETIC HEART VALVE: ICD-10-CM

## 2020-10-01 ENCOUNTER — HOSPITAL ENCOUNTER (OUTPATIENT)
Dept: NON INVASIVE DIAGNOSTICS | Facility: CLINIC | Age: 60
Discharge: HOME | End: 2020-10-01
Payer: COMMERCIAL

## 2020-10-01 DIAGNOSIS — I65.22 ASYMPTOMATIC STENOSIS OF LEFT CAROTID ARTERY: ICD-10-CM

## 2020-10-01 PROCEDURE — 93880 EXTRACRANIAL BILAT STUDY: CPT

## 2020-10-01 PROCEDURE — 93880 EXTRACRANIAL BILAT STUDY: CPT | Performed by: SURGERY

## 2020-10-05 ENCOUNTER — TELEPHONE (OUTPATIENT)
Dept: CARDIOLOGY CLINIC | Facility: CLINIC | Age: 60
End: 2020-10-05

## 2020-10-20 DIAGNOSIS — E11.9 TYPE 2 DIABETES MELLITUS WITHOUT COMPLICATION, WITHOUT LONG-TERM CURRENT USE OF INSULIN (HCC): ICD-10-CM

## 2020-10-23 DIAGNOSIS — J45.20 MILD INTERMITTENT ASTHMA WITHOUT COMPLICATION: ICD-10-CM

## 2020-10-23 DIAGNOSIS — E08.610 CONTROLLED DIABETES MELLITUS DUE TO UNDERLYING CONDITION WITH DIABETIC NEUROPATHIC ARTHROPATHY, WITHOUT LONG-TERM CURRENT USE OF INSULIN (HCC): ICD-10-CM

## 2020-10-23 RX ORDER — DEXAMETHASONE 4 MG/1
TABLET ORAL
Qty: 1 INHALER | Refills: 1 | Status: SHIPPED | OUTPATIENT
Start: 2020-10-23 | End: 2020-12-31

## 2020-10-23 RX ORDER — PIOGLITAZONEHYDROCHLORIDE 15 MG/1
TABLET ORAL
Qty: 90 TABLET | Refills: 0 | Status: SHIPPED | OUTPATIENT
Start: 2020-10-23 | End: 2021-02-01 | Stop reason: SDUPTHER

## 2020-10-23 RX ORDER — ALBUTEROL SULFATE 90 UG/1
AEROSOL, METERED RESPIRATORY (INHALATION)
Qty: 18 INHALER | Refills: 0 | Status: SHIPPED | OUTPATIENT
Start: 2020-10-23 | End: 2021-09-23 | Stop reason: SDUPTHER

## 2020-10-26 ENCOUNTER — TELEPHONE (OUTPATIENT)
Dept: NEUROLOGY | Facility: CLINIC | Age: 60
End: 2020-10-26

## 2020-10-29 ENCOUNTER — LAB (OUTPATIENT)
Dept: LAB | Facility: HOSPITAL | Age: 60
End: 2020-10-29
Attending: INTERNAL MEDICINE
Payer: COMMERCIAL

## 2020-10-29 ENCOUNTER — ANTICOAG VISIT (OUTPATIENT)
Dept: CARDIOLOGY CLINIC | Facility: CLINIC | Age: 60
End: 2020-10-29

## 2020-10-29 DIAGNOSIS — Z95.2 HISTORY OF PROSTHETIC HEART VALVE: ICD-10-CM

## 2020-10-29 LAB
INR PPP: 3.71 (ref 0.84–1.19)
PROTHROMBIN TIME: 36.5 SECONDS (ref 11.6–14.5)

## 2020-10-29 PROCEDURE — 36415 COLL VENOUS BLD VENIPUNCTURE: CPT

## 2020-10-29 PROCEDURE — 85610 PROTHROMBIN TIME: CPT

## 2020-10-30 DIAGNOSIS — Z79.4 TYPE 2 DIABETES MELLITUS WITH COMPLICATION, WITH LONG-TERM CURRENT USE OF INSULIN (HCC): ICD-10-CM

## 2020-10-30 DIAGNOSIS — E11.8 TYPE 2 DIABETES MELLITUS WITH COMPLICATION, WITH LONG-TERM CURRENT USE OF INSULIN (HCC): ICD-10-CM

## 2020-11-02 ENCOUNTER — OFFICE VISIT (OUTPATIENT)
Dept: FAMILY MEDICINE CLINIC | Facility: CLINIC | Age: 60
End: 2020-11-02

## 2020-11-02 VITALS
RESPIRATION RATE: 18 BRPM | SYSTOLIC BLOOD PRESSURE: 114 MMHG | HEIGHT: 60 IN | TEMPERATURE: 97.4 F | WEIGHT: 244.4 LBS | HEART RATE: 70 BPM | BODY MASS INDEX: 47.98 KG/M2 | DIASTOLIC BLOOD PRESSURE: 70 MMHG

## 2020-11-02 DIAGNOSIS — Z79.4 TYPE 2 DIABETES MELLITUS WITH COMPLICATION, WITH LONG-TERM CURRENT USE OF INSULIN (HCC): Primary | ICD-10-CM

## 2020-11-02 DIAGNOSIS — Z79.4 TYPE 2 DIABETES MELLITUS WITH COMPLICATION, WITH LONG-TERM CURRENT USE OF INSULIN (HCC): ICD-10-CM

## 2020-11-02 DIAGNOSIS — E78.5 DYSLIPIDEMIA: ICD-10-CM

## 2020-11-02 DIAGNOSIS — M79.604 LEG PAIN, BILATERAL: ICD-10-CM

## 2020-11-02 DIAGNOSIS — E08.610 CONTROLLED DIABETES MELLITUS DUE TO UNDERLYING CONDITION WITH DIABETIC NEUROPATHIC ARTHROPATHY, WITHOUT LONG-TERM CURRENT USE OF INSULIN (HCC): ICD-10-CM

## 2020-11-02 DIAGNOSIS — Z23 ENCOUNTER FOR IMMUNIZATION: ICD-10-CM

## 2020-11-02 DIAGNOSIS — M79.604 BILATERAL LEG PAIN: ICD-10-CM

## 2020-11-02 DIAGNOSIS — M79.605 LEG PAIN, BILATERAL: ICD-10-CM

## 2020-11-02 DIAGNOSIS — E11.9 TYPE 2 DIABETES MELLITUS WITHOUT COMPLICATION, WITHOUT LONG-TERM CURRENT USE OF INSULIN (HCC): ICD-10-CM

## 2020-11-02 DIAGNOSIS — E11.8 TYPE 2 DIABETES MELLITUS WITH COMPLICATION, WITH LONG-TERM CURRENT USE OF INSULIN (HCC): Primary | ICD-10-CM

## 2020-11-02 DIAGNOSIS — E11.8 TYPE 2 DIABETES MELLITUS WITH COMPLICATION, WITH LONG-TERM CURRENT USE OF INSULIN (HCC): ICD-10-CM

## 2020-11-02 DIAGNOSIS — M79.605 BILATERAL LEG PAIN: ICD-10-CM

## 2020-11-02 LAB — SL AMB POCT HEMOGLOBIN AIC: 6.4 (ref ?–6.5)

## 2020-11-02 PROCEDURE — 83036 HEMOGLOBIN GLYCOSYLATED A1C: CPT | Performed by: FAMILY MEDICINE

## 2020-11-02 PROCEDURE — 99214 OFFICE O/P EST MOD 30 MIN: CPT | Performed by: FAMILY MEDICINE

## 2020-11-02 PROCEDURE — 3044F HG A1C LEVEL LT 7.0%: CPT | Performed by: FAMILY MEDICINE

## 2020-11-02 PROCEDURE — 1036F TOBACCO NON-USER: CPT | Performed by: FAMILY MEDICINE

## 2020-11-02 PROCEDURE — 3008F BODY MASS INDEX DOCD: CPT | Performed by: FAMILY MEDICINE

## 2020-11-02 PROCEDURE — 90682 RIV4 VACC RECOMBINANT DNA IM: CPT | Performed by: FAMILY MEDICINE

## 2020-11-02 PROCEDURE — 90471 IMMUNIZATION ADMIN: CPT | Performed by: FAMILY MEDICINE

## 2020-11-02 RX ORDER — LANCETS
EACH MISCELLANEOUS
Qty: 100 EACH | Refills: 5 | Status: SHIPPED | OUTPATIENT
Start: 2020-11-02 | End: 2021-07-06

## 2020-11-02 RX ORDER — BLOOD SUGAR DIAGNOSTIC
STRIP MISCELLANEOUS
Qty: 100 EACH | Refills: 11 | Status: SHIPPED | OUTPATIENT
Start: 2020-11-02

## 2020-11-02 RX ORDER — BLOOD SUGAR DIAGNOSTIC
STRIP MISCELLANEOUS
Qty: 100 EACH | Refills: 10 | Status: SHIPPED | OUTPATIENT
Start: 2020-11-02 | End: 2020-11-03

## 2020-11-03 RX ORDER — BLOOD SUGAR DIAGNOSTIC
STRIP MISCELLANEOUS
Qty: 100 EACH | Refills: 10 | Status: SHIPPED | OUTPATIENT
Start: 2020-11-03 | End: 2021-11-26

## 2020-11-06 DIAGNOSIS — L50.0 ALLERGIC URTICARIA: ICD-10-CM

## 2020-11-06 RX ORDER — HYDROXYZINE HYDROCHLORIDE 25 MG/1
TABLET, FILM COATED ORAL
Qty: 60 TABLET | Refills: 1 | Status: SHIPPED | OUTPATIENT
Start: 2020-11-06 | End: 2020-12-31

## 2020-11-10 ENCOUNTER — TELEPHONE (OUTPATIENT)
Dept: FAMILY MEDICINE CLINIC | Facility: CLINIC | Age: 60
End: 2020-11-10

## 2020-11-11 ENCOUNTER — TELEPHONE (OUTPATIENT)
Dept: FAMILY MEDICINE CLINIC | Facility: CLINIC | Age: 60
End: 2020-11-11

## 2020-11-11 ENCOUNTER — TRANSCRIBE ORDERS (OUTPATIENT)
Dept: RADIOLOGY | Facility: HOSPITAL | Age: 60
End: 2020-11-11

## 2020-11-11 ENCOUNTER — ANTICOAG VISIT (OUTPATIENT)
Dept: CARDIOLOGY CLINIC | Facility: CLINIC | Age: 60
End: 2020-11-11

## 2020-11-11 ENCOUNTER — HOSPITAL ENCOUNTER (OUTPATIENT)
Dept: RADIOLOGY | Facility: HOSPITAL | Age: 60
Discharge: HOME | End: 2020-11-11
Attending: FAMILY MEDICINE
Payer: COMMERCIAL

## 2020-11-11 ENCOUNTER — APPOINTMENT (OUTPATIENT)
Dept: LAB | Facility: HOSPITAL | Age: 60
End: 2020-11-11
Attending: FAMILY MEDICINE
Payer: COMMERCIAL

## 2020-11-11 DIAGNOSIS — M79.604 LEG PAIN, BILATERAL: ICD-10-CM

## 2020-11-11 DIAGNOSIS — E11.8 TYPE 2 DIABETES MELLITUS WITH COMPLICATION, WITH LONG-TERM CURRENT USE OF INSULIN (HCC): ICD-10-CM

## 2020-11-11 DIAGNOSIS — Z95.2 HISTORY OF PROSTHETIC HEART VALVE: ICD-10-CM

## 2020-11-11 DIAGNOSIS — Z79.4 TYPE 2 DIABETES MELLITUS WITH COMPLICATION, WITH LONG-TERM CURRENT USE OF INSULIN (HCC): ICD-10-CM

## 2020-11-11 DIAGNOSIS — M79.605 LEG PAIN, BILATERAL: ICD-10-CM

## 2020-11-11 LAB
ALBUMIN SERPL BCP-MCNC: 3.4 G/DL (ref 3.5–5)
ALP SERPL-CCNC: 153 U/L (ref 46–116)
ALT SERPL W P-5'-P-CCNC: 18 U/L (ref 12–78)
ANION GAP SERPL CALCULATED.3IONS-SCNC: 5 MMOL/L (ref 4–13)
AST SERPL W P-5'-P-CCNC: 20 U/L (ref 5–45)
BILIRUB SERPL-MCNC: 0.58 MG/DL (ref 0.2–1)
BUN SERPL-MCNC: 13 MG/DL (ref 5–25)
CALCIUM ALBUM COR SERPL-MCNC: 9.7 MG/DL (ref 8.3–10.1)
CALCIUM SERPL-MCNC: 9.2 MG/DL (ref 8.3–10.1)
CHLORIDE SERPL-SCNC: 108 MMOL/L (ref 100–108)
CO2 SERPL-SCNC: 30 MMOL/L (ref 21–32)
CREAT SERPL-MCNC: 0.96 MG/DL (ref 0.6–1.3)
GFR SERPL CREATININE-BSD FRML MDRD: 64 ML/MIN/1.73SQ M
GLUCOSE SERPL-MCNC: 161 MG/DL (ref 65–140)
NT-PROBNP SERPL-MCNC: 482 PG/ML
POTASSIUM SERPL-SCNC: 3.8 MMOL/L (ref 3.5–5.3)
PROT SERPL-MCNC: 7.3 G/DL (ref 6.4–8.2)
SODIUM SERPL-SCNC: 143 MMOL/L (ref 136–145)

## 2020-11-11 PROCEDURE — 80053 COMPREHEN METABOLIC PANEL: CPT

## 2020-11-11 PROCEDURE — 72100 X-RAY EXAM L-S SPINE 2/3 VWS: CPT

## 2020-11-11 PROCEDURE — 83880 ASSAY OF NATRIURETIC PEPTIDE: CPT

## 2020-11-18 ENCOUNTER — HOSPITAL ENCOUNTER (OUTPATIENT)
Dept: NON INVASIVE DIAGNOSTICS | Facility: HOSPITAL | Age: 60
Discharge: HOME | End: 2020-11-18
Attending: FAMILY MEDICINE
Payer: COMMERCIAL

## 2020-11-18 DIAGNOSIS — M79.604 LEG PAIN, BILATERAL: ICD-10-CM

## 2020-11-18 DIAGNOSIS — M79.605 LEG PAIN, BILATERAL: ICD-10-CM

## 2020-11-18 PROCEDURE — 93925 LOWER EXTREMITY STUDY: CPT

## 2020-11-18 PROCEDURE — 93925 LOWER EXTREMITY STUDY: CPT | Performed by: SURGERY

## 2020-11-18 PROCEDURE — 93923 UPR/LXTR ART STDY 3+ LVLS: CPT

## 2020-11-18 PROCEDURE — 93922 UPR/L XTREMITY ART 2 LEVELS: CPT | Performed by: SURGERY

## 2020-11-20 DIAGNOSIS — M79.604 BILATERAL LEG PAIN: Primary | ICD-10-CM

## 2020-11-20 DIAGNOSIS — M79.605 BILATERAL LEG PAIN: Primary | ICD-10-CM

## 2020-12-11 ENCOUNTER — ANTICOAG VISIT (OUTPATIENT)
Dept: CARDIOLOGY CLINIC | Facility: CLINIC | Age: 60
End: 2020-12-11

## 2020-12-11 ENCOUNTER — LAB (OUTPATIENT)
Dept: LAB | Facility: HOSPITAL | Age: 60
End: 2020-12-11
Attending: INTERNAL MEDICINE
Payer: COMMERCIAL

## 2020-12-11 DIAGNOSIS — Z95.2 HISTORY OF PROSTHETIC HEART VALVE: ICD-10-CM

## 2020-12-18 ENCOUNTER — TELEPHONE (OUTPATIENT)
Dept: VASCULAR SURGERY | Facility: CLINIC | Age: 60
End: 2020-12-18

## 2020-12-21 ENCOUNTER — CONSULT (OUTPATIENT)
Dept: VASCULAR SURGERY | Facility: CLINIC | Age: 60
End: 2020-12-21
Payer: COMMERCIAL

## 2020-12-21 VITALS
BODY MASS INDEX: 48.69 KG/M2 | HEIGHT: 60 IN | RESPIRATION RATE: 18 BRPM | SYSTOLIC BLOOD PRESSURE: 128 MMHG | HEART RATE: 67 BPM | TEMPERATURE: 97.6 F | DIASTOLIC BLOOD PRESSURE: 68 MMHG | WEIGHT: 248 LBS

## 2020-12-21 DIAGNOSIS — M79.604 BILATERAL LEG PAIN: ICD-10-CM

## 2020-12-21 DIAGNOSIS — E66.01 MORBID OBESITY WITH BMI OF 45.0-49.9, ADULT (HCC): ICD-10-CM

## 2020-12-21 DIAGNOSIS — R60.0 BILATERAL LOWER EXTREMITY EDEMA: Primary | ICD-10-CM

## 2020-12-21 DIAGNOSIS — Z95.2 H/O MITRAL VALVE REPLACEMENT WITH MECHANICAL VALVE: ICD-10-CM

## 2020-12-21 DIAGNOSIS — M79.605 BILATERAL LEG PAIN: ICD-10-CM

## 2020-12-21 DIAGNOSIS — I89.0 LYMPHEDEMA OF BOTH LOWER EXTREMITIES: ICD-10-CM

## 2020-12-21 DIAGNOSIS — I07.1 TRICUSPID VALVE INSUFFICIENCY, UNSPECIFIED ETIOLOGY: ICD-10-CM

## 2020-12-21 DIAGNOSIS — I73.9 PAD (PERIPHERAL ARTERY DISEASE) (HCC): ICD-10-CM

## 2020-12-21 DIAGNOSIS — M25.571 ACUTE RIGHT ANKLE PAIN: ICD-10-CM

## 2020-12-21 PROCEDURE — 3008F BODY MASS INDEX DOCD: CPT | Performed by: FAMILY MEDICINE

## 2020-12-21 PROCEDURE — 99244 OFF/OP CNSLTJ NEW/EST MOD 40: CPT | Performed by: SURGERY

## 2020-12-21 PROCEDURE — 1036F TOBACCO NON-USER: CPT | Performed by: SURGERY

## 2020-12-21 PROCEDURE — 3008F BODY MASS INDEX DOCD: CPT | Performed by: SURGERY

## 2020-12-28 ENCOUNTER — TELEMEDICINE (OUTPATIENT)
Dept: FAMILY MEDICINE CLINIC | Facility: CLINIC | Age: 60
End: 2020-12-28

## 2020-12-28 DIAGNOSIS — R09.81 HEAD CONGESTION: Primary | ICD-10-CM

## 2020-12-28 PROCEDURE — 99213 OFFICE O/P EST LOW 20 MIN: CPT | Performed by: FAMILY MEDICINE

## 2020-12-28 PROCEDURE — 1036F TOBACCO NON-USER: CPT | Performed by: FAMILY MEDICINE

## 2020-12-28 RX ORDER — GUAIFENESIN 600 MG
1200 TABLET, EXTENDED RELEASE 12 HR ORAL EVERY 12 HOURS SCHEDULED
Qty: 28 TABLET | Refills: 0 | Status: SHIPPED | OUTPATIENT
Start: 2020-12-28 | End: 2021-01-04

## 2020-12-30 ENCOUNTER — OFFICE VISIT (OUTPATIENT)
Dept: URGENT CARE | Age: 60
End: 2020-12-30
Payer: COMMERCIAL

## 2020-12-30 VITALS — OXYGEN SATURATION: 100 % | TEMPERATURE: 97.1 F | HEART RATE: 77 BPM

## 2020-12-30 DIAGNOSIS — E11.9 TYPE 2 DIABETES MELLITUS WITHOUT COMPLICATION, WITHOUT LONG-TERM CURRENT USE OF INSULIN (HCC): ICD-10-CM

## 2020-12-30 DIAGNOSIS — Z20.822 COVID-19 RULED OUT: Primary | ICD-10-CM

## 2020-12-30 DIAGNOSIS — J01.00 ACUTE MAXILLARY SINUSITIS, RECURRENCE NOT SPECIFIED: ICD-10-CM

## 2020-12-30 DIAGNOSIS — L50.0 ALLERGIC URTICARIA: ICD-10-CM

## 2020-12-30 DIAGNOSIS — J06.9 UPPER RESPIRATORY TRACT INFECTION, UNSPECIFIED TYPE: ICD-10-CM

## 2020-12-30 DIAGNOSIS — J45.20 MILD INTERMITTENT ASTHMA WITHOUT COMPLICATION: ICD-10-CM

## 2020-12-30 PROCEDURE — 99213 OFFICE O/P EST LOW 20 MIN: CPT | Performed by: NURSE PRACTITIONER

## 2020-12-30 PROCEDURE — U0003 INFECTIOUS AGENT DETECTION BY NUCLEIC ACID (DNA OR RNA); SEVERE ACUTE RESPIRATORY SYNDROME CORONAVIRUS 2 (SARS-COV-2) (CORONAVIRUS DISEASE [COVID-19]), AMPLIFIED PROBE TECHNIQUE, MAKING USE OF HIGH THROUGHPUT TECHNOLOGIES AS DESCRIBED BY CMS-2020-01-R: HCPCS | Performed by: NURSE PRACTITIONER

## 2020-12-30 RX ORDER — AMOXICILLIN AND CLAVULANATE POTASSIUM 875; 125 MG/1; MG/1
1 TABLET, FILM COATED ORAL EVERY 12 HOURS SCHEDULED
Qty: 14 TABLET | Refills: 0 | Status: SHIPPED | OUTPATIENT
Start: 2020-12-30 | End: 2021-01-06

## 2020-12-31 RX ORDER — HYDROXYZINE HYDROCHLORIDE 25 MG/1
TABLET, FILM COATED ORAL
Qty: 60 TABLET | Refills: 0 | Status: SHIPPED | OUTPATIENT
Start: 2020-12-31 | End: 2021-01-28

## 2020-12-31 RX ORDER — DEXAMETHASONE 4 MG/1
TABLET ORAL
Qty: 3 INHALER | Refills: 3 | Status: SHIPPED | OUTPATIENT
Start: 2020-12-31 | End: 2021-09-23 | Stop reason: SDUPTHER

## 2021-01-01 LAB — SARS-COV-2 RNA SPEC QL NAA+PROBE: DETECTED

## 2021-01-02 ENCOUNTER — TELEPHONE (OUTPATIENT)
Dept: URGENT CARE | Age: 61
End: 2021-01-02

## 2021-01-07 ENCOUNTER — TELEMEDICINE (OUTPATIENT)
Dept: FAMILY MEDICINE CLINIC | Facility: CLINIC | Age: 61
End: 2021-01-07

## 2021-01-07 DIAGNOSIS — U07.1 COVID-19 VIRUS INFECTION: Primary | ICD-10-CM

## 2021-01-07 PROCEDURE — 99213 OFFICE O/P EST LOW 20 MIN: CPT | Performed by: FAMILY MEDICINE

## 2021-01-07 NOTE — PROGRESS NOTES
COVID-19 Virtual Visit     Assessment/Plan:    Problem List Items Addressed This Visit        Other    COVID-19 virus infection - Primary     Improving, advice to continue supportive care, daily follow up  Call, ER parameters discussed with patient  Disposition:     I have spent 10 minutes directly with the patient  Greater than 50% of this time was spent in counseling/coordination of care regarding: diagnostic results, prognosis, risks and benefits of treatment options, instructions for management, patient and family education and impressions  Encounter provider Paige Henderson MD    Provider located at [address]  [address] [place] [id number] [phone]    Recent Visits  No visits were found meeting these conditions  Showing recent visits within past 7 days and meeting all other requirements     Today's Visits  Date Type Provider Dept   01/07/21 Telemedicine Paige Henderson MD  [facility]   Showing today's visits and meeting all other requirements     Future Appointments  No visits were found meeting these conditions  Showing future appointments within next 150 days and meeting all other requirements      This virtual check-in was done via Microsoft Teams and patient was informed that this is a secure, HIPAA-compliant platform  She agrees to proceed  Patient agrees to participate in a virtual check in via telephone or video visit instead of presenting to the office to address urgent/immediate medical needs  Patient is aware this is a billable service  After connecting through [facility], the patient was identified by name and date of birth  Kieran Solomon was informed that this was a telemedicine visit and that the exam was being conducted confidentially over secure lines  No one else was in the room  Kieran Solomon acknowledged consent and understanding of privacy and security of the telemedicine visit   I informed the patient that I have reviewed her record in Epic and presented the opportunity for her to ask any questions regarding the visit today  The patient agreed to participate  Subjective:   Carolyn Manzano is a 57 y o  female who has been screened for COVID-19  Symptom change since last report: improving  Patient's symptoms include sore throat, cough and shortness of breath  Ethel Benjamin has been staying home and has isolated themselves in her home  She is taking care to not share personal items and is cleaning all surfaces that are touched often, like counters, tabletops, and doorknobs using household cleaning sprays or wipes  She is wearing a mask when she leaves her room  Date of symptom onset: 12/29/2020  Date of exposure: 12/23/2020  Date of positive COVID-19 PCR: 12/30/2020    Patient was exposed to her friend who was in the hospital for 1 week due to Gil  Patient is at home by herself  Doing ok  Advice patient that we do daily video visit for our patient with COVID so we will see her again tomorrow  At that point, we can decide whether she needs to be seen over the weekend or not  Continue isolation for total of 10 days since Dec 30th until Jan 10th      Lab Results   Component Value Date    SARSCOV2 Detected (A) 12/30/2020     Past Medical History:   Diagnosis Date    Abnormal ultrasound of pelvis     last assessed - [id number]    Asthma     Asthma, persistent 8/1/2014    Cerebral infarction ([facility] [address] )     unspecified; last assessed - [id number]    Dizziness     last assessed - [id number]    Dysuria     last assessed - [id number]    Exposure to potentially hazardous body fluids     last assessed - [id number]    Heart disease     Hematoma     of arm; last assessed - [id number]    Mitral valve stenosis     Personal history of scoliosis     Pneumonia     Scar, hypertrophic     last assessed - [id number]    Seizures ([facility] [address] )     Type 2 diabetes mellitus ([facility] [address] )     Vision problems      Past Surgical History:   Procedure Laterality Date    BREAST BIOPSY Left     many years ago in [place]-pt not really sure    MITRAL VALVE REPLACEMENT  06/17/2015    MVR with 27 mm [employer] Optiform mechanical prostethic;  last assessed - [id number]    TONSILLECTOMY      TRANSLUMINAL ANGIOPLASTY      mitral valve; last assessed - 01Aug2014    TUBAL LIGATION  1986    last assessed - 01Aug2014     Current Outpatient Medications   Medication Sig Dispense Refill    Accu-Chek FastClix Lancets MISC USE AS INSTRUCTED 100 each 5    acetaminophen (TYLENOL) 500 mg tablet Take 2 tablets by mouth daily as needed for pain 180 tablet 3    albuterol (PROVENTIL HFA,VENTOLIN HFA) 90 mcg/act inhaler INHALE TWO PUFFS EVERY 6 (SIX) HOURS AS NEEDED FOR WHEEZING 18 Inhaler 0    Blood Glucose Monitoring Suppl (ACCU-CHEK EDILMA PLUS) w/Device KIT by Does not apply route 2 (two) times a day 1 kit 0    cetirizine (ZyrTEC) 10 mg tablet Take 1 tablet (10 mg total) by mouth daily 90 tablet 3    co-enzyme Q-10 50 MG capsule Take 1 capsule (50 mg total) by mouth daily (Patient not taking: Reported on 8/13/2020) 90 capsule 1    Flovent  MCG/ACT inhaler INHALE TWO PUFFS TWO TIMES A DAY RINSE MOUTH AFTER USE  3 Inhaler 3    fluticasone (FLONASE) 50 mcg/act nasal spray 1 spray into each nostril daily 16 g 1    glucose blood (Accu-Chek SmartView) test strip USE DAILY AS INSTRUCTED 100 each 11    hydrOXYzine HCL (ATARAX) 25 mg tablet TAKE ONE TABLET BY MOUTH EVERY 6 HOURS AS NEEDED FOR ITCHING 60 tablet 0    KEPPRA 500 MG tablet Take 1 tablet (500 mg total) by mouth 3 (three) times a day 270 tablet 3    ketotifen (ZADITOR) 0 025 % ophthalmic solution Administer 1 drop to both eyes 2 (two) times a day 5 mL 3    lisinopril (ZESTRIL) 2 5 mg tablet Take 2 5 mg by mouth daily      metFORMIN (GLUCOPHAGE) 500 mg tablet TAKE ONE TABLET BY MOUTH THREE TIMES A DAY 90 tablet 0    metoprolol tartrate (LOPRESSOR) 25 mg tablet Take 0 5 tablets (12 5 mg total) by mouth every 12 (twelve) hours 90 tablet 3    omeprazole (PriLOSEC) 40 MG capsule Take 1 capsule (40 mg total) by mouth daily 90 capsule 4    OneTouch Verio test strip USE DAILY AS INSTRUCTED 100 each 10    pioglitazone (ACTOS) 15 mg tablet TAKE ONE TABLET BY MOUTH DAILY 90 tablet 0    simvastatin (ZOCOR) 10 mg tablet Take 1 tablet (10 mg total) by mouth daily at bedtime 90 tablet 3    torsemide (DEMADEX) 20 mg tablet Take 1 tablet (20 mg total) by mouth as needed (pt takes it every other day) 90 tablet 1    warfarin (COUMADIN) 2 5 mg tablet Take 1 tablet (2 5 mg total) by mouth daily 90 tablet 0    warfarin (COUMADIN) 5 mg tablet TAKE ONE TABLET BY MOUTH DAILY 90 tablet 1     No current facility-administered medications for this visit  Allergies   Allergen Reactions    Codeine     Prednisone Itching    Codeine Polt-Chlorphen Polt Er Irritability, Itching and Rash       Review of Systems   HENT: Positive for sore throat  Respiratory: Positive for cough and shortness of breath  Negative for wheezing  Objective: There were no vitals filed for this visit  Physical Exam  Constitutional:       Appearance: Normal appearance  Pulmonary:      Effort: Pulmonary effort is normal    Neurological:      General: No focal deficit present  Mental Status: She is alert  Psychiatric:         Mood and Affect: Mood normal        VIRTUAL VISIT DISCLAIMER    Florecita Ramirez acknowledges that she has consented to an online visit or consultation  She understands that the online visit is based solely on information provided by her, and that, in the absence of a face-to-face physical evaluation by the physician, the diagnosis she receives is both limited and provisional in terms of accuracy and completeness  This is not intended to replace a full medical face-to-face evaluation by the physician  Jean Roman understands and accepts these terms

## 2021-01-07 NOTE — ASSESSMENT & PLAN NOTE
Improving, advice to continue supportive care, daily follow up  Call, ER parameters discussed with patient

## 2021-01-11 NOTE — PROGRESS NOTES
Assessment & Plan     Symptomatic varicose veins of both lower extremities  Discussed compression stockings - she states she was fitted at [facility] in the past, but they didn't fit and she couldn't get her money back.  States she can't tolerate the knee high, because they just push swelling higher, and can't tolerate the thigh high, because they are too tight and will roll down.   Suggest at least mild compression or Cristobal stockings.    Frequent elevation of legs when seated throughout the day.  Healthy diet, exercise as able, weight loss.    Suspect her symptoms are from symptomatic varicosities.  No sign of infection.  No masses or subcutaneous nodules (such as erythema nodosum).    Evaluate venous competency.  Symptomatic cares.  - US Lower Extremity Venous Competency Bilateral; Future    Pain  As above.        See Patient Instructions    CC'd PCP.    Marion Das MD  [facility] - REI Copeland is a 77 year old who presents to clinic today for the following health issues     HPI       Concern - leg problem   Onset: few months   Description: bilateral knees and lower legs   Intensity: moderate, severe  Progression of Symptoms:  same and intermittent  Accompanying Signs & Symptoms: burning sensation that starts in the knees and radiates down. Swelling and warmth in both legs that comes and goes. Internal bumps that are painful to the touch that wax and wane.  Previous history of similar problem: yes  Precipitating factors:        Worsened by: none   Alleviating factors:        Improved by: none   Therapies tried and outcome: lidocaine, ice and bengay    Had covid 10/2020    Left knee 9/2019 TKA, Dr Alonzo.  Planning right.  No surgery right knee yet.    Symptoms started in left, but now bilateral.    Lumps in anterior shins - tender.  No lumps posterior.  Seem more prominent end of day.  Wax/wane.  Legs get hot from knees down.  No rash or bruising.  No fevers.  No recent med  Jg Thomas 1960 female MRN: [id number]    Family Medicine Follow-up Visit    ASSESSMENT/PLAN  Fatigue  Lab in January was normal, but will recheck  Advice patient to try maintaining 3 meals daily, advice on what a healthy plate looks like (1/2 fruit, veggie, 1/4 grain, 1/4 good protein)  Patient agrees to try a healthier diet  Controlled diabetes mellitus due to underlying condition with diabetic neuropathic arthropathy (HCC)  Well controlled, recheck lab and urine  Asthma, persistent  ACT score in well controlled, no recent use of albuterol  Future Appointments  Date Time Provider Izabel Griselda   9/4/2018 11:30 AM Ian Regan MD [facility]          SUBJECTIVE  CC: Fatigue      HPI:  Too tired and lack of energy for 1 month,   Very sleepy during the day, patient said she sleep well at night  No chest pain, shortness of breath  Patient usually eat breakfast, but skip lunch and sometime dinner due to not feeling hungry  Declines any feeling of depression  Fatigue   Associated symptoms include fatigue  Review of Systems   Constitutional: Positive for activity change, appetite change and fatigue  HENT: Negative  Eyes: Negative  Respiratory: Negative  Cardiovascular: Negative  Gastrointestinal: Negative  Endocrine: Negative  Musculoskeletal: Negative  Skin: Negative  Neurological: Negative  Psychiatric/Behavioral: Negative          Historical Information   The patient history was reviewed as follows:    Past Medical History:   Diagnosis Date    Abnormal ultrasound of pelvis     last assessed - 26Mar2015    Asthma     Cerebral infarction [facility])     unspecified; last assessed - 30Aug2017    Dizziness     last assessed - [id number]    Dysuria     last assessed - 28Zsr1218    Exposure to potentially hazardous body fluids     last assessed - 72Kgd0560    Heart disease     Hematoma     of arm; last assessed - 20Jun2016    Personal history "changes.  No falls.   No dyspnea.   Only topical agents.      Review of Systems   Constitutional, HEENT, cardiovascular, pulmonary, gi and gu systems are negative, except as otherwise noted.      Objective    /54 (Patient Position: Sitting)   Pulse 69   Ht 1.651 m (5' 5\")   Wt 97.1 kg (214 lb)   SpO2 98%   BMI 35.61 kg/m    Body mass index is 35.61 kg/m .  Physical Exam   GENERAL: alert, no distress and obese  NECK: no adenopathy, no asymmetry, masses, or scars and thyroid normal to palpation  RESP: lungs clear to auscultation - no rales, rhonchi or wheezes  CV: regular rate and rhythm, normal S1 S2, no S3 or S4, no murmur, click or rub, no peripheral edema and peripheral pulses strong  MS: normal muscle tone and significant varicosities bilaterally, anterior shins; they are tender; non-tender posterior calf; no nodules palpated; no overlying skin changes/rashes/bruising  SKIN: no suspicious lesions or rashes  NEURO: Normal strength and tone, mentation intact and speech normal  PSYCH: mentation appears normal, affect normal/bright              " of scoliosis     Pneumonia     Scar, hypertrophic     last assessed - [id number]    Seizures ([facility] [address] )     Type 2 diabetes mellitus ([facility] [address] )     Vision problems      Past Surgical History:   Procedure Laterality Date    MITRAL VALVE REPLACEMENT  06/17/2015    MVR with 27 mm [employer] Optiform mechanical prostethic;  last assessed - [id number]    TONSILLECTOMY      TRANSLUMINAL ANGIOPLASTY      mitral valve; last assessed - 01Aug2014    TUBAL LIGATION  1986    last assessed - 01Aug2014     Family History   Problem Relation Age of Onset    Cancer Mother     Dementia Mother     Diabetes Mother     Hypertension Mother     Hyperlipidemia Mother     Cervical cancer Mother     Heart disease Father     Coronary artery disease Father     Hypertension Father     Heart attack Father     Diabetes Sister     Breast cancer Sister     Diabetes Brother     Hypertension Brother     Hyperlipidemia Brother     Diabetes Maternal Grandmother     Breast cancer Maternal Grandmother     Diabetes Other     Seizures Son     Psoriasis Son       Social History   History   Alcohol Use No     History   Drug Use No     History   Smoking Status    Never Smoker   Smokeless Tobacco    Never Used       Medications:     Current Outpatient Prescriptions:     acetaminophen (TYLENOL) 500 mg tablet, Take 2 tablets by mouth daily as needed for pain, Disp: 60 tablet, Rfl: 2    ALL DAY ALLERGY 10 MG tablet, TAKE ONE TABLET BY MOUTH DAILY AS DIRECTED, Disp: 30 tablet, Rfl: 5    fluticasone (FLONASE) 50 mcg/act nasal spray, 1 spray into each nostril daily, Disp: 16 g, Rfl: 1    fluticasone (FLOVENT HFA) 110 MCG/ACT inhaler, Inhale 2 puffs 2 (two) times a day, Disp: 1 Inhaler, Rfl: 5    glucose blood (ACCU-CHEK SMARTVIEW) test strip, Use daily as instructed, Disp: 100 each, Rfl: 5    hydrOXYzine HCL (ATARAX) 25 mg tablet, TAKE ONE TABLET BY MOUTH EVERY 6 HOURS AS NEEDED, Disp: 60 tablet, Rfl: 3    levETIRAcetam (KEPPRA) 500 mg tablet, Take 500 mg by mouth 3 (three) times a day, Disp: , Rfl:     metFORMIN (GLUCOPHAGE) 500 mg tablet, TAKE ONE TABLET BY MOUTH TWICE A DAY, Disp: 180 tablet, Rfl: 2    metoprolol tartrate (LOPRESSOR) 25 mg tablet, Take 0 5 tablets (12 5 mg total) by mouth every 12 (twelve) hours, Disp: 30 tablet, Rfl: 5    omeprazole (PriLOSEC) 40 MG capsule, Take by mouth, Disp: , Rfl:     torsemide (DEMADEX) 20 mg tablet, Take 1 tablet (20 mg total) by mouth daily, Disp: 90 tablet, Rfl: 3    traMADol (ULTRAM) 50 mg tablet, Take 50 mg by mouth every 6 (six) hours as needed for moderate pain, Disp: , Rfl:     warfarin (COUMADIN) 2 5 mg tablet, Take 2 5 mg by mouth daily, Disp: , Rfl:     warfarin (COUMADIN) 5 mg tablet, TAKE ONE TABLET BY MOUTH DAILY, Disp: 60 tablet, Rfl: 5  Allergies   Allergen Reactions    Codeine     Prednisone Itching    Codeine Polt-Chlorphen Polt Er Irritability, Itching and Rash       OBJECTIVE    Vitals:   Vitals:    06/07/18 1132   BP: 124/82   Pulse: 84   Resp: 16   Temp: (!) 96 2 °F (35 7 °C)   Weight: 101 kg (222 lb)   Height: 5' 0 3" (1 532 m)           Physical Exam   Constitutional: She is oriented to person, place, and time  She appears well-developed and well-nourished  HENT:   Head: Normocephalic and atraumatic  Eyes: Conjunctivae and EOM are normal  Pupils are equal, round, and reactive to light  Neck: Normal range of motion  Neck supple  Cardiovascular: Normal rate, regular rhythm and normal heart sounds  Pulses:       Dorsalis pedis pulses are 2+ on the right side  Posterior tibial pulses are 2+ on the right side  Pulmonary/Chest: Effort normal and breath sounds normal    Abdominal: Soft  Bowel sounds are normal    Musculoskeletal: Normal range of motion  Feet:   Right Foot:   Skin Integrity: Negative for ulcer, skin breakdown, erythema, warmth, callus or dry skin  Neurological: She is alert and oriented to person, place, and time  Skin: Skin is dry  Psychiatric: She has a normal mood and affect  Her behavior is normal           Patient's shoes and socks removed  Right Foot/Ankle   Right Foot Inspection  Skin Exam: skin normal and skin intact no dry skin, no warmth, no callus, no erythema, no maceration, no abnormal color, no pre-ulcer, no ulcer and no callus                          Toe Exam: ROM and strength within normal limits  Sensory       Monofilament testing: intact  Vascular  Capillary refills: < 3 seconds  The right DP pulse is 2+  The right PT pulse is 2+       Assign Risk Category:  ; No loss of protective sensation;        Risk: 0

## 2021-01-14 ENCOUNTER — TELEPHONE (OUTPATIENT)
Dept: FAMILY MEDICINE CLINIC | Facility: CLINIC | Age: 61
End: 2021-01-14

## 2021-01-14 NOTE — TELEPHONE ENCOUNTER
Pt calling, somewhat frustrated states she called earlier today and left a message with someone to relay to Dr Temitope Talbot to have her call pt  I asked around and nobody recalled this  She also claims to have never received messages or calls after her telemed visit on 1/7 with Dr Temitope Talbot     She would only like a call back from Dr Temitope Talbot because she has covid questions

## 2021-01-14 NOTE — TELEPHONE ENCOUNTER
Called patient,she states she had called this morning, was told you would call her, she did not have a name  When I called, I assured her I would get message to you  She tested Covid Positive on 12/30/20, currently her only symptom is coughing, more at night than day  She is sleeping with 2 pillows, using Vicks  She hasn't been out of the house since 12/28/20  Advised her she is beyond 14 day quarantine period, she is able to go out as long as she wears mask and is doing social distancing  She is anxious, would like you to call her  She is aware that you are not if office on Friday

## 2021-01-18 NOTE — TELEPHONE ENCOUNTER
I called patient at 9:50 am on 1/18/2021  Patient reports getting better and cough improved  Patient still has phlegm  Patient worries about not able to taste or smell and concerns if she is still infectious  Reassure patient that the sense of taste and smell are common symptoms of COVID infection and it will take time to resolved  Advice patient that she has past 10 days of isolation, and not having fever so she can resume normal activity  Patient asked about antibody and I advice patient that since she had COVID, she will be protected at least in the next 90 days  All questions answered and patient verbalized understanding

## 2021-01-28 DIAGNOSIS — L50.0 ALLERGIC URTICARIA: ICD-10-CM

## 2021-01-28 DIAGNOSIS — E11.9 TYPE 2 DIABETES MELLITUS WITHOUT COMPLICATION, WITHOUT LONG-TERM CURRENT USE OF INSULIN (HCC): ICD-10-CM

## 2021-01-28 RX ORDER — HYDROXYZINE HYDROCHLORIDE 25 MG/1
TABLET, FILM COATED ORAL
Qty: 60 TABLET | Refills: 0 | Status: SHIPPED | OUTPATIENT
Start: 2021-01-28 | End: 2021-03-15

## 2021-02-01 ENCOUNTER — TELEMEDICINE (OUTPATIENT)
Dept: FAMILY MEDICINE CLINIC | Facility: CLINIC | Age: 61
End: 2021-02-01

## 2021-02-01 DIAGNOSIS — E08.610 CONTROLLED DIABETES MELLITUS DUE TO UNDERLYING CONDITION WITH DIABETIC NEUROPATHIC ARTHROPATHY, WITHOUT LONG-TERM CURRENT USE OF INSULIN (HCC): ICD-10-CM

## 2021-02-01 DIAGNOSIS — G93.3 POSTVIRAL FATIGUE SYNDROME: ICD-10-CM

## 2021-02-01 DIAGNOSIS — U07.1 COVID-19 VIRUS INFECTION: Primary | ICD-10-CM

## 2021-02-01 PROCEDURE — 99214 OFFICE O/P EST MOD 30 MIN: CPT | Performed by: FAMILY MEDICINE

## 2021-02-01 PROCEDURE — 1036F TOBACCO NON-USER: CPT | Performed by: FAMILY MEDICINE

## 2021-02-01 RX ORDER — PIOGLITAZONEHYDROCHLORIDE 15 MG/1
15 TABLET ORAL DAILY
Qty: 90 TABLET | Refills: 3 | Status: SHIPPED | OUTPATIENT
Start: 2021-02-01 | End: 2021-05-03 | Stop reason: SDUPTHER

## 2021-02-01 NOTE — PROGRESS NOTES
Virtual Regular Visit      Assessment/Plan:    Problem List Items Addressed This Visit        Endocrine    Controlled diabetes mellitus due to underlying condition with diabetic neuropathic arthropathy [facility])       Lab Results   Component Value Date    HGBA1C 6 4 11/02/2020   Tolerating metformin and Actos well  Refill for Actos given  Recheck HbA1c  Next visit in May 2021  Relevant Medications    pioglitazone (ACTOS) 15 mg tablet       Other    Fatigue     Persistent fatigue especially with recent COVID infection  Advice on supportive care, check labs  COVID-19 virus infection - Primary     Improving, taste bud is coming back  Advice patient that her fatigue is most likely due to COVID, but will reassess with labs  Reason for visit is No chief complaint on file  Encounter provider Marycruz Bedolla MD    Provider located at [address]  [place] [address] [phone]      Recent Visits  No visits were found meeting these conditions  Showing recent visits within past 7 days and meeting all other requirements     Today's Visits  Date Type Provider Dept   02/01/21 Telemedicine Marycruz Bedolla MD [facility]   Showing today's visits and meeting all other requirements     Future Appointments  No visits were found meeting these conditions  Showing future appointments within next 150 days and meeting all other requirements        The patient was identified by name and date of birth  Wyline Boast was informed that this is a telemedicine visit and that the visit is being conducted through [employer] and patient was informed that this is a secure, HIPAA-compliant platform  She agrees to proceed     My office door was closed  No one else was in the room  She acknowledged consent and understanding of privacy and security of the video platform   The patient has agreed to participate and understands they can discontinue the visit at any time  Patient is aware this is a billable service  Kristi Barry is a 57 y o  female for follow up from COVID and fatigue   Still has a cough, but taste bud is back  Would like to check to see she can get tested for thyroid since patient was quite tired  Feeling too cold, hairs is falling, no change in weight         Past Medical History:   Diagnosis Date    Abnormal ultrasound of pelvis     last assessed - [id number]    Asthma     Asthma, persistent 8/1/2014    Cerebral infarction [facility])     unspecified; last assessed - [id number]    Dizziness     last assessed - [id number]    Dysuria     last assessed - [id number]    Exposure to potentially hazardous body fluids     last assessed - [id number]    Heart disease     Hematoma     of arm; last assessed - [id number]    Mitral valve stenosis     Personal history of scoliosis     Pneumonia     Scar, hypertrophic     last assessed - [id number]    Seizures ([facility] )     Type 2 diabetes mellitus ([facility] )     Vision problems        Past Surgical History:   Procedure Laterality Date    BREAST BIOPSY Left     many years ago in [place]-pt not really sure    MITRAL VALVE REPLACEMENT  06/17/2015    MVR with 27 mm [employer] Optiform mechanical prostethic;  last assessed - [id number]    TONSILLECTOMY      TRANSLUMINAL ANGIOPLASTY      mitral valve; last assessed - [id number]   [address] [place]    last assessed - [id number]       Current Outpatient Medications   Medication Sig Dispense Refill    Accu-Chek FastClix Lancets MISC USE AS INSTRUCTED 100 each 5    acetaminophen (TYLENOL) 500 mg tablet Take 2 tablets by mouth daily as needed for pain 180 tablet 3    albuterol (PROVENTIL HFA,VENTOLIN HFA) 90 mcg/act inhaler INHALE TWO PUFFS EVERY 6 (SIX) HOURS AS NEEDED FOR WHEEZING 18 Inhaler 0    Blood Glucose Monitoring Suppl (ACCU-CHEK EDILMA PLUS) w/Device KIT by Does not apply route 2 (two) times a day 1 kit 0    cetirizine (ZyrTEC) 10 mg tablet Take 1 tablet (10 mg total) by mouth daily 90 tablet 3    co-enzyme Q-10 50 MG capsule Take 1 capsule (50 mg total) by mouth daily (Patient not taking: Reported on 8/13/2020) 90 capsule 1    Flovent  MCG/ACT inhaler INHALE TWO PUFFS TWO TIMES A DAY RINSE MOUTH AFTER USE  3 Inhaler 3    fluticasone (FLONASE) 50 mcg/act nasal spray 1 spray into each nostril daily 16 g 1    glucose blood (Accu-Chek SmartView) test strip USE DAILY AS INSTRUCTED 100 each 11    hydrOXYzine HCL (ATARAX) 25 mg tablet TAKE ONE TABLET BY MOUTH EVERY 6 HOURS AS NEEDED FOR ITCHING 60 tablet 0    KEPPRA 500 MG tablet Take 1 tablet (500 mg total) by mouth 3 (three) times a day 270 tablet 3    ketotifen (ZADITOR) 0 025 % ophthalmic solution Administer 1 drop to both eyes 2 (two) times a day 5 mL 3    lisinopril (ZESTRIL) 2 5 mg tablet Take 2 5 mg by mouth daily      metFORMIN (GLUCOPHAGE) 500 mg tablet TAKE ONE TABLET BY MOUTH THREE TIMES A DAY 90 tablet 0    metoprolol tartrate (LOPRESSOR) 25 mg tablet Take 0 5 tablets (12 5 mg total) by mouth every 12 (twelve) hours 90 tablet 3    omeprazole (PriLOSEC) 40 MG capsule Take 1 capsule (40 mg total) by mouth daily 90 capsule 4    OneTouch Verio test strip USE DAILY AS INSTRUCTED 100 each 10    pioglitazone (ACTOS) 15 mg tablet Take 1 tablet (15 mg total) by mouth daily 90 tablet 3    simvastatin (ZOCOR) 10 mg tablet Take 1 tablet (10 mg total) by mouth daily at bedtime 90 tablet 3    torsemide (DEMADEX) 20 mg tablet Take 1 tablet (20 mg total) by mouth as needed (pt takes it every other day) 90 tablet 1    warfarin (COUMADIN) 2 5 mg tablet Take 1 tablet (2 5 mg total) by mouth daily 90 tablet 0    warfarin (COUMADIN) 5 mg tablet TAKE ONE TABLET BY MOUTH DAILY 90 tablet 1     No current facility-administered medications for this visit           Allergies   Allergen Reactions    Codeine     Prednisone Itching    Codeine Polt-Chlorphen Polt Er Irritability, Itching and Rash       Review of Systems   Constitutional: Positive for activity change and fatigue  Negative for appetite change and fever  HENT: Negative for congestion  Respiratory: Positive for cough  Negative for chest tightness and shortness of breath  Cardiovascular: Negative for chest pain  Gastrointestinal: Negative for abdominal pain, diarrhea and nausea  Musculoskeletal: Positive for back pain  Video Exam    There were no vitals filed for this visit  Physical Exam  Constitutional:       Appearance: Normal appearance  Pulmonary:      Effort: Pulmonary effort is normal    Neurological:      Mental Status: She is alert and oriented to person, place, and time  I spent 20 minutes directly with the patient during this visit      [address] [place] [address] acknowledges that she has consented to an online visit or consultation  She understands that the online visit is based solely on information provided by her, and that, in the absence of a face-to-face physical evaluation by the physician, the diagnosis she receives is both limited and provisional in terms of accuracy and completeness  This is not intended to replace a full medical face-to-face evaluation by the physician  Vishnu Sheridan understands and accepts these terms

## 2021-02-01 NOTE — ASSESSMENT & PLAN NOTE
Improving, taste bud is coming back  Advice patient that her fatigue is most likely due to COVID, but will reassess with labs

## 2021-02-01 NOTE — ASSESSMENT & PLAN NOTE
Lab Results   Component Value Date    HGBA1C 6 4 11/02/2020   Tolerating metformin and Actos well  Refill for Actos given  Recheck HbA1c  Next visit in May 2021

## 2021-02-15 DIAGNOSIS — I05.0 MITRAL VALVE STENOSIS, UNSPECIFIED ETIOLOGY: ICD-10-CM

## 2021-02-17 ENCOUNTER — LAB (OUTPATIENT)
Dept: LAB | Facility: HOSPITAL | Age: 61
End: 2021-02-17
Attending: INTERNAL MEDICINE
Payer: COMMERCIAL

## 2021-02-17 ENCOUNTER — ANTICOAG VISIT (OUTPATIENT)
Dept: CARDIOLOGY CLINIC | Facility: CLINIC | Age: 61
End: 2021-02-17

## 2021-02-17 DIAGNOSIS — G93.3 POSTVIRAL FATIGUE SYNDROME: ICD-10-CM

## 2021-02-17 DIAGNOSIS — Z95.2 HISTORY OF PROSTHETIC HEART VALVE: ICD-10-CM

## 2021-02-17 LAB
ALBUMIN SERPL BCP-MCNC: 3.6 G/DL (ref 3.5–5)
ALP SERPL-CCNC: 148 U/L (ref 46–116)
ALT SERPL W P-5'-P-CCNC: 20 U/L (ref 12–78)
ANION GAP SERPL CALCULATED.3IONS-SCNC: 5 MMOL/L (ref 4–13)
AST SERPL W P-5'-P-CCNC: 23 U/L (ref 5–45)
BASOPHILS # BLD AUTO: 0.07 THOUSANDS/ΜL (ref 0–0.1)
BASOPHILS NFR BLD AUTO: 1 % (ref 0–1)
BILIRUB SERPL-MCNC: 0.8 MG/DL (ref 0.2–1)
BUN SERPL-MCNC: 16 MG/DL (ref 5–25)
CALCIUM SERPL-MCNC: 9.9 MG/DL (ref 8.3–10.1)
CHLORIDE SERPL-SCNC: 107 MMOL/L (ref 100–108)
CO2 SERPL-SCNC: 30 MMOL/L (ref 21–32)
CREAT SERPL-MCNC: 0.87 MG/DL (ref 0.6–1.3)
EOSINOPHIL # BLD AUTO: 0.2 THOUSAND/ΜL (ref 0–0.61)
EOSINOPHIL NFR BLD AUTO: 3 % (ref 0–6)
ERYTHROCYTE [DISTWIDTH] IN BLOOD BY AUTOMATED COUNT: 15.5 % (ref 11.6–15.1)
GFR SERPL CREATININE-BSD FRML MDRD: 72 ML/MIN/1.73SQ M
GLUCOSE P FAST SERPL-MCNC: 151 MG/DL (ref 65–99)
HCT VFR BLD AUTO: 36.5 % (ref 34.8–46.1)
HGB BLD-MCNC: 11.2 G/DL (ref 11.5–15.4)
IMM GRANULOCYTES # BLD AUTO: 0.02 THOUSAND/UL (ref 0–0.2)
IMM GRANULOCYTES NFR BLD AUTO: 0 % (ref 0–2)
LYMPHOCYTES # BLD AUTO: 1.56 THOUSANDS/ΜL (ref 0.6–4.47)
LYMPHOCYTES NFR BLD AUTO: 24 % (ref 14–44)
MCH RBC QN AUTO: 25.9 PG (ref 26.8–34.3)
MCHC RBC AUTO-ENTMCNC: 30.7 G/DL (ref 31.4–37.4)
MCV RBC AUTO: 84 FL (ref 82–98)
MONOCYTES # BLD AUTO: 0.46 THOUSAND/ΜL (ref 0.17–1.22)
MONOCYTES NFR BLD AUTO: 7 % (ref 4–12)
NEUTROPHILS # BLD AUTO: 4.13 THOUSANDS/ΜL (ref 1.85–7.62)
NEUTS SEG NFR BLD AUTO: 65 % (ref 43–75)
NRBC BLD AUTO-RTO: 0 /100 WBCS
PLATELET # BLD AUTO: 268 THOUSANDS/UL (ref 149–390)
PMV BLD AUTO: 10.6 FL (ref 8.9–12.7)
POTASSIUM SERPL-SCNC: 4.1 MMOL/L (ref 3.5–5.3)
PROT SERPL-MCNC: 7.5 G/DL (ref 6.4–8.2)
RBC # BLD AUTO: 4.33 MILLION/UL (ref 3.81–5.12)
SODIUM SERPL-SCNC: 142 MMOL/L (ref 136–145)
TSH SERPL DL<=0.05 MIU/L-ACNC: 3.03 UIU/ML (ref 0.36–3.74)
WBC # BLD AUTO: 6.44 THOUSAND/UL (ref 4.31–10.16)

## 2021-02-17 PROCEDURE — 85025 COMPLETE CBC W/AUTO DIFF WBC: CPT

## 2021-02-17 PROCEDURE — 80053 COMPREHEN METABOLIC PANEL: CPT

## 2021-02-17 PROCEDURE — 84443 ASSAY THYROID STIM HORMONE: CPT

## 2021-02-26 ENCOUNTER — TELEPHONE (OUTPATIENT)
Dept: NEUROLOGY | Facility: CLINIC | Age: 61
End: 2021-02-26

## 2021-02-26 NOTE — TELEPHONE ENCOUNTER
Called and spoke to patient -  patient confirmed upcoming apt with Dr Chapman on 3/2/21  Patient has no issues or concerns at this time

## 2021-03-01 ENCOUNTER — TELEPHONE (OUTPATIENT)
Dept: FAMILY MEDICINE CLINIC | Facility: CLINIC | Age: 61
End: 2021-03-01

## 2021-03-01 NOTE — TELEPHONE ENCOUNTER
Patient calling for refill of Lisinopril 2 5mg, states she hasn't taken it in awhile and actually feels much better with out it  Wants to know if she should continue? Also, reviewed lab work from 2/21/21 with her and scheduled diabetic f/u 4/5/21

## 2021-03-11 DIAGNOSIS — I08.0 MITRAL AND AORTIC VALVE DISEASE: ICD-10-CM

## 2021-03-11 DIAGNOSIS — L50.0 ALLERGIC URTICARIA: ICD-10-CM

## 2021-03-11 DIAGNOSIS — E11.9 TYPE 2 DIABETES MELLITUS WITHOUT COMPLICATION, WITHOUT LONG-TERM CURRENT USE OF INSULIN (HCC): ICD-10-CM

## 2021-03-11 DIAGNOSIS — G40.019 LOCALIZATION-RELATED EPILEPSY, INTRACTABLE (HCC): ICD-10-CM

## 2021-03-11 RX ORDER — LEVETIRACETAM 500 MG
500 TABLET ORAL 3 TIMES DAILY
Qty: 270 TABLET | Refills: 0 | Status: SHIPPED | OUTPATIENT
Start: 2021-03-11 | End: 2021-04-30

## 2021-03-15 ENCOUNTER — EVALUATION (OUTPATIENT)
Dept: PHYSICAL THERAPY | Facility: REHABILITATION | Age: 61
End: 2021-03-15
Payer: COMMERCIAL

## 2021-03-15 DIAGNOSIS — M25.571 ACUTE RIGHT ANKLE PAIN: ICD-10-CM

## 2021-03-15 DIAGNOSIS — G89.29 CHRONIC BILATERAL LOW BACK PAIN, UNSPECIFIED WHETHER SCIATICA PRESENT: ICD-10-CM

## 2021-03-15 DIAGNOSIS — M54.50 CHRONIC BILATERAL LOW BACK PAIN, UNSPECIFIED WHETHER SCIATICA PRESENT: ICD-10-CM

## 2021-03-15 DIAGNOSIS — M79.605 BILATERAL LEG PAIN: ICD-10-CM

## 2021-03-15 DIAGNOSIS — G89.29 CHRONIC PAIN OF BOTH KNEES: Primary | ICD-10-CM

## 2021-03-15 DIAGNOSIS — M79.604 BILATERAL LEG PAIN: ICD-10-CM

## 2021-03-15 DIAGNOSIS — E66.01 MORBID OBESITY WITH BMI OF 45.0-49.9, ADULT (HCC): ICD-10-CM

## 2021-03-15 DIAGNOSIS — M25.562 CHRONIC PAIN OF BOTH KNEES: Primary | ICD-10-CM

## 2021-03-15 DIAGNOSIS — M25.561 CHRONIC PAIN OF BOTH KNEES: Primary | ICD-10-CM

## 2021-03-15 PROCEDURE — 97162 PT EVAL MOD COMPLEX 30 MIN: CPT | Performed by: PHYSICAL THERAPIST

## 2021-03-15 RX ORDER — TORSEMIDE 20 MG/1
TABLET ORAL
Qty: 90 TABLET | Refills: 10 | Status: SHIPPED | OUTPATIENT
Start: 2021-03-15

## 2021-03-15 RX ORDER — HYDROXYZINE HYDROCHLORIDE 25 MG/1
TABLET, FILM COATED ORAL
Qty: 60 TABLET | Refills: 0 | Status: SHIPPED | OUTPATIENT
Start: 2021-03-15 | End: 2021-04-23

## 2021-03-15 NOTE — PROGRESS NOTES
PT Evaluation     Today's date: 3/15/2021  Patient name: Preethi Fxo  : 1960  MRN: [id number]  Referring provider: Micki Breen MD  Dx:   Encounter Diagnosis     ICD-10-CM    1  Chronic pain of both knees  M25 561     M25 562     G89 29    2  Bilateral leg pain  M79 604 Ambulatory referral to Physical Therapy    M79 605    3  Acute right ankle pain  M25 571 Ambulatory referral to Physical Therapy   4  Morbid obesity with BMI of 45 0-49 9, adult ([facility] )  E66 01 Ambulatory referral to Physical Therapy    Z68 42    5  Chronic bilateral low back pain, unspecified whether sciatica present  M54 5     G89 29                   Assessment  Assessment details: Preethi Fox is a pleasant 57 y o  female who presents with chronic knee pain and chronic low back pain  The patient's greatest concerns are the pain she is experiencing, worry over not knowing what's wrong, fear of not being able to keep active and future ill health (and wanting to prevent it)  No further referral appears necessary at this time based upon examination results  The primary movement problem is weight bearing intolerance , resulting in pathoanatomical symptoms of chronic bilateral knee pain and chronic lower back pain, which is limiting her ability to walk, stand, squat, negotiate stairs, lift and carry objects, and perform other ADL's  The patient has had chronic knee pain for the past 6-7 years as well as low back pain that has recently worsened especially as she has become more sedentary since the COVID-19 pandemic  The patient does have morbid obesity, global weakness and deconditioning, and has a hard time moving well independently at this time  Her right knee is much more symptomatic than her left knee, and she also has centralized lower back pain that is limiting her  No red flags were present upon the evaluation   The patient would benefit from skilled physical therapy to address her current deficits, improve her quality of life, and restore her PLOF  Primary Impairments:  1) weight bearing intolerance  2) global deconditioning    Etiologic factors include none recalled by the patient  Impairments: abnormal coordination, abnormal gait, abnormal muscle firing, abnormal muscle tone, abnormal or restricted ROM, abnormal movement, activity intolerance, impaired balance, impaired physical strength, lacks appropriate home exercise program, pain with function, safety issue, weight-bearing intolerance, poor posture  and poor body mechanics    Symptom irritability: highUnderstanding of Dx/Px/POC: good   Prognosis: good  Prognosis details: Positive prognostic indicators include positive attitude toward recovery  Negative prognostic indicators include chronicity of symptoms, morbid obesity, history of hypertension, history of open heart surgery, sedentary lifestyle, and lack of family support  Goals  Impairment Based Goals:   Patient will improve FOTO score greater than predicted increase in 4 weeks  Patient will improve knee ROM by at least 5 degrees in 4 weeks  Patient will improve strength by at least 1/2 MMT grade in 4 weeks  Patient will have improved TUG score of at least 5 seconds in 4 weeks  Functional Based Goals: Upon Discharge  Patient will be independent with home exercise program    Patient will be able to manage symptoms independently  Patient will be able to walk up her flight of stairs in her living facility independently  Patient will be able to walk for up to 5 minutes independently  Plan  Plan details: Prognosis above is given PT services 2x/week tapering to 1x/week over the next 2 months and home program adherence    Patient would benefit from: skilled physical therapy  Referral necessary: No  Planned modality interventions: thermotherapy: hydrocollator packs  Planned therapy interventions: activity modification, joint mobilization, manual therapy, motor coordination training, neuromuscular re-education, patient education, self care, therapeutic activities, therapeutic exercise, graded activity, home exercise program, behavior modification, balance, balance/weight bearing training, abdominal trunk stabilization, flexibility, functional ROM exercises, gait training, stretching, strengthening, postural training, body mechanics training and breathing training  Plan of Care beginning date: 3/15/2021  Plan of Care expiration date: 5/10/2021  Treatment plan discussed with: patient        Subjective Evaluation    History of Present Illness  Mechanism of injury: I have been having pain in my knee over the past 6-7 years and it has progressively gotten worse  I did have some physical therapy around 5652-2305, and that did help  Walking is a big challenge for me right now  I also experience some low back pain when I sit down and go to stand up  I also feel that the weather plays a role in my pain  Going up the stairs is also very challenging for me  I feel that I have gained weight since the pandemic because I do not go out  My legs seem to swell if I sit or stand for too long  I do take water pills for the leg swelling  I have also been experiencing some right ankle pain over the past 6 months     Pain  Current pain ratin  At best pain ratin  At worst pain ratin  Location: Mostly on the inside of the knee but I also feel some pain behind the knee   Quality: dull ache, tight and discomfort  Relieving factors: rest, relaxation and change in position  Aggravating factors: stair climbing, sitting, standing and walking  Progression: worsening    Treatments  Previous treatment: physical therapy and medication  Current treatment: medication and physical therapy  Patient Goals  Patient goals for therapy: decreased pain, improved balance, increased motion, increased strength, independence with ADLs/IADLs and return to sport/leisure activities          Objective     General Comments:      Knee Comments  Gait: Antalgic gait with poor weight bearing acceptance through right lower extremity  Use of single point cane  Wide SARAHI  Unsteady on feet       TU seconds with SPC     Knee PROM:   Flexion: R 90 degrees with pain  L 95 degrees   Extension: R 0 degrees   L 0 degrees    Knee AROM:   Flexion: R 80 degrees with pain  L 85 degrees   Extension: R 0 degrees   L 0 degrees     Patellar Mobility: Fair     ASLR: Unable     Quad Set: Fair     Palpation: TTP of medial aspect of right knee     Lumbar Spine AROM:   Flexion: 50% limited with pain   Extension: 50% limited with pain   Sidebend: R 25% limited  L 25% limited     Passive SLR: (+)     Strength:   Knee Extension: R 4-/5  L 4/5   Knee Flexion: R 4-/5   L 4-/5   Hip Abduction Seated: R 4/5  L 4/5                  Precautions: morbid obesity, history of open heart surgery, history of hypertension      Manuals 3/15            Knee PROM             Sidelying Lumbopelvic Mobs                                       Neuro Re-Ed             Quad Set 10x            Bridges             Squats             Leg Press             Sit to [facility] Sets                          Ther Ex             Nustep             Heel Slides 20x            Lower Trunk Rotation 20x            Lumbar Rollouts              Paloff press             Rows/LPD             LAQs             SAQs             Ther Activity                                       Gait Training             Walking [facility]  SE 8'                          Modalities

## 2021-03-19 ENCOUNTER — APPOINTMENT (OUTPATIENT)
Dept: PHYSICAL THERAPY | Facility: REHABILITATION | Age: 61
End: 2021-03-19
Payer: COMMERCIAL

## 2021-03-23 ENCOUNTER — OFFICE VISIT (OUTPATIENT)
Dept: PHYSICAL THERAPY | Facility: REHABILITATION | Age: 61
End: 2021-03-23
Payer: COMMERCIAL

## 2021-03-23 DIAGNOSIS — G89.29 CHRONIC PAIN OF BOTH KNEES: ICD-10-CM

## 2021-03-23 DIAGNOSIS — M25.562 CHRONIC PAIN OF BOTH KNEES: ICD-10-CM

## 2021-03-23 DIAGNOSIS — M25.561 CHRONIC PAIN OF BOTH KNEES: ICD-10-CM

## 2021-03-23 DIAGNOSIS — G89.29 CHRONIC BILATERAL LOW BACK PAIN, UNSPECIFIED WHETHER SCIATICA PRESENT: Primary | ICD-10-CM

## 2021-03-23 DIAGNOSIS — M54.50 CHRONIC BILATERAL LOW BACK PAIN, UNSPECIFIED WHETHER SCIATICA PRESENT: Primary | ICD-10-CM

## 2021-03-23 PROCEDURE — 97112 NEUROMUSCULAR REEDUCATION: CPT | Performed by: PHYSICAL THERAPIST

## 2021-03-23 PROCEDURE — 97110 THERAPEUTIC EXERCISES: CPT | Performed by: PHYSICAL THERAPIST

## 2021-03-23 NOTE — PROGRESS NOTES
Daily Note     Today's date: 3/23/2021  Patient name: Ayanna Rubio  : 1960  MRN: [id number]  Referring provider: Doctor, Teressa Rai MD  Dx:   Encounter Diagnosis     ICD-10-CM    1  Chronic bilateral low back pain, unspecified whether sciatica present  M54 5     G89 29    2  Chronic pain of both knees  M25 561     M25 562     G89 29                   Subjective: Doing ok today  Having some tightness in my legs  Left feels weaker than right  Objective: See treatment diary below      Assessment: Started very low level today due to patient's poor endurance and exercise capacity  Does have more weakness on left leg versus right  Was tolerant to treatment, but significantly fatigued by end of session  Would continue to benefit from PT  Plan: Continue per plan of care        Precautions: morbid obesity, history of open heart surgery, history of hypertension      Manuals 3/15 3/23           Knee PROM             Sidelying Lumbopelvic Mobs                                       Neuro Re-Ed             Quad Set 10x HEP           Bridges             Squats             Leg Press             Sit to [employer] HL table   3x10            Glute Sets                           Ther Ex             Nustep  10' L4           Heel Slides 20x HEP           Lower Trunk Rotation 20x HEP            Lumbar Rollouts              Paloff press             Rows/LPD             LAQs  3x10 2# B            Standing Marches  2x20 at [employer] table           Ther Activity                                       Gait Training             Walking [facility]  SE 8'                          Modalities

## 2021-03-25 ENCOUNTER — OFFICE VISIT (OUTPATIENT)
Dept: PHYSICAL THERAPY | Facility: REHABILITATION | Age: 61
End: 2021-03-25
Payer: COMMERCIAL

## 2021-03-25 DIAGNOSIS — M79.604 BILATERAL LEG PAIN: ICD-10-CM

## 2021-03-25 DIAGNOSIS — M54.50 CHRONIC BILATERAL LOW BACK PAIN, UNSPECIFIED WHETHER SCIATICA PRESENT: Primary | ICD-10-CM

## 2021-03-25 DIAGNOSIS — M25.561 CHRONIC PAIN OF BOTH KNEES: ICD-10-CM

## 2021-03-25 DIAGNOSIS — M25.571 ACUTE RIGHT ANKLE PAIN: ICD-10-CM

## 2021-03-25 DIAGNOSIS — M25.562 CHRONIC PAIN OF BOTH KNEES: ICD-10-CM

## 2021-03-25 DIAGNOSIS — G89.29 CHRONIC PAIN OF BOTH KNEES: ICD-10-CM

## 2021-03-25 DIAGNOSIS — M79.605 BILATERAL LEG PAIN: ICD-10-CM

## 2021-03-25 DIAGNOSIS — G89.29 CHRONIC BILATERAL LOW BACK PAIN, UNSPECIFIED WHETHER SCIATICA PRESENT: Primary | ICD-10-CM

## 2021-03-25 DIAGNOSIS — E66.01 MORBID OBESITY WITH BMI OF 45.0-49.9, ADULT (HCC): ICD-10-CM

## 2021-03-25 PROCEDURE — 97112 NEUROMUSCULAR REEDUCATION: CPT

## 2021-03-25 PROCEDURE — 97110 THERAPEUTIC EXERCISES: CPT

## 2021-03-25 NOTE — PROGRESS NOTES
Daily Note     Today's date: 3/25/2021  Patient name: Aaron Olsen  : 1960  MRN: [id number]  Referring provider: Doctor, Damaso Mason MD  Dx:   Encounter Diagnosis     ICD-10-CM    1  Chronic bilateral low back pain, unspecified whether sciatica present  M54 5     G89 29    2  Chronic pain of both knees  M25 561     M25 562     G89 29    3  Bilateral leg pain  M79 604     M79 605    4  Acute right ankle pain  M25 571    5  Morbid obesity with BMI of 45 0-49 9, adult ([facility] )  E66 01     Z68 42                   Subjective: Pt reports some increased soreness today, states she feels it's from the weather  Objective: See treatment diary below      Assessment: Tolerated treatment well  Patient would benefit from continued PT  Pt had some difficulty with supine positioning, was able to perform supine exercises with wedge  Pt  able to complete all exercises with no increase in pain during or after session  Pt  1:1 with PTA for entirety  Plan: Continue per plan of care        Precautions: morbid obesity, history of open heart surgery, history of hypertension      Manuals 3/15 3/23 3/25          Knee PROM             Sidelying Lumbopelvic Mobs                                       Neuro Re-Ed             Quad Set 10x HEP           Bridges             Squats             Leg Press             Sit to [employer] HL table   3x10            Glute Sets    20x w/wedge          Seated thoracic rot   20x ea b/l          TA contraction   20x5"          Ther Ex             Nustep  10' L4 10' L4          Heel Slides 20x HEP           Lower Trunk Rotation 20x HEP            Lumbar Rollouts    10x10" Flex          Paloff press             Rows/LPD   2x15 ea otb          LAQs  3x10 2# B  3x10 ea b/l 2#          Standing Marches  2x20 at [employer] table 2x20           Ther Activity                                       Gait Training             Walking [facility]  SE 8'                          Modalities

## 2021-03-29 ENCOUNTER — APPOINTMENT (OUTPATIENT)
Dept: PHYSICAL THERAPY | Facility: REHABILITATION | Age: 61
End: 2021-03-29
Payer: COMMERCIAL

## 2021-03-30 ENCOUNTER — APPOINTMENT (OUTPATIENT)
Dept: PHYSICAL THERAPY | Facility: REHABILITATION | Age: 61
End: 2021-03-30
Payer: COMMERCIAL

## 2021-04-01 ENCOUNTER — OFFICE VISIT (OUTPATIENT)
Dept: PHYSICAL THERAPY | Facility: REHABILITATION | Age: 61
End: 2021-04-01
Payer: COMMERCIAL

## 2021-04-01 DIAGNOSIS — G89.29 CHRONIC PAIN OF BOTH KNEES: ICD-10-CM

## 2021-04-01 DIAGNOSIS — G89.29 CHRONIC BILATERAL LOW BACK PAIN, UNSPECIFIED WHETHER SCIATICA PRESENT: Primary | ICD-10-CM

## 2021-04-01 DIAGNOSIS — M25.562 CHRONIC PAIN OF BOTH KNEES: ICD-10-CM

## 2021-04-01 DIAGNOSIS — M54.50 CHRONIC BILATERAL LOW BACK PAIN, UNSPECIFIED WHETHER SCIATICA PRESENT: Primary | ICD-10-CM

## 2021-04-01 DIAGNOSIS — M25.561 CHRONIC PAIN OF BOTH KNEES: ICD-10-CM

## 2021-04-01 PROCEDURE — 97112 NEUROMUSCULAR REEDUCATION: CPT | Performed by: PHYSICAL THERAPIST

## 2021-04-01 PROCEDURE — 97110 THERAPEUTIC EXERCISES: CPT | Performed by: PHYSICAL THERAPIST

## 2021-04-01 NOTE — PROGRESS NOTES
Daily Note     Today's date: 2021  Patient name: Emeterio Bustamante  : 1960  MRN: [id number]  Referring provider: Doctor, Carleen Martinez MD  Dx:   Encounter Diagnosis     ICD-10-CM    1  Chronic bilateral low back pain, unspecified whether sciatica present  M54 5     G89 29    2  Chronic pain of both knees  M25 561     M25 562     G89 29                   Subjective: I feel very tight everywhere in my body today  Objective: See treatment diary below      Assessment: Talked with patient today about moving more frequently at home as well as changing sitting surface, as she spends most of her day on recliner and soft sofa, which is not optimal for posture  Patient did report she felt better after she did movements and exercise in PT today  Is poorly tolerant to standing as well as long duration of exercise  Would continue to benefit from PT  Plan: Continue per plan of care        Precautions: morbid obesity, history of open heart surgery, history of hypertension      Manuals 3/15 3/23 3/25 4/1         Knee PROM             Sidelying Lumbopelvic Mobs                                       Neuro Re-Ed             Quad Set 10x HEP           Bridges             Squats             Leg Press             Sit to St[employer] HL table   3x10   3x10          Glute Sets    20x w/wedge 20x wedge         Seated thoracic rot   20x ea b/l 20x ea b/l         TA contraction   20x5" 20x5"          Ther Ex             Nustep  10' L4 10' L4 10' L4          Heel Slides 20x HEP           Lower Trunk Rotation 20x HEP            Lumbar Rollouts    10x10" Flex 10x10" flex          Paloff press             Rows/LPD   2x15 ea otb 2x15 ea otb         LAQs  3x10 2# B  3x10 ea b/l 2# 3x10 ea b/l 2#         Standing Marches  2x20 at [employer] table 2x20  2x20          Ther Activity                                       Gait Training             Walking [facility]  SE 8'                          Modalities

## 2021-04-05 ENCOUNTER — TELEPHONE (OUTPATIENT)
Dept: FAMILY MEDICINE CLINIC | Facility: CLINIC | Age: 61
End: 2021-04-05

## 2021-04-05 NOTE — TELEPHONE ENCOUNTER
Patient missed her appt this morning with Dr Bev Quiñones due to [facility] not showing up  She rescheduled to 5/3     She asked to speak with Dr Bev Quiñones about taking Lisinopril 2 5mg along with Metolprolol Tartrate 12 5 mg prescribed 2/15/21

## 2021-04-12 ENCOUNTER — APPOINTMENT (OUTPATIENT)
Dept: PHYSICAL THERAPY | Facility: REHABILITATION | Age: 61
End: 2021-04-12
Payer: COMMERCIAL

## 2021-04-15 ENCOUNTER — APPOINTMENT (OUTPATIENT)
Dept: PHYSICAL THERAPY | Facility: REHABILITATION | Age: 61
End: 2021-04-15
Payer: COMMERCIAL

## 2021-04-21 ENCOUNTER — OFFICE VISIT (OUTPATIENT)
Dept: PHYSICAL THERAPY | Facility: REHABILITATION | Age: 61
End: 2021-04-21
Payer: COMMERCIAL

## 2021-04-21 DIAGNOSIS — M79.604 BILATERAL LEG PAIN: ICD-10-CM

## 2021-04-21 DIAGNOSIS — M25.561 CHRONIC PAIN OF BOTH KNEES: ICD-10-CM

## 2021-04-21 DIAGNOSIS — M25.571 ACUTE RIGHT ANKLE PAIN: ICD-10-CM

## 2021-04-21 DIAGNOSIS — G89.29 CHRONIC PAIN OF BOTH KNEES: ICD-10-CM

## 2021-04-21 DIAGNOSIS — M25.562 CHRONIC PAIN OF BOTH KNEES: ICD-10-CM

## 2021-04-21 DIAGNOSIS — E66.01 MORBID OBESITY WITH BMI OF 45.0-49.9, ADULT (HCC): ICD-10-CM

## 2021-04-21 DIAGNOSIS — M54.50 CHRONIC BILATERAL LOW BACK PAIN, UNSPECIFIED WHETHER SCIATICA PRESENT: Primary | ICD-10-CM

## 2021-04-21 DIAGNOSIS — G89.29 CHRONIC BILATERAL LOW BACK PAIN, UNSPECIFIED WHETHER SCIATICA PRESENT: Primary | ICD-10-CM

## 2021-04-21 DIAGNOSIS — M79.605 BILATERAL LEG PAIN: ICD-10-CM

## 2021-04-21 PROCEDURE — 97112 NEUROMUSCULAR REEDUCATION: CPT | Performed by: PHYSICAL THERAPIST

## 2021-04-21 PROCEDURE — 97110 THERAPEUTIC EXERCISES: CPT | Performed by: PHYSICAL THERAPIST

## 2021-04-21 NOTE — PROGRESS NOTES
Daily Note     Today's date: 2021  Patient name: Khadijah Troncoso  : 1960  MRN: [id number]  Referring provider: Doctor, Nemo Capone MD  Dx:   Encounter Diagnosis     ICD-10-CM    1  Chronic bilateral low back pain, unspecified whether sciatica present  M54 5     G89 29    2  Chronic pain of both knees  M25 561     M25 562     G89 29    3  Bilateral leg pain  M79 604     M79 605    4  Acute right ankle pain  M25 571    5  Morbid obesity with BMI of 45 0-49 9, adult ([facility])  E66 01     Z68 42                   Subjective: Pt reports b/l knee soreness from ambulating more frequently  Objective: See treatment diary below      Assessment: Tolerated treatment well  VC's required for correct technique with TE's  Back pain noted when laying supine, although the wedge helps some  Patient would benefit from continued PT      Plan: Continue per plan of care  Progress treatment as tolerated         Precautions: morbid obesity, history of open heart surgery, history of hypertension      Manuals 3/15 3/23 3/25 4/1 4/21        Knee PROM             Sidelying Lumbopelvic Mobs                                       Neuro Re-Ed             Quad Set 10x HEP           Bridges             Squats             Leg Press             Sit to Stands HL table   3x10   3x10  3x10        Glute Sets    20x w/wedge 20x wedge 5"x20 wedge        Seated thoracic rot   20x ea b/l 20x ea b/l 20xea b/l        TA contraction   20x5" 20x5"  5"x20        Ther Ex             Nustep  10' L4 10' L4 10' L4  10'L5        Heel Slides 20x HEP           Lower Trunk Rotation 20x HEP            Lumbar Rollouts    10x10" Flex 10x10" flex  10"x10 flex        Paloff press             Rows/LPD   2x15 ea otb 2x15 ea otb 2x15 ea otb        LAQs  3x10 2# B  3x10 ea b/l 2# 3x10 ea b/l 2# 3x10ea b/l 2#        Standing Marches  2x20 at HL table 2x20  2x20  2x20        Ther Activity                                       Gait Training             Walking [facility]  SE 8'                          Modalities

## 2021-04-23 DIAGNOSIS — K21.9 GASTROESOPHAGEAL REFLUX DISEASE: ICD-10-CM

## 2021-04-23 DIAGNOSIS — L50.0 ALLERGIC URTICARIA: ICD-10-CM

## 2021-04-23 DIAGNOSIS — Z95.2 H/O MITRAL VALVE REPLACEMENT: ICD-10-CM

## 2021-04-23 DIAGNOSIS — E11.9 TYPE 2 DIABETES MELLITUS WITHOUT COMPLICATION, WITHOUT LONG-TERM CURRENT USE OF INSULIN (HCC): ICD-10-CM

## 2021-04-23 DIAGNOSIS — J30.9 ALLERGIC RHINITIS, UNSPECIFIED SEASONALITY, UNSPECIFIED TRIGGER: ICD-10-CM

## 2021-04-23 RX ORDER — CETIRIZINE HYDROCHLORIDE 10 MG/1
TABLET ORAL
Qty: 90 TABLET | Refills: 10 | Status: SHIPPED | OUTPATIENT
Start: 2021-04-23 | End: 2022-07-09 | Stop reason: SDUPTHER

## 2021-04-23 RX ORDER — WARFARIN SODIUM 5 MG/1
TABLET ORAL
Qty: 90 TABLET | Refills: 0 | Status: SHIPPED | OUTPATIENT
Start: 2021-04-23 | End: 2021-09-20

## 2021-04-23 RX ORDER — OMEPRAZOLE 40 MG/1
40 CAPSULE, DELAYED RELEASE ORAL DAILY
Qty: 90 CAPSULE | Refills: 11 | Status: SHIPPED | OUTPATIENT
Start: 2021-04-23 | End: 2022-07-12

## 2021-04-23 RX ORDER — HYDROXYZINE HYDROCHLORIDE 25 MG/1
TABLET, FILM COATED ORAL
Qty: 60 TABLET | Refills: 0 | Status: SHIPPED | OUTPATIENT
Start: 2021-04-23 | End: 2021-05-25

## 2021-04-27 ENCOUNTER — APPOINTMENT (OUTPATIENT)
Dept: PHYSICAL THERAPY | Facility: REHABILITATION | Age: 61
End: 2021-04-27
Payer: COMMERCIAL

## 2021-04-28 ENCOUNTER — APPOINTMENT (OUTPATIENT)
Dept: LAB | Facility: HOSPITAL | Age: 61
End: 2021-04-28
Attending: INTERNAL MEDICINE
Payer: COMMERCIAL

## 2021-04-28 ENCOUNTER — ANTICOAG VISIT (OUTPATIENT)
Dept: CARDIOLOGY CLINIC | Facility: CLINIC | Age: 61
End: 2021-04-28

## 2021-04-28 DIAGNOSIS — Z95.2 HISTORY OF PROSTHETIC HEART VALVE: ICD-10-CM

## 2021-04-29 DIAGNOSIS — G40.019 LOCALIZATION-RELATED EPILEPSY, INTRACTABLE (HCC): ICD-10-CM

## 2021-04-30 RX ORDER — LEVETIRACETAM 500 MG
TABLET ORAL
Qty: 270 TABLET | Refills: 1 | Status: SHIPPED | OUTPATIENT
Start: 2021-04-30 | End: 2021-10-05 | Stop reason: SDUPTHER

## 2021-04-30 NOTE — TELEPHONE ENCOUNTER
Patient has an appointment with Ethyl Kayser in the [place] office with Ethyl Kayser on 8/20/21 @ 12:45 pm

## 2021-05-03 ENCOUNTER — OFFICE VISIT (OUTPATIENT)
Dept: FAMILY MEDICINE CLINIC | Facility: CLINIC | Age: 61
End: 2021-05-03

## 2021-05-03 VITALS
HEART RATE: 64 BPM | BODY MASS INDEX: 51.57 KG/M2 | RESPIRATION RATE: 20 BRPM | DIASTOLIC BLOOD PRESSURE: 80 MMHG | TEMPERATURE: 97.1 F | WEIGHT: 255.8 LBS | SYSTOLIC BLOOD PRESSURE: 120 MMHG | OXYGEN SATURATION: 95 % | HEIGHT: 59 IN

## 2021-05-03 DIAGNOSIS — M25.562 CHRONIC PAIN OF BOTH KNEES: ICD-10-CM

## 2021-05-03 DIAGNOSIS — E08.610 CONTROLLED DIABETES MELLITUS DUE TO UNDERLYING CONDITION WITH DIABETIC NEUROPATHIC ARTHROPATHY, WITHOUT LONG-TERM CURRENT USE OF INSULIN (HCC): ICD-10-CM

## 2021-05-03 DIAGNOSIS — E66.01 MORBID OBESITY WITH BMI OF 45.0-49.9, ADULT (HCC): Primary | ICD-10-CM

## 2021-05-03 DIAGNOSIS — E78.5 DYSLIPIDEMIA: ICD-10-CM

## 2021-05-03 DIAGNOSIS — M25.561 CHRONIC PAIN OF BOTH KNEES: ICD-10-CM

## 2021-05-03 DIAGNOSIS — Z23 ENCOUNTER FOR IMMUNIZATION: ICD-10-CM

## 2021-05-03 DIAGNOSIS — G89.29 CHRONIC PAIN OF BOTH KNEES: ICD-10-CM

## 2021-05-03 LAB — SL AMB POCT HEMOGLOBIN AIC: 6.6 (ref ?–6.5)

## 2021-05-03 PROCEDURE — 99214 OFFICE O/P EST MOD 30 MIN: CPT | Performed by: FAMILY MEDICINE

## 2021-05-03 PROCEDURE — 90471 IMMUNIZATION ADMIN: CPT | Performed by: FAMILY MEDICINE

## 2021-05-03 PROCEDURE — 90732 PPSV23 VACC 2 YRS+ SUBQ/IM: CPT | Performed by: FAMILY MEDICINE

## 2021-05-03 PROCEDURE — 83036 HEMOGLOBIN GLYCOSYLATED A1C: CPT | Performed by: FAMILY MEDICINE

## 2021-05-03 RX ORDER — ROSUVASTATIN CALCIUM 5 MG/1
5 TABLET, COATED ORAL DAILY
Qty: 90 TABLET | Refills: 3 | Status: SHIPPED | OUTPATIENT
Start: 2021-05-03 | End: 2021-05-03 | Stop reason: CLARIF

## 2021-05-03 RX ORDER — PRAVASTATIN SODIUM 20 MG
20 TABLET ORAL
Qty: 90 TABLET | Refills: 3 | Status: SHIPPED | OUTPATIENT
Start: 2021-05-03 | End: 2022-03-08

## 2021-05-03 RX ORDER — PIOGLITAZONEHYDROCHLORIDE 15 MG/1
15 TABLET ORAL DAILY
Qty: 90 TABLET | Refills: 3 | Status: SHIPPED | OUTPATIENT
Start: 2021-05-03 | End: 2022-03-08

## 2021-05-03 RX ORDER — LISINOPRIL 2.5 MG/1
2.5 TABLET ORAL DAILY
Qty: 90 TABLET | Refills: 3 | Status: SHIPPED | OUTPATIENT
Start: 2021-05-03 | End: 2022-03-08

## 2021-05-03 NOTE — PROGRESS NOTES
Assessment/Plan:    Controlled diabetes mellitus due to underlying condition with diabetic neuropathic arthropathy ([facility] [address] )    Lab Results   Component Value Date    HGBA1C 6 6 (A) 05/03/2021   very well controlled, foot exam normal   Advice patient to continue current regiment  Refer for weight management  Morbid obesity with BMI of 45 0-49 9, adult [facility])  Patient was referred to weight management previously but due to [place] pandemic, was not able to go yet  Discussed weight management and recommend patient to see weight management  Patient agreed, referral given  Dyslipidemia  Patient said that simvastatin also caused her headache daily, so would like to try different statin  Pravastatin low dose sent and recheck lab 3 months  BMI Counseling: Body mass index is 51 67 kg/m²  The BMI is above normal  Nutrition recommendations include reducing portion sizes and 3-5 servings of fruits/vegetables daily  Patient referred to weight management due to patient being morbidly obese  There are no diagnoses linked to this encounter  Subjective:      Patient ID: Jm Mendoza is a 57 y o  female  Pain on right medial knee is so bad, throbbing at time, not able to stand up after sitting for a while  No redness, no recent injury  Weight has been bother her very much  Patient has been watcing her diet, trying very hard, but weight remain the same      The following portions of the patient's history were reviewed and updated as appropriate: allergies, current medications, past family history, past medical history, past social history, past surgical history and problem list     Review of Systems   Constitutional: Negative for activity change, appetite change, fatigue and fever  HENT: Negative for ear pain, postnasal drip and sore throat  Eyes: Negative for photophobia and visual disturbance  Respiratory: Negative for cough, chest tightness, shortness of breath and wheezing      Cardiovascular: Negative for chest pain and palpitations  Gastrointestinal: Negative for abdominal pain, constipation and diarrhea  Musculoskeletal: Positive for arthralgias  Negative for back pain  Skin: Negative for rash  Neurological: Negative for light-headedness and headaches  Hematological: Negative for adenopathy  Psychiatric/Behavioral: Negative for agitation  Objective:      /80   Pulse 64   Temp (!) 97 1 °F (36 2 °C) (Temporal)   Resp 20   Ht 4' 11" (1 499 m)   Wt 116 kg (255 lb 12 8 oz)   SpO2 95%   BMI 51 67 kg/m²          Physical Exam  Vitals signs reviewed  Constitutional:       Appearance: She is well-developed  HENT:      Head: Normocephalic and atraumatic  Right Ear: External ear normal       Left Ear: External ear normal       Nose: Nose normal    Eyes:      Conjunctiva/sclera: Conjunctivae normal       Pupils: Pupils are equal, round, and reactive to light  Neck:      Musculoskeletal: Normal range of motion and neck supple  Cardiovascular:      Rate and Rhythm: Normal rate and regular rhythm  Pulses:           Dorsalis pedis pulses are 2+ on the right side and 2+ on the left side  Heart sounds: Normal heart sounds  Pulmonary:      Effort: Pulmonary effort is normal       Breath sounds: Normal breath sounds  Abdominal:      General: Bowel sounds are normal       Palpations: Abdomen is soft  Musculoskeletal: Normal range of motion  General: Tenderness present  Comments: Tender to palpation at right medial knee  Pain but ROM intact  Feet:      Right foot:      Skin integrity: No ulcer, skin breakdown, erythema, warmth, callus or dry skin  Left foot:      Skin integrity: No ulcer, skin breakdown, erythema, warmth, callus or dry skin  Skin:     General: Skin is warm  Neurological:      General: No focal deficit present  Mental Status: She is alert and oriented to person, place, and time     Psychiatric:         Mood and Affect: Mood normal          Behavior: Behavior normal          Thought Content: Thought content normal          Judgment: Judgment normal            Patient's shoes and socks removed  Right Foot/Ankle   Right Foot Inspection  Skin Exam: skin normal and skin intact no dry skin, no warmth, no callus, no erythema, no maceration, no abnormal color, no pre-ulcer, no ulcer and no callus                          Toe Exam: ROM and strength within normal limits    Vascular    The right DP pulse is 2+  Left Foot/Ankle  Left Foot Inspection  Skin Exam: skin normal and skin intactno dry skin, no warmth, no erythema, no maceration, normal color, no pre-ulcer, no ulcer and no callus                         Toe Exam: ROM and strength within normal limits                     Vascular    The left DP pulse is 2+  Assign Risk Category:  No deformity present;  No loss of protective sensation;        Risk: 0

## 2021-05-03 NOTE — ASSESSMENT & PLAN NOTE
Patient said that simvastatin also caused her headache daily, so would like to try different statin  Pravastatin low dose sent and recheck lab 3 months

## 2021-05-03 NOTE — ASSESSMENT & PLAN NOTE
Lab Results   Component Value Date    HGBA1C 6 6 (A) 05/03/2021   very well controlled, foot exam normal   Advice patient to continue current regiment  Refer for weight management

## 2021-05-03 NOTE — ASSESSMENT & PLAN NOTE
Patient was referred to weight management previously but due to [place] pandemic, was not able to go yet  Discussed weight management and recommend patient to see weight management  Patient agreed, referral given

## 2021-05-20 ENCOUNTER — TRANSCRIBE ORDERS (OUTPATIENT)
Dept: LAB | Facility: HOSPITAL | Age: 61
End: 2021-05-20

## 2021-05-20 ENCOUNTER — HOSPITAL ENCOUNTER (OUTPATIENT)
Dept: RADIOLOGY | Facility: HOSPITAL | Age: 61
Discharge: HOME | End: 2021-05-20
Attending: FAMILY MEDICINE
Payer: COMMERCIAL

## 2021-05-20 ENCOUNTER — ANTICOAG VISIT (OUTPATIENT)
Dept: CARDIOLOGY CLINIC | Facility: CLINIC | Age: 61
End: 2021-05-20

## 2021-05-20 ENCOUNTER — APPOINTMENT (OUTPATIENT)
Dept: LAB | Facility: HOSPITAL | Age: 61
End: 2021-05-20
Attending: INTERNAL MEDICINE
Payer: COMMERCIAL

## 2021-05-20 DIAGNOSIS — M25.561 CHRONIC PAIN OF BOTH KNEES: ICD-10-CM

## 2021-05-20 DIAGNOSIS — E78.5 DYSLIPIDEMIA: ICD-10-CM

## 2021-05-20 DIAGNOSIS — Z95.2 HISTORY OF PROSTHETIC HEART VALVE: ICD-10-CM

## 2021-05-20 DIAGNOSIS — M25.562 CHRONIC PAIN OF BOTH KNEES: ICD-10-CM

## 2021-05-20 DIAGNOSIS — G89.29 CHRONIC PAIN OF BOTH KNEES: ICD-10-CM

## 2021-05-20 DIAGNOSIS — E08.610 CONTROLLED DIABETES MELLITUS DUE TO UNDERLYING CONDITION WITH DIABETIC NEUROPATHIC ARTHROPATHY, WITHOUT LONG-TERM CURRENT USE OF INSULIN (HCC): ICD-10-CM

## 2021-05-20 LAB
ALBUMIN SERPL BCP-MCNC: 3.7 G/DL (ref 3.5–5)
ALP SERPL-CCNC: 122 U/L (ref 46–116)
ALT SERPL W P-5'-P-CCNC: 27 U/L (ref 12–78)
ANION GAP SERPL CALCULATED.3IONS-SCNC: 6 MMOL/L (ref 4–13)
AST SERPL W P-5'-P-CCNC: 27 U/L (ref 5–45)
BILIRUB SERPL-MCNC: 0.79 MG/DL (ref 0.2–1)
BUN SERPL-MCNC: 12 MG/DL (ref 5–25)
CALCIUM SERPL-MCNC: 10.1 MG/DL (ref 8.3–10.1)
CHLORIDE SERPL-SCNC: 109 MMOL/L (ref 100–108)
CHOLEST SERPL-MCNC: 194 MG/DL (ref 50–200)
CO2 SERPL-SCNC: 28 MMOL/L (ref 21–32)
CREAT SERPL-MCNC: 0.74 MG/DL (ref 0.6–1.3)
GFR SERPL CREATININE-BSD FRML MDRD: 87 ML/MIN/1.73SQ M
GLUCOSE P FAST SERPL-MCNC: 128 MG/DL (ref 65–99)
HDLC SERPL-MCNC: 60 MG/DL
LDLC SERPL CALC-MCNC: 103 MG/DL (ref 0–100)
NONHDLC SERPL-MCNC: 134 MG/DL
POTASSIUM SERPL-SCNC: 4.3 MMOL/L (ref 3.5–5.3)
PROT SERPL-MCNC: 7.7 G/DL (ref 6.4–8.2)
SODIUM SERPL-SCNC: 143 MMOL/L (ref 136–145)
TRIGL SERPL-MCNC: 157 MG/DL

## 2021-05-20 PROCEDURE — 73562 X-RAY EXAM OF KNEE 3: CPT

## 2021-05-20 PROCEDURE — 80053 COMPREHEN METABOLIC PANEL: CPT

## 2021-05-20 PROCEDURE — 80061 LIPID PANEL: CPT

## 2021-05-24 ENCOUNTER — TELEPHONE (OUTPATIENT)
Dept: FAMILY MEDICINE CLINIC | Facility: CLINIC | Age: 61
End: 2021-05-24

## 2021-05-24 NOTE — TELEPHONE ENCOUNTER
Called patient, reviewed lab results with her, did tell her we would call her back if you are making any changes in her current regime

## 2021-05-24 NOTE — TELEPHONE ENCOUNTER
Patient calling and stated, " I had lab work done on 5/20/21 and I would like my results "    # is [phone]

## 2021-05-25 DIAGNOSIS — L50.0 ALLERGIC URTICARIA: ICD-10-CM

## 2021-05-26 RX ORDER — HYDROXYZINE HYDROCHLORIDE 25 MG/1
TABLET, FILM COATED ORAL
Qty: 60 TABLET | Refills: 1 | Status: SHIPPED | OUTPATIENT
Start: 2021-05-26 | End: 2021-07-21

## 2021-05-27 ENCOUNTER — TELEPHONE (OUTPATIENT)
Dept: FAMILY MEDICINE CLINIC | Facility: CLINIC | Age: 61
End: 2021-05-27

## 2021-05-27 DIAGNOSIS — M17.0 OSTEOARTHRITIS OF BOTH KNEES, UNSPECIFIED OSTEOARTHRITIS TYPE: Primary | ICD-10-CM

## 2021-05-27 NOTE — TELEPHONE ENCOUNTER
If the patient is in that much pain she needs to go to the ED or make an appointment to be evaluated  Dr Bridger Palomino, are you able to call the patient as Dr Penny Humphreys is not in the office? Thanks!

## 2021-05-27 NOTE — TELEPHONE ENCOUNTER
Patient wants Dr David Madden to call her to discuss the xray results of knee    Patient is still having pain on right knee

## 2021-05-27 NOTE — PROGRESS NOTES
Called patient regarding knee xray result  Final report of both knees are not available yet  However, the images are available for reviewing with degenerative disease noted especially in medial compartment both knees  Encourage patient to increase tylenol to 1000 mg three times daily  Given that patient still having significant pain, I recommend evaluation by orthopedics and patient agreed  Referral entered  I also reviewed recent labs with patients and all questions answered  Patient really wants to stop taking statin stating that it gives her headache  Reviewed ASCVD 8 5% with patient and recommend her to continue given other co-morbidities

## 2021-05-27 NOTE — TELEPHONE ENCOUNTER
Patient is very concerned because patient is in a lot of pain and needs to speak to Dr Austin Brown she says even if results are in process she still needs to talk to the doctor

## 2021-06-02 ENCOUNTER — TELEMEDICINE (OUTPATIENT)
Dept: FAMILY MEDICINE CLINIC | Facility: CLINIC | Age: 61
End: 2021-06-02

## 2021-06-02 DIAGNOSIS — R11.0 NAUSEA: ICD-10-CM

## 2021-06-02 DIAGNOSIS — R19.7 DIARRHEA, UNSPECIFIED TYPE: Primary | ICD-10-CM

## 2021-06-02 PROCEDURE — 1036F TOBACCO NON-USER: CPT | Performed by: FAMILY MEDICINE

## 2021-06-02 PROCEDURE — 99213 OFFICE O/P EST LOW 20 MIN: CPT | Performed by: FAMILY MEDICINE

## 2021-06-02 RX ORDER — ONDANSETRON 4 MG/1
4 TABLET, FILM COATED ORAL EVERY 8 HOURS PRN
Qty: 20 TABLET | Refills: 0 | Status: SHIPPED | OUTPATIENT
Start: 2021-06-02 | End: 2021-08-16 | Stop reason: ALTCHOICE

## 2021-06-02 RX ORDER — LOPERAMIDE HYDROCHLORIDE 2 MG/1
2 TABLET ORAL 4 TIMES DAILY PRN
Qty: 20 TABLET | Refills: 0 | Status: SHIPPED | OUTPATIENT
Start: 2021-06-02 | End: 2021-08-16 | Stop reason: ALTCHOICE

## 2021-06-02 NOTE — PROGRESS NOTES
Virtual Regular Visit      Assessment/Plan:    Problem List Items Addressed This Visit        Other    Diarrhea - Primary     Symptomatic with watery stools 4-5 episodes in a day  For 3 days   reports no blood, no recent fevers, no mucus in stools   denies any URI symptoms including sore throat, cough, shortness of breath   history of COVID infection in January   Based on the symptoms most likely appears to be viral gastroenteritis vs  Food induced less likely  Repeat COVID infection   advised adequate hydration   ordered Imodium,  and Zofran for nausea as needed   will follow-up with the symptoms worsen         Relevant Medications    loperamide (IMODIUM A-D) 2 MG tablet      Other Visit Diagnoses     Nausea        Relevant Medications    ondansetron (ZOFRAN) 4 mg tablet               Reason for visit is   Chief Complaint   Patient presents with    Diarrhea     onset 3 days -         Encounter provider Omaira Huang MD    Provider located at [address] [place] [address]   [phone]      Recent Visits  Date Type Provider Dept   05/27/21 Telephone [facility] recent visits within past 7 days and meeting all other requirements     Today's Visits  Date Type Provider Dept   06/02/21 Telemedicine Omaira Huang MD [facility]   Showing today's visits and meeting all other requirements     Future Appointments  No visits were found meeting these conditions  Showing future appointments within next 150 days and meeting all other requirements        The patient was identified by name and date of birth  [facility] was informed that this is a telemedicine visit and that the visit is being conducted through [address] [place] [address] and patient was informed that this is a secure, HIPAA-compliant platform  She agrees to proceed     My office door was closed  No one else was in the room    She acknowledged consent and understanding of privacy and security of the video platform  The patient has agreed to participate and understands they can discontinue the visit at any time  Patient is aware this is a billable service  Jhonatan Crooks is a 57 y o  female   57-year-old female is here with symptoms of diarrhea for the past 3 days  Reports 4-5 episodes a day of watery stools, no blood, no recent fevers, no mucus in stools  Associated with occasional nausea in the mornings  No episodes of emesis  Denies other symptoms including abdominal pain, chest pain, shortness of breath, cough, sore throat  No recent travels, no new addition in the diet, no change in medications,   No recent sick contacts as well         Past Medical History:   Diagnosis Date    Abnormal ultrasound of pelvis     last assessed - [id number]    Asthma     Asthma, persistent 8/1/2014    Cerebral infarction ([facility] )     unspecified; last assessed - [id number]    Dizziness     last assessed - [id number]    Dysuria     last assessed - [id number]    Exposure to potentially hazardous body fluids     last assessed - [id number]    Heart disease     Hematoma     of arm; last assessed - 20Jun2016    Mitral valve stenosis     Personal history of scoliosis     Pneumonia     Scar, hypertrophic     last assessed - [id number]    Seizures ([facility] [address] )     Type 2 diabetes mellitus ([facility] )     Vision problems        Past Surgical History:   Procedure Laterality Date    BREAST BIOPSY Left     many years ago in [facility] not really sure    MITRAL VALVE REPLACEMENT  06/17/2015    MVR with 27 mm [employer] Optiform mechanical prostethic;  last assessed - [id number]    TONSILLECTOMY      TRANSLUMINAL ANGIOPLASTY      mitral valve; last assessed - [id number]   [address]    last assessed - [id number]       Current Outpatient Medications   Medication Sig Dispense Refill    albuterol (PROVENTIL HFA,VENTOLIN HFA) 90 mcg/act inhaler INHALE TWO PUFFS EVERY 6 (SIX) HOURS AS NEEDED FOR WHEEZING 18 Inhaler 0    cetirizine (ZyrTEC) 10 mg tablet TAKE ONE TABLET BY MOUTH DAILY 90 tablet 10    Flovent  MCG/ACT inhaler INHALE TWO PUFFS TWO TIMES A DAY RINSE MOUTH AFTER USE  3 Inhaler 3    fluticasone (FLONASE) 50 mcg/act nasal spray 1 spray into each nostril daily 16 g 1    hydrOXYzine HCL (ATARAX) 25 mg tablet TAKE ONE TABLET BY MOUTH EVERY 6 HOURS AS NEEDED FOR ITCHING 60 tablet 1    Keppra 500 MG tablet TAKE 1 TABLET (500 MG TOTAL) BY MOUTH THREE (THREE) TIMES A  tablet 1    metFORMIN (GLUCOPHAGE) 500 mg tablet TAKE ONE TABLET BY MOUTH THREE TIMES A DAY 90 tablet 0    omeprazole (PriLOSEC) 40 MG capsule TAKE 1 CAPSULE (40 MG TOTAL) BY MOUTH DAILY 90 capsule 11    warfarin (COUMADIN) 2 5 mg tablet Take 1 tablet (2 5 mg total) by mouth daily 90 tablet 0    warfarin (COUMADIN) 5 mg tablet TAKE ONE TABLET BY MOUTH DAILY 90 tablet 0    Accu-Chek FastClix Lancets MISC USE AS INSTRUCTED (Patient not taking: Reported on 5/3/2021) 100 each 5    acetaminophen (TYLENOL) 500 mg tablet Take 2 tablets by mouth daily as needed for pain 180 tablet 3    Blood Glucose Monitoring Suppl (ACCU-CHEK EDILMA PLUS) w/Device KIT by Does not apply route 2 (two) times a day (Patient not taking: Reported on 5/3/2021) 1 kit 0    co-enzyme Q-10 50 MG capsule Take 1 capsule (50 mg total) by mouth daily (Patient not taking: Reported on 8/13/2020) 90 capsule 1    glucose blood (Accu-Chek SmartView) test strip USE DAILY AS INSTRUCTED (Patient not taking: Reported on 5/3/2021) 100 each 11    ketotifen (ZADITOR) 0 025 % ophthalmic solution Administer 1 drop to both eyes 2 (two) times a day 5 mL 3    lisinopril (ZESTRIL) 2 5 mg tablet Take 1 tablet (2 5 mg total) by mouth daily (Patient not taking: Reported on 6/2/2021) 90 tablet 3    loperamide (IMODIUM A-D) 2 MG tablet Take 1 tablet (2 mg total) by mouth 4 (four) times a day as needed for diarrhea 20 tablet 0    metoprolol tartrate (LOPRESSOR) 25 mg tablet Take 0 5 tablets (12 5 mg total) by mouth every 12 (twelve) hours (Patient not taking: Reported on 6/2/2021) 90 tablet 3    ondansetron (ZOFRAN) 4 mg tablet Take 1 tablet (4 mg total) by mouth every 8 (eight) hours as needed for nausea or vomiting 20 tablet 0    OneTouch Verio test strip USE DAILY AS INSTRUCTED 100 each 10    pioglitazone (ACTOS) 15 mg tablet Take 1 tablet (15 mg total) by mouth daily 90 tablet 3    pravastatin (PRAVACHOL) 20 mg tablet Take 1 tablet (20 mg total) by mouth daily at bedtime 90 tablet 3    torsemide (DEMADEX) 20 mg tablet TAKE ONE TABLET BY MOUTH AS NEEDED AS DIRECTED 90 tablet 10     No current facility-administered medications for this visit  Allergies   Allergen Reactions    Codeine     Prednisone Itching    Codeine Polt-Chlorphen Polt Er Irritability, Itching and Rash       Review of Systems   Constitutional: Negative for chills and fever  HENT: Negative for ear pain and sore throat  Eyes: Negative for pain and visual disturbance  Respiratory: Negative for cough, shortness of breath and wheezing  Cardiovascular: Negative for chest pain and palpitations  Gastrointestinal: Positive for diarrhea and nausea  Negative for abdominal distention, abdominal pain, anal bleeding, blood in stool, constipation, rectal pain and vomiting  Genitourinary: Negative for dysuria and hematuria  Musculoskeletal: Negative for arthralgias and back pain  Skin: Negative for color change and rash  Neurological: Negative for seizures and syncope  Psychiatric/Behavioral: Negative for agitation and behavioral problems  All other systems reviewed and are negative  Video Exam    There were no vitals filed for this visit  Physical Exam  Constitutional:       General: She is not in acute distress  Appearance: Normal appearance  She is not ill-appearing, toxic-appearing or diaphoretic     HENT:      Head: Normocephalic and atraumatic  Neurological:      Mental Status: She is alert  Psychiatric:         Mood and Affect: Mood normal          Behavior: Behavior normal           I spent 20 minutes directly with the patient during this visit      [address] [place] [address] acknowledges that she has consented to an online visit or consultation  She understands that the online visit is based solely on information provided by her, and that, in the absence of a face-to-face physical evaluation by the physician, the diagnosis she receives is both limited and provisional in terms of accuracy and completeness  This is not intended to replace a full medical face-to-face evaluation by the physician  Emeterio Bustamante understands and accepts these terms

## 2021-06-02 NOTE — ASSESSMENT & PLAN NOTE
Symptomatic with watery stools 4-5 episodes in a day  For 3 days   reports no blood, no recent fevers, no mucus in stools   denies any URI symptoms including sore throat, cough, shortness of breath   history of COVID infection in January   Based on the symptoms most likely appears to be viral gastroenteritis vs  Food induced less likely  Repeat COVID infection   advised adequate hydration   ordered Imodium,  and Zofran for nausea as needed   will follow-up with the symptoms worsen

## 2021-06-15 ENCOUNTER — OFFICE VISIT (OUTPATIENT)
Dept: OBGYN CLINIC | Facility: HOSPITAL | Age: 61
End: 2021-06-15
Payer: COMMERCIAL

## 2021-06-15 VITALS
DIASTOLIC BLOOD PRESSURE: 82 MMHG | SYSTOLIC BLOOD PRESSURE: 137 MMHG | WEIGHT: 250.8 LBS | HEART RATE: 70 BPM | HEIGHT: 59 IN | BODY MASS INDEX: 50.56 KG/M2

## 2021-06-15 DIAGNOSIS — M17.12 PRIMARY OSTEOARTHRITIS OF LEFT KNEE: ICD-10-CM

## 2021-06-15 DIAGNOSIS — M17.11 PRIMARY OSTEOARTHRITIS OF RIGHT KNEE: Primary | ICD-10-CM

## 2021-06-15 PROCEDURE — 99213 OFFICE O/P EST LOW 20 MIN: CPT | Performed by: PHYSICIAN ASSISTANT

## 2021-06-15 PROCEDURE — 3008F BODY MASS INDEX DOCD: CPT | Performed by: FAMILY MEDICINE

## 2021-06-15 PROCEDURE — 3008F BODY MASS INDEX DOCD: CPT | Performed by: PHYSICIAN ASSISTANT

## 2021-06-15 PROCEDURE — 1036F TOBACCO NON-USER: CPT | Performed by: PHYSICIAN ASSISTANT

## 2021-06-15 NOTE — PROGRESS NOTES
Assessment/Plan   Diagnoses and all orders for this visit:    Primary osteoarthritis of right knee    Primary osteoarthritis of left knee    - Pt did not want to try PT  - I did not recommend cortisone due to itching and rash with Prednisone  - She does not want any more pills or medications  - I recommended a series of visco injections, but she declined this too because she does not like needles  - At BMI 50, she is not a candidate for knee replacement surgery    She would like another opinion with more options, and will follow up with [facility] sports medicine  Subjective   Patient ID: Anil King is a 57 y o  female  Vitals:    06/15/21 1020   BP: 137/82   Pulse: 79     62yo female comes in for an evaluation of her b/l knees  She has been having b/l knee pain for about a year and has a history of osteoarthritis  She has tried PT, but that did not help  She is using a cane  The pain is worse in the right  It increases with weightbearing  The pain is dull in character, mild in severity, pain does not radiate and is not associated with numbness          The following portions of the patient's history were reviewed and updated as appropriate: allergies, current medications, past family history, past medical history, past social history, past surgical history and problem list     Review of Systems  Ortho Exam  Past Medical History:   Diagnosis Date    Abnormal ultrasound of pelvis     last assessed - 26Mar2015    Asthma     Asthma, persistent 8/1/2014    Cerebral infarction ([facility] [address] )     unspecified; last assessed - 30Aug2017    Dizziness     last assessed - [id number]    Dysuria     last assessed - 62Pbf4675    Exposure to potentially hazardous body fluids     last assessed - 12Elt3037    Heart disease     Hematoma     of arm; last assessed - 20Jun2016    Mitral valve stenosis     Personal history of scoliosis     Pneumonia     Scar, hypertrophic     last assessed - [id number]    Seizures ([facility] [address] )  Type 2 diabetes mellitus ([facility] [address] )     Vision problems      Past Surgical History:   Procedure Laterality Date    BREAST BIOPSY Left     many years ago in [place]-pt not really sure    MITRAL VALVE REPLACEMENT  06/17/2015    MVR with 27 mm [employer] Optiform mechanical prostethic;  last assessed - [id number]    TONSILLECTOMY      TRANSLUMINAL ANGIOPLASTY      mitral valve; last assessed - 01Aug2014    TUBAL LIGATION  1986    last assessed - 01Aug2014     Family History   Problem Relation Age of Onset    Cancer Mother     Dementia Mother     Diabetes Mother     Hypertension Mother     Hyperlipidemia Mother     Cervical cancer Mother     Heart disease Father     Coronary artery disease Father     Hypertension Father     Heart attack Father     Diabetes Sister     Breast cancer Sister     Diabetes Brother     Hypertension Brother     Hyperlipidemia Brother     Diabetes Maternal Grandmother     Breast cancer Maternal Grandmother     Diabetes Other     Seizures Son     Psoriasis Son     No Known Problems Daughter     Heart disease Paternal Aunt     Heart disease Paternal Aunt      Social History     Occupational History    Not on file   Tobacco Use    Smoking status: Never Smoker    Smokeless tobacco: Never Used   Vaping Use    Vaping Use: Never used   Substance and Sexual Activity    Alcohol use: No    Drug use: No    Sexual activity: Not Currently     Partners: Male       Review of Systems   Constitutional: Negative  HENT: Negative  Eyes: Negative  Respiratory: Negative  Cardiovascular: Negative  Gastrointestinal: Negative  Endocrine: Negative  Genitourinary: Negative  Musculoskeletal: As below      Allergic/Immunologic: Negative  Neurological: Negative  Hematological: Negative  Psychiatric/Behavioral: Negative          Objective   Physical Exam    · Constitutional: Awake, Alert, Oriented  · Eyes: EOMI  · Psych: Mood and affect appropriate  · Heart: regular rate and rhythm  · Lungs: No audible wheezing  · Abdomen: soft  · Lymph: no lymphedema   bilateral Knee:  - Appearance   No swelling, discoloration, deformity, or ecchymosis  - Effusion   Exam limited by body habitus  - Palpation   Generalized tenderness medially and laterally  - ROM   Extension: 0 and Flexion: 90 (limited by body habitus)  - Special Tests   Marleny's Test negative, Anterior Drawer Test negative, Posterior Drawer Test negative, Valgus Stress Test negative and Varus Stress Test negative  - Motor   normal 5/5 in all planes  - NVI distally    I have personally reviewed pertinent films in PACS and my interpretation is osteoarthritis R > L with joint space narrowing and osteophyte formation

## 2021-06-19 ENCOUNTER — APPOINTMENT (EMERGENCY)
Dept: RADIOLOGY | Facility: HOSPITAL | Age: 61
End: 2021-06-19
Payer: COMMERCIAL

## 2021-06-19 ENCOUNTER — HOSPITAL ENCOUNTER (EMERGENCY)
Facility: HOSPITAL | Age: 61
Discharge: HOME | End: 2021-06-19
Attending: EMERGENCY MEDICINE
Payer: COMMERCIAL

## 2021-06-19 VITALS
TEMPERATURE: 98.2 F | WEIGHT: 245 LBS | BODY MASS INDEX: 49.48 KG/M2 | SYSTOLIC BLOOD PRESSURE: 137 MMHG | RESPIRATION RATE: 18 BRPM | DIASTOLIC BLOOD PRESSURE: 65 MMHG | OXYGEN SATURATION: 99 % | HEART RATE: 68 BPM

## 2021-06-19 DIAGNOSIS — M54.9 MID BACK PAIN ON RIGHT SIDE: Primary | ICD-10-CM

## 2021-06-19 DIAGNOSIS — M79.18 MUSCULOSKELETAL PAIN: ICD-10-CM

## 2021-06-19 DIAGNOSIS — R07.89 RIGHT-SIDED CHEST WALL PAIN: ICD-10-CM

## 2021-06-19 LAB
ALBUMIN SERPL BCP-MCNC: 3.7 G/DL (ref 3.5–5)
ALP SERPL-CCNC: 129 U/L (ref 46–116)
ALT SERPL W P-5'-P-CCNC: 28 U/L (ref 12–78)
ANION GAP SERPL CALCULATED.3IONS-SCNC: 5 MMOL/L (ref 4–13)
AST SERPL W P-5'-P-CCNC: 33 U/L (ref 5–45)
ATRIAL RATE: 65 BPM
BACTERIA UR QL AUTO: ABNORMAL /HPF
BASOPHILS # BLD AUTO: 0.08 THOUSANDS/ΜL (ref 0–0.1)
BASOPHILS NFR BLD AUTO: 1 % (ref 0–1)
BILIRUB SERPL-MCNC: 0.59 MG/DL (ref 0.2–1)
BILIRUB UR QL STRIP: NEGATIVE
BUN SERPL-MCNC: 14 MG/DL (ref 5–25)
CALCIUM SERPL-MCNC: 9.5 MG/DL (ref 8.3–10.1)
CHLORIDE SERPL-SCNC: 104 MMOL/L (ref 100–108)
CLARITY UR: CLEAR
CO2 SERPL-SCNC: 30 MMOL/L (ref 21–32)
COLOR UR: YELLOW
CREAT SERPL-MCNC: 0.77 MG/DL (ref 0.6–1.3)
EOSINOPHIL # BLD AUTO: 0.15 THOUSAND/ΜL (ref 0–0.61)
EOSINOPHIL NFR BLD AUTO: 2 % (ref 0–6)
ERYTHROCYTE [DISTWIDTH] IN BLOOD BY AUTOMATED COUNT: 15.2 % (ref 11.6–15.1)
GFR SERPL CREATININE-BSD FRML MDRD: 83 ML/MIN/1.73SQ M
GLUCOSE SERPL-MCNC: 107 MG/DL (ref 65–140)
GLUCOSE UR STRIP-MCNC: NEGATIVE MG/DL
HCT VFR BLD AUTO: 38.2 % (ref 34.8–46.1)
HGB BLD-MCNC: 11.8 G/DL (ref 11.5–15.4)
HGB UR QL STRIP.AUTO: NEGATIVE
HYALINE CASTS #/AREA URNS LPF: ABNORMAL /LPF
IMM GRANULOCYTES # BLD AUTO: 0.02 THOUSAND/UL (ref 0–0.2)
IMM GRANULOCYTES NFR BLD AUTO: 0 % (ref 0–2)
KETONES UR STRIP-MCNC: NEGATIVE MG/DL
LEUKOCYTE ESTERASE UR QL STRIP: ABNORMAL
LIPASE SERPL-CCNC: 36 U/L (ref 73–393)
LYMPHOCYTES # BLD AUTO: 1.31 THOUSANDS/ΜL (ref 0.6–4.47)
LYMPHOCYTES NFR BLD AUTO: 19 % (ref 14–44)
MCH RBC QN AUTO: 24.9 PG (ref 26.8–34.3)
MCHC RBC AUTO-ENTMCNC: 30.9 G/DL (ref 31.4–37.4)
MCV RBC AUTO: 81 FL (ref 82–98)
MONOCYTES # BLD AUTO: 0.48 THOUSAND/ΜL (ref 0.17–1.22)
MONOCYTES NFR BLD AUTO: 7 % (ref 4–12)
NEUTROPHILS # BLD AUTO: 4.91 THOUSANDS/ΜL (ref 1.85–7.62)
NEUTS SEG NFR BLD AUTO: 71 % (ref 43–75)
NITRITE UR QL STRIP: NEGATIVE
NON-SQ EPI CELLS URNS QL MICRO: ABNORMAL /HPF
NRBC BLD AUTO-RTO: 0 /100 WBCS
P AXIS: 94 DEGREES
PH UR STRIP.AUTO: 7 [PH] (ref 4.5–8)
PLATELET # BLD AUTO: 295 THOUSANDS/UL (ref 149–390)
PMV BLD AUTO: 10.5 FL (ref 8.9–12.7)
POTASSIUM SERPL-SCNC: 4 MMOL/L (ref 3.5–5.3)
PR INTERVAL: 140 MS
PROT SERPL-MCNC: 7.6 G/DL (ref 6.4–8.2)
PROT UR STRIP-MCNC: NEGATIVE MG/DL
QRS AXIS: 101 DEGREES
QRSD INTERVAL: 74 MS
QT INTERVAL: 418 MS
QTC INTERVAL: 434 MS
RBC # BLD AUTO: 4.74 MILLION/UL (ref 3.81–5.12)
RBC #/AREA URNS AUTO: ABNORMAL /HPF
SODIUM SERPL-SCNC: 139 MMOL/L (ref 136–145)
SP GR UR STRIP.AUTO: 1.01 (ref 1–1.03)
T WAVE AXIS: 83 DEGREES
TROPONIN I SERPL-MCNC: 0.04 NG/ML
UROBILINOGEN UR QL STRIP.AUTO: 0.2 E.U./DL
VENTRICULAR RATE: 65 BPM
WBC # BLD AUTO: 6.95 THOUSAND/UL (ref 4.31–10.16)
WBC #/AREA URNS AUTO: ABNORMAL /HPF

## 2021-06-19 PROCEDURE — 93005 ELECTROCARDIOGRAM TRACING: CPT

## 2021-06-19 PROCEDURE — 99285 EMERGENCY DEPT VISIT HI MDM: CPT | Performed by: EMERGENCY MEDICINE

## 2021-06-19 PROCEDURE — 81001 URINALYSIS AUTO W/SCOPE: CPT

## 2021-06-19 PROCEDURE — 83690 ASSAY OF LIPASE: CPT | Performed by: EMERGENCY MEDICINE

## 2021-06-19 PROCEDURE — 74177 CT ABD & PELVIS W/CONTRAST: CPT

## 2021-06-19 PROCEDURE — 87086 URINE CULTURE/COLONY COUNT: CPT

## 2021-06-19 PROCEDURE — 85025 COMPLETE CBC W/AUTO DIFF WBC: CPT | Performed by: EMERGENCY MEDICINE

## 2021-06-19 PROCEDURE — 36415 COLL VENOUS BLD VENIPUNCTURE: CPT | Performed by: EMERGENCY MEDICINE

## 2021-06-19 PROCEDURE — 84484 ASSAY OF TROPONIN QUANT: CPT | Performed by: EMERGENCY MEDICINE

## 2021-06-19 PROCEDURE — 80053 COMPREHEN METABOLIC PANEL: CPT | Performed by: EMERGENCY MEDICINE

## 2021-06-19 PROCEDURE — 99284 EMERGENCY DEPT VISIT MOD MDM: CPT

## 2021-06-19 PROCEDURE — 93010 ELECTROCARDIOGRAM REPORT: CPT | Performed by: INTERNAL MEDICINE

## 2021-06-19 PROCEDURE — 71260 CT THORAX DX C+: CPT

## 2021-06-19 PROCEDURE — G1004 CDSM NDSC: HCPCS

## 2021-06-19 RX ORDER — ACETAMINOPHEN 325 MG/1
650 TABLET ORAL ONCE
Status: COMPLETED | OUTPATIENT
Start: 2021-06-19 | End: 2021-06-19

## 2021-06-19 RX ORDER — LIDOCAINE 50 MG/G
1 PATCH TOPICAL DAILY
Qty: 10 PATCH | Refills: 0 | Status: SHIPPED | OUTPATIENT
Start: 2021-06-19 | End: 2022-03-08

## 2021-06-19 RX ORDER — METHOCARBAMOL 500 MG/1
500 TABLET, FILM COATED ORAL ONCE
Status: COMPLETED | OUTPATIENT
Start: 2021-06-19 | End: 2021-06-19

## 2021-06-19 RX ORDER — METHOCARBAMOL 500 MG/1
500 TABLET, FILM COATED ORAL
Qty: 10 TABLET | Refills: 0 | Status: SHIPPED | OUTPATIENT
Start: 2021-06-19 | End: 2021-08-16 | Stop reason: ALTCHOICE

## 2021-06-19 RX ORDER — LIDOCAINE 50 MG/G
1 PATCH TOPICAL ONCE
Status: DISCONTINUED | OUTPATIENT
Start: 2021-06-19 | End: 2021-06-19 | Stop reason: HOSPADM

## 2021-06-19 RX ADMIN — ACETAMINOPHEN 650 MG: 325 TABLET, FILM COATED ORAL at 16:29

## 2021-06-19 RX ADMIN — METHOCARBAMOL 500 MG: 500 TABLET, FILM COATED ORAL at 18:46

## 2021-06-19 RX ADMIN — LIDOCAINE 1 PATCH: 50 PATCH TOPICAL at 16:29

## 2021-06-19 RX ADMIN — IOHEXOL 100 ML: 350 INJECTION, SOLUTION INTRAVENOUS at 17:18

## 2021-06-19 NOTE — ED ATTENDING ATTESTATION
6/19/2021  IMonica MD, saw and evaluated the patient  I have discussed the patient with the resident/non-physician practitioner and agree with the resident's/non-physician practitioner's findings, Plan of Care, and MDM as documented in the resident's/non-physician practitioner's note, except where noted  All available labs and Radiology studies were reviewed  I was present for key portions of any procedure(s) performed by the resident/non-physician practitioner and I was immediately available to provide assistance  At this point I agree with the current assessment done in the Emergency Department  I have conducted an independent evaluation of this patient a history and physical is as follows:  Right-sided upper back pain, radiating around her side  Patient states that the symptoms have been present for several days, believes this started slowly but have gotten worse  States that they are there all the time, but that she will have paroxysms where she gets lancinating more severe pain  Patient has not had pain like this in the past   She has not had fevers or chills  She does not have urinary symptoms  She does not believe the symptoms are related to eating  The patient states that she has had some queasiness, but not actual vomiting  No diarrhea  Review of systems otherwise -12 systems reviewed  On exam the patient is awake, alert, interactive  She is anxious  Vital signs are normal   HEENT exam is unremarkable  Neck is supple nontender with no adenopathy  Heart is regular without murmurs, rubs, gallops  Her lungs are clear bilaterally with good air movement  Her pain is not fully reproducible  She does not have any abdominal tenderness, rebound, guarding  Her extremities are intact with good pulses  She is neurologically intact with normal skin and back exam   There are no rashes  Impression:  Back pain of undetermined etiology    Will plan to CT, check labs, EKG  ED Course         Critical Care Time  Procedures

## 2021-06-19 NOTE — DISCHARGE INSTRUCTIONS
Tylenol as needed for pain  You may use prescribed lidoderm patch and muscle relaxant  Follow up with your primary care physician this week  Return to the emergency department for severe worsening of pain, dizziness, lightheadedness, fainting, vomiting, fever, increased difficulty breathing, any other new or concerning symptoms

## 2021-06-19 NOTE — Clinical Note
Liborio Isabeldall was seen and treated in our emergency department on 6/19/2021  Diagnosis:     Griselda Clark  may return to work on return date  She may return on this date: 06/22/2021         If you have any questions or concerns, please don't hesitate to call        Ang Perez MD    ______________________________           _______________          _______________  Hospital Representative                              Date                                Time

## 2021-06-20 LAB — BACTERIA UR CULT: NORMAL

## 2021-06-20 NOTE — ED PROVIDER NOTES
History  Chief Complaint   Patient presents with    Back Pain     Pt reports back pain started 1 week PTa, today samuel getting up from recliner and loss balance fell and landed on knees  Pt denies LOC, headstrike, +thinners  HPI  56 yo female with PMH HLD, CAD, mechanical valve on coumadin, DM, CVA, elevated BMI, GERD, presents to the ED with concern for right side pain radiating to her right and midline thoracic back x 1 week  Pain is constant but intermittently more severe  Pain is worse with movement but she also has paroxysms of severe pain at rest  States some mild SOB and has some right chest wall tenderness on palpation  No abdominal pain, nausea, vomiting, dizziness, lightheadedness, numbness, weakness, fever, chills, urinary sxs, diarrhea, leg pain/swelling, hx DVT/PE  She did have some lightheadedness and nausea earlier today but it has resolved  Pt also notes she fell today after getting up off the sofa and losing her balance  Fell to her knees, did not strike her back or side  Was able to get to her feet and ambulate following fall  No relief with tylenol  Hx gallstones  Notes some has been walking more and lifting 5 lb weights to try to lose weight  Prior to Admission Medications   Prescriptions Last Dose Informant Patient Reported? Taking? Accu-Chek FastClix Lancets MISC  Self No No   Sig: USE AS INSTRUCTED   Patient not taking: Reported on 5/3/2021   Blood Glucose Monitoring Suppl (ACCU-CHEK EDILMA PLUS) w/Device KIT  Self No No   Sig: by Does not apply route 2 (two) times a day   Patient not taking: Reported on 5/3/2021   Flovent  MCG/ACT inhaler   No No   Sig: INHALE TWO PUFFS TWO TIMES A DAY RINSE MOUTH AFTER USE     Keppra 500 MG tablet   No No   Sig: TAKE 1 TABLET (500 MG TOTAL) BY MOUTH THREE (THREE) TIMES A DAY   OneTouch Verio test strip  Self No No   Sig: USE DAILY AS INSTRUCTED   acetaminophen (TYLENOL) 500 mg tablet  Self No No   Sig: Take 2 tablets by mouth daily as needed for pain   albuterol (PROVENTIL HFA,VENTOLIN HFA) 90 mcg/act inhaler  Self No No   Sig: INHALE TWO PUFFS EVERY 6 (SIX) HOURS AS NEEDED FOR WHEEZING   cetirizine (ZyrTEC) 10 mg tablet   No No   Sig: TAKE ONE TABLET BY MOUTH DAILY   co-enzyme Q-10 50 MG capsule  Self No No   Sig: Take 1 capsule (50 mg total) by mouth daily   Patient not taking: Reported on 2020   fluticasone (FLONASE) 50 mcg/act nasal spray  Self No No   Si spray into each nostril daily   glucose blood (Accu-Chek SmartView) test strip  Self No No   Sig: USE DAILY AS INSTRUCTED   Patient not taking: Reported on 5/3/2021   hydrOXYzine HCL (ATARAX) 25 mg tablet   No No   Sig: TAKE ONE TABLET BY MOUTH EVERY 6 HOURS AS NEEDED FOR ITCHING   ketotifen (ZADITOR) 0 025 % ophthalmic solution  Self No No   Sig: Administer 1 drop to both eyes 2 (two) times a day   lisinopril (ZESTRIL) 2 5 mg tablet   No No   Sig: Take 1 tablet (2 5 mg total) by mouth daily   Patient not taking: Reported on 2021   loperamide (IMODIUM A-D) 2 MG tablet   No No   Sig: Take 1 tablet (2 mg total) by mouth 4 (four) times a day as needed for diarrhea   metFORMIN (GLUCOPHAGE) 500 mg tablet   No No   Sig: TAKE ONE TABLET BY MOUTH THREE TIMES A DAY   metoprolol tartrate (LOPRESSOR) 25 mg tablet   No No   Sig: Take 0 5 tablets (12 5 mg total) by mouth every 12 (twelve) hours   Patient not taking: Reported on 2021   omeprazole (PriLOSEC) 40 MG capsule   No No   Sig: TAKE 1 CAPSULE (40 MG TOTAL) BY MOUTH DAILY   ondansetron (ZOFRAN) 4 mg tablet   No No   Sig: Take 1 tablet (4 mg total) by mouth every 8 (eight) hours as needed for nausea or vomiting   pioglitazone (ACTOS) 15 mg tablet   No No   Sig: Take 1 tablet (15 mg total) by mouth daily   pravastatin (PRAVACHOL) 20 mg tablet   No No   Sig: Take 1 tablet (20 mg total) by mouth daily at bedtime   torsemide (DEMADEX) 20 mg tablet   No No   Sig: TAKE ONE TABLET BY MOUTH AS NEEDED AS DIRECTED   warfarin (COUMADIN) 2 5 mg tablet Self No No   Sig: Take 1 tablet (2 5 mg total) by mouth daily   warfarin (COUMADIN) 5 mg tablet   No No   Sig: TAKE ONE TABLET BY MOUTH DAILY      Facility-Administered Medications: None       Past Medical History:   Diagnosis Date    Abnormal ultrasound of pelvis     last assessed - [id number]    Asthma     Asthma, persistent 8/1/2014    Cerebral infarction ([facility] [address] )     unspecified; last assessed - [id number]    Dizziness     last assessed - [id number]    Dysuria     last assessed - [id number]    Exposure to potentially hazardous body fluids     last assessed - [id number]    Heart disease     Hematoma     of arm; last assessed - [id number]    Mitral valve stenosis     Personal history of scoliosis     Pneumonia     Scar, hypertrophic     last assessed - [id number]    Seizures ([facility] [address] )     Type 2 diabetes mellitus ([facility]     Vision problems        Past Surgical History:   Procedure Laterality Date    BREAST BIOPSY Left     [facility] years ago in [facility] not really sure    MITRAL VALVE REPLACEMENT  06/17/2015    MVR with 27 mm [employer] Optiform mechanical prostethic;  last assessed - [id number]    TONSILLECTOMY      TRANSLUMINAL ANGIOPLASTY      mitral valve; last assessed - [id number]    TUBAL LIGATION  1986    last assessed - 61Qmd9212       Family History   Problem Relation Age of Onset    Cancer Mother     Dementia Mother     Diabetes Mother     Hypertension Mother     Hyperlipidemia Mother     Cervical cancer Mother     Heart disease Father     Coronary artery disease Father     Hypertension Father     Heart attack Father     Diabetes Sister     Breast cancer Sister     Diabetes Brother     Hypertension Brother     Hyperlipidemia Brother     Diabetes Maternal Grandmother     Breast cancer Maternal Grandmother     Diabetes Other     Seizures Son     Psoriasis Son     No Known Problems Daughter     Heart disease Paternal Aunt     Heart disease Paternal Aunt      I have reviewed and agree with the history as documented  E-Cigarette/Vaping    E-Cigarette Use Never User      E-Cigarette/Vaping Substances    Nicotine No     THC No     CBD No     Flavoring No     Other No     Unknown No      Social History     Tobacco Use    Smoking status: Never Smoker    Smokeless tobacco: Never Used   Vaping Use    Vaping Use: Never used   Substance Use Topics    Alcohol use: No    Drug use: No        Review of Systems   Constitutional: Negative for chills and fever  HENT: Negative for congestion, rhinorrhea and sore throat  Respiratory: Positive for shortness of breath  Negative for chest tightness  Cardiovascular: Positive for chest pain  Gastrointestinal: Negative for abdominal pain, diarrhea, nausea and vomiting  Genitourinary: Negative for dysuria and hematuria  Musculoskeletal: Positive for arthralgias, back pain and myalgias  Skin: Negative for rash  Neurological: Negative for dizziness, syncope, weakness, light-headedness, numbness and headaches  All other systems reviewed and are negative  Physical Exam  ED Triage Vitals [06/19/21 1545]   Temperature Pulse Respirations Blood Pressure SpO2   98 2 °F (36 8 °C) 71 20 154/69 97 %      Temp Source Heart Rate Source Patient Position - Orthostatic VS BP Location FiO2 (%)   Oral Monitor Lying Left arm --      Pain Score       9             Orthostatic Vital Signs  Vitals:    06/19/21 1545 06/19/21 1730   BP: 154/69 137/65   Pulse: 71 68   Patient Position - Orthostatic VS: Lying Lying       Physical Exam  Vitals and nursing note reviewed  Constitutional:       General: She is not in acute distress  Appearance: She is well-developed  She is obese  She is not diaphoretic  HENT:      Head: Normocephalic and atraumatic        Right Ear: External ear normal       Left Ear: External ear normal       Nose: Nose normal       Mouth/Throat:      Mouth: Mucous membranes are moist    Eyes:      Conjunctiva/sclera: Conjunctivae normal       Pupils: Pupils are equal, round, and reactive to light  Cardiovascular:      Rate and Rhythm: Normal rate and regular rhythm  Heart sounds: Normal heart sounds  No murmur heard  No friction rub  No gallop  Pulmonary:      Effort: Pulmonary effort is normal  No respiratory distress  Breath sounds: Normal breath sounds  No wheezing, rhonchi or rales  Chest:       Abdominal:      General: Bowel sounds are normal  There is no distension  Palpations: Abdomen is soft  There is no mass  Tenderness: There is no abdominal tenderness  There is right CVA tenderness  There is no guarding or rebound  Musculoskeletal:         General: Normal range of motion  Cervical back: Normal range of motion and neck supple  Thoracic back: Tenderness present  Back:    Lymphadenopathy:      Cervical: No cervical adenopathy  Skin:     General: Skin is warm and dry  Neurological:      Mental Status: She is alert and oriented to person, place, and time  Cranial Nerves: No cranial nerve deficit  Sensory: No sensory deficit  ED Medications  Medications   acetaminophen (TYLENOL) tablet 650 mg (650 mg Oral Given 6/19/21 1629)   iohexol (OMNIPAQUE) 350 MG/ML injection (SINGLE-DOSE) 100 mL (100 mL Intravenous Given 6/19/21 1718)   methocarbamol (ROBAXIN) tablet 500 mg (500 mg Oral Given 6/19/21 1846)       Diagnostic Studies  Results Reviewed     Procedure Component Value Units Date/Time    Urine Microscopic [[id number]]  (Abnormal) Collected: 06/19/21 1807    Lab Status: Final result Specimen: Urine, Clean Catch Updated: 06/19/21 1835     RBC, UA None Seen /hpf      WBC, UA 10-20 /hpf      Epithelial Cells None Seen /hpf      Bacteria, UA None Seen /hpf      Hyaline Casts, UA None Seen /lpf     Urine culture [[id number]] Collected: 06/19/21 1807    Lab Status:  In process Specimen: Urine, Clean Catch Updated: 06/19/21 1835    Urine Macroscopic, POC [[id number]]  (Abnormal) Collected: 06/19/21 1807    Lab Status: Final result Specimen: Urine Updated: 06/19/21 1808     Color, UA Yellow     Clarity, UA Clear     pH, UA 7 0     Leukocytes, UA Small     Nitrite, UA Negative     Protein, UA Negative mg/dl      Glucose, UA Negative mg/dl      Ketones, UA Negative mg/dl      Urobilinogen, UA 0 2 E U /dl      Bilirubin, UA Negative     Blood, UA Negative     Specific Gravity, UA 1 010    Narrative:      CLINITEK RESULT    Comprehensive metabolic panel [[id number]]  (Abnormal) Collected: 06/19/21 1628    Lab Status: Final result Specimen: Blood from Arm, Left Updated: 06/19/21 1702     Sodium 139 mmol/L      Potassium 4 0 mmol/L      Chloride 104 mmol/L      CO2 30 mmol/L      ANION GAP 5 mmol/L      BUN 14 mg/dL      Creatinine 0 77 mg/dL      Glucose 107 mg/dL      Calcium 9 5 mg/dL      AST 33 U/L      ALT 28 U/L      Alkaline Phosphatase 129 U/L      Total Protein 7 6 g/dL      Albumin 3 7 g/dL      Total Bilirubin 0 59 mg/dL      eGFR 83 ml/min/1 73sq m     Narrative:      [facility] guidelines for Chronic Kidney Disease (CKD):     Stage 1 with normal or high GFR (GFR > 90 mL/min/1 73 square meters)    Stage 2 Mild CKD (GFR = 60-89 mL/min/1 73 square meters)    Stage 3A Moderate CKD (GFR = 45-59 mL/min/1 73 square meters)    Stage 3B Moderate CKD (GFR = 30-44 mL/min/1 73 square meters)    Stage 4 Severe CKD (GFR = 15-29 mL/min/1 73 square meters)    Stage 5 End Stage CKD (GFR <15 mL/min/1 73 square meters)  Note: GFR calculation is accurate only with a steady state creatinine    Lipase [[id number]]  (Abnormal) Collected: 06/19/21 1628    Lab Status: Final result Specimen: Blood from Arm, Left Updated: 06/19/21 1702     Lipase 36 u/L     Troponin I [[id number]]  (Normal) Collected: 06/19/21 1628    Lab Status: Final result Specimen: Blood from Arm, Left Updated: 06/19/21 1701     Troponin I 0 04 ng/mL     CBC and differential [[id number]]  (Abnormal) Collected: 06/19/21 1628    Lab Status: Final result Specimen: Blood from Arm, Left Updated: 06/19/21 1642     WBC 6 95 Thousand/uL      RBC 4 74 Million/uL      Hemoglobin 11 8 g/dL      Hematocrit 38 2 %      MCV 81 fL      MCH 24 9 pg      MCHC 30 9 g/dL      RDW 15 2 %      MPV 10 5 fL      Platelets 610 Thousands/uL      nRBC 0 /100 WBCs      Neutrophils Relative 71 %      Immat GRANS % 0 %      Lymphocytes Relative 19 %      Monocytes Relative 7 %      Eosinophils Relative 2 %      Basophils Relative 1 %      Neutrophils Absolute 4 91 Thousands/µL      Immature Grans Absolute 0 02 Thousand/uL      Lymphocytes Absolute 1 31 Thousands/µL      Monocytes Absolute 0 48 Thousand/µL      Eosinophils Absolute 0 15 Thousand/µL      Basophils Absolute 0 08 Thousands/µL                  CT chest abdomen pelvis w contrast   Final Result by Chelsea Cerrato MD (06/19 1751)      No acute cardiopulmonary abnormality  No acute intra-abdominal abnormality  Cholelithiasis  Workstation performed: [id number]               Procedures  POC Biliary US    Date/Time: 6/19/2021 11:22 PM  Performed by: Chapis Gilman MD  Authorized by: Chapis Gilman MD     Patient location:  ED  Procedure details:     Exam Type:  Diagnostic and educational    Indications: back pain      Assessment for:  Cholecystitis and cholelithiasis    Views obtained: gallbladder (transverse and longitudinal)      Image quality: limited diagnostic      Image availability:  Images available in PACS, still images obtained and video obtained  Findings:     Cholelithiasis: identified      Gallbladder wall:  Normal    Pericholecystic fluid: not identified    Interpretation:     Biliary ultrasound impressions: cholelithiasis            ED Course  ED Course as of Jun 19 2320   Sat Jun 19, 2021   1633 EKG sinus rhythm  Right axis deviation  No significant ST elevations or depressions  Nonspecific T wave inversion V1 and T wave flattening avl  Qtc 434   Not significantly changed from prior on 7/16/16        1701 Pt reassessed  Reports she is feeling better after tylenol and lidoderm patch  1415 Discussed results with pt  Pending UA  She is amenable to discharge and will follow up with her PCP  Requesting prescriptions for home  Will give lidoderm and robaxin  Pt requesting a dose of robaxin here because she is not able to get to her pharmacy  Pt has a ride home  1848 Bacteria, UA: None Seen Will discharge with strict return precautions  MDM  58 yo female with PMH HLD, CAD, mechanical valve on coumadin, DM, CVA, elevated BMI, GERD, presenting with right chest pain, right side pain, and right back pain  Will obtain CBC, CMP, Troponin, EKG, CT chest/abd/pelvis, UA,  to evaluate for leukocytosis, anemia, electrolyte abnormality, renal dysfunction, hepatic dysfunction, acute cardiac abnormality, UTI, hematuria, pneumonia, pneumothorax, pleural effusion, cholecystitis, other intraabdominal or intrathoracic abnormality  If no acute findings possibly musculoskeletal, will treat with tylenol and lidoderm patch and reassess  Disposition  Final diagnoses:   Mid back pain on right side   Musculoskeletal pain   Right-sided chest wall pain     Time reflects when diagnosis was documented in both MDM as applicable and the Disposition within this note     Time User Action Codes Description Comment    6/19/2021  6:23 PM Zulema Carty Add [M54 9] Mid back pain on right side     6/19/2021  6:24 PM Zulema Carty Add [M79 18] Musculoskeletal pain     6/19/2021  6:24 PM Zulema Valdovinosyle Add [R07 89] Right-sided chest wall pain       ED Disposition     ED Disposition Condition Date/Time Comment    Discharge Stable Sat Jun 19, 2021  6:48 PM Florecita Ramirez discharge to home/self care              Follow-up Information     Follow up With Specialties Details Why Brigida Castillo MD Family Medicine, Obstetrics and Gynecology, Obstetrics, Gynecology Call in 2 days for an appointment to be seen this week Maximus Loyola   [phone]            Discharge Medication List as of 6/19/2021  6:49 PM      START taking these medications    Details   lidocaine (LIDODERM) 5 % Apply 1 patch topically daily Remove & Discard patch within 12 hours or as directed by MD, Starting Sat 6/19/2021, Normal      methocarbamol (ROBAXIN) 500 mg tablet Take 1 tablet (500 mg total) by mouth daily at bedtime as needed for muscle spasms, Starting Sat 6/19/2021, Normal         CONTINUE these medications which have NOT CHANGED    Details   Accu-Chek FastClix Lancets MISC USE AS INSTRUCTED, Normal      acetaminophen (TYLENOL) 500 mg tablet Take 2 tablets by mouth daily as needed for pain, Normal      albuterol (PROVENTIL HFA,VENTOLIN HFA) 90 mcg/act inhaler INHALE TWO PUFFS EVERY 6 (SIX) HOURS AS NEEDED FOR WHEEZING, Normal      Blood Glucose Monitoring Suppl (ACCU-CHEK EDILMA PLUS) w/Device KIT by Does not apply route 2 (two) times a day, Starting Mon 3/30/2020, Normal      cetirizine (ZyrTEC) 10 mg tablet TAKE ONE TABLET BY MOUTH DAILY, Normal      co-enzyme Q-10 50 MG capsule Take 1 capsule (50 mg total) by mouth daily, Starting Thu 7/9/2020, Normal      Flovent  MCG/ACT inhaler INHALE TWO PUFFS TWO TIMES A DAY RINSE MOUTH AFTER USE , Normal      fluticasone (FLONASE) 50 mcg/act nasal spray 1 spray into each nostril daily, Starting Mon 4/6/2020, Normal      !! glucose blood (Accu-Chek SmartView) test strip USE DAILY AS INSTRUCTED, Normal      hydrOXYzine HCL (ATARAX) 25 mg tablet TAKE ONE TABLET BY MOUTH EVERY 6 HOURS AS NEEDED FOR ITCHING, Normal      Keppra 500 MG tablet TAKE 1 TABLET (500 MG TOTAL) BY MOUTH THREE (THREE) TIMES A DAY, Normal      ketotifen (ZADITOR) 0 025 % ophthalmic solution Administer 1 drop to both eyes 2 (two) times a day, Starting Mon 4/6/2020, Normal      lisinopril (ZESTRIL) 2 5 mg tablet Take 1 tablet (2 5 mg total) by mouth daily, Starting Mon 5/3/2021, Normal      loperamide (IMODIUM A-D) 2 MG tablet Take 1 tablet (2 mg total) by mouth 4 (four) times a day as needed for diarrhea, Starting Wed 6/2/2021, Normal      metFORMIN (GLUCOPHAGE) 500 mg tablet TAKE ONE TABLET BY MOUTH THREE TIMES A DAY, Normal      metoprolol tartrate (LOPRESSOR) 25 mg tablet Take 0 5 tablets (12 5 mg total) by mouth every 12 (twelve) hours, Starting Mon 2/15/2021, Normal      omeprazole (PriLOSEC) 40 MG capsule TAKE 1 CAPSULE (40 MG TOTAL) BY MOUTH DAILY, Starting Fri 4/23/2021, Normal      ondansetron (ZOFRAN) 4 mg tablet Take 1 tablet (4 mg total) by mouth every 8 (eight) hours as needed for nausea or vomiting, Starting Wed 6/2/2021, Normal      !! OneTouch Verio test strip USE DAILY AS INSTRUCTED, Normal      pioglitazone (ACTOS) 15 mg tablet Take 1 tablet (15 mg total) by mouth daily, Starting Mon 5/3/2021, Normal      pravastatin (PRAVACHOL) 20 mg tablet Take 1 tablet (20 mg total) by mouth daily at bedtime, Starting Mon 5/3/2021, Normal      torsemide (DEMADEX) 20 mg tablet TAKE ONE TABLET BY MOUTH AS NEEDED AS DIRECTED, Normal      !! warfarin (COUMADIN) 2 5 mg tablet Take 1 tablet (2 5 mg total) by mouth daily, Starting Mon 4/6/2020, Normal      !! warfarin (COUMADIN) 5 mg tablet TAKE ONE TABLET BY MOUTH DAILY, Normal       !! - Potential duplicate medications found  Please discuss with provider  No discharge procedures on file  PDMP Review     None           ED Provider  Attending physically available and evaluated Benny JavedMitchel  HAMIDA managed the patient along with the ED Attending      Electronically Signed by         Froy Gurrola MD  06/19/21 5820

## 2021-06-21 NOTE — ED RE-EVALUATION NOTE
Patient called back due to persistent pain, reviewed chart and medications with her, advised her to return to ER for repeat evaluation and keep f/u appointment with PCP she has scheduled for in 2 days        Connor Matos PA-C  06/21/21 3368

## 2021-06-23 ENCOUNTER — OFFICE VISIT (OUTPATIENT)
Dept: FAMILY MEDICINE CLINIC | Facility: CLINIC | Age: 61
End: 2021-06-23

## 2021-06-23 ENCOUNTER — TELEPHONE (OUTPATIENT)
Dept: FAMILY MEDICINE CLINIC | Facility: CLINIC | Age: 61
End: 2021-06-23

## 2021-06-23 VITALS
TEMPERATURE: 97.2 F | BODY MASS INDEX: 48.95 KG/M2 | WEIGHT: 242.8 LBS | OXYGEN SATURATION: 98 % | HEART RATE: 81 BPM | HEIGHT: 59 IN | DIASTOLIC BLOOD PRESSURE: 82 MMHG | RESPIRATION RATE: 18 BRPM | SYSTOLIC BLOOD PRESSURE: 126 MMHG

## 2021-06-23 DIAGNOSIS — M99.02 THORACIC REGION SOMATIC DYSFUNCTION: ICD-10-CM

## 2021-06-23 DIAGNOSIS — B02.9 HERPES ZOSTER WITHOUT COMPLICATION: Primary | ICD-10-CM

## 2021-06-23 PROCEDURE — 99214 OFFICE O/P EST MOD 30 MIN: CPT | Performed by: FAMILY MEDICINE

## 2021-06-23 RX ORDER — PREDNISONE 10 MG/1
TABLET ORAL
Qty: 5 TABLET | Refills: 0 | Status: SHIPPED | OUTPATIENT
Start: 2021-06-23 | End: 2021-08-16 | Stop reason: ALTCHOICE

## 2021-06-23 RX ORDER — PREDNISONE 20 MG/1
TABLET ORAL
Qty: 18 TABLET | Refills: 0 | Status: SHIPPED | OUTPATIENT
Start: 2021-06-23 | End: 2021-08-16 | Stop reason: ALTCHOICE

## 2021-06-23 RX ORDER — GABAPENTIN 300 MG/1
CAPSULE ORAL
Qty: 30 CAPSULE | Refills: 1 | Status: SHIPPED | OUTPATIENT
Start: 2021-06-23 | End: 2021-08-16 | Stop reason: ALTCHOICE

## 2021-06-23 NOTE — PATIENT INSTRUCTIONS
Prednisone: Take the following  3 days of 3 of the 20 mg tablets once daily  3 days of 2 of the 20 mg tablets once daily   3 days of 1 of the 20 mg tablets once daily  3 days of 1 of the 10 mg tablets once daily  3 days of 1/2 of the 10 mg tablets once daily    Gabapentin:  Take the following  Today take 1 300mg capsule before bed  Tomorrow take 1 capsule in the morning and one before bed  The next day take 1 capsule morning, lunch, and before bed    *Do not abruptly stop this medication as it can cause seizures  *    Shingles   WHAT YOU NEED TO KNOW:   Shingles is a painful rash  Shingles is caused by the same virus that causes chickenpox (varicella-zoster)  After you get chickenpox, the virus stays in your body for several years without causing any symptoms  Shingles occurs when the virus becomes active again  The active virus travels along a nerve to your skin and causes a rash  DISCHARGE INSTRUCTIONS:   Call your local emergency number (911 in the [address] [place] [address],[address]) if:   · You have trouble moving your arms, legs, or face  · You become confused, or have difficulty speaking  · You have a seizure  Return to the emergency department if:   · You have weakness in an arm or leg  · You have dizziness, a severe headache, or hearing or vision loss  · You have painful, red, warm skin around the blisters, or the blisters drain pus  · Your neck is stiff or you have trouble moving it  Call your doctor if:   · You feel weak or have a headache  · You have a cough, chills, or a fever  · You have abdominal pain or nausea, or you are vomiting  · Your rash becomes more itchy or painful  · Your rash spreads to other parts of your body  · Your pain worsens and does not go away even after you take medicine  · You have questions or concerns about your condition or care  Medicines: You may need any of the following:  · Antiviral medicine  helps decrease symptoms and healing time   They may also decrease your risk of developing nerve pain  You will need to start taking them within 3 days of the start of symptoms to prevent nerve pain  · Prescription pain medicine  may be given  Ask your healthcare provider how to take this medicine safely  Some prescription pain medicines contain acetaminophen  Do not take other medicines that contain acetaminophen without talking to your healthcare provider  Too much acetaminophen may cause liver damage  Prescription pain medicine may cause constipation  Ask your healthcare provider how to prevent or treat constipation  · Acetaminophen  decreases pain and fever  It is available without a doctor's order  Ask how much to take and how often to take it  Follow directions  Read the labels of all other medicines you are using to see if they also contain acetaminophen, or ask your doctor or pharmacist  Acetaminophen can cause liver damage if not taken correctly  Do not use more than 4 grams (4,000 milligrams) total of acetaminophen in one day  · NSAIDs , such as ibuprofen, help decrease swelling, pain, and fever  This medicine is available with or without a doctor's order  NSAIDs can cause stomach bleeding or kidney problems in certain people  If you take blood thinner medicine, always ask if NSAIDs are safe for you  Always read the medicine label and follow directions  Do not give these medicines to children under 10months of age without direction from your child's healthcare provider  · Topical anesthetics  are used to numb the skin and decrease pain  They can be a cream, gel, spray, or patch  · Anticonvulsants  decrease nerve pain and may help you sleep at night  · Antidepressants  may be used to decrease nerve pain  · Take your medicine as directed  Contact your healthcare provider if you think your medicine is not helping or if you have side effects  Tell him of her if you are allergic to any medicine  Keep a list of the medicines, vitamins, and herbs you take  Include the amounts, and when and why you take them  Bring the list or the pill bottles to follow-up visits  Carry your medicine list with you in case of an emergency  Self-care:  Keep your rash clean and dry  Cover your rash with a bandage or clothing  Do not use bandages that stick to your skin  The sticky part may irritate your skin and make your rash last longer  Prevent the spread of germs:       · Wash your hands often  Wash your hands several times each day  Wash after you use the bathroom, change a child's diaper, and before you prepare or eat food  Use soap and water every time  Rub your soapy hands together, lacing your fingers  Wash the front and back of your hands, and in between your fingers  Use the fingers of one hand to scrub under the fingernails of the other hand  Wash for at least 20 seconds  Rinse with warm, running water for several seconds  Then dry your hands with a clean towel or paper towel  Use hand  that contains alcohol if soap and water are not available  Do not touch your eyes, nose, or mouth without washing your hands first          · Cover a sneeze or cough  Use a tissue that covers your mouth and nose  Throw the tissue away in a trash can right away  Use the bend of your arm if a tissue is not available  Wash your hands well with soap and water or use a hand   · Stay away from others while you are sick  Avoid crowds as much as possible  · Ask about vaccines you may need  Talk to your healthcare provider about your vaccine history  He or she will tell you which vaccines you need, and when to get them  Prevent shingles or another shingles outbreak:  A vaccine may be given to help prevent shingles  You can get the vaccine even if you already had shingles  The vaccine can help prevent a future outbreak  If you do get shingles again, the vaccine can keep it from becoming severe  The vaccine comes in 2 forms   Your healthcare provider will tell you which form is right for you  The decision is based on your age and any medical conditions you have  A 2-dose vaccine is usually given to adults 48 years or older  A 1-dose vaccine may be given to adults 61 years or older  For more information:   · Centers for Disease Control and Prevention  [address] , [address]  Phone: 8- 072 - 9774303  Phone: 1- 829 - 5084419  Web Address: [employer] com     Follow up with your doctor as directed:  Write down your questions so you remember to ask them during your visits  © Copyright [address] [place] [address] Drive Information is for End User's use only and may not be sold, redistributed or otherwise used for commercial purposes  All illustrations and images included in CareNotes® are the copyrighted property of A D A M [employer] Inc  or Tito Otero  The above information is an  only  It is not intended as medical advice for individual conditions or treatments  Talk to your doctor, nurse or pharmacist before following any medical regimen to see if it is safe and effective for you

## 2021-06-23 NOTE — PROGRESS NOTES
1  Herpes zoster without complication  predniSONE 20 mg tablet    predniSONE 10 mg tablet    gabapentin (NEURONTIN) 300 mg capsule   2  Thoracic region somatic dysfunction       No orders of the defined types were placed in this encounter  ASSESSMENT/PLAN:  Lengthy discussion with Anjana today regarding clinical findings  I have also reviewed the ED note and studies performed while there  Florecita's pain appears to be due to shingles  This unfortunately started about 2 weeks ago  We discussed precautions (specifically to avoid pregnant individuals, children, and immunocompromised individuals) as well as treatment options  The main concern at this point is pain control  We came to a shared informed decision to proceed with symptomatic treatment which will include a steroid taper and Gabapentin  She will follow-up in 3 weeks or sooner if needed  My plan would be to consider tapering off the Gabapentin at that point  She does have a history of seizure and we carefully discussed the importance of not stopping this medication abruptly  She voiced understanding to this statement  Return in about 2 weeks (around 7/7/2021) for Schedule with a resident please   Repeat XR Next Visit: n/a    _____________________________________________________________  CHIEF COMPLAINT:  Back pain    HPI:  Cely Schmidt is a 57 y o  female w/ a history of diabetes mellitus (last A1c 6 6 on 05/03/21), GERMAN, heart disease, epilepsy, cerebral infarction, BPPV, HTN, h/o mitral valve replacement who presents for ER follow-up back pain  She presented to the ED on 06/19/2021 (4 days ago) with a 1 week history of back pain and loss of balance  Denied head strike or LOC  She endorsed falling to her knees  An EKG was unchanged from previous  Troponin was 0 04  U/a, CBC, CMP, lipase were all non-acute  CT ab/pelvis was read as no acute cardiopulmonary abnormality, no acute intra-abdominal abnormality, cholelithiasis         Visit 6/23/2021 :  Today, presents for ED follow-up  Reports right sided mid thoracic back pain radiates out and follows rash distribution  First noticed symptoms about 2 weeks ago  Rash is red and has vesicles  Had chicken pox as a child  Denies fevers, chills, night sweats, disseminated rash, or AMS  Rash is worse with direct pressure  Pain is fairly constant and described as severe in intensity  Describes the pain as a burning sensation  Review of Systems   Constitutional: Negative for chills, fever and unexpected weight change  HENT: Negative for hearing loss, nosebleeds and sore throat  Eyes: Negative for pain, redness and visual disturbance  Respiratory: Negative for cough, shortness of breath and wheezing  Cardiovascular: Negative for chest pain, palpitations and leg swelling  Gastrointestinal: Negative for abdominal pain, nausea and vomiting  Endocrine: Negative for polydipsia and polyuria  Genitourinary: Negative for dysuria and hematuria  Musculoskeletal: Positive for back pain  Skin: Positive for color change and rash  Negative for wound  Neurological: Negative for dizziness, numbness and headaches  Psychiatric/Behavioral: Negative for decreased concentration, dysphoric mood and suicidal ideas  The patient is not nervous/anxious          Following history reviewed and update:    Past Medical History:   Diagnosis Date    Abnormal ultrasound of pelvis     last assessed - [id number]    Asthma     Asthma, persistent 8/1/2014    Cerebral infarction [facility])     unspecified; last assessed - [id number]    Dizziness     last assessed - [id number]    Dysuria     last assessed - [id number]    Exposure to potentially hazardous body fluids     last assessed - [id number]    Heart disease     Hematoma     of arm; last assessed - [id number]    Mitral valve stenosis     Personal history of scoliosis     Pneumonia     Scar, hypertrophic     last assessed - [id number]    Seizures ([facility] [address] )     Type 2 diabetes mellitus ([facility] [address] )     Vision problems      Past Surgical History:   Procedure Laterality Date    BREAST BIOPSY Left     many years ago in [place]-pt not really sure    MITRAL VALVE REPLACEMENT  06/17/2015    MVR with 27 mm [employer] Optiform mechanical prostethic;  last assessed - [id number]    TONSILLECTOMY      TRANSLUMINAL ANGIOPLASTY      mitral valve; last assessed - 01Aug2014    TUBAL LIGATION  1986    last assessed - [id number]     Social History   Social History     Substance and Sexual Activity   Alcohol Use No     Social History     Substance and Sexual Activity   Drug Use No     Social History     Tobacco Use   Smoking Status Never Smoker   Smokeless Tobacco Never Used     Family History   Problem Relation Age of Onset    Cancer Mother     Dementia Mother     Diabetes Mother     Hypertension Mother     Hyperlipidemia Mother     Cervical cancer Mother     Heart disease Father     Coronary artery disease Father     Hypertension Father     Heart attack Father     Diabetes Sister     Breast cancer Sister     Diabetes Brother     Hypertension Brother     Hyperlipidemia Brother     Diabetes Maternal Grandmother     Breast cancer Maternal Grandmother     Diabetes Other     Seizures Son     Psoriasis Son     No Known Problems Daughter     Heart disease Paternal Aunt     Heart disease Paternal Aunt      Allergies   Allergen Reactions    Codeine     Prednisone Itching    Codeine Polt-Chlorphen Polt Er Irritability, Itching and Rash          Physical Exam  /82 (BP Location: Left arm, Patient Position: Sitting, Cuff Size: Large)   Pulse 81   Temp (!) 97 2 °F (36 2 °C) (Temporal)   Resp 18   Ht 4' 11" (1 499 m)   Wt 110 kg (242 lb 12 8 oz)   SpO2 98%   BMI 49 04 kg/m²     Constitutional:  see vital signs  Gen: well-developed, normocephalic/atraumatic, well-groomed  Eyes: No inflammation or discharge of conjunctiva or lids; sclera clear   Pharynx: no inflammation, lesion, or mass of lips  Neck: supple, no masses, non-distended  MSK: no inflammation, lesion, mass, or clubbing of nails and digits except for other than mentioned below  Pulmonary/Chest: Effort normal  No respiratory distress  NEURO: cranial nerves grossly intact  PSYCH:  Alert and oriented to person, place, and time; recent and remote memory intact; mood normal, no depression, anxiety, or agitation, judgment and insight good and intact     SKIN EXAM:  Vesicular rash with erythematous base following a dermatomal pattern from right mid thoracic region to under the breast on the right side  This portion of the exam was performed with a female chaperone present  Portions of the record may have been created with voice recognition software  Occasional wrong word or "sound alike" substitutions may have occurred due to the inherent limitations of voice recognition software  Please review the chart carefully and recognize, using context, where substitutions/typographical errors may have occurred

## 2021-07-06 ENCOUNTER — OFFICE VISIT (OUTPATIENT)
Dept: FAMILY MEDICINE CLINIC | Facility: CLINIC | Age: 61
End: 2021-07-06

## 2021-07-06 VITALS
BODY MASS INDEX: 48.54 KG/M2 | WEIGHT: 240.8 LBS | DIASTOLIC BLOOD PRESSURE: 84 MMHG | RESPIRATION RATE: 18 BRPM | OXYGEN SATURATION: 98 % | SYSTOLIC BLOOD PRESSURE: 140 MMHG | HEART RATE: 75 BPM | TEMPERATURE: 96.2 F | HEIGHT: 59 IN

## 2021-07-06 DIAGNOSIS — B02.9 HERPES ZOSTER WITHOUT COMPLICATION: ICD-10-CM

## 2021-07-06 DIAGNOSIS — E11.9 TYPE 2 DIABETES MELLITUS WITHOUT COMPLICATION, WITHOUT LONG-TERM CURRENT USE OF INSULIN (HCC): ICD-10-CM

## 2021-07-06 PROCEDURE — 99213 OFFICE O/P EST LOW 20 MIN: CPT | Performed by: FAMILY MEDICINE

## 2021-07-06 PROCEDURE — 3008F BODY MASS INDEX DOCD: CPT | Performed by: PHYSICIAN ASSISTANT

## 2021-07-06 RX ORDER — LANCETS 33 GAUGE
EACH MISCELLANEOUS
Qty: 100 EACH | Refills: 4 | Status: SHIPPED | OUTPATIENT
Start: 2021-07-06

## 2021-07-06 RX ORDER — GABAPENTIN 100 MG/1
100 CAPSULE ORAL
Qty: 14 CAPSULE | Refills: 0 | Status: SHIPPED | OUTPATIENT
Start: 2021-07-06 | End: 2021-08-16 | Stop reason: ALTCHOICE

## 2021-07-06 NOTE — PROGRESS NOTES
Assessment/Plan:    Herpes zoster without complication  - Shingles rash of 3 weeks duration   - Rash is healing well, patient is taking gabapentin for pain  - Discussed postherpetic neuralgia and the potential need for chronic management  - Will taper gabapentin to 100 mg qHs at this time and advised patient to take for at least 3 more days before discontinuing completely  - Discussed Shingrix vaccine with the patient once this acute episode has resolved which patient is interested in and will follow-up on at her annual apt in August            Problem List Items Addressed This Visit        Other    Herpes zoster without complication     - Shingles rash of 3 weeks duration   - Rash is healing well, patient is taking gabapentin for pain  - Discussed postherpetic neuralgia and the potential need for chronic management  - Will taper gabapentin to 100 mg qHs at this time and advised patient to take for at least 3 more days before discontinuing completely  - Discussed Shingrix vaccine with the patient once this acute episode has resolved which patient is interested in and will follow-up on at her annual apt in August           Relevant Medications    gabapentin (NEURONTIN) 100 mg capsule            Subjective:      Patient ID: Juventino Lama is a 57 y o  female  Patient presents for follow up for shingles  She reports that the area is still painful, and she has to sleep sitting up  She reports improved pain with gabapentin  The following portions of the patient's history were reviewed and updated as appropriate: allergies, current medications, past family history, past medical history, past social history, past surgical history and problem list     Review of Systems   Constitutional: Negative  HENT: Negative  Respiratory: Negative  Cardiovascular: Positive for leg swelling (recent leg swelling  Improved when she took her "water pill")  Negative for chest pain and palpitations     Gastrointestinal: Negative  Endocrine: Positive for polydipsia (new-onset after initiating prednisone ) and polyuria  Negative for cold intolerance, heat intolerance and polyphagia  Genitourinary: Negative  Skin: Negative for color change, pallor and wound  Neurological: Positive for dizziness (Dizzy the morning after taking gabapentin  Mari Mort )  Psychiatric/Behavioral: Negative  Objective:      /84 (BP Location: Left arm, Patient Position: Sitting, Cuff Size: Standard)   Pulse 75   Temp (!) 96 2 °F (35 7 °C) (Tympanic)   Resp 18   Ht 4' 11" (1 499 m)   Wt 109 kg (240 lb 12 8 oz)   SpO2 98%   BMI 48 64 kg/m²          Physical Exam  Constitutional:       Appearance: Normal appearance  She is obese  HENT:      Head: Normocephalic and atraumatic  Cardiovascular:      Rate and Rhythm: Normal rate and regular rhythm  Heart sounds: Normal heart sounds  Pulmonary:      Breath sounds: Normal breath sounds  Skin:     Findings: Rash present  Rash is vesicular (resolving VZV rash  No longer crusting)  Neurological:      Mental Status: She is alert     Psychiatric:         Mood and Affect: Mood normal

## 2021-07-06 NOTE — ASSESSMENT & PLAN NOTE
- Shingles rash of 3 weeks duration   - Rash is healing well, patient is taking gabapentin for pain    - Discussed postherpetic neuralgia and the potential need for chronic management  - Will taper gabapentin to 100 mg qHs at this time and advised patient to take for at least 3 more days before discontinuing completely  - Discussed Shingrix vaccine with the patient once this acute episode has resolved which patient is interested in and will follow-up on at her annual apt in August

## 2021-07-13 ENCOUNTER — ANTICOAG VISIT (OUTPATIENT)
Dept: CARDIOLOGY CLINIC | Facility: CLINIC | Age: 61
End: 2021-07-13

## 2021-07-13 DIAGNOSIS — Z95.2 S/P MVR (MITRAL VALVE REPLACEMENT): Primary | ICD-10-CM

## 2021-07-13 DIAGNOSIS — Z95.2 HISTORY OF PROSTHETIC HEART VALVE: Primary | ICD-10-CM

## 2021-07-21 DIAGNOSIS — L50.0 ALLERGIC URTICARIA: ICD-10-CM

## 2021-07-21 DIAGNOSIS — E11.9 TYPE 2 DIABETES MELLITUS WITHOUT COMPLICATION, WITHOUT LONG-TERM CURRENT USE OF INSULIN (HCC): ICD-10-CM

## 2021-07-21 RX ORDER — HYDROXYZINE HYDROCHLORIDE 25 MG/1
TABLET, FILM COATED ORAL
Qty: 60 TABLET | Refills: 0 | Status: SHIPPED | OUTPATIENT
Start: 2021-07-21 | End: 2021-08-27

## 2021-08-16 ENCOUNTER — TELEMEDICINE (OUTPATIENT)
Dept: FAMILY MEDICINE CLINIC | Facility: CLINIC | Age: 61
End: 2021-08-16

## 2021-08-16 VITALS — BODY MASS INDEX: 46.97 KG/M2 | TEMPERATURE: 97.7 F | WEIGHT: 233 LBS | HEIGHT: 59 IN

## 2021-08-16 DIAGNOSIS — K52.9 CHRONIC DIARRHEA: Primary | ICD-10-CM

## 2021-08-16 PROCEDURE — 99213 OFFICE O/P EST LOW 20 MIN: CPT | Performed by: FAMILY MEDICINE

## 2021-08-16 PROCEDURE — 3008F BODY MASS INDEX DOCD: CPT | Performed by: FAMILY MEDICINE

## 2021-08-16 NOTE — ASSESSMENT & PLAN NOTE
Chronic diarrhea for almost 3 weeks, encourage hydration, blant diet, check stool culture, ova parasite, C  Diff as well as COVID testing

## 2021-08-16 NOTE — PROGRESS NOTES
Virtual Regular Visit    Verification of patient location:    Patient is located in the following state in which I hold an active license [place]      Assessment/Plan:    Problem List Items Addressed This Visit        Digestive    Chronic diarrhea - Primary     Chronic diarrhea for almost 3 weeks, encourage hydration, blant diet, check stool culture, ova parasite, C  Diff as well as COVID testing  Relevant Orders    Stool culture    Ova and parasite examination    Fecal fat, qualitative    Clostridium difficile toxin by PCR    Novel Coronavirus (Covid-19),PCR [facility] - Collected at [facility] or [facility]               Reason for visit is   Chief Complaint   Patient presents with    Diarrhea     for more than 2 weeks/ mouth is really dry    Abdominal Pain    Headache    Virtual Regular Visit        Encounter provider Karri Baer MD    Provider located at [address] [place] [address]   [phone]      Recent Visits  No visits were found meeting these conditions  Showing recent visits within past 7 days and meeting all other requirements  Today's Visits  Date Type Provider Dept   08/16/21 Telemedicine Karri Baer MD [facility]   Showing today's visits and meeting all other requirements  Future Appointments  No visits were found meeting these conditions  Showing future appointments within next 150 days and meeting all other requirements       The patient was identified by name and date of birth  Murtaaz Lagunas was informed that this is a telemedicine visit and that the visit is being conducted through [address] [place] [address] Now and patient was informed that this is a secure, HIPAA-compliant platform  She agrees to proceed     My office door was closed  The patient was notified the following individuals were present in the room medical student Haylie Edwards is in the room    She acknowledged consent and understanding of privacy and security of the video platform  The patient has agreed to participate and understands they can discontinue the visit at any time  Patient is aware this is a billable service  Lisa Rey is a 57 y o  female having shingles in the past took gabapentin and prednisone for pain, but now having diarrhea 3-4 times day, no blood      Patient noted diarrhea 3-4 times daily for almost 3 weeks  No recollection of any triggering event  No blood in stool, just some mucus  No recent antibiotics; patient had COVID earlier in this year  Able to keep fluids and trying to stay well hydrated         Past Medical History:   Diagnosis Date    Abnormal ultrasound of pelvis     last assessed - [id number]    Asthma     Asthma, persistent 8/1/2014    Cerebral infarction ([facility] )     unspecified; last assessed - [id number]    Dizziness     last assessed - [id number]    Dysuria     last assessed - 42Mxa5128    Exposure to potentially hazardous body fluids     last assessed - 35Fuy2578    Heart disease     Hematoma     of arm; last assessed - 20Jun2016    Mitral valve stenosis     Personal history of scoliosis     Pneumonia     Scar, hypertrophic     last assessed - [id number]    Seizures ([facility] [address] )     Type 2 diabetes mellitus ([facility] )     Vision problems        Past Surgical History:   Procedure Laterality Date    BREAST BIOPSY Left     many years ago in [place]-pt not really sure    MITRAL VALVE REPLACEMENT  06/17/2015    MVR with 27 mm [employer] Optiform mechanical prostethic;  last assessed - [id number]    TONSILLECTOMY      TRANSLUMINAL ANGIOPLASTY      mitral valve; last assessed - 88Sjp8836   [address] [place]    last assessed - 75Rsh9305       Current Outpatient Medications   Medication Sig Dispense Refill    acetaminophen (TYLENOL) 500 mg tablet Take 2 tablets by mouth daily as needed for pain 180 tablet 3    albuterol (PROVENTIL HFA,VENTOLIN HFA) 90 mcg/act inhaler INHALE TWO PUFFS EVERY 6 (SIX) HOURS AS NEEDED FOR WHEEZING 18 Inhaler 0    Blood Glucose Monitoring Suppl (ACCU-CHEK EDILMA PLUS) w/Device KIT by Does not apply route 2 (two) times a day 1 kit 0    cetirizine (ZyrTEC) 10 mg tablet TAKE ONE TABLET BY MOUTH DAILY 90 tablet 10    co-enzyme Q-10 50 MG capsule Take 1 capsule (50 mg total) by mouth daily 90 capsule 1    Flovent  MCG/ACT inhaler INHALE TWO PUFFS TWO TIMES A DAY RINSE MOUTH AFTER USE  3 Inhaler 3    fluticasone (FLONASE) 50 mcg/act nasal spray 1 spray into each nostril daily 16 g 1    glucose blood (Accu-Chek SmartView) test strip USE DAILY AS INSTRUCTED 100 each 11    hydrOXYzine HCL (ATARAX) 25 mg tablet TAKE ONE TABLET BY MOUTH EVERY 6 HOURS AS NEEDED FOR ITCHING 60 tablet 0    Keppra 500 MG tablet TAKE 1 TABLET (500 MG TOTAL) BY MOUTH THREE (THREE) TIMES A  tablet 1    Lancets (OneTouch Delica Plus [id number]) MISC USE AS INSTRUCTED 100 each 4    lidocaine (LIDODERM) 5 % Apply 1 patch topically daily Remove & Discard patch within 12 hours or as directed by MD 10 patch 0    lisinopril (ZESTRIL) 2 5 mg tablet Take 1 tablet (2 5 mg total) by mouth daily 90 tablet 3    metFORMIN (GLUCOPHAGE) 500 mg tablet TAKE ONE TABLET BY MOUTH THREE TIMES A DAY 90 tablet 0    metoprolol tartrate (LOPRESSOR) 25 mg tablet Take 0 5 tablets (12 5 mg total) by mouth every 12 (twelve) hours 90 tablet 3    omeprazole (PriLOSEC) 40 MG capsule TAKE 1 CAPSULE (40 MG TOTAL) BY MOUTH DAILY 90 capsule 11    OneTouch Verio test strip USE DAILY AS INSTRUCTED 100 each 10    pioglitazone (ACTOS) 15 mg tablet Take 1 tablet (15 mg total) by mouth daily 90 tablet 3    pravastatin (PRAVACHOL) 20 mg tablet Take 1 tablet (20 mg total) by mouth daily at bedtime 90 tablet 3    torsemide (DEMADEX) 20 mg tablet TAKE ONE TABLET BY MOUTH AS NEEDED AS DIRECTED 90 tablet 10    warfarin (COUMADIN) 2 5 mg tablet Take 1 tablet (2 5 mg total) by mouth daily 90 tablet 0    warfarin (COUMADIN) 5 mg tablet TAKE ONE TABLET BY MOUTH DAILY 90 tablet 0     No current facility-administered medications for this visit  Allergies   Allergen Reactions    Codeine     Prednisone Itching    Codeine Polt-Chlorphen Polt Er Irritability, Itching and Rash       Review of Systems   Constitutional: Negative for activity change, appetite change, fatigue and fever  HENT: Negative for ear pain, postnasal drip and sore throat  Eyes: Negative for photophobia and visual disturbance  Respiratory: Negative for cough, chest tightness, shortness of breath and wheezing  Cardiovascular: Negative for chest pain and palpitations  Gastrointestinal: Positive for diarrhea  Negative for abdominal pain, blood in stool, constipation, nausea, rectal pain and vomiting  Musculoskeletal: Negative for arthralgias and back pain  Skin: Negative for rash  Neurological: Negative for light-headedness and headaches  Hematological: Negative for adenopathy  Psychiatric/Behavioral: Negative for agitation  Video Exam    Vitals:    08/16/21 1131   Temp: 97 7 °F (36 5 °C)   TempSrc: Temporal   Weight: 106 kg (233 lb)   Height: 4' 11" (1 499 m)       Physical Exam  Constitutional:       Appearance: Normal appearance  Pulmonary:      Effort: Pulmonary effort is normal    Neurological:      Mental Status: She is alert  Psychiatric:         Mood and Affect: Mood normal           I spent 20 minutes with patient today in which greater than 50% of the time was spent in counseling/coordination of care regarding reasons for stood and COVIDs testing, encourage hydration, supportive care  VIRTUAL VISIT DISCLAIMER      Florecita Ramirez verbally agrees to participate in [employer]   Pt is aware that [employer] could be limited without vital signs or the ability to perform a full hands-on physical exam  Florecita Ramirez understands she or the provider may request at any time to terminate the video visit and request the patient to seek care or treatment in person

## 2021-08-16 NOTE — PATIENT INSTRUCTIONS
Instruct patient to go to the mobile van/lab to get tested for COVID then for stool collection for testing

## 2021-08-20 ENCOUNTER — APPOINTMENT (OUTPATIENT)
Dept: LAB | Age: 61
End: 2021-08-20
Payer: COMMERCIAL

## 2021-08-20 ENCOUNTER — TELEPHONE (OUTPATIENT)
Dept: FAMILY MEDICINE CLINIC | Facility: CLINIC | Age: 61
End: 2021-08-20

## 2021-08-20 ENCOUNTER — ANTICOAG VISIT (OUTPATIENT)
Dept: CARDIOLOGY CLINIC | Facility: CLINIC | Age: 61
End: 2021-08-20

## 2021-08-20 DIAGNOSIS — Z95.2 HISTORY OF PROSTHETIC HEART VALVE: Primary | ICD-10-CM

## 2021-08-20 PROCEDURE — U0005 INFEC AGEN DETEC AMPLI PROBE: HCPCS | Performed by: FAMILY MEDICINE

## 2021-08-20 PROCEDURE — U0003 INFECTIOUS AGENT DETECTION BY NUCLEIC ACID (DNA OR RNA); SEVERE ACUTE RESPIRATORY SYNDROME CORONAVIRUS 2 (SARS-COV-2) (CORONAVIRUS DISEASE [COVID-19]), AMPLIFIED PROBE TECHNIQUE, MAKING USE OF HIGH THROUGHPUT TECHNOLOGIES AS DESCRIBED BY CMS-2020-01-R: HCPCS | Performed by: FAMILY MEDICINE

## 2021-08-20 NOTE — TELEPHONE ENCOUNTER
Patient called to request someone call her when her covid test results come in   She wants a call over weekend not to wait til Mon

## 2021-08-26 ENCOUNTER — TELEPHONE (OUTPATIENT)
Dept: OBGYN CLINIC | Facility: HOSPITAL | Age: 61
End: 2021-08-26

## 2021-08-26 ENCOUNTER — OFFICE VISIT (OUTPATIENT)
Dept: OBGYN CLINIC | Facility: OTHER | Age: 61
End: 2021-08-26
Payer: COMMERCIAL

## 2021-08-26 VITALS
BODY MASS INDEX: 47.87 KG/M2 | SYSTOLIC BLOOD PRESSURE: 142 MMHG | HEART RATE: 70 BPM | WEIGHT: 237 LBS | DIASTOLIC BLOOD PRESSURE: 84 MMHG

## 2021-08-26 DIAGNOSIS — M17.11 PRIMARY OSTEOARTHRITIS OF RIGHT KNEE: Primary | ICD-10-CM

## 2021-08-26 DIAGNOSIS — M17.12 PRIMARY OSTEOARTHRITIS OF LEFT KNEE: ICD-10-CM

## 2021-08-26 PROCEDURE — 99214 OFFICE O/P EST MOD 30 MIN: CPT | Performed by: FAMILY MEDICINE

## 2021-08-26 NOTE — TELEPHONE ENCOUNTER
Dr Martha Sierra    [phone]  Patient is  requesting order for wheelchair to be faxed to [facility] in [place]  She could not provide the fax #

## 2021-08-26 NOTE — PATIENT INSTRUCTIONS
Declined corticosteroid injection  If decides to proceed with injection then will order diazepam (valium) for anxiety  Recommended loved one present with patient if proceeds with injection  voltaren gel as needed     Treatment for knee osteoarthritis depends on severity of cartilage wear and pain levels  First line for mild arthritis is exercise to strengthen the knee and allow better joint movement, 5-10% weight loss if overweight, and topical anti-inflammatories such as diclofenac gel or topical analgesic pain relief creams such as capsaicin  Symptoms at this stage usually improve in 3 months  Moderate to severe arthritis or arthritis not responding to first line treatments may require oral medicine such as anti-inflammatories (NSAIDs) such as ibuprofen/motrin, and if failing treatment with oral NSAIDs, then may consider depression medicines such as duloxetine (cymbalta) which also have been shown to work on pain receptors and help to control pain  Other analgesics such as tylenol (acetaminophen) may be used but do not appear to offer significant pain relief  Supplements such as glucosamine and chondroitin supplements  Some studies do show benefit from taking glucosamine sulfate (1500 mg/day) and chondroitin (800 mg/day) but evidence is controversial  I recommend against a type of glucosamine known as "glucosamine hydrochloride" which appears not as effective as glucosamine sulfate  If no improvement with oral medicines, then patients may consider steroid injection into the knee joint which provides pain relief  Steroid injections can be given every 3 months  Any sooner than that can  result in possible deterioration or cartilage in your joint  Other injections are available such as as viscosupplementation or gel shots into the knee which provide nutrients for the cartilage and can result in pain reduction    These viscosupplementation injections are generally considered every 6 months but are controversial   The American Orthopedic Society recommends against viscosupplementation injection; however, the [employer] recommends for these injections  Beyond these injections there are other controversial treatments including stem cell as well as platelet rich plasma injection which are out of pocket usually up to a 1000 dollars per injection  Lastly, if you fail conservative measures and have persistent pain, then we may consider referral to surgeon for knee replacement  (UTD Vicenta Tran 2018)  I recommend against taking anti-inflammatory medications also known NSAIDs  (non-steroidal anti-inflammatory pills including advil, ibuprofen, motrin, meloxicam, celecoxib, aleve, naproxen, and aspirin)  These medications should not be used in the setting of unctonrolled high blood pressure, kidney disease, stomach ulcers, gastrointestinal bleeding from the rectum or the stomach, or simultaneously with blood thinners  Risks of taking NSAIDs include severely elevated blood pressure that can lead to stroke and heart attack, kidney failure, and severe internal bleeding  If you have no liver problems, you may take Tylenol (also known as acetaminophen) at a  maximum of 1,000  Mg per dose every 6 hours as needed for pain but no more 3 doses or 3,000 mg per day  If you are taking other medications which include tylenol (acetaminophen) such as hydrocodone-acetaminophen then you must factor in the amount of tylenol (acetamionphen) into your 3,000mg maximum dose  Patient expressed understanding and agreed to plan

## 2021-08-26 NOTE — PROGRESS NOTES
1  Primary osteoarthritis of right knee  Diclofenac Sodium (VOLTAREN) 1 %    Walker   2  Primary osteoarthritis of left knee       Orders Placed This Encounter   Procedures    Walker        Imaging Studies (I personally reviewed images in PACS and report):  X-ray bilateral knees 05/20/2021:  Bilateral moderate medial compartment osteoarthritis     hemoglobin A1c 6 6 on 05/03/2021    IMPRESSION:  bilateral moderate medial compartment osteoarthritis  Anxiety with needle phobia  PMH:  Mitral valve stenosis on blood thinners-avoid oral NSAIDs, diabetes- controlled    Repeat X-ray next visit: None    Return if symptoms worsen or fail to improve  Patient Instructions   Declined corticosteroid injection  If decides to proceed with injection then will order diazepam (valium) for anxiety  Recommended loved one present with patient if proceeds with injection  voltaren gel as needed     Treatment for knee osteoarthritis depends on severity of cartilage wear and pain levels  First line for mild arthritis is exercise to strengthen the knee and allow better joint movement, 5-10% weight loss if overweight, and topical anti-inflammatories such as diclofenac gel or topical analgesic pain relief creams such as capsaicin  Symptoms at this stage usually improve in 3 months  Moderate to severe arthritis or arthritis not responding to first line treatments may require oral medicine such as anti-inflammatories (NSAIDs) such as ibuprofen/motrin, and if failing treatment with oral NSAIDs, then may consider depression medicines such as duloxetine (cymbalta) which also have been shown to work on pain receptors and help to control pain  Other analgesics such as tylenol (acetaminophen) may be used but do not appear to offer significant pain relief  Supplements such as glucosamine and chondroitin supplements   Some studies do show benefit from taking glucosamine sulfate (1500 mg/day) and chondroitin (800 mg/day) but evidence is controversial  I recommend against a type of glucosamine known as "glucosamine hydrochloride" which appears not as effective as glucosamine sulfate  If no improvement with oral medicines, then patients may consider steroid injection into the knee joint which provides pain relief  Steroid injections can be given every 3 months  Any sooner than that can  result in possible deterioration or cartilage in your joint  Other injections are available such as as viscosupplementation or gel shots into the knee which provide nutrients for the cartilage and can result in pain reduction  These viscosupplementation injections are generally considered every 6 months but are controversial   The [address] recommends against viscosupplementation injection; however, the [employer] recommends for these injections  Beyond these injections there are other controversial treatments including stem cell as well as platelet rich plasma injection which are out of pocket usually up to a 1000 dollars per injection  Lastly, if you fail conservative measures and have persistent pain, then we may consider referral to surgeon for knee replacement  (MUMTAZ Munson 2018)  I recommend against taking anti-inflammatory medications also known NSAIDs  (non-steroidal anti-inflammatory pills including advil, ibuprofen, motrin, meloxicam, celecoxib, aleve, naproxen, and aspirin)  These medications should not be used in the setting of unctonrolled high blood pressure, kidney disease, stomach ulcers, gastrointestinal bleeding from the rectum or the stomach, or simultaneously with blood thinners  Risks of taking NSAIDs include severely elevated blood pressure that can lead to stroke and heart attack, kidney failure, and severe internal bleeding   If you have no liver problems, you may take Tylenol (also known as acetaminophen) at a  maximum of 1,000  Mg per dose every 6 hours as needed for pain but no more 3 doses or 3,000 mg per day  If you are taking other medications which include tylenol (acetaminophen) such as hydrocodone-acetaminophen then you must factor in the amount of tylenol (acetamionphen) into your 3,000mg maximum dose  Patient expressed understanding and agreed to plan  CHIEF COMPLAINT:   bilateral knee pain    HPI:  Milady Soria is a 57 y o  female  who presents for    consultation from Dr Phyllis Lamb for knee pain osteoarthritis of both knees    Visit 8/26/2021 :  4 years knee pain worsening in last few months  Denies any injury  Points to medial aspect of her knee is greatest source of pain  Exacerbated by walking  Using cane due to pain with walking  Pain moderate to severe intensity intermittent  Lightheaded after mentioning needle in the examination room  She did take metformin this morning for her diabetes  She did not skip any meals  She was offered water in the office as well as crackers and candy  Her lightheadedness resolved after explaining procedure  Patient agreed at that time to consider procedure in the future for ultrasound-guided knee injection  Review of Systems   Constitutional: Negative for chills, fever and unexpected weight change  HENT: Negative for hearing loss, nosebleeds and sore throat  Eyes: Negative for pain, redness and visual disturbance  Respiratory: Negative for cough, shortness of breath and wheezing  Cardiovascular: Negative for chest pain, palpitations and leg swelling  Gastrointestinal: Negative for abdominal distention, nausea and vomiting  Endocrine: Negative for polydipsia and polyuria  Genitourinary: Negative for dysuria and hematuria  Skin: Negative for rash and wound  Neurological: Negative for dizziness, weakness, numbness and headaches  Psychiatric/Behavioral: Negative for decreased concentration and suicidal ideas           Following history reviewed and update:    Past Medical History:   Diagnosis Date    Abnormal ultrasound of pelvis     last assessed - 26Mar2015    Asthma     Asthma, persistent 8/1/2014    Cerebral infarction ([facility] [address] )     unspecified; last assessed - [id number]    Dizziness     last assessed - [id number]    Dysuria     last assessed - 42Kup4350    Exposure to potentially hazardous body fluids     last assessed - 81Qce5341    Heart disease     Hematoma     of arm; last assessed - 20Jun2016    Mitral valve stenosis     Personal history of scoliosis     Pneumonia     Scar, hypertrophic     last assessed - [id number]    Seizures ([facility] [address] )     Type 2 diabetes mellitus ([facility] )     Vision problems      Past Surgical History:   Procedure Laterality Date    BREAST [place]SY Left     [place] years ago in [place] really sure    MITRAL VALVE REPLACEMENT  06/17/2015    MVR with 27 mm [employer] Optiform mechanical prostethic;  last assessed - [id number]    TONSILLECTOMY      TRANSLUMINAL ANGIOPLASTY      mitral valve; last assessed - 01Aug2014    TUBAL LIGATION  1986    last assessed - 01Aug2014     Social History   Social History     Substance and Sexual Activity   Alcohol Use No     Social History     Substance and Sexual Activity   Drug Use No     Social History     Tobacco Use   Smoking Status Never Smoker   Smokeless Tobacco Never Used     Family History   Problem Relation Age of Onset    Cancer Mother     Dementia Mother     Diabetes Mother     Hypertension Mother     Hyperlipidemia Mother     Cervical cancer Mother     Heart disease Father     Coronary artery disease Father     Hypertension Father     Heart attack Father     Diabetes Sister     Breast cancer Sister     Diabetes Brother     Hypertension Brother     Hyperlipidemia Brother     Diabetes Maternal Grandmother     Breast cancer Maternal Grandmother     Diabetes Other     Seizures Son     Psoriasis Son     No Known Problems Daughter     Heart disease Paternal Aunt     Heart disease Paternal Aunt      Allergies   Allergen Reactions    Codeine     Prednisone Itching    Codeine Polt-Chlorphen Polt Er Irritability, Itching and Rash          Physical Exam  /84 (BP Location: Left arm, Patient Position: Sitting, Cuff Size: Adult)   Pulse 70   Wt 108 kg (237 lb)   BMI 47 87 kg/m²     Constitutional:  see vital signs  Gen: well-developed, normocephalic/atraumatic, well-groomed  Eyes: No inflammation or discharge of conjunctiva or lids; sclera clear   Pharynx: no inflammation, lesion, or mass of lips  Neck: supple, no masses, non-distended  MSK: no inflammation, lesion, mass, or clubbing of nails and digits except for other than mentioned below  SKIN: no visible rashes or skin lesions  Pulmonary/Chest: Effort normal  No respiratory distress     NEURO: cranial nerves grossly intact  PSYCH:  Alert and oriented to person, place, and time; recent and remote memory intact; mood normal, no depression, anxiety, or agitation, judgment and insight good and intact     Ortho Exam  RIGHT KNEE:  Erythema: no  Swelling: no  Increased Warmth: no  Tenderness:+  Medial joint line  Flexion: intact  Extension: intact  Drawer: negative  Varus laxity: negative  Valgus laxity: negative  [facility]: negative     LEFT KNEE:  Erythema: no  Swelling: no  Increased Warmth: no  Tenderness: + medial joint line  Flexion: intact  Extension: intact  Drawer: negative  Varus laxity: negative  Valgus laxity: negative  Chin: negative        Procedures

## 2021-08-27 DIAGNOSIS — L50.0 ALLERGIC URTICARIA: ICD-10-CM

## 2021-08-27 RX ORDER — HYDROXYZINE HYDROCHLORIDE 25 MG/1
TABLET, FILM COATED ORAL
Qty: 60 TABLET | Refills: 0 | Status: SHIPPED | OUTPATIENT
Start: 2021-08-27 | End: 2021-09-27 | Stop reason: SDUPTHER

## 2021-08-27 NOTE — TELEPHONE ENCOUNTER
Spoke with patient  It is a script for a walker, not a wheelchair, she is requesting to be faxed over  Faxed walker script to [facility] per patient's request  Received confirmation that fax went through

## 2021-08-27 NOTE — TELEPHONE ENCOUNTER
We decided on walker in office  RX is on chart ready to be faxed  Please confirm if she now desires wheel chair and a new order will be placed

## 2021-08-27 NOTE — TELEPHONE ENCOUNTER
Patient is calling back regarding order for wheelchair to be faxed to [facility]   Please advise        CB# [phone]

## 2021-09-01 NOTE — TELEPHONE ENCOUNTER
Patient was told that the office should send the walker script to [facility] and there is also a ph # to call  She would like to have a walker with wheels  Ph # [phone]    Please FU with patient when this is completed       CB # [phone]

## 2021-09-07 NOTE — TELEPHONE ENCOUNTER
Spoke with patient  She will stop by the [facility] office today, around 1pm, to  walker

## 2021-09-07 NOTE — TELEPHONE ENCOUNTER
Patient stopped by the office  Per patient, she discussed with Dr Shraddha Vázquez that she would like a walker with a seat and brakes   A new order for the correct walker was put in and printed for patient to take to [address]

## 2021-09-08 NOTE — TELEPHONE ENCOUNTER
New script for walker was [facility]ed to [facility]  (fax #[phone])  Received confirmation that fax went through

## 2021-09-08 NOTE — TELEPHONE ENCOUNTER
Patient is calling back to advise that [facility] is telling her they have NOT received the new order fr the walker with wheels, brakes, and a seat  Danyell Tolliver at [facility] noted that Shelly from [place] will call the patient in the morning      Please call Erich Moreira at [phone]

## 2021-09-17 DIAGNOSIS — Z95.2 H/O MITRAL VALVE REPLACEMENT: ICD-10-CM

## 2021-09-20 RX ORDER — WARFARIN SODIUM 5 MG/1
TABLET ORAL
Qty: 90 TABLET | Refills: 0 | Status: SHIPPED | OUTPATIENT
Start: 2021-09-20 | End: 2022-02-01 | Stop reason: SDUPTHER

## 2021-09-23 ENCOUNTER — OFFICE VISIT (OUTPATIENT)
Dept: FAMILY MEDICINE CLINIC | Facility: CLINIC | Age: 61
End: 2021-09-23

## 2021-09-23 VITALS
RESPIRATION RATE: 18 BRPM | TEMPERATURE: 98.7 F | HEART RATE: 74 BPM | SYSTOLIC BLOOD PRESSURE: 128 MMHG | WEIGHT: 233 LBS | BODY MASS INDEX: 46.97 KG/M2 | HEIGHT: 59 IN | DIASTOLIC BLOOD PRESSURE: 78 MMHG | OXYGEN SATURATION: 99 %

## 2021-09-23 DIAGNOSIS — I05.0 MITRAL VALVE STENOSIS, UNSPECIFIED ETIOLOGY: ICD-10-CM

## 2021-09-23 DIAGNOSIS — E11.9 TYPE 2 DIABETES MELLITUS WITHOUT COMPLICATION, WITHOUT LONG-TERM CURRENT USE OF INSULIN (HCC): Primary | ICD-10-CM

## 2021-09-23 DIAGNOSIS — Z23 ENCOUNTER FOR IMMUNIZATION: ICD-10-CM

## 2021-09-23 DIAGNOSIS — E11.9 CONTROLLED TYPE 2 DIABETES MELLITUS WITHOUT COMPLICATION, WITHOUT LONG-TERM CURRENT USE OF INSULIN (HCC): ICD-10-CM

## 2021-09-23 DIAGNOSIS — J45.20 MILD INTERMITTENT ASTHMA WITHOUT COMPLICATION: ICD-10-CM

## 2021-09-23 DIAGNOSIS — M17.11 PRIMARY OSTEOARTHRITIS OF RIGHT KNEE: ICD-10-CM

## 2021-09-23 DIAGNOSIS — R05.9 COUGH: ICD-10-CM

## 2021-09-23 LAB — SL AMB POCT HEMOGLOBIN AIC: 7.4 (ref ?–6.5)

## 2021-09-23 PROCEDURE — 3008F BODY MASS INDEX DOCD: CPT | Performed by: FAMILY MEDICINE

## 2021-09-23 PROCEDURE — 3051F HG A1C>EQUAL 7.0%<8.0%: CPT | Performed by: FAMILY MEDICINE

## 2021-09-23 PROCEDURE — 90471 IMMUNIZATION ADMIN: CPT | Performed by: FAMILY MEDICINE

## 2021-09-23 PROCEDURE — 90682 RIV4 VACC RECOMBINANT DNA IM: CPT | Performed by: FAMILY MEDICINE

## 2021-09-23 PROCEDURE — 83036 HEMOGLOBIN GLYCOSYLATED A1C: CPT | Performed by: FAMILY MEDICINE

## 2021-09-23 PROCEDURE — 99214 OFFICE O/P EST MOD 30 MIN: CPT | Performed by: FAMILY MEDICINE

## 2021-09-23 RX ORDER — DEXAMETHASONE 4 MG/1
2 TABLET ORAL 2 TIMES DAILY
Qty: 12 G | Refills: 3 | Status: SHIPPED | OUTPATIENT
Start: 2021-09-23 | End: 2022-05-16

## 2021-09-23 RX ORDER — ALBUTEROL SULFATE 90 UG/1
2 AEROSOL, METERED RESPIRATORY (INHALATION) EVERY 4 HOURS PRN
Qty: 18 G | Refills: 0 | Status: SHIPPED | OUTPATIENT
Start: 2021-09-23

## 2021-09-23 RX ORDER — FLUTICASONE PROPIONATE 50 MCG
1 SPRAY, SUSPENSION (ML) NASAL DAILY
Qty: 16 G | Refills: 1 | Status: SHIPPED | OUTPATIENT
Start: 2021-09-23 | End: 2022-07-13

## 2021-09-23 NOTE — ASSESSMENT & PLAN NOTE
Encourage patient to take tylenol up to 1000 mg daily to help with pain  Encourage patient to follow up with Sport medicine for possible knee injection  Patient agreed with recommendation

## 2021-09-23 NOTE — PROGRESS NOTES
Assessment/Plan:    Controlled diabetes type 2:   Hb A1c 7 4  Discussed with patient about adjusting medication  Since metformin in the morning have been giving her diarrhea, patient agreed on taking 2 tabs at night (total 1000 mg daily)  Patient agreed to resume Actos in the morning  Recheck 3 months  Asthma, mild intermittent:  Stable, refill inhalers, will sign patient to stay in a warmer apartment to decrease triggers  Osteoarthritis: Encourage patient to take tylenol up to 1000 mg daily to help with pain  Encourage patient to follow up with Sport medicine for possible knee injection  Patient agreed with recommendation  Diagnoses and all orders for this visit:    Controlled diabetes mellitus due to underlying condition with diabetic neuropathic arthropathy, without long-term current use of insulin ([facility])  -     POCT hemoglobin A1c        Subjective:      Patient ID: Vane Ramos is a 60 y o  female  Routine follow up, concern about knee injection due to fear of needle  Also, experiencing diarrhea with metformin, ongoing  Asthma has been well controlled, but cold appartment make it worsen with allergy/asthma,  Patient brought paper for housing to see if she can have a warmer appt  The following portions of the patient's history were reviewed and updated as appropriate: allergies, current medications, past family history, past medical history, past social history, past surgical history and problem list     Review of Systems   Constitutional: Negative for activity change, appetite change, fatigue and fever  HENT: Negative for ear pain, postnasal drip and sore throat  Eyes: Negative for photophobia and visual disturbance  Respiratory: Negative for cough, chest tightness, shortness of breath and wheezing  Cardiovascular: Negative for chest pain and palpitations  Gastrointestinal: Positive for diarrhea  Negative for abdominal pain and constipation     Musculoskeletal: Positive for arthralgias  Negative for back pain  Right knee pain medially  Skin: Negative for rash  Neurological: Negative for light-headedness and headaches  Hematological: Negative for adenopathy  Psychiatric/Behavioral: Negative for agitation  Objective:      /78 (BP Location: Left arm, Patient Position: Sitting, Cuff Size: Large)   Pulse 74   Temp 98 7 °F (37 1 °C) (Temporal)   Resp 18   Ht 4' 11" (1 499 m)   Wt 106 kg (233 lb)   SpO2 99%   BMI 47 06 kg/m²          Physical Exam  Vitals reviewed  Constitutional:       Appearance: Normal appearance  She is well-developed  She is obese  HENT:      Head: Normocephalic and atraumatic  Right Ear: External ear normal       Left Ear: External ear normal       Nose: Nose normal    Eyes:      Conjunctiva/sclera: Conjunctivae normal       Pupils: Pupils are equal, round, and reactive to light  Cardiovascular:      Rate and Rhythm: Normal rate and regular rhythm  Pulses: Normal pulses  Heart sounds: Normal heart sounds  Pulmonary:      Effort: Pulmonary effort is normal       Breath sounds: Normal breath sounds  Abdominal:      General: Abdomen is flat  Bowel sounds are normal       Palpations: Abdomen is soft  Musculoskeletal:         General: Tenderness present  Normal range of motion  Cervical back: Normal range of motion and neck supple  Comments: Walking with walker, TTP at medial right knee  Skin:     General: Skin is warm  Neurological:      Mental Status: She is alert and oriented to person, place, and time  Psychiatric:         Behavior: Behavior normal          Thought Content:  Thought content normal          Judgment: Judgment normal

## 2021-09-23 NOTE — ASSESSMENT & PLAN NOTE
Lab Results   Component Value Date    HGBA1C 7 4 (A) 09/23/2021   Discussed with patient about adjusting medication  Since metformin in the morning have been giving her diarrhea, patient agreed on taking 2 tabs at night (total 1000 mg daily)  Patient agreed to resume Actos in the morning  Recheck 3 months

## 2021-09-27 ENCOUNTER — TELEPHONE (OUTPATIENT)
Dept: FAMILY MEDICINE CLINIC | Facility: CLINIC | Age: 61
End: 2021-09-27

## 2021-09-27 DIAGNOSIS — L50.0 ALLERGIC URTICARIA: ICD-10-CM

## 2021-09-27 RX ORDER — HYDROXYZINE HYDROCHLORIDE 25 MG/1
25 TABLET, FILM COATED ORAL
Qty: 90 TABLET | Refills: 1 | Status: SHIPPED | OUTPATIENT
Start: 2021-09-27 | End: 2021-11-15 | Stop reason: SDUPTHER

## 2021-09-27 NOTE — TELEPHONE ENCOUNTER
Patient called pharmacy and medicine  hydrOXYzine HCL (ATARAX) 25 mg tablet [[id number]    Was stopped and patient wants to know why  She can be reached at  [phone]

## 2021-10-05 ENCOUNTER — TELEMEDICINE (OUTPATIENT)
Dept: NEUROLOGY | Facility: CLINIC | Age: 61
End: 2021-10-05
Payer: COMMERCIAL

## 2021-10-05 DIAGNOSIS — G40.019 LOCALIZATION-RELATED EPILEPSY, INTRACTABLE (HCC): Primary | ICD-10-CM

## 2021-10-05 PROCEDURE — 99214 OFFICE O/P EST MOD 30 MIN: CPT | Performed by: PSYCHIATRY & NEUROLOGY

## 2021-10-05 PROCEDURE — 1036F TOBACCO NON-USER: CPT | Performed by: PSYCHIATRY & NEUROLOGY

## 2021-10-05 RX ORDER — LEVETIRACETAM 500 MG
500 TABLET ORAL 3 TIMES DAILY
Qty: 270 TABLET | Refills: 3 | Status: SHIPPED | OUTPATIENT
Start: 2021-10-05 | End: 2022-07-13 | Stop reason: SDUPTHER

## 2021-10-06 ENCOUNTER — TELEPHONE (OUTPATIENT)
Dept: NEUROLOGY | Facility: CLINIC | Age: 61
End: 2021-10-06

## 2021-10-07 PROBLEM — R09.81 HEAD CONGESTION: Status: RESOLVED | Noted: 2020-12-28 | Resolved: 2021-10-07

## 2021-10-07 PROBLEM — R40.4 ALTERED AWARENESS, TRANSIENT: Status: RESOLVED | Noted: 2020-03-10 | Resolved: 2021-10-07

## 2021-10-07 PROBLEM — R09.82 POSTNASAL DRIP: Status: RESOLVED | Noted: 2017-03-06 | Resolved: 2021-10-07

## 2021-10-28 ENCOUNTER — APPOINTMENT (OUTPATIENT)
Dept: LAB | Facility: HOSPITAL | Age: 61
End: 2021-10-28
Attending: INTERNAL MEDICINE
Payer: COMMERCIAL

## 2021-10-28 ENCOUNTER — ANTICOAG VISIT (OUTPATIENT)
Dept: CARDIOLOGY CLINIC | Facility: CLINIC | Age: 61
End: 2021-10-28

## 2021-10-28 DIAGNOSIS — Z95.2 HISTORY OF PROSTHETIC HEART VALVE: Primary | ICD-10-CM

## 2021-10-28 DIAGNOSIS — G40.019 LOCALIZATION-RELATED EPILEPSY, INTRACTABLE (HCC): ICD-10-CM

## 2021-10-28 PROCEDURE — 80177 DRUG SCRN QUAN LEVETIRACETAM: CPT

## 2021-10-31 LAB — LEVETIRACETAM SERPL-MCNC: 19.2 UG/ML (ref 10–40)

## 2021-11-01 ENCOUNTER — VBI (OUTPATIENT)
Dept: ADMINISTRATIVE | Facility: OTHER | Age: 61
End: 2021-11-01

## 2021-11-15 ENCOUNTER — TELEPHONE (OUTPATIENT)
Dept: FAMILY MEDICINE CLINIC | Facility: CLINIC | Age: 61
End: 2021-11-15

## 2021-11-15 DIAGNOSIS — L50.0 ALLERGIC URTICARIA: ICD-10-CM

## 2021-11-15 RX ORDER — HYDROXYZINE HYDROCHLORIDE 25 MG/1
25 TABLET, FILM COATED ORAL 2 TIMES DAILY
Qty: 180 TABLET | Refills: 1 | Status: SHIPPED | OUTPATIENT
Start: 2021-11-15 | End: 2022-05-10 | Stop reason: SDUPTHER

## 2021-11-26 DIAGNOSIS — E11.8 TYPE 2 DIABETES MELLITUS WITH COMPLICATION, WITH LONG-TERM CURRENT USE OF INSULIN (HCC): ICD-10-CM

## 2021-11-26 DIAGNOSIS — Z79.4 TYPE 2 DIABETES MELLITUS WITH COMPLICATION, WITH LONG-TERM CURRENT USE OF INSULIN (HCC): ICD-10-CM

## 2021-11-26 RX ORDER — BLOOD SUGAR DIAGNOSTIC
STRIP MISCELLANEOUS
Qty: 100 STRIP | Refills: 9 | Status: SHIPPED | OUTPATIENT
Start: 2021-11-26 | End: 2022-01-11

## 2022-01-07 ENCOUNTER — HOSPITAL ENCOUNTER (OUTPATIENT)
Dept: NON INVASIVE DIAGNOSTICS | Facility: CLINIC | Age: 62
Discharge: HOME | End: 2022-01-07
Payer: MEDICARE

## 2022-01-07 DIAGNOSIS — I73.9 PAD (PERIPHERAL ARTERY DISEASE) (HCC): ICD-10-CM

## 2022-01-07 PROCEDURE — 93923 UPR/LXTR ART STDY 3+ LVLS: CPT

## 2022-01-07 PROCEDURE — 93925 LOWER EXTREMITY STUDY: CPT

## 2022-01-07 PROCEDURE — 93922 UPR/L XTREMITY ART 2 LEVELS: CPT | Performed by: SURGERY

## 2022-01-07 PROCEDURE — 93925 LOWER EXTREMITY STUDY: CPT | Performed by: SURGERY

## 2022-01-11 DIAGNOSIS — Z79.4 TYPE 2 DIABETES MELLITUS WITH COMPLICATION, WITH LONG-TERM CURRENT USE OF INSULIN (HCC): ICD-10-CM

## 2022-01-11 DIAGNOSIS — E11.8 TYPE 2 DIABETES MELLITUS WITH COMPLICATION, WITH LONG-TERM CURRENT USE OF INSULIN (HCC): ICD-10-CM

## 2022-01-11 RX ORDER — BLOOD SUGAR DIAGNOSTIC
STRIP MISCELLANEOUS
Qty: 100 STRIP | Refills: 9 | Status: SHIPPED | OUTPATIENT
Start: 2022-01-11

## 2022-01-12 ENCOUNTER — ANTICOAG VISIT (OUTPATIENT)
Dept: CARDIOLOGY CLINIC | Facility: CLINIC | Age: 62
End: 2022-01-12

## 2022-01-12 ENCOUNTER — APPOINTMENT (OUTPATIENT)
Dept: LAB | Facility: HOSPITAL | Age: 62
End: 2022-01-12
Attending: INTERNAL MEDICINE
Payer: MEDICARE

## 2022-01-12 DIAGNOSIS — Z95.2 HISTORY OF PROSTHETIC HEART VALVE: Primary | ICD-10-CM

## 2022-02-01 ENCOUNTER — OFFICE VISIT (OUTPATIENT)
Dept: CARDIOLOGY CLINIC | Facility: CLINIC | Age: 62
End: 2022-02-01
Payer: MEDICARE

## 2022-02-01 VITALS
WEIGHT: 221 LBS | BODY MASS INDEX: 44.55 KG/M2 | HEIGHT: 59 IN | DIASTOLIC BLOOD PRESSURE: 78 MMHG | SYSTOLIC BLOOD PRESSURE: 124 MMHG | HEART RATE: 72 BPM | OXYGEN SATURATION: 97 %

## 2022-02-01 DIAGNOSIS — I07.1 RHEUMATIC TRICUSPID VALVE REGURGITATION: Primary | ICD-10-CM

## 2022-02-01 DIAGNOSIS — Z95.2 H/O MITRAL VALVE REPLACEMENT WITH MECHANICAL VALVE: ICD-10-CM

## 2022-02-01 DIAGNOSIS — Z95.2 H/O MITRAL VALVE REPLACEMENT: ICD-10-CM

## 2022-02-01 PROCEDURE — 99213 OFFICE O/P EST LOW 20 MIN: CPT | Performed by: INTERNAL MEDICINE

## 2022-02-01 RX ORDER — WARFARIN SODIUM 5 MG/1
TABLET ORAL
Qty: 90 TABLET | Refills: 0 | Status: SHIPPED | OUTPATIENT
Start: 2022-02-01 | End: 2022-05-06 | Stop reason: SDUPTHER

## 2022-02-01 NOTE — PROGRESS NOTES
Cardiology Follow Up    Florecita HERNANDEZ Ashley  1960  [id number]  [facility] [address] [id number] [facility]  [phone]    1  Rheumatic tricuspid valve regurgitation     2  H/O mitral valve replacement with mechanical valve         Interval History:  Cardiology follow-up  Patient doing well from a chronically point of view, denies any chest pain or dyspnea, he tries to walk regularly, she continues to struggle with her weight  No significant bleeding issues on chronic warfarin therapy  Denies any focal neurological deficits  Denies any orthopnea or PND  Compliant with low-sodium diet, blood pressures been well control      Patient Active Problem List   Diagnosis    History of prosthetic heart valve    Abnormal mammogram    Cervical high risk HPV (human papillomavirus) test positive    Controlled diabetes mellitus due to underlying condition with diabetic neuropathic arthropathy (HCC)    Depression with anxiety    Elevated blood pressure reading    Esophageal reflux    Excessive daytime sleepiness    Fatigue    Hypokalemia    Fecal occult blood test positive    Insomnia    Intertrigo    Iron deficiency anemia    Localization-related epilepsy, intractable (HCC)    Low grade squamous intraepithelial lesion (LGSIL) on cervical Pap smear    Microcytic hypochromic anemia    Obesity    Primary osteoarthritis of right knee    Overactive bladder    Plantar fasciitis    Sleep disorder, circadian, delayed sleep phase type    Urge incontinence of urine    Urinary retention    Urticaria, chronic    Uterine fibroid    Vitamin D insufficiency    Xerosis of skin    Cough    History of stroke    Wrist swelling, left    Chest wall pain    Bilateral lower extremity edema    H/O mitral valve replacement with mechanical valve    Posterior knee pain, right    Left sided abdominal pain    Vertigo    GERMAN (obstructive sleep apnea)    Panic reaction    Allergic conjunctivitis and rhinitis, bilateral    Secondary hypertension    Dyslipidemia    Acute right ankle pain    Mild intermittent asthma    Sprain of anterior talofibular ligament of right ankle    Peroneal tendonitis    Posterior tibial tendinitis of right lower extremity    BPPV (benign paroxysmal positional vertigo)    Bilateral leg pain    Lymphedema of both lower extremities    Tricuspid regurgitation    Morbid obesity with BMI of 45 0-49 9, adult (HCC)    PAD (peripheral artery disease) ([facility] )    COVID-19 virus infection    Diarrhea    Herpes zoster without complication    Chronic diarrhea     Past Medical History:   Diagnosis Date    Abnormal ultrasound of pelvis     last assessed - 26Mar2015    Asthma     Asthma, persistent 8/1/2014    Cerebral infarction ([facility] )     unspecified; last assessed - [id number]    Dizziness     last assessed - [id number]    Dysuria     last assessed - [id number]    Exposure to potentially hazardous body fluids     last assessed - [id number]    Heart disease     Hematoma     of arm; last assessed - 20Jun2016    Mitral valve stenosis     Personal history of scoliosis     Pneumonia     Scar, hypertrophic     last assessed - [id number]    Seizures ([facility] )     Shingles 2021    Type 2 diabetes mellitus ([facility] )     Vision problems      Social History     Socioeconomic History    Marital status: Single     Spouse name: Not on file    Number of children: Not on file    Years of education: Not on file    Highest education level: Not on file   Occupational History    Not on file   Tobacco Use    Smoking status: Never Smoker    Smokeless tobacco: Never Used   Vaping Use    Vaping Use: Never used   Substance and Sexual Activity    Alcohol use: No    Drug use: No    Sexual activity: Not Currently     Partners: Male   Other Topics Concern    Not on file   Social History Narrative Caffeine use     Social Determinants of Health     Financial Resource Strain: Low Risk     Difficulty of Paying Living Expenses: Not hard at all   Food Insecurity: No Food Insecurity    Worried About Running Out of Food in the Last Year: Never true    Rhett of Food in the Last Year: Never true   Transportation Needs: No Transportation Needs    Lack of Transportation (Medical): No    Lack of Transportation (Non-Medical):  No   Physical Activity: Not on file   Stress: Not on file   Social Connections: Not on file   Intimate Partner Violence: Not on file   Housing Stability: Not on file      Family History   Problem Relation Age of Onset    Cancer Mother     Dementia Mother     Diabetes Mother     Hypertension Mother     Hyperlipidemia Mother     Cervical cancer Mother     Heart disease Father     Coronary artery disease Father     Hypertension Father     Heart attack Father     Diabetes Sister     Breast cancer Sister     Diabetes Brother     Hypertension Brother     Hyperlipidemia Brother     Diabetes Maternal Grandmother     Breast cancer Maternal Grandmother     Diabetes Other     Seizures Son     Psoriasis Son     No Known Problems Daughter     Heart disease Paternal Aunt     Heart disease Paternal Aunt      Past Surgical History:   Procedure Laterality Date    BREAST BIOPSY Left     many years ago in [facility] not really sure    MITRAL VALVE REPLACEMENT  06/17/2015    MVR with 27 mm Arun [employer] Optiform mechanical prostethic;  last assessed - [id number]    TONSILLECTOMY      TRANSLUMINAL ANGIOPLASTY      mitral valve; last assessed - 01Aug2014   [address] [place]    last assessed - [id number]       Current Outpatient Medications:     acetaminophen (TYLENOL) 500 mg tablet, Take 2 tablets by mouth daily as needed for pain, Disp: 180 tablet, Rfl: 3    albuterol (PROVENTIL HFA,VENTOLIN HFA) 90 mcg/act inhaler, Inhale 2 puffs every 4 (four) hours as needed for wheezing, Disp: 18 g, Rfl: 0    Blood Glucose Monitoring Suppl (ACCU-CHEK EDILMA PLUS) w/Device KIT, by Does not apply route 2 (two) times a day, Disp: 1 kit, Rfl: 0    cetirizine (ZyrTEC) 10 mg tablet, TAKE ONE TABLET BY MOUTH DAILY, Disp: 90 tablet, Rfl: 10    co-enzyme Q-10 50 MG capsule, Take 1 capsule (50 mg total) by mouth daily, Disp: 90 capsule, Rfl: 1    Diclofenac Sodium (VOLTAREN) 1 %, Apply 2 g topically 4 (four) times a day, Disp: 100 g, Rfl: 2    fluticasone (FLONASE) 50 mcg/act nasal spray, 1 spray into each nostril daily, Disp: 16 g, Rfl: 1    fluticasone (Flovent HFA) 110 MCG/ACT inhaler, Inhale 2 puffs 2 (two) times a day Rinse mouth after use , Disp: 12 g, Rfl: 3    glucose blood (Accu-Chek SmartView) test strip, USE DAILY AS INSTRUCTED, Disp: 100 each, Rfl: 11    hydrOXYzine HCL (ATARAX) 25 mg tablet, Take 1 tablet (25 mg total) by mouth 2 (two) times a day, Disp: 180 tablet, Rfl: 1    Keppra 500 MG tablet, Take 1 tablet (500 mg total) by mouth 3 (three) times a day, Disp: 270 tablet, Rfl: 3    Lancets (OneTouch Delica Plus [id number]) MISC, USE AS INSTRUCTED, Disp: 100 each, Rfl: 4    lidocaine (LIDODERM) 5 %, Apply 1 patch topically daily Remove & Discard patch within 12 hours or as directed by MD, Disp: 10 patch, Rfl: 0    lisinopril (ZESTRIL) 2 5 mg tablet, Take 1 tablet (2 5 mg total) by mouth daily, Disp: 90 tablet, Rfl: 3    metFORMIN (GLUCOPHAGE) 500 mg tablet, Take 2 tablets (1,000 mg total) by mouth daily with dinner, Disp: 180 tablet, Rfl: 3    metoprolol tartrate (LOPRESSOR) 25 mg tablet, Take 0 5 tablets (12 5 mg total) by mouth every 12 (twelve) hours, Disp: 90 tablet, Rfl: 3    omeprazole (PriLOSEC) 40 MG capsule, TAKE 1 CAPSULE (40 MG TOTAL) BY MOUTH DAILY, Disp: 90 capsule, Rfl: 11    OneTouch Verio test strip, USE DAILY AS INSTRUCTED, Disp: 100 strip, Rfl: 9    pioglitazone (ACTOS) 15 mg tablet, Take 1 tablet (15 mg total) by mouth daily, Disp: 90 tablet, Rfl: 3    pravastatin (PRAVACHOL) 20 mg tablet, Take 1 tablet (20 mg total) by mouth daily at bedtime, Disp: 90 tablet, Rfl: 3    torsemide (DEMADEX) 20 mg tablet, TAKE ONE TABLET BY MOUTH AS NEEDED AS DIRECTED, Disp: 90 tablet, Rfl: 10    warfarin (COUMADIN) 5 mg tablet, TAKE ONE TABLET BY MOUTH DAILY (Patient taking differently: One tab Sunday and Tuesday; all other days half a tab ), Disp: 90 tablet, Rfl: 0  Allergies   Allergen Reactions    Codeine     Prednisone Itching    Codeine Polt-Chlorphen Polt Er Irritability, Itching and Rash       Labs: Ancillary Orders on 10/29/2021   Component Date Value    Protime 01/12/2022 30 1*    INR 01/12/2022 3 07*   Appointment on 10/28/2021   Component Date Value    Levetiracetam Lvl 10/28/2021 19 2    Office Visit on 09/23/2021   Component Date Value    Hemoglobin A1C 09/23/2021 7 4*   Ancillary Orders on 08/27/2021   Component Date Value    Protime 10/28/2021 27 4*    INR 10/28/2021 2 71*   Orders Only on 08/20/2021   Component Date Value    SARS-CoV-2 08/20/2021 Negative      Imaging: VAS lower limb arterial duplex, complete bilateral    Result Date: 1/7/2022  Narrative:  [facility] REPORT [facility]INICAL: Indications: Yearly surveillance for progression of disease  Patient is unable to walk short distances without pain  She states this pain is mostly in her knees  Operative History: 2015-06-17 Mechanical Mitral Valve Replacement 2014-03-01 Cardiac cath Risk Factors The patient has history of Obesity, HTN, Diabetes and Hyperlipidemia    FINDINGS:  Segment                Right       Left                                          PSV (cm/s)  PSV (cm/s)  Common Femoral Artery         126         123  Prox Profunda                  70          72  Prox SFA                      104          85  Mid SFA                        88          86  Dist SFA                       74          71  Proximal Pop                   48          47  Distal Pop                     74          63 Tibioperoneal                  41          62  Dist Post Tibial               94          65  Dist  Ant  Tibial              78          44     CONCLUSION:  Impression: RIGHT LOWER LIMB: This resting evaluation shows no evidence of significant lower extremity arterial occlusive disease  Ankle/Brachial index: 0 99, normal  Prior: 0 74 PVR/ PPG tracings are mildly dampened  Metatarsal pressure of 156 mmHg Great toe pressure of 107 mmHg, within the healing range  LEFT LOWER LIMB: This resting evaluation shows no evidence of significant lower extremity arterial occlusive disease  Ankle/Brachial index: 1 08, normal  Prior: 1 1 PVR/ PPG tracings are mildly dampened  Metatarsal pressure of 185 mmHg Great toe pressure of 109 mmHg, within the healing range  Compared to previous study on 11/18/2020, there is an increase in the right CORETTA and b/l great toe pressures  SIGNATURE: Electronically Signed by: Geovanna Rivero MD on 2022-01-07 02:15:55 PM      Review of Systems:  Review of Systems   Constitutional: Negative for activity change and fatigue  HENT: Negative for nosebleeds  Eyes: Negative for visual disturbance  Respiratory: Negative for shortness of breath, wheezing and stridor  Cardiovascular: Negative for chest pain, palpitations and leg swelling  Gastrointestinal: Negative for anal bleeding and blood in stool  Genitourinary: Negative for hematuria  Neurological: Negative for syncope  Hematological: Does not bruise/bleed easily  Physical Exam:  Physical Exam  Vitals reviewed  Constitutional:       General: She is not in acute distress  Appearance: Normal appearance  She is obese  She is not ill-appearing, toxic-appearing or diaphoretic  Neck:      Vascular: No carotid bruit  Cardiovascular:      Rate and Rhythm: Normal rate and regular rhythm  Heart sounds: No murmur heard  No friction rub  No gallop         Comments: Crisp metallic M1  Pulmonary:      Effort: Pulmonary effort is normal  No respiratory distress  Breath sounds: Normal breath sounds  No stridor  No wheezing, rhonchi or rales  Neurological:      Mental Status: She is alert  Psychiatric:         Mood and Affect: Mood normal          Discussion/Summary:  Rheumatic heart disease, severe mitral stenosis, history of valvuloplasty 9817 complicated with embolic CVA  Eventually mechanical AVR 2015,  number 27 [employer] valve  He has been getting yearly echocardiograms which I believe is too much for her  Last echocardiogram last year revealed normal left systolic function  There was mild insufficiency mitral valve prosthesis working properly  There was mild-to-moderate tricuspid insufficiency with estimated normal pulmonary pressures suggested by Doppler criteria  Patient have atrial fibrillation postoperative, she has remained clinically electrographically in sinus rhythm  Continue warfarin, target INR 2 5-3 5    This note was completed in part utilizing [employer] direct voice recognition software  Grammatical errors, random word insertion, spelling mistakes, and incomplete sentences may be an occasional consequence of the system secondary to software limitations, ambient noise and hardware issues  At the time of dictation, efforts were made to edit, clarify and /or correct errors  Please read the chart carefully and recognize, using context, where substitutions have occurred  If you have any questions or concerns about the context, text or information contained within the body of this dictation, please contact myself, the provider, for further clarification

## 2022-02-07 ENCOUNTER — IMMUNIZATIONS (OUTPATIENT)
Dept: FAMILY MEDICINE CLINIC | Facility: HOSPITAL | Age: 62
End: 2022-02-07

## 2022-02-07 DIAGNOSIS — Z23 ENCOUNTER FOR IMMUNIZATION: Primary | ICD-10-CM

## 2022-02-07 PROCEDURE — 0001A COVID-19 PFIZER VACC 0.3 ML: CPT

## 2022-02-07 PROCEDURE — 91300 COVID-19 PFIZER VACC 0.3 ML: CPT

## 2022-02-11 PROBLEM — E11.9 TYPE 2 DIABETES MELLITUS WITHOUT COMPLICATION, WITHOUT LONG-TERM CURRENT USE OF INSULIN (HCC): Status: ACTIVE | Noted: 2022-02-11

## 2022-02-11 NOTE — ASSESSMENT & PLAN NOTE
Lab Results   Component Value Date    HGBA1C 7 4 (A) 09/23/2021     Lab Results   Component Value Date    HGBA1C 7 4 (A) 09/23/2021   Discussed with patient about adjusting medication  Since metformin in the morning have been giving her diarrhea, patient agreed on taking 2 tabs at night (total 1000 mg daily)  Patient agreed to resume Actos in the morning  Recheck 3 months

## 2022-03-05 ENCOUNTER — APPOINTMENT (OUTPATIENT)
Dept: LAB | Facility: HOSPITAL | Age: 62
End: 2022-03-05
Attending: INTERNAL MEDICINE
Payer: MEDICARE

## 2022-03-05 ENCOUNTER — IMMUNIZATIONS (OUTPATIENT)
Dept: FAMILY MEDICINE CLINIC | Facility: HOSPITAL | Age: 62
End: 2022-03-05
Payer: MEDICARE

## 2022-03-05 PROCEDURE — 91305 COVID-19 PFIZER VACC TRIS-SUCROSE GRAY CAP 0.3 ML: CPT

## 2022-03-05 PROCEDURE — 0054A COVID-19 PFIZER VACC TRIS-SUCROSE GRAY CAP 0.3 ML: CPT

## 2022-03-07 ENCOUNTER — ANTICOAG VISIT (OUTPATIENT)
Dept: CARDIOLOGY CLINIC | Facility: CLINIC | Age: 62
End: 2022-03-07

## 2022-03-07 DIAGNOSIS — Z95.2 HISTORY OF PROSTHETIC HEART VALVE: Primary | ICD-10-CM

## 2022-03-08 ENCOUNTER — OFFICE VISIT (OUTPATIENT)
Dept: FAMILY MEDICINE CLINIC | Facility: CLINIC | Age: 62
End: 2022-03-08
Payer: MEDICARE

## 2022-03-08 VITALS
HEART RATE: 65 BPM | WEIGHT: 218.8 LBS | RESPIRATION RATE: 16 BRPM | DIASTOLIC BLOOD PRESSURE: 74 MMHG | TEMPERATURE: 97.7 F | HEIGHT: 58 IN | BODY MASS INDEX: 45.93 KG/M2 | SYSTOLIC BLOOD PRESSURE: 142 MMHG | OXYGEN SATURATION: 98 %

## 2022-03-08 DIAGNOSIS — E78.2 MIXED HYPERLIPIDEMIA: ICD-10-CM

## 2022-03-08 DIAGNOSIS — I07.1 RHEUMATIC TRICUSPID VALVE REGURGITATION: ICD-10-CM

## 2022-03-08 DIAGNOSIS — Z00.00 ANNUAL PHYSICAL EXAM: Primary | ICD-10-CM

## 2022-03-08 DIAGNOSIS — I73.9 PAD (PERIPHERAL ARTERY DISEASE) (HCC): ICD-10-CM

## 2022-03-08 DIAGNOSIS — Z95.2 H/O MITRAL VALVE REPLACEMENT WITH MECHANICAL VALVE: ICD-10-CM

## 2022-03-08 DIAGNOSIS — E13.9 DIABETES 1.5, MANAGED AS TYPE 2 (HCC): ICD-10-CM

## 2022-03-08 DIAGNOSIS — E11.9 TYPE 2 DIABETES MELLITUS WITHOUT COMPLICATION, WITHOUT LONG-TERM CURRENT USE OF INSULIN (HCC): ICD-10-CM

## 2022-03-08 DIAGNOSIS — Z12.4 CERVICAL CANCER SCREENING: ICD-10-CM

## 2022-03-08 DIAGNOSIS — Z12.31 SCREENING MAMMOGRAM FOR BREAST CANCER: ICD-10-CM

## 2022-03-08 LAB — SL AMB POCT HEMOGLOBIN AIC: 7.6 (ref ?–6.5)

## 2022-03-08 PROCEDURE — 83036 HEMOGLOBIN GLYCOSYLATED A1C: CPT | Performed by: FAMILY MEDICINE

## 2022-03-08 PROCEDURE — 99396 PREV VISIT EST AGE 40-64: CPT | Performed by: FAMILY MEDICINE

## 2022-03-08 RX ORDER — DORZOLAMIDE HYDROCHLORIDE AND TIMOLOL MALEATE 20; 5 MG/ML; MG/ML
SOLUTION/ DROPS OPHTHALMIC
COMMUNITY
Start: 2022-02-15

## 2022-03-08 NOTE — PROGRESS NOTES
[address]    NAME: Juan Diego Ramirez  AGE: 60 y o  SEX: female  : 1960     DATE: 3/10/2022     Assessment and Plan:     Here as a new patient  A1c today 7 6 up from 7 4  On metformin 500 mg qhs  Unable to tolerate BID dosing  Added a DPP4  SGLT 2 contraindicated due to history of PVD  Referred to a different cardiology as the current one is about to retire  History of MVR on AC with an INR goal of 2 5-3 5  Seizures well controlled on Keppra  Seizure free for years  FBW ordered along with urine PCR  Breast cancer screening also ordered  Will defer statin therapy for now  Did not tolerate pravastatin and atorvastatin due to headaches  May try a low does of Crestor   Pt prefers to wait for lipid results although patient should be on statin regardless given her risk factors ( DM and valvular ds)    Problem List Items Addressed This Visit        Endocrine    Type 2 diabetes mellitus without complication, without long-term current use of insulin (HCC)    Relevant Medications    metFORMIN (GLUCOPHAGE) 500 mg tablet    sitaGLIPtin (JANUVIA) 25 mg tablet       Cardiovascular and Mediastinum    Tricuspid regurgitation    Relevant Orders    Ambulatory Referral to Cardiology    [facility]    Relevant Orders    Ambulatory Referral to Cardiology       Other    H/O mitral valve replacement with mechanical valve    Relevant Orders    Ambulatory Referral to Cardiology      Other Visit Diagnoses     Annual physical exam    -  Primary    Cervical cancer screening        Relevant Orders    Ambulatory Referral to Obstetrics / Gynecology    Lipid panel    Screening mammogram for breast cancer        Relevant Orders    Mammo screening bilateral w cad    Mixed hyperlipidemia        Relevant Orders    Comprehensive metabolic panel    BMI 52 2-61 3, adult ([facility] [address] )        Relevant Orders    Lipid panel    Comprehensive metabolic panel    Diabetes 1 5, managed as type 2 (HCC)        Relevant Medications    metFORMIN (GLUCOPHAGE) 500 mg tablet    sitaGLIPtin (JANUVIA) 25 mg tablet    Other Relevant Orders    POCT hemoglobin A1c (Completed)    Microalbumin / creatinine urine ratio          Immunizations and preventive care screenings were discussed with patient today  Appropriate education was printed on patient's after visit summary  Counseling:  Dental Health: discussed importance of regular tooth brushing, flossing, and dental visits  · Exercise: the importance of regular exercise/physical activity was discussed  Recommend exercise 3-5 times per week for at least 30 minutes  BMI Counseling: Body mass index is 45 kg/m²  The BMI is above normal  Nutrition recommendations include encouraging healthy choices of fruits and vegetables, decreasing fast food intake, limiting drinks that contain sugar, moderation in carbohydrate intake, increasing intake of lean protein, reducing intake of saturated and trans fat and reducing intake of cholesterol  Exercise recommendations include moderate physical activity 150 minutes/week  No pharmacotherapy was ordered  Rationale for BMI follow-up plan is due to patient being overweight or obese  No follow-ups on file  Chief Complaint:     Chief Complaint   Patient presents with    Annual Exam     discuss medication make sure it is what she needs to take and also she is asking for a new set blood work      History of Present Illness:     Adult Annual Physical   Patient is here as anew patient for a comprehensive physical exam  Last PCP was at [facility] and was last seen Sept 2021  Requesting a referral for a new cardiologist  Her current one is retiring soon  Would also like BW  Lost weight with diet  Went from 250--> 218  FBS range between and taking metformin 500 mg at night  [facility] states she couldn't tolerate BID dosing  Also intolerant to statins   Tried simvastatin and pravastatin in the past and both caused headaches  Went to center for site last Friday for diabetic eye exam         Diet and Physical Activity  · Diet/Nutrition: poor appetite but is on a calories restricted diet to lose weight  · Exercise: active, [place] exercise   Depression Screening  PHQ-2/9 Depression Screening         General Health  · Sleep: sleeps well  Takes Tylenol Pm at night  Has not tried Melatonin   · Hearing: normal - bilateral   · Vision: Saw the eye doctor recently  Told she has gluacoma and was prescribed bew eye drops  Also recieved new prescriptions for glasses   · Dental: Its been 7 years  /GYN Health  · Patient is: postmenopausal  · Last menstrual period: 18 years ago ( age 38)   · Contraceptive method: none        Review of Systems:     Review of Systems   Past Medical History:     Past Medical History:   Diagnosis Date    Abnormal ultrasound of pelvis     last assessed - [id number]    Asthma     Asthma, persistent 8/1/2014    Cerebral infarction ([facility] [address] )     unspecified; last assessed - [id number]    Dizziness     last assessed - [id number]    Dysuria     last assessed - 78Qse3738    Exposure to potentially hazardous body fluids     last assessed - 21Tuv4684    Heart disease     Hematoma     of arm; last assessed - 22Szj6650    Mitral valve stenosis     Personal history of scoliosis     Pneumonia     Scar, hypertrophic     last assessed - [id number]    Seizures ([facility] )     Shingles 2021    Type 2 diabetes mellitus ([facility] [facility] )     Vision problems       Past Surgical History:     Past Surgical History:   Procedure Laterality Date    BREAST BIOPSY Left     many years ago in [place]-pt not really sure    MITRAL VALVE REPLACEMENT  06/17/2015    MVR with 27 mm [employer] Optiform mechanical prostethic;  last assessed - [id number]    TONSILLECTOMY      TRANSLUMINAL ANGIOPLASTY      mitral valve; last assessed - 34Hnf3853   [address]    last assessed - [id number] Social History:     Social History     Socioeconomic History    Marital status: Single     Spouse name: None    Number of children: None    Years of education: None    Highest education level: None   Occupational History    None   Tobacco Use    Smoking status: Never Smoker    Smokeless tobacco: Never Used   Vaping Use    Vaping Use: Never used   Substance and Sexual Activity    Alcohol use: No    Drug use: No    Sexual activity: Not Currently     Partners: Male   Other Topics Concern    None   Social History Narrative    Caffeine use     Social Determinants of Health     Financial Resource Strain: Low Risk     Difficulty of Paying Living Expenses: Not hard at all   Food Insecurity: No Food Insecurity    Worried About Running Out of Food in the Last Year: Never true    Rhett of Food in the Last Year: Never true   Transportation Needs: No Transportation Needs    Lack of Transportation (Medical): No    Lack of Transportation (Non-Medical):  No   Physical Activity: Not on file   Stress: Not on file   Social Connections: Not on file   Intimate Partner Violence: Not on file   Housing Stability: Not on file      Family History:     Family History   Problem Relation Age of Onset    Cancer Mother     Dementia Mother     Diabetes Mother     Hypertension Mother     Hyperlipidemia Mother     Cervical cancer Mother     Heart disease Father     Coronary artery disease Father     Hypertension Father     Heart attack Father     Diabetes Sister     Breast cancer Sister     Diabetes Brother     Hypertension Brother     Hyperlipidemia Brother     Diabetes Maternal Grandmother     Breast cancer Maternal Grandmother     Diabetes Other     Seizures Son     Psoriasis Son     No Known Problems Daughter     Heart disease Paternal Aunt     Heart disease Paternal Aunt       Current Medications:     Current Outpatient Medications   Medication Sig Dispense Refill    acetaminophen (TYLENOL) 500 mg tablet Take 2 tablets by mouth daily as needed for pain 180 tablet 3    albuterol (PROVENTIL HFA,VENTOLIN HFA) 90 mcg/act inhaler Inhale 2 puffs every 4 (four) hours as needed for wheezing 18 g 0    Blood Glucose Monitoring Suppl (ACCU-CHEK EDILMA PLUS) w/Device KIT by Does not apply route 2 (two) times a day 1 kit 0    cetirizine (ZyrTEC) 10 mg tablet TAKE ONE TABLET BY MOUTH DAILY 90 tablet 10    Diclofenac Sodium (VOLTAREN) 1 % Apply 2 g topically 4 (four) times a day 100 g 2    Diclofenac Sodium (VOLTAREN) 1 % Apply 2 g topically 4 (four) times a day as needed      dorzolamide-timolol (COSOPT) 22 3-6 8 MG/ML ophthalmic solution       fluticasone (FLONASE) 50 mcg/act nasal spray 1 spray into each nostril daily 16 g 1    fluticasone (Flovent HFA) 110 MCG/ACT inhaler Inhale 2 puffs 2 (two) times a day Rinse mouth after use  12 g 3    glucose blood (Accu-Chek SmartView) test strip USE DAILY AS INSTRUCTED 100 each 11    hydrOXYzine HCL (ATARAX) 25 mg tablet Take 1 tablet (25 mg total) by mouth 2 (two) times a day 180 tablet 1    Keppra 500 MG tablet Take 1 tablet (500 mg total) by mouth 3 (three) times a day 270 tablet 3    Lancets (OneTouch Delica Plus [id number]) MISC USE AS INSTRUCTED 100 each 4    metFORMIN (GLUCOPHAGE) 500 mg tablet Take 1 tablet (500 mg total) by mouth daily at bedtime 180 tablet 3    metoprolol tartrate (LOPRESSOR) 25 mg tablet Take 0 5 tablets (12 5 mg total) by mouth every 12 (twelve) hours 90 tablet 3    omeprazole (PriLOSEC) 40 MG capsule TAKE 1 CAPSULE (40 MG TOTAL) BY MOUTH DAILY 90 capsule 11    OneTouch Verio test strip USE DAILY AS INSTRUCTED 100 strip 9    warfarin (COUMADIN) 5 mg tablet One tab Sunday and Tuesday; all other days half a tab   90 tablet 0    sitaGLIPtin (JANUVIA) 25 mg tablet Take 1 tablet (25 mg total) by mouth daily 30 tablet 1    torsemide (DEMADEX) 20 mg tablet TAKE ONE TABLET BY MOUTH AS NEEDED AS DIRECTED 90 tablet 10     No current facility-administered medications for this visit  Allergies: Allergies   Allergen Reactions    Codeine     Prednisone Itching    Codeine Polt-Chlorphen Polt Er Irritability, Itching and Rash      Physical Exam:     /74   Pulse 65   Temp 97 7 °F (36 5 °C) (Tympanic)   Resp 16   Ht 4' 10 47" (1 485 m)   Wt 99 2 kg (218 lb 12 8 oz)   SpO2 98%   BMI 45 00 kg/m²     Physical Exam  Vitals reviewed  Constitutional:       General: She is not in acute distress  Appearance: Normal appearance  She is not ill-appearing  HENT:      Head: Normocephalic and atraumatic  Right Ear: Tympanic membrane normal       Left Ear: Tympanic membrane normal    Eyes:      Extraocular Movements: Extraocular movements intact  Cardiovascular:      Rate and Rhythm: Normal rate and regular rhythm  Heart sounds: Murmur (mechanical click ) heard  Pulmonary:      Effort: Pulmonary effort is normal  No respiratory distress  Breath sounds: Normal breath sounds  No stridor  No wheezing or rhonchi  Abdominal:      General: Abdomen is flat  There is no distension  Palpations: Abdomen is soft  There is no mass  Tenderness: There is no abdominal tenderness  Hernia: No hernia is present  Lymphadenopathy:      Cervical: No cervical adenopathy  Neurological:      Mental Status: She is alert and oriented to person, place, and time  Psychiatric:         Mood and Affect: Mood normal          Behavior: Behavior normal          Thought Content:  Thought content normal           Arelis Jovel MD  [address] [facility]

## 2022-04-22 ENCOUNTER — ANTICOAG VISIT (OUTPATIENT)
Dept: CARDIOLOGY CLINIC | Facility: CLINIC | Age: 62
End: 2022-04-22

## 2022-04-22 ENCOUNTER — APPOINTMENT (OUTPATIENT)
Dept: LAB | Facility: HOSPITAL | Age: 62
End: 2022-04-22
Payer: MEDICARE

## 2022-04-22 DIAGNOSIS — E78.2 MIXED HYPERLIPIDEMIA: ICD-10-CM

## 2022-04-22 DIAGNOSIS — Z12.4 CERVICAL CANCER SCREENING: ICD-10-CM

## 2022-04-22 DIAGNOSIS — Z95.2 HISTORY OF PROSTHETIC HEART VALVE: Primary | ICD-10-CM

## 2022-04-22 LAB
ALBUMIN SERPL BCP-MCNC: 3.5 G/DL (ref 3.5–5)
ALP SERPL-CCNC: 137 U/L (ref 46–116)
ALT SERPL W P-5'-P-CCNC: 19 U/L (ref 12–78)
ANION GAP SERPL CALCULATED.3IONS-SCNC: 1 MMOL/L (ref 4–13)
AST SERPL W P-5'-P-CCNC: 20 U/L (ref 5–45)
BILIRUB SERPL-MCNC: 0.58 MG/DL (ref 0.2–1)
BUN SERPL-MCNC: 20 MG/DL (ref 5–25)
CALCIUM SERPL-MCNC: 9.9 MG/DL (ref 8.3–10.1)
CHLORIDE SERPL-SCNC: 107 MMOL/L (ref 100–108)
CHOLEST SERPL-MCNC: 206 MG/DL
CO2 SERPL-SCNC: 31 MMOL/L (ref 21–32)
CREAT SERPL-MCNC: 0.88 MG/DL (ref 0.6–1.3)
CREAT UR-MCNC: 173 MG/DL
GFR SERPL CREATININE-BSD FRML MDRD: 70 ML/MIN/1.73SQ M
GLUCOSE P FAST SERPL-MCNC: 149 MG/DL (ref 65–99)
HDLC SERPL-MCNC: 54 MG/DL
LDLC SERPL CALC-MCNC: 119 MG/DL (ref 0–100)
MICROALBUMIN UR-MCNC: 18.9 MG/L (ref 0–20)
MICROALBUMIN/CREAT 24H UR: 11 MG/G CREATININE (ref 0–30)
NONHDLC SERPL-MCNC: 152 MG/DL
POTASSIUM SERPL-SCNC: 4.1 MMOL/L (ref 3.5–5.3)
PROT SERPL-MCNC: 7.5 G/DL (ref 6.4–8.2)
SODIUM SERPL-SCNC: 139 MMOL/L (ref 136–145)
TRIGL SERPL-MCNC: 163 MG/DL

## 2022-04-22 PROCEDURE — 82570 ASSAY OF URINE CREATININE: CPT | Performed by: FAMILY MEDICINE

## 2022-04-22 PROCEDURE — 82043 UR ALBUMIN QUANTITATIVE: CPT | Performed by: FAMILY MEDICINE

## 2022-04-22 PROCEDURE — 80053 COMPREHEN METABOLIC PANEL: CPT

## 2022-04-22 PROCEDURE — 80061 LIPID PANEL: CPT

## 2022-05-05 ENCOUNTER — OFFICE VISIT (OUTPATIENT)
Dept: OBGYN CLINIC | Facility: CLINIC | Age: 62
End: 2022-05-05
Payer: MEDICARE

## 2022-05-05 VITALS
SYSTOLIC BLOOD PRESSURE: 120 MMHG | HEIGHT: 58 IN | WEIGHT: 214 LBS | BODY MASS INDEX: 44.92 KG/M2 | DIASTOLIC BLOOD PRESSURE: 72 MMHG

## 2022-05-05 DIAGNOSIS — Z13.820 SCREENING FOR OSTEOPOROSIS: ICD-10-CM

## 2022-05-05 DIAGNOSIS — Z01.419 WOMEN'S ANNUAL ROUTINE GYNECOLOGICAL EXAMINATION: Primary | ICD-10-CM

## 2022-05-05 DIAGNOSIS — R10.2 PELVIC PAIN: ICD-10-CM

## 2022-05-05 PROCEDURE — G0476 HPV COMBO ASSAY CA SCREEN: HCPCS | Performed by: STUDENT IN AN ORGANIZED HEALTH CARE EDUCATION/TRAINING PROGRAM

## 2022-05-05 PROCEDURE — 99386 PREV VISIT NEW AGE 40-64: CPT | Performed by: STUDENT IN AN ORGANIZED HEALTH CARE EDUCATION/TRAINING PROGRAM

## 2022-05-05 PROCEDURE — G0145 SCR C/V CYTO,THINLAYER,RESCR: HCPCS | Performed by: PATHOLOGY

## 2022-05-05 PROCEDURE — G0124 SCREEN C/V THIN LAYER BY MD: HCPCS | Performed by: PATHOLOGY

## 2022-05-05 NOTE — PROGRESS NOTES
[facility]  Well Woman Annual Exam  [place]    Assessment   60 y o  postmenopausal female presenting for annual exam- normal exam today  Plan     1  Cervical cancer screening: The patient previously had LSIL/ HPV pos pap in 2019 and pap was repeated today  She is anxious because she had colposcopy completed in the past and a lot of discomfort with acetic acid burning during colposcopy- we reviewed this could be completed with lugol's solution and to please follow up if she needs a colposcopy  2  The patient is not sexually active and declined STD testing  3  Breast Cancer Screening: Last Mammogram: 2020- Birads 1/2, mammogram has been ordered and reminded her to have scheudled  Reviewed ACOG recommendations of yearly mammogram for breast cancer screening   4  Colon Cancer Screening: Last Colonoscopy 2016, Patient due in 10 years per patient  5  Osteoporosis screening: I reviewed the patients risk factors for osteoporosis and ordered a DEXA scan   6  I emphasized the importance of an annual pelvic and breast exam    7  I have discussed the importance of monthly self-breast exams, exercise and healthy diet as well as adequate intake of calcium and vitamin D      8  Urge incontinence: We reviewed the importance of continuation of weight loss for many reasons operative and improvement in incontinence  We reviewed decreasing bladder irritants including caffeine and alcohol  We discussed timed voids and that in taking a diuretic that she will have increased need to void but that the small lifestyle changes can assist  She would not like to try a medication at this time  9  Left-sided abdominal pain- pelvic ultrasound ordered giving complaints of discomfort to palpation of the left lower quadrant without any bowel complaints  All questions have been answered to her satisfaction      __________________________________________________________________      Subjective     60 y o  postmenopausal female presenting for annual exam  She is without complaint today and has no concerns she would like addressed  GYN  Complaints: denies  Denies genital discharge, vaginal pain,   Menopause occurred at age 44s  She has had no bleeding since this time  Menopausal symptoms: hot flashes occasionally  Sexually active: No  Hx STI: denies   Hx Abnormal pap: yes- h/o colpo  Last pap: LSIL, HPV pos    OB  [id number]  SVDs      Complaints: reports  Denies urinary incontinence- symptoms of urgency  Patient reports she sometimes is unable to make it to the bathroom when she feels she needs to void and urine does come out on its own  She does report nocturia  Denies any hematuria, dysuria or flank pain  BREAST  Complaints: denies  Denies: breast lump, breast tenderness, changed mole, dryness, nipple discharge, pruritus, rash, skin color change and skin lesion(s)  Last mammogram: 8/2020  Personal hx: left breast biopsy "long ago"- normal per patient  Family hx: denies fhx of breast, uterine, ovarian, or colon cancers  Sister had breast cancer - 61  Mother had uterine cancer  Grandmother had breast cancer  Patient does not do regular self-exams    GENERAL  PMH reviewed/updated and is as below  Patient does follow with a PCP  Currently on disability for seizures  Denies domestic violence    Exercise: walks   Diet: cut down on carbohydrates- has lost 30 lbs    Past Medical History:   Diagnosis Date    Abnormal ultrasound of pelvis     last assessed - [id number]    Asthma     Asthma, persistent 8/1/2014    Cerebral infarction [facility])     unspecified; last assessed - 85Qym2425    Dizziness     last assessed - 95Hbk1389    Dysuria     last assessed - 30Cvg2019    Exposure to potentially hazardous body fluids     last assessed - 95Zmj7359    Heart disease     Hematoma     of arm; last assessed - 05Acu1427    Mitral valve stenosis     Personal history of scoliosis     Pneumonia     Scar, hypertrophic     last assessed - 63QUM1929    Seizures ([facility] [address] )     Shingles 2021    [facility]     Vision problems        Past Surgical History:   Procedure Laterality Date    BREAST BIOPSY Left     many years ago in [place]-pt not really sure    MITRAL VALVE REPLACEMENT  06/17/2015    MVR with 27 mm [employer] Optiform mechanical prostethic;  last assessed - [id number]    TONSILLECTOMY      TRANSLUMINAL ANGIOPLASTY      mitral valve; last assessed - 01Aug2014   [address]    last assessed - 01Aug2014         Current Outpatient Medications:     acetaminophen (TYLENOL) 500 mg tablet, Take 2 tablets by mouth daily as needed for pain, Disp: 180 tablet, Rfl: 3    albuterol (PROVENTIL HFA,VENTOLIN HFA) 90 mcg/act inhaler, Inhale 2 puffs every 4 (four) hours as needed for wheezing, Disp: 18 g, Rfl: 0    Blood Glucose Monitoring Suppl (ACCU-CHEK EDILMA PLUS) w/Device KIT, by Does not apply route 2 (two) times a day, Disp: 1 kit, Rfl: 0    cetirizine (ZyrTEC) 10 mg tablet, TAKE ONE TABLET BY MOUTH DAILY, Disp: 90 tablet, Rfl: 10    Diclofenac Sodium (VOLTAREN) 1 %, Apply 2 g topically 4 (four) times a day as needed, Disp: , Rfl:     dorzolamide-timolol (COSOPT) 22 3-6 8 MG/ML ophthalmic solution, , Disp: , Rfl:     fluticasone (FLONASE) 50 mcg/act nasal spray, 1 spray into each nostril daily, Disp: 16 g, Rfl: 1    fluticasone (Flovent HFA) 110 MCG/ACT inhaler, Inhale 2 puffs 2 (two) times a day Rinse mouth after use , Disp: 12 g, Rfl: 3    glucose blood (Accu-Chek SmartView) test strip, USE DAILY AS INSTRUCTED, Disp: 100 each, Rfl: 11    hydrOXYzine HCL (ATARAX) 25 mg tablet, Take 1 tablet (25 mg total) by mouth 2 (two) times a day, Disp: 180 tablet, Rfl: 1    Keppra 500 MG tablet, Take 1 tablet (500 mg total) by mouth 3 (three) times a day, Disp: 270 tablet, Rfl: 3    Lancets (OneTouch Delica Plus [id number]) MISC, USE AS INSTRUCTED, Disp: 100 each, Rfl: 4    metFORMIN (GLUCOPHAGE) 500 mg tablet, Take 1 tablet (500 mg total) by mouth daily at bedtime, Disp: 180 tablet, Rfl: 3    metoprolol tartrate (LOPRESSOR) 25 mg tablet, Take 0 5 tablets (12 5 mg total) by mouth every 12 (twelve) hours, Disp: 90 tablet, Rfl: 3    omeprazole (PriLOSEC) 40 MG capsule, TAKE 1 CAPSULE (40 MG TOTAL) BY MOUTH DAILY, Disp: 90 capsule, Rfl: 11    OneTouch Verio test strip, USE DAILY AS INSTRUCTED, Disp: 100 strip, Rfl: 9    sitaGLIPtin (JANUVIA) 25 mg tablet, Take 1 tablet (25 mg total) by mouth daily, Disp: 30 tablet, Rfl: 1    torsemide (DEMADEX) 20 mg tablet, TAKE ONE TABLET BY MOUTH AS NEEDED AS DIRECTED, Disp: 90 tablet, Rfl: 10    warfarin (COUMADIN) 5 mg tablet, One tab Sunday and Tuesday; all other days half a tab , Disp: 90 tablet, Rfl: 0    Diclofenac Sodium (VOLTAREN) 1 %, Apply 2 g topically 4 (four) times a day, Disp: 100 g, Rfl: 2    Allergies   Allergen Reactions    Codeine     Prednisone Itching    Codeine Polt-Chlorphen Polt Er Irritability, Itching and Rash       Social History     Socioeconomic History    Marital status: Single     Spouse name: Not on file    Number of children: Not on file    Years of education: Not on file    Highest education level: Not on file   Occupational History    Not on file   Tobacco Use    Smoking status: Never Smoker    Smokeless tobacco: Never Used   Vaping Use    Vaping Use: Never used   Substance and Sexual Activity    Alcohol use: No    Drug use: No    Sexual activity: Not Currently     Partners: Male   Other Topics Concern    Not on file   Social History Narrative    Caffeine use     Social Determinants of Health     Financial Resource Strain: Not on file   Food Insecurity: Not on file   Transportation Needs: Not on file   Physical Activity: Not on file   Stress: Not on file   Social Connections: Not on file   Intimate Partner Violence: Not on file   Housing Stability: Not on file            Review of Systems  Review of Systems   Respiratory: Negative for cough and wheezing  Cardiovascular: Positive for leg swelling  Negative for chest pain and palpitations  Gastrointestinal: Positive for abdominal pain  Negative for constipation, diarrhea, nausea and vomiting  Genitourinary: Positive for pelvic pain and urgency  Negative for dysuria, flank pain, frequency, vaginal bleeding, vaginal discharge and vaginal pain  Musculoskeletal: Positive for arthralgias, back pain, gait problem and myalgias  Remaining per HPI    Objective  /72   Ht 4' 10" (1 473 m)   Wt 97 1 kg (214 lb)   BMI 44 73 kg/m²      Physical Exam:  Physical Exam  Vitals reviewed  Constitutional:       General: She is not in acute distress  Appearance: She is obese  She is not ill-appearing or diaphoretic  Cardiovascular:      Rate and Rhythm: Normal rate and regular rhythm  Pulses: Normal pulses  Heart sounds: Murmur heard  No gallop  Pulmonary:      Effort: Pulmonary effort is normal  No respiratory distress  Breath sounds: Normal breath sounds  No wheezing or rhonchi  Abdominal:      Palpations: Abdomen is soft  Tenderness: There is abdominal tenderness  There is no guarding or rebound  Hernia: No hernia is present  Genitourinary:     General: Normal vulva  Comments: On speculum exam the vagina and cervix appears atrophic with no visible lesions, bleeding or discharge  Musculoskeletal:      Right lower leg: Edema present  Left lower leg: Edema present  Skin:     General: Skin is warm and dry  Neurological:      Mental Status: She is alert and oriented to person, place, and time     Psychiatric:         Mood and Affect: Mood normal          Behavior: Behavior normal        Breast inspection negative, no nipple discharge or bleeding, no masses or nodularity palpable bilaterally    Yvette Serrano MD  05/05/22

## 2022-05-06 DIAGNOSIS — Z95.2 H/O MITRAL VALVE REPLACEMENT: Primary | ICD-10-CM

## 2022-05-06 LAB
HPV HR 12 DNA CVX QL NAA+PROBE: NEGATIVE
HPV16 DNA CVX QL NAA+PROBE: NEGATIVE
HPV18 DNA CVX QL NAA+PROBE: NEGATIVE

## 2022-05-06 RX ORDER — WARFARIN SODIUM 5 MG/1
TABLET ORAL
Qty: 90 TABLET | Refills: 3 | Status: SHIPPED | OUTPATIENT
Start: 2022-05-06

## 2022-05-10 ENCOUNTER — OFFICE VISIT (OUTPATIENT)
Dept: FAMILY MEDICINE CLINIC | Facility: CLINIC | Age: 62
End: 2022-05-10
Payer: MEDICARE

## 2022-05-10 VITALS
TEMPERATURE: 96.6 F | WEIGHT: 215 LBS | SYSTOLIC BLOOD PRESSURE: 140 MMHG | RESPIRATION RATE: 16 BRPM | OXYGEN SATURATION: 98 % | HEIGHT: 58 IN | DIASTOLIC BLOOD PRESSURE: 80 MMHG | HEART RATE: 60 BPM | BODY MASS INDEX: 45.13 KG/M2

## 2022-05-10 DIAGNOSIS — M25.562 ACUTE PAIN OF LEFT KNEE: ICD-10-CM

## 2022-05-10 DIAGNOSIS — L50.0 ALLERGIC URTICARIA: ICD-10-CM

## 2022-05-10 DIAGNOSIS — I07.1 RHEUMATIC TRICUSPID VALVE REGURGITATION: Primary | ICD-10-CM

## 2022-05-10 DIAGNOSIS — G40.019 LOCALIZATION-RELATED EPILEPSY, INTRACTABLE (HCC): ICD-10-CM

## 2022-05-10 DIAGNOSIS — Z95.2 H/O MITRAL VALVE REPLACEMENT WITH MECHANICAL VALVE: ICD-10-CM

## 2022-05-10 PROCEDURE — 99214 OFFICE O/P EST MOD 30 MIN: CPT | Performed by: FAMILY MEDICINE

## 2022-05-10 RX ORDER — HYDROXYZINE HYDROCHLORIDE 25 MG/1
25 TABLET, FILM COATED ORAL 2 TIMES DAILY
Qty: 180 TABLET | Refills: 1 | Status: SHIPPED | OUTPATIENT
Start: 2022-05-10

## 2022-05-10 NOTE — PROGRESS NOTES
Assessment/Plan:  Complains of B/L knee pain  History of OA  Left knee now giving out  Concern for intraarticular derangements or weakness of the quadriceps  Refer to ortho for further eval  Tylenol recommended for pain  May use a brace for support  May need strengthening of the quads through PT  Pt referred to cardiology for routine follow up  No acute concerns  No recent seizure activities and reports 100% compliance with AEDs  BS are better controlled with Januvia and metformin  Eye exam due as well as her foot exam  F/U in 2 months       Problem List Items Addressed This Visit        Cardiovascular and Mediastinum    Tricuspid regurgitation - Primary    Relevant Orders    Ambulatory Referral to Cardiology       Nervous and Auditory    Localization-related epilepsy, intractable ([facility] [address] )       Other    H/O mitral valve replacement with mechanical valve    Relevant Orders    Ambulatory Referral to Cardiology      Other Visit Diagnoses     Allergic urticaria        Relevant Medications    hydrOXYzine HCL (ATARAX) 25 mg tablet    Acute pain of left knee        Relevant Orders    Ambulatory Referral to Orthopedic Surgery            Subjective:      Patient ID: Kieran Solomon is a 60 y o  female here with knee pain  Knee has been giving out the past week  History of arthritis in the both knee present on imaging  No recent seizure activities  Also complains of lower back pain that started 2 weeks ago  Pt has been packing and unpacking since April  Twisiting and going from sitting to standing elicits the pain  BS are better controlled after adding Januvia  , 108, 109 on Januvia with metformin  Lastly, pt is requesting a referral to cardiology  Prefers not to return to her previous cardiologist  Prefers the office in [place]       The following portions of the patient's history were reviewed and updated as appropriate:   She  has a past medical history of Abnormal ultrasound of pelvis, Asthma, Asthma, persistent (8/1/2014), Cerebral infarction ([place] [address] ), Dizziness, Dysuria, Exposure to potentially hazardous body fluids, Heart disease, Hematoma, Mitral valve stenosis, Personal history of scoliosis, Pneumonia, Scar, hypertrophic, Seizures ([place] [address] ), Shingles (2021), Type 2 diabetes mellitus ([place] [address] ), and Vision problems    She   Patient Active Problem List    Diagnosis Date Noted    Type 2 diabetes mellitus without complication, without long-term current use of insulin ([place] [address] ) 02/11/2022    Chronic diarrhea 08/16/2021    Herpes zoster without complication 27/09/6196    Diarrhea 06/02/2021    COVID-19 virus infection 01/07/2021    Lymphedema of both lower extremities 12/21/2020    Tricuspid regurgitation 12/21/2020    Morbid obesity with BMI of 45 0-49 9, adult ([place] [address] ) 12/21/2020    PAD (peripheral artery disease) ([place] [address] ) 12/21/2020    Bilateral leg pain 11/02/2020    BPPV (benign paroxysmal positional vertigo) 09/03/2020    Sprain of anterior talofibular ligament of right ankle 08/07/2020    Peroneal tendonitis 08/07/2020    Posterior tibial tendinitis of right lower extremity 08/07/2020    Mild intermittent asthma 07/28/2020    Acute right ankle pain 07/09/2020    Secondary hypertension 06/10/2020    Dyslipidemia 06/10/2020    Allergic conjunctivitis and rhinitis, bilateral 04/06/2020    Panic reaction 03/10/2020    GERMAN (obstructive sleep apnea)     Vertigo 12/30/2019    Posterior knee pain, right 09/26/2019    Left sided abdominal pain 09/26/2019    H/O mitral valve replacement with mechanical valve 06/12/2019    Bilateral lower extremity edema 05/30/2019    Wrist swelling, left 08/27/2018    Chest wall pain 08/27/2018    History of stroke 05/18/2018    Cough 03/12/2018    History of prosthetic heart valve 01/20/2018    Sleep disorder, circadian, delayed sleep phase type 12/15/2017    Low grade squamous intraepithelial lesion (LGSIL) on cervical Pap smear 04/27/2017    Excessive daytime sleepiness 01/20/2017  Plantar fasciitis 12/15/2016    Urticaria, chronic 09/15/2016    Abnormal mammogram 05/04/2016    Fecal occult blood test positive 04/20/2016    Iron deficiency anemia 04/20/2016    Cervical high risk HPV (human papillomavirus) test positive 04/06/2016    Microcytic hypochromic anemia 04/06/2016    Vitamin D insufficiency 04/06/2016    Insomnia 02/18/2016    Fatigue 12/10/2015    Xerosis of skin 10/20/2015    Hypokalemia 08/21/2015    Urge incontinence of urine 04/03/2015    Uterine fibroid 03/26/2015    Elevated blood pressure reading 03/06/2015    Localization-related epilepsy, intractable ([facility] [address] ) 03/06/2015    Intertrigo 02/24/2015    Overactive bladder 02/24/2015    Urinary retention 02/24/2015    Obesity 09/08/2014    Depression with anxiety 09/04/2014    Esophageal reflux 08/01/2014    Primary osteoarthritis of right knee 08/01/2014     She  has a past surgical history that includes Mitral valve replacement (06/17/2015); Tonsillectomy; Transluminal angioplasty; Tubal ligation (1986); and Breast biopsy (Left)  Her family history includes Breast cancer in her maternal grandmother and sister; Cancer in her mother; Cervical cancer in her mother; Coronary artery disease in her father; Dementia in her mother; Diabetes in her brother, maternal grandmother, mother, other, and sister; Heart attack in her father; Heart disease in her father, paternal aunt, and paternal aunt; Hyperlipidemia in her brother and mother; Hypertension in her brother, father, and mother; No Known Problems in her daughter; Psoriasis in her son; Seizures in her son  She  reports that she has never smoked  She has never used smokeless tobacco  She reports that she does not drink alcohol and does not use drugs    Current Outpatient Medications on File Prior to Visit   Medication Sig    acetaminophen (TYLENOL) 500 mg tablet Take 2 tablets by mouth daily as needed for pain    albuterol (PROVENTIL HFA,VENTOLIN HFA) 90 mcg/act inhaler Inhale 2 puffs every 4 (four) hours as needed for wheezing    Blood Glucose Monitoring Suppl (ACCU-CHEK EDILMA PLUS) w/Device KIT by Does not apply route 2 (two) times a day    cetirizine (ZyrTEC) 10 mg tablet TAKE ONE TABLET BY MOUTH DAILY    Diclofenac Sodium (VOLTAREN) 1 % Apply 2 g topically 4 (four) times a day as needed    dorzolamide-timolol (COSOPT) 22 3-6 8 MG/ML ophthalmic solution     fluticasone (FLONASE) 50 mcg/act nasal spray 1 spray into each nostril daily    fluticasone (Flovent HFA) 110 MCG/ACT inhaler Inhale 2 puffs 2 (two) times a day Rinse mouth after use   glucose blood (Accu-Chek SmartView) test strip USE DAILY AS INSTRUCTED    Keppra 500 MG tablet Take 1 tablet (500 mg total) by mouth 3 (three) times a day    Lancets (OneTouch Delica Plus UNLWHN16T) MISC USE AS INSTRUCTED    metFORMIN (GLUCOPHAGE) 500 mg tablet Take 1 tablet (500 mg total) by mouth daily at bedtime    metoprolol tartrate (LOPRESSOR) 25 mg tablet Take 0 5 tablets (12 5 mg total) by mouth every 12 (twelve) hours    omeprazole (PriLOSEC) 40 MG capsule TAKE 1 CAPSULE (40 MG TOTAL) BY MOUTH DAILY    OneTouch Verio test strip USE DAILY AS INSTRUCTED    sitaGLIPtin (JANUVIA) 25 mg tablet Take 1 tablet (25 mg total) by mouth daily    torsemide (DEMADEX) 20 mg tablet TAKE ONE TABLET BY MOUTH AS NEEDED AS DIRECTED    warfarin (COUMADIN) 5 mg tablet TAKE 1/2 TO 1 TABLET DAILY BY MOUTH OR AS ORDERED BY PHYSICIAN  No current facility-administered medications on file prior to visit  She is allergic to codeine, prednisone, and codeine polt-chlorphen polt er       Review of Systems   Musculoskeletal: Positive for arthralgias and back pain  Neurological: Positive for weakness (generalized muscle weakness )           Objective:      /80   Pulse 60   Temp (!) 96 6 °F (35 9 °C) (Tympanic)   Resp 16   Ht 4' 10" (1 473 m)   Wt 97 5 kg (215 lb)   SpO2 98%   BMI 44 94 kg/m²          Physical Exam  Constitutional:       General: She is not in acute distress  Appearance: Normal appearance  She is not ill-appearing  HENT:      Head: Normocephalic and atraumatic  Nose: No congestion or rhinorrhea  Cardiovascular:      Rate and Rhythm: Normal rate and regular rhythm  Heart sounds: Murmur heard  Pulmonary:      Effort: Pulmonary effort is normal       Breath sounds: Normal breath sounds  Musculoskeletal:      Left knee: Bony tenderness present  No swelling, deformity or effusion  Normal range of motion  Tenderness present over the medial joint line, lateral joint line and LCL  No patellar tendon tenderness  No LCL laxity, MCL laxity, ACL laxity or PCL laxity  Normal patellar mobility  Instability Tests: Anterior drawer test negative  Posterior drawer test negative  Anterior Lachman test negative  Medial Marleny test negative  Right lower leg: Edema (1+) present  Left lower leg: Bony tenderness present  No deformity  Edema (trace- 1+) present  Skin:     General: Skin is warm  Neurological:      Mental Status: She is alert and oriented to person, place, and time

## 2022-05-13 LAB
LAB AP GYN PRIMARY INTERPRETATION: ABNORMAL
Lab: ABNORMAL
PATH INTERP SPEC-IMP: ABNORMAL

## 2022-05-14 DIAGNOSIS — J45.20 MILD INTERMITTENT ASTHMA WITHOUT COMPLICATION: ICD-10-CM

## 2022-05-14 DIAGNOSIS — E13.9 DIABETES 1.5, MANAGED AS TYPE 2 (HCC): ICD-10-CM

## 2022-05-14 RX ORDER — SITAGLIPTIN 25 MG/1
TABLET, FILM COATED ORAL
Qty: 30 TABLET | Refills: 0 | Status: SHIPPED | OUTPATIENT
Start: 2022-05-14 | End: 2022-06-13

## 2022-05-16 RX ORDER — DEXAMETHASONE 4 MG/1
TABLET ORAL
Qty: 12 G | Refills: 3 | Status: SHIPPED | OUTPATIENT
Start: 2022-05-16

## 2022-06-03 ENCOUNTER — APPOINTMENT (OUTPATIENT)
Dept: LAB | Facility: HOSPITAL | Age: 62
End: 2022-06-03
Attending: INTERNAL MEDICINE
Payer: MEDICARE

## 2022-06-03 ENCOUNTER — OFFICE VISIT (OUTPATIENT)
Dept: OBGYN CLINIC | Facility: HOSPITAL | Age: 62
End: 2022-06-03
Payer: MEDICARE

## 2022-06-03 ENCOUNTER — HOSPITAL ENCOUNTER (OUTPATIENT)
Dept: RADIOLOGY | Facility: HOSPITAL | Age: 62
Discharge: HOME | End: 2022-06-03
Payer: MEDICARE

## 2022-06-03 ENCOUNTER — ANTICOAG VISIT (OUTPATIENT)
Dept: CARDIOLOGY CLINIC | Facility: CLINIC | Age: 62
End: 2022-06-03

## 2022-06-03 VITALS
SYSTOLIC BLOOD PRESSURE: 136 MMHG | HEIGHT: 58 IN | WEIGHT: 215 LBS | DIASTOLIC BLOOD PRESSURE: 80 MMHG | BODY MASS INDEX: 45.13 KG/M2 | HEART RATE: 74 BPM

## 2022-06-03 DIAGNOSIS — M25.562 ACUTE PAIN OF LEFT KNEE: ICD-10-CM

## 2022-06-03 DIAGNOSIS — Z95.2 HISTORY OF PROSTHETIC HEART VALVE: Primary | ICD-10-CM

## 2022-06-03 DIAGNOSIS — M17.12 PRIMARY OSTEOARTHRITIS OF LEFT KNEE: ICD-10-CM

## 2022-06-03 DIAGNOSIS — M76.52 PATELLAR TENDONITIS OF LEFT KNEE: Primary | ICD-10-CM

## 2022-06-03 PROCEDURE — 73562 X-RAY EXAM OF KNEE 3: CPT

## 2022-06-03 PROCEDURE — 99213 OFFICE O/P EST LOW 20 MIN: CPT | Performed by: PHYSICIAN ASSISTANT

## 2022-06-03 NOTE — PROGRESS NOTES
Assessment:    Left knee patellar tendinitis   Left knee osteoarthritis      Plan:    Rx Diclofenac gel to the area  Rest, activities to tolerance  Ice and ACE compression to the area  Referral to PT for treatment  Can follow-up PRN, let us know if symptoms worsen           Visit Diagnoses     Acute pain of left knee        Relevant Orders    XR knee 3 vw left non injury                   Subjective:     Patient ID:  Volodymyr Rai is a 60 y o  female    HPI    57-year-old female presenting for evaluation of the left knee  According to the patient, she has a three week history of anterior left knee pain that came on without injury or trauma to the area  She has known chronic osteoarthritis of the left knee and her symptoms of this are at baseline however this feels slightly different to her  She was in [place] around this time frame and was walking around much more than usual     She states the pain is specifically anterior knee below the kneecap without radiation  It hurts worse with walking and gets better with rest     No associated numbness and tingling  She has not noticed any redness or unusual swelling  There was no injury or trauma to the area  No history of surgery to the left knee  The following portions of the patient's history were reviewed and updated as appropriate: allergies, current medications, past family history, past medical history, past social history, past surgical history and problem list     Review of Systems     Objective:    Imaging:  Left knee x-rays demonstrate mild to moderate arthritic changes in the left knee, no acute findings      Vitals:    06/03/22 0941   BP: 136/80   Pulse: 74           Physical Exam     Orthopedic Examination:  The patient ambulates with the assistance of a cane  Left knee     Inspection:  No open wounds or erythema  No significant effusion  No ecchymosis  Palpation:   The medial and lateral joint lines are nontender to palpation The patellar tendon is tender without gapping    The quadriceps tendon is nontender  There is no warmth of the skin    Range-of-motion:  0 to 130, no extensor lag    Strength:  5/5 hip flexion knee extension ankle plantar dorsiflexion    Sensation:  Intact    Special Tests:  Stable to varus and valgus stress  Negative Marleny's   Extensor mechanism intact

## 2022-06-13 DIAGNOSIS — E13.9 DIABETES 1.5, MANAGED AS TYPE 2 (HCC): ICD-10-CM

## 2022-06-13 RX ORDER — SITAGLIPTIN 25 MG/1
TABLET, FILM COATED ORAL
Qty: 30 TABLET | Refills: 2 | Status: SHIPPED | OUTPATIENT
Start: 2022-06-13

## 2022-06-22 ENCOUNTER — HOSPITAL ENCOUNTER (OUTPATIENT)
Dept: RADIOLOGY | Facility: IMAGING CENTER | Age: 62
Discharge: HOME | End: 2022-06-22
Payer: MEDICARE

## 2022-06-22 DIAGNOSIS — Z12.31 ENCOUNTER FOR SCREENING MAMMOGRAM FOR MALIGNANT NEOPLASM OF BREAST: ICD-10-CM

## 2022-06-22 DIAGNOSIS — Z13.820 SCREENING FOR OSTEOPOROSIS: ICD-10-CM

## 2022-06-22 DIAGNOSIS — Z12.31 SCREENING MAMMOGRAM FOR BREAST CANCER: ICD-10-CM

## 2022-06-22 PROCEDURE — 77063 BREAST TOMOSYNTHESIS BI: CPT

## 2022-06-22 PROCEDURE — 77067 SCR MAMMO BI INCL CAD: CPT

## 2022-06-22 PROCEDURE — 77080 DXA BONE DENSITY AXIAL: CPT

## 2022-06-23 ENCOUNTER — HOSPITAL ENCOUNTER (OUTPATIENT)
Dept: RADIOLOGY | Facility: IMAGING CENTER | Age: 62
Discharge: HOME | End: 2022-06-23
Payer: MEDICARE

## 2022-06-23 DIAGNOSIS — R10.2 PELVIC PAIN: ICD-10-CM

## 2022-06-23 PROCEDURE — 76830 TRANSVAGINAL US NON-OB: CPT

## 2022-06-23 PROCEDURE — 76856 US EXAM PELVIC COMPLETE: CPT

## 2022-07-09 DIAGNOSIS — J30.9 ALLERGIC RHINITIS, UNSPECIFIED SEASONALITY, UNSPECIFIED TRIGGER: ICD-10-CM

## 2022-07-12 DIAGNOSIS — K21.9 GASTROESOPHAGEAL REFLUX DISEASE: ICD-10-CM

## 2022-07-12 RX ORDER — OMEPRAZOLE 40 MG/1
CAPSULE, DELAYED RELEASE ORAL
Qty: 90 CAPSULE | Refills: 10 | Status: SHIPPED | OUTPATIENT
Start: 2022-07-12

## 2022-07-12 RX ORDER — CETIRIZINE HYDROCHLORIDE 10 MG/1
10 TABLET ORAL DAILY
Qty: 90 TABLET | Refills: 0 | Status: SHIPPED | OUTPATIENT
Start: 2022-07-12 | End: 2022-09-14

## 2022-07-13 ENCOUNTER — OFFICE VISIT (OUTPATIENT)
Dept: FAMILY MEDICINE CLINIC | Facility: CLINIC | Age: 62
End: 2022-07-13
Payer: MEDICARE

## 2022-07-13 ENCOUNTER — TELEPHONE (OUTPATIENT)
Dept: OTHER | Facility: OTHER | Age: 62
End: 2022-07-13

## 2022-07-13 VITALS
TEMPERATURE: 98 F | HEIGHT: 58 IN | DIASTOLIC BLOOD PRESSURE: 80 MMHG | SYSTOLIC BLOOD PRESSURE: 112 MMHG | OXYGEN SATURATION: 97 % | RESPIRATION RATE: 16 BRPM | BODY MASS INDEX: 43.49 KG/M2 | WEIGHT: 207.2 LBS | HEART RATE: 64 BPM

## 2022-07-13 DIAGNOSIS — B35.3 TINEA PEDIS OF LEFT FOOT: ICD-10-CM

## 2022-07-13 DIAGNOSIS — R10.32 ABDOMINAL WALL PAIN IN LEFT LOWER QUADRANT: ICD-10-CM

## 2022-07-13 DIAGNOSIS — E13.9 DIABETES 1.5, MANAGED AS TYPE 2 (HCC): Primary | ICD-10-CM

## 2022-07-13 DIAGNOSIS — G40.019 LOCALIZATION-RELATED EPILEPSY, INTRACTABLE (HCC): ICD-10-CM

## 2022-07-13 DIAGNOSIS — E11.9 TYPE 2 DIABETES MELLITUS WITHOUT COMPLICATION, WITHOUT LONG-TERM CURRENT USE OF INSULIN (HCC): ICD-10-CM

## 2022-07-13 DIAGNOSIS — E78.5 DYSLIPIDEMIA: ICD-10-CM

## 2022-07-13 LAB — SL AMB POCT HEMOGLOBIN AIC: 6.3 (ref ?–6.5)

## 2022-07-13 PROCEDURE — 83036 HEMOGLOBIN GLYCOSYLATED A1C: CPT | Performed by: FAMILY MEDICINE

## 2022-07-13 PROCEDURE — 99214 OFFICE O/P EST MOD 30 MIN: CPT | Performed by: FAMILY MEDICINE

## 2022-07-13 RX ORDER — LIDOCAINE 50 MG/G
2 PATCH TOPICAL DAILY
Qty: 15 PATCH | Refills: 0 | Status: SHIPPED | OUTPATIENT
Start: 2022-07-13

## 2022-07-13 RX ORDER — PRENATAL VIT 91/IRON/FOLIC/DHA 28-975-200
COMBINATION PACKAGE (EA) ORAL 2 TIMES DAILY
Qty: 36 G | Refills: 0 | Status: SHIPPED | OUTPATIENT
Start: 2022-07-13

## 2022-07-13 RX ORDER — LEVETIRACETAM 500 MG/1
500 TABLET, FILM COATED ORAL 3 TIMES DAILY
Qty: 270 TABLET | Refills: 3 | Status: SHIPPED | OUTPATIENT
Start: 2022-07-13 | End: 2022-09-13 | Stop reason: SDUPTHER

## 2022-07-13 NOTE — TELEPHONE ENCOUNTER
[facility] pharmacy calling in regards to patient, states that the patient was prescribed Keppra 500 mg but per insurance, requires prior authorization   Ph# for Prior Auth w/ Insurance: [phone]

## 2022-07-13 NOTE — PROGRESS NOTES
Patient's shoes and socks removed  Right Foot/Ankle   Right Foot Inspection  Skin Exam: dry skin  Toe Exam: No swelling and erythema    Sensory   Monofilament testing: intact    Vascular  Capillary refills: < 3 seconds  The right DP pulse is 1+  Left Foot/Ankle  Left Foot Inspection  Skin Exam: dry skin  Toe Exam: No swelling and no erythema  Sensory   Monofilament testing: intact    Vascular  Capillary refills: < 3 seconds  The left DP pulse is 1+  Assign Risk Category  No deformity present  No loss of protective sensation  No weak pulses  Risk: 0    Assessment/Plan:    Leonides Hussein is here for follow-up  Doing well  Lost 11 lb with diet and exercise  Granddaughter visited recently and was walking every day  Has not been able to start physical therapy  Did see Ortho and recommended topical analgesics along with physical therapy  Pain is subsided which she attributes to walking  A1c came down nicely from 7 6-6  3! Foot exam performed today  There was a dry hyperkeratotic patch in the medial plantar region of the left foot  This resembles a fungal infection  Will start Lamisil b i d  for 2 weeks  If there is any improvement and given the chronicity, will need to be biopsied  Slight elevation in the lipid panel  Given her history of diabetes statins warranted but prefers to manage hyperlipidemia with lifestyle management  Abdominal pain still an issue  Pelvic ultrasound showed no ovarian cyst but did reveal a 1 9 cm uterine fibroid  Pain may be due to abdominal wall syndrome  Reccommended using lidocaine patch  If pain resolved is likely superficial in origin and less likely an intra-abdominal etiology  Keppra refilled  Asked to verify with her insurance whether not she can receive shingles vaccine in the office  If so provided to her at her follow-up visit in 6 months      Problem List Items Addressed This Visit        Endocrine    Type 2 diabetes mellitus without complication, without long-term current use of insulin (HCC)       Nervous and Auditory    Localization-related epilepsy, intractable (HCC)    Relevant Medications    Keppra 500 MG tablet       Musculoskeletal and Integument    Tinea pedis of left foot    Relevant Medications    lidocaine (Lidoderm) 5 %    terbinafine (LamISIL) 1 % cream       Other    Dyslipidemia      Other Visit Diagnoses     Diabetes 1 5, managed as type 2 ([facility] [address] )    -  Primary    Relevant Orders    POCT hemoglobin A1c (Completed)    Abdominal wall pain in left lower quadrant        Relevant Medications    lidocaine (Lidoderm) 5 %            Subjective:      Patient ID: Jakub Pierre is a 60 y o  female with a history of asthma, type 2 diabetes, BPPV, arthritis, GERMAN, prefer to disease, urge incontinence, anemia, and mood disorder here to follow-up diabetes  Patient has lost 11 lb since March  Recently changed her diet it is not consuming less carbs, sweets, and fats  A1c down to 6 3 from 7 6  Compliant with medications  Saw ortho to assess the left knee  Recommended topical analgesics and physical therapy  Patient is due to schedule physical therapy  Granddaughter was in town visiting and could not find anyone to baby-sit  Left knee pain has improved  Has been walking more and will experience pain every now and then  Knee no longer giving out  Patient also had a Pap smear in May  Previous Pap in 2019 showed LSIL/HPV  Cytology positive for ASCUS but HPV was negative  Still expereincing pain on the left side of her abdomen  Pelvic U/S negative for ovarian cyst   Did show a 1 9 cm uterine fibroid  The following portions of the patient's history were reviewed and updated as appropriate: allergies, current medications, past family history, past medical history, past social history, past surgical history and problem list     Review of Systems   Respiratory: Negative for cough, chest tightness and shortness of breath      Cardiovascular: Negative for chest pain, palpitations and leg swelling  Musculoskeletal: Positive for arthralgias (Chronic and is planning to start physical therapy soon) and back pain ( across the lower back)  Skin: Negative for wound  Neurological: Negative for dizziness and light-headedness  Objective:      /80 (BP Location: Left arm, Patient Position: Sitting, Cuff Size: Large)   Pulse 64   Temp 98 °F (36 7 °C) (Tympanic)   Resp 16   Ht 4' 10" (1 473 m)   Wt 94 kg (207 lb 3 2 oz)   SpO2 97%   BMI 43 30 kg/m²          Physical Exam  Vitals reviewed  Constitutional:       Appearance: Normal appearance  She is obese  HENT:      Head: Normocephalic and atraumatic  Eyes:      Extraocular Movements: Extraocular movements intact  Cardiovascular:      Rate and Rhythm: Normal rate and regular rhythm  Pulses: no weak pulses          Dorsalis pedis pulses are 1+ on the right side and 1+ on the left side  Heart sounds: Murmur (Mechanical click) heard  Pulmonary:      Effort: Pulmonary effort is normal       Breath sounds: Normal breath sounds  No stridor  No wheezing, rhonchi or rales  Chest:      Chest wall: No tenderness  Abdominal:      General: Abdomen is flat  Bowel sounds are normal  There is no distension  Palpations: There is no mass  Tenderness: There is abdominal tenderness ( left lower quadrant and periumbilical)  There is no guarding or rebound  Hernia: No hernia is present  Feet:      Right foot:      Skin integrity: Dry skin present  Left foot:      Skin integrity: Dry skin present  Lymphadenopathy:      Cervical: No cervical adenopathy  Skin:     General: Skin is warm  Neurological:      Mental Status: She is alert and oriented to person, place, and time  Psychiatric:         Mood and Affect: Mood normal          Behavior: Behavior normal          Thought Content:  Thought content normal          Judgment: Judgment normal

## 2022-07-18 DIAGNOSIS — Z95.2 H/O MITRAL VALVE REPLACEMENT WITH MECHANICAL VALVE: Primary | ICD-10-CM

## 2022-08-03 ENCOUNTER — APPOINTMENT (OUTPATIENT)
Dept: LAB | Facility: HOSPITAL | Age: 62
End: 2022-08-03
Attending: INTERNAL MEDICINE
Payer: MEDICARE

## 2022-08-03 ENCOUNTER — ANTICOAG VISIT (OUTPATIENT)
Dept: CARDIOLOGY CLINIC | Facility: CLINIC | Age: 62
End: 2022-08-03

## 2022-08-03 DIAGNOSIS — Z95.2 HISTORY OF PROSTHETIC HEART VALVE: Primary | ICD-10-CM

## 2022-08-30 LAB
LEFT EYE DIABETIC RETINOPATHY: NORMAL
RIGHT EYE DIABETIC RETINOPATHY: NORMAL

## 2022-08-31 ENCOUNTER — EVALUATION (OUTPATIENT)
Dept: PHYSICAL THERAPY | Age: 62
End: 2022-08-31
Payer: MEDICARE

## 2022-08-31 DIAGNOSIS — M17.12 PRIMARY OSTEOARTHRITIS OF LEFT KNEE: ICD-10-CM

## 2022-08-31 DIAGNOSIS — M76.52 PATELLAR TENDONITIS OF LEFT KNEE: Primary | ICD-10-CM

## 2022-08-31 PROCEDURE — 97110 THERAPEUTIC EXERCISES: CPT | Performed by: PHYSICAL THERAPIST

## 2022-08-31 PROCEDURE — 97162 PT EVAL MOD COMPLEX 30 MIN: CPT | Performed by: PHYSICAL THERAPIST

## 2022-08-31 NOTE — PROGRESS NOTES
PT Evaluation     Today's date: 2022  Patient name: Henrietta Staley  : 1960  MRN: [id number]  Referring provider: Radha Kenney  Dx:   Encounter Diagnosis     ICD-10-CM    1  Patellar tendonitis of left knee  M76 52 Ambulatory Referral to Physical Therapy   2  Primary osteoarthritis of left knee  M17 12 Ambulatory Referral to Physical Therapy                  Assessment  Assessment details: PT IE: 2022  Patient reported she has had left > right knee pain for " along time" which is at least several years  Patient noted she has injections in each knee, which worked for a little time  Patient noted this summer she started walking with her family, which went well until her family member left to return to home thus since she does not walk alone she has stopped walking  Patient noted she has stopped walking, her bilateral knee pain has increased and she noted she has gained some weight  Patient noted she only leaves the house for the store and appointments  Patient noted she does perform home exercises  Patient noted the following deficits that persists due to bilateral knee pain: prolonged sitting, prolonged standing, prolonged walking, intermittent use of RW or spc due to left knee buckle and severe pain, sleep deficits, squatting, lifting, shoe and sock donning and doffing  Patient noted her bilateral knees are edematous  Patient noted her bilateral le are weak as well  Patient noted at times she will stand up and just stand prior to walking due to pain and concerns of weakness and buckle  Patient noted she has bilateral knee crepitus, but she denies pain with crepitus  Patient noted the following decreases her pain: use of Tylenol at night, OTC lotions like Voltaren gel, elevated bilateral le, and use of MHP  Patient noted her pain can radiate up into the thigh and tibial regions     Impairments: abnormal gait, abnormal or restricted ROM, abnormal movement, activity intolerance, lacks appropriate home exercise program and pain with function  Understanding of Dx/Px/POC: excellent   Prognosis: good  Prognosis details: Patient is a 60y o  year old female seen for outpatient PT evaluation with left knee pain due to OA and patellar tendonitis  Patient presents to PT IE with the following problems, concerns, deficits and impairments: bilateral knee pain, decreased bilateral le range of motion, decreased bilateral le strength, + TTP, left knee edema, + left le special tests, gait and stair dysfunctions, transfer dysfunctions, balance deficits, functional limitations and decreased tolerance to activity  Patient would benefit from skilled PT services under the following PT treatment plan to address the above noted deficits: therapeutic exercises and activities to facilitate bilateral le rom and strength, modalities, manual therapy techniques, gait and stair training, transfer training, balance and proprioception activities, IASTM techniques, Kinesio taping techniques and a hep  Thank you for the referral      Goals  Short Term goals - 4 weeks  1  Patient will be independent HEP  2   Patient will report a 25 - 50% decrease in pain complaints  3   Increase strength 1/2 grade  4   Increase ROM 5-10 degrees  Long Term goals - 8 weeks  1  Patient will report elimination of pain complaints  2   Patient will return to all recreational activities without restriction  3   ROM WFL  4   Strength 5/5   5   Patient will report prolonged sitting is improved by > 15 minutes prior to bilateral knee pain symptoms and limitations  6   Patient will report prolonged standing is improved by > 15 minutes prior to bilateral knee pain symptoms and limitations  7   Patient will report prolonged walking is improved by > 15 minutes prior to bilateral knee pain symptoms and limitations    8   Patient will report sleep improved by > 15 minutes prior to bilateral knee pain symptoms or limitations  9   Patient will report ability to resume squatting activities at home  10   Patient will report ability to resume lifting activities at home  11   Patient will report ability to resume shoe and sock donning and doffing without limitations        Plan  Patient would benefit from: skilled physical therapy  Planned modality interventions: cryotherapy, TENS, thermotherapy: hydrocollator packs, ultrasound and unattended electrical stimulation  Planned therapy interventions: joint mobilization, manual therapy, massage, aquatic therapy, muscle pump exercises, neuromuscular re-education, balance, balance/weight bearing training, patient education, body mechanics training, postural training, compression, self care, strengthening, stretching, therapeutic activities, therapeutic exercise, therapeutic training, flexibility, functional ROM exercises, gait training, transfer training, graded activity, graded exercise, home exercise program and graded motor  Frequency: 2x week  Duration in weeks: 12  Treatment plan discussed with: patient        Subjective Evaluation    History of Present Illness  Mechanism of injury: LEFT KNEE     INDICATION:   M25 562: Pain in left knee      COMPARISON:  2021     VIEWS:  XR KNEE 3 VW LEFT NON INJURY         FINDINGS:     There is no acute fracture or dislocation      There is no joint effusion      Moderate tricompartmental osteoarthritis as evidenced by joint space narrowing, osteophyte formation and subchondral sclerosis      No lytic or blastic osseous lesion      Soft tissues are unremarkable      IMPRESSION:     No acute osseous abnormality      Degenerative changes as described         Pain  At best pain ratin  At worst pain rating: 10  Location: bilateral knee    Patient Goals  Patient goals for therapy: decreased pain, increased motion, increased strength, improved balance, decreased edema and independence with ADLs/IADLs          Objective     Tenderness Additional Tenderness Details  Patient is + moderate to severe TTP at left lateral joint line and bilateral hamstring tendons and minimal to moderate TTP at peripatellar region, medial joint line and popliteal region  Active Range of Motion   Left Hip   Flexion: 54 degrees with pain  Abduction: 4 degrees with pain    Right Hip   Flexion: 58 degrees with pain  Abduction: 10 degrees with pain  Left Knee   Flexion: 74 degrees with pain  Extension: 2 degrees with pain  Extensor lag: Left knee extensor lag: unable due to pain and weakness  with pain    Right Knee   Flexion: 76 degrees with pain  Extension: 4 degrees with pain  Extensor la degrees with pain  Left Ankle/Foot   Dorsiflexion (ke): 8 degrees   Plantar flexion: 46 degrees     Right Ankle/Foot   Dorsiflexion (ke): 8 degrees   Plantar flexion: 42 degrees     Additional Active Range of Motion Details  Hamstring mobility on right at 48 degrees and left at 40 degrees  Strength/Myotome Testing     Left Hip   Planes of Motion   Flexion: 3+  Extension: 3+  Abduction: 3+  Adduction: 4-    Right Hip   Planes of Motion   Flexion: 3+  Extension: 3+  Abduction: 3+  Adduction: 4-    Left Knee   Flexion: 4-  Extension: 3+    Right Knee   Flexion: 4-  Extension: 3+    Left Ankle/Foot   Dorsiflexion: 4-  Plantar flexion: 4+    Right Ankle/Foot   Dorsiflexion: 4+  Plantar flexion: 5    Tests     Left Knee   Positive lateral Marleny, medial Marleny, patellar apprehension, patellar compression, valgus stress test at 0 degrees and varus stress test at 0 degrees  Ambulation     Ambulation: Level Surfaces   Ambulation without assistive device: independent    Additional Level Surfaces Ambulation Details  Patient ambulates with decrease in pace, and decrease in bilateral step length      Ambulation: Stairs   Ascend stairs: independent  Pattern: non-reciprocal  Railings: two rails  Descend stairs: independent  Pattern: non-reciprocal  Railings: two rails    Additional Stairs Ambulation Details  Patient exhibits decrease in left le push off with ascent thus non reciprocal is the safe way to perform stairs  Comments   PT IE: 08/31/2022  Tug is at 17 42 seconds  General Comments:      Knee Comments  Girth Measurements:  Knee:  Suprapatellar region: Right at 59 0 CM and left at 59 5 CM; Mid patellar region: Right at 54 4 CM and left at 56 5 Cm; Infrapatellar region: Right at 49 9 CM and left at 52 0 CM  Precautions: Patient's PMHx is remarkable for Asthma, Mitral Valve Replacement, seizure hx and DM type 2        Manuals 8/31            Kinesio taping in a "U" pattern to bilateral patella with base of "U" inferior at patellar tendon and tails at medial and lateral patella                                                    Neuro Re-Ed                                                                                                        Ther Ex                                       Nu step             Seated hr and tr:B:             LAQ:B:             Seated hip flexion:B:                          Supine hamstring stretch:B:             LTR:B:             Supine ankle pumps:B: 10 x             Quad sets:B: 3 sec x 10            glute sets:B: 3 sec x 10            Heel slides:B: 10 x             SAQ:B:             Bridges                          Prone lying             Prone knee flexion arom:B:             Prone TKE:B:             Prone hip extension:B                          Standing slr x 3:B:             Standing ham curls:B:             Standing hr and tr:B:             sls and tandem stance:B:             Side stepping and tandem ambulation                          Forward step ups:B: 6"            Lateral step ups:B: 6"            Step downs:B: 4"                                                                Ther Activity                                       Gait Training                                       Modalities MHP to bilateral knees while supine 10 min

## 2022-09-07 ENCOUNTER — OFFICE VISIT (OUTPATIENT)
Dept: PHYSICAL THERAPY | Age: 62
End: 2022-09-07
Payer: MEDICARE

## 2022-09-07 DIAGNOSIS — M76.52 PATELLAR TENDONITIS OF LEFT KNEE: ICD-10-CM

## 2022-09-07 DIAGNOSIS — M17.12 PRIMARY OSTEOARTHRITIS OF LEFT KNEE: Primary | ICD-10-CM

## 2022-09-07 PROCEDURE — 97110 THERAPEUTIC EXERCISES: CPT

## 2022-09-07 NOTE — PROGRESS NOTES
Daily Note     Today's date: 2022  Patient name: Shakira Abbott  : 1960  MRN: [id number]  Referring provider: Nila Mcwilliams  Dx:   Encounter Diagnosis     ICD-10-CM    1  Primary osteoarthritis of left knee  M17 12    2  Patellar tendonitis of left knee  M76 52                   Subjective: L knee pain 6/10- 10 R knee      Objective: See treatment diary below      Assessment: Tolerated treatment well  Trial of Ktape to B knees  Decreased pain to 10 today       Plan: Cont with Plan of care      Precautions: Patient's PMHx is remarkable for Asthma, Mitral Valve Replacement, seizure hx and DM type 2  Manuals            Kinesio taping in a "U" pattern to bilateral patella with base of "U" inferior at patellar tendon and tails at medial and lateral patella  KEV                         Neuro Re-Ed                                                    Ther Ex                                       Nu step  10 MIN            Seated hr and tr:B:  20X            LAQ:B:  20X            Seated hip flexion:B:  20X                         Supine hamstring stretch:B:  20SEC 5X            LTR:B:  20X 2SEC            Supine ankle pumps:B: 10 x  NT           Quad sets:B: 3 sec x 10 NT           glute sets:B: 3 sec x 10 NT           Heel slides:B: 10 x  NT           SAQ:B:  20X 3SEC            Bridges  20X 3SEC            Prone on elbows   3 min            Prone lying  NT           Prone knee flexion arom:B:  20X            Prone TKE:B:  NT           Prone hip extension:B  NT                        Standing slr x 3:B:   20X            Standing ham curls:B:  20X            Standing hr and tr:B: NT 20X            sls and tandem stance:B: NT NT           Side stepping and tandem ambulation NT NT                        Forward step ups:B: 6"NT NT           Lateral step ups:  6"NT NT           Step downs:B: 4"NT NT                                                                Ther Activity Gait Training                                       Modalities             MHP to bilateral knees while supine 10 min

## 2022-09-09 ENCOUNTER — OFFICE VISIT (OUTPATIENT)
Dept: PHYSICAL THERAPY | Age: 62
End: 2022-09-09
Payer: MEDICARE

## 2022-09-09 DIAGNOSIS — M76.52 PATELLAR TENDONITIS OF LEFT KNEE: ICD-10-CM

## 2022-09-09 DIAGNOSIS — M17.12 PRIMARY OSTEOARTHRITIS OF LEFT KNEE: Primary | ICD-10-CM

## 2022-09-09 PROCEDURE — 97140 MANUAL THERAPY 1/> REGIONS: CPT

## 2022-09-09 PROCEDURE — 97110 THERAPEUTIC EXERCISES: CPT

## 2022-09-09 NOTE — PROGRESS NOTES
Daily Note     Today's date: 2022  Patient name: Nicki Sims  : 1960  MRN: [id number]  Referring provider: Carlyn Salgado  Dx:   Encounter Diagnosis     ICD-10-CM    1  Primary osteoarthritis of left knee  M17 12    2  Patellar tendonitis of left knee  M76 52                   Subjective: "the tape really helped me"       Objective: See treatment diary below      Assessment: No pain at L knee post PT session  Progress as able       Plan: Cont with Plan of care      Precautions: Patient's PMHx is remarkable for Asthma, Mitral Valve Replacement, seizure hx and DM type 2  Manuals           Kinesio taping in a "U" pattern to bilateral patella with base of "U" inferior at patellar tendon and tails at medial and lateral patella  KEV  KEV                        Neuro Re-Ed                                                    Ther Ex                                       Nu step  10 MIN  10 min          Seated hr and tr:B:  20X  20X           LAQ:B:  20X  20X          Seated hip flexion:B:  20X  20X                       Supine hamstring stretch:B:  20SEC 5X  20SEC 5X           LTR:B:  20X 2SEC  20X 3SEC           Supine ankle pumps:B: 10 x  NT NT           Quad sets:B: 3 sec x 10 NT 30X 3SEC           glute sets:B: 3 sec x 10 NT NT           Heel slides:B: 10 x  NT NT           SAQ:B:  20X 3SEC  20X 3SEC           Bridges  20X 3SEC  20X 3SEC           Prone on elbows   3 min  3 MIN           Prone lying  NT NT           Prone knee flexion arom:B:  20X  20X           Prone TKE:B:  NT NT           Prone hip extension:B  NT NT                        Standing slr x 3:B:   20X  20X           Standing ham curls:B:  20X  20X           Standing hr and tr:B: NT 20X  20X           sls and tandem stance:B: NT NT NT          Side stepping and tandem ambulation NT NT NT                       Forward step ups:B: 6"NT NT NT          Lateral step ups:  6"NT NT NT          Step downs:B: 4"NT NT Ther Activity                                       Gait Training                                       Modalities             MHP to bilateral knees while supine 10 min

## 2022-09-12 DIAGNOSIS — E11.9 TYPE 2 DIABETES MELLITUS WITHOUT COMPLICATION, WITHOUT LONG-TERM CURRENT USE OF INSULIN (HCC): ICD-10-CM

## 2022-09-12 RX ORDER — LANCETS 33 GAUGE
EACH MISCELLANEOUS
Qty: 100 EACH | Refills: 4 | Status: SHIPPED | OUTPATIENT
Start: 2022-09-12

## 2022-09-13 DIAGNOSIS — J30.9 ALLERGIC RHINITIS, UNSPECIFIED SEASONALITY, UNSPECIFIED TRIGGER: ICD-10-CM

## 2022-09-13 DIAGNOSIS — G40.019 LOCALIZATION-RELATED EPILEPSY, INTRACTABLE (HCC): ICD-10-CM

## 2022-09-13 DIAGNOSIS — Z79.4 TYPE 2 DIABETES MELLITUS WITH COMPLICATION, WITH LONG-TERM CURRENT USE OF INSULIN (HCC): ICD-10-CM

## 2022-09-13 DIAGNOSIS — E11.8 TYPE 2 DIABETES MELLITUS WITH COMPLICATION, WITH LONG-TERM CURRENT USE OF INSULIN (HCC): ICD-10-CM

## 2022-09-13 DIAGNOSIS — E13.9 DIABETES 1.5, MANAGED AS TYPE 2 (HCC): ICD-10-CM

## 2022-09-13 RX ORDER — SITAGLIPTIN 25 MG/1
TABLET, FILM COATED ORAL
Qty: 30 TABLET | Refills: 1 | Status: SHIPPED | OUTPATIENT
Start: 2022-09-13

## 2022-09-13 RX ORDER — LEVETIRACETAM 500 MG/1
500 TABLET, FILM COATED ORAL 3 TIMES DAILY
Qty: 270 TABLET | Refills: 0 | Status: SHIPPED | OUTPATIENT
Start: 2022-09-13 | End: 2022-10-17 | Stop reason: SDUPTHER

## 2022-09-14 ENCOUNTER — OFFICE VISIT (OUTPATIENT)
Dept: PHYSICAL THERAPY | Age: 62
End: 2022-09-14
Payer: MEDICARE

## 2022-09-14 DIAGNOSIS — M17.12 PRIMARY OSTEOARTHRITIS OF LEFT KNEE: ICD-10-CM

## 2022-09-14 DIAGNOSIS — M76.52 PATELLAR TENDONITIS OF LEFT KNEE: Primary | ICD-10-CM

## 2022-09-14 PROCEDURE — 97110 THERAPEUTIC EXERCISES: CPT

## 2022-09-14 PROCEDURE — 97140 MANUAL THERAPY 1/> REGIONS: CPT

## 2022-09-14 RX ORDER — LANCETS 33 GAUGE
EACH MISCELLANEOUS
Qty: 100 EACH | Refills: 3 | Status: SHIPPED | OUTPATIENT
Start: 2022-09-14

## 2022-09-14 RX ORDER — BLOOD SUGAR DIAGNOSTIC
STRIP MISCELLANEOUS
Qty: 100 STRIP | Refills: 9 | Status: SHIPPED | OUTPATIENT
Start: 2022-09-14

## 2022-09-14 RX ORDER — CETIRIZINE HYDROCHLORIDE 10 MG/1
10 TABLET ORAL DAILY
Qty: 90 TABLET | Refills: 0 | Status: SHIPPED | OUTPATIENT
Start: 2022-09-14 | End: 2022-10-10 | Stop reason: SDUPTHER

## 2022-09-14 NOTE — PROGRESS NOTES
Daily Note     Today's date: 2022  Patient name: Isabella Du  : 1960  MRN: [id number]  Referring provider: Juanjo Colon  Dx:   Encounter Diagnosis     ICD-10-CM    1  Patellar tendonitis of left knee  M76 52    2  Primary osteoarthritis of left knee  M17 12                   Subjective: Pt noted 2/10 B knee pain today  Objective: See treatment diary below      Assessment: Muscle pain after today's progression  Plan: Cont with Plan of care      Precautions: Patient's PMHx is remarkable for Asthma, Mitral Valve Replacement, seizure hx and DM type 2        Manuals          Kinesio taping in a "U" pattern to bilateral patella with base of "U" inferior at patellar tendon and tails at medial and lateral patella  KEV  KEV  KEV                       Neuro Re-Ed                                                    Ther Ex                                       Nu step  10 MIN  10 min 10 MIN          Seated hr and tr:B:  20X  20X  NT         LAQ:B:  20X  20X 20X 2# 20X 2#          Seated hip flexion:B:  20X  20X                       Supine hamstring stretch:B:  20SEC 5X  20SEC 5X  20SEC 5X          LTR:B:  20X 2SEC  20X 3SEC  20X 3SEC          Supine ankle pumps:B: 10 x  NT NT  NT         Quad sets:B: 3 sec x 10 NT 30X 3SEC  30X 5SEC B         glute sets:B: 3 sec x 10 NT NT  NT         Heel slides:B: 10 x  NT NT  NT         SAQ:B:  20X 3SEC  20X 3SEC  20X 3SEC 2#          Bridges  20X 3SEC  20X 3SEC  20X 3SEC          Prone on elbows   3 min  3 MIN  3 min         Prone press ups     20x          Prone lying  NT NT  NT         Prone knee flexion arom:B:  20X  20X  20x 2#          Prone TKE:B:  NT NT  20X 3SEC          Prone hip extension:B  NT NT  NT                      Standing slr x 3:B:   20X  20X  20X 2#          Standing ham curls:B:  20X  20X  20X 2#          Standing hr and tr:B: NT 20X  20X  20X          sls and tandem stance:B: NT NT NT NT         Side stepping and tandem ambulation NT NT NT NT                      Forward step ups:B: 6"NT NT NT NT         Lateral step ups:  6"NT NT NT NT         Step downs:B: 4"NT NT                                                                Ther Activity                                       Gait Training                                       Modalities             MHP to bilateral knees while supine 10 min

## 2022-09-20 ENCOUNTER — OFFICE VISIT (OUTPATIENT)
Dept: PHYSICAL THERAPY | Age: 62
End: 2022-09-20
Payer: MEDICARE

## 2022-09-20 DIAGNOSIS — M76.52 PATELLAR TENDONITIS OF LEFT KNEE: Primary | ICD-10-CM

## 2022-09-20 DIAGNOSIS — M17.12 PRIMARY OSTEOARTHRITIS OF LEFT KNEE: ICD-10-CM

## 2022-09-20 PROCEDURE — 97110 THERAPEUTIC EXERCISES: CPT | Performed by: PHYSICAL THERAPIST

## 2022-09-20 NOTE — PROGRESS NOTES
Daily Note     Today's date: 2022  Patient name: Jakub Pierre  : 1960  MRN: [id number]  Referring provider: Kiersten Quesada  Dx:   Encounter Diagnosis     ICD-10-CM    1  Patellar tendonitis of left knee  M76 52    2  Primary osteoarthritis of left knee  M17 12                   Subjective: Patient reported overall pain reduction in Bilateral knee pain reduction since onset of PT, with pain at 2 of 10 today  Patient reported prolonged weight baring activities continue to present as a major functional limitations due to bilateral knee pain aggravation  Objective: See treatment diary below      Assessment: Patient presents with bilateral le fatigue and then pain aggravation with repeated and prolonged standing / closed kinetic chain activities that mimics standing based functional deficits  But, she lacks long term pain reduction, bilateral le strength and endurance that limits standing based functional activities  Thus, PT is warranted to facilitate decrease in bilateral knee pain as well as bilateral le strength improvements to improve all standing based functional activities  Plan: Cont with Plan of care      Precautions: Patient's PMHx is remarkable for Asthma, Mitral Valve Replacement, seizure hx and DM type 2  Manuals         Kinesio taping in a "U" pattern to bilateral patella with base of "U" inferior at patellar tendon and tails at medial and lateral patella  KEV  KEV  KEV  Held due to skin irriation                       Neuro Re-Ed                                                    Ther Ex                                       Nu step  10 MIN  10 min 10 MIN  10 min        Seated hr and tr:B:  20X  20X  NT NT        LAQ:B:  20X  20X 20X 2# 20X 2#  2 x 10        Seated hip flexion:B:  20X  20X  2 x 10                     Supine hamstring stretch:B:  20SEC 5X  20SEC 5X  20SEC 5X  NT        LTR:B:  20X 2SEC  20X 3SEC  20X 3SEC  NT        Supine ankle pumps:B: 10 x  NT NT  NT NT        slr flexion:B:     10 x         Quad sets:B: 3 sec x 10 NT 30X 3SEC  30X 5SEC B NT        glute sets:B: 3 sec x 10 NT NT  NT NT        Heel slides:B: 10 x  NT NT  NT 2 x 10        SAQ:B:  20X 3SEC  20X 3SEC  20X 3SEC 2#  2 x 10        Bridges  20X 3SEC  20X 3SEC  20X 3SEC  2 x 10        Prone on elbows   3 min  3 MIN  3 min 3 min        Prone press ups     20x          Prone lying  NT NT  NT NT        Prone knee flexion arom:B:  20X  20X  20x 2#  2 x 10        Prone TKE:B:  NT NT  20X 3SEC  2 x 10        Prone hip extension:B  NT NT  NT NT                                  Knee flexion stretch in standing:B:     20 sec x 5        Standing slr x 3:B:   20X  20X  20X 2#  2 x 10        Standing ham curls:B:  20X  20X  20X 2#  2 x 10        Standing hr and tr:B: NT 20X  20X  20X  2 x 10        sls and tandem stance:B: NT NT NT NT NT        Side stepping and tandem ambulation NT NT NT NT NT        Mini squats     2 x 10        Forward step ups:B: 6"NT NT NT NT NT        Lateral step ups:  6"NT NT NT NT NT        Step downs:B: 4"NT NT                                                                Ther Activity                                       Gait Training                                       Modalities             MHP to bilateral knees while supine 10 min

## 2022-09-27 ENCOUNTER — OFFICE VISIT (OUTPATIENT)
Dept: PHYSICAL THERAPY | Age: 62
End: 2022-09-27
Payer: MEDICARE

## 2022-09-27 DIAGNOSIS — M17.12 PRIMARY OSTEOARTHRITIS OF LEFT KNEE: ICD-10-CM

## 2022-09-27 DIAGNOSIS — M76.52 PATELLAR TENDONITIS OF LEFT KNEE: Primary | ICD-10-CM

## 2022-09-27 PROCEDURE — 97110 THERAPEUTIC EXERCISES: CPT

## 2022-09-27 PROCEDURE — 97140 MANUAL THERAPY 1/> REGIONS: CPT

## 2022-09-27 NOTE — PROGRESS NOTES
Daily Note     Today's date: 2022  Patient name: Niharika Ramirez  : 1960  MRN: [id number]  Referring provider: Timothy Osier, Kathern Brunner  Dx:   Encounter Diagnosis     ICD-10-CM    1  Patellar tendonitis of left knee  M76 52    2  Primary osteoarthritis of left knee  M17 12                   Subjective: Patient reported no pain at B knees today       Objective: See treatment diary below      Assessment: Pain with exercises R>L, resolved by session's end  Plan: Cont with Plan of care      Precautions: Patient's PMHx is remarkable for Asthma, Mitral Valve Replacement, seizure hx and DM type 2  Manuals        Kinesio taping in a "U" pattern to bilateral patella with base of "U" inferior at patellar tendon and tails at medial and lateral patella  KEV  KEV  KEV  Held due to skin irriation   KEV                     Neuro Re-Ed                                                    Ther Ex                                       Nu step  10 MIN  10 min 10 MIN  10 min 10 min       Seated hr and tr:B:  20X  20X  NT NT 20X        LAQ:B:  20X  20X 20X 2# 20X 2#  2 x 10 20X 2#         Seated hip flexion:B:  20X  20X  2 x 10 20X 2#                     Supine hamstring stretch:B:  20SEC 5X  20SEC 5X  20SEC 5X  NT 20SEC 5X        LTR:B:  20X 2SEC  20X 3SEC  20X 3SEC  NT 20X 2SEC        Supine ankle pumps:B: 10 x  NT NT  NT NT 20X        slr flexion:B:     10 x  20X        Quad sets:B: 3 sec x 10 NT 30X 3SEC  30X 5SEC B NT 20X 3SEC        glute sets:B: 3 sec x 10 NT NT  NT NT NT       Heel slides:B: 10 x  NT NT  NT 2 x 10 20X        SAQ:B:  20X 3SEC  20X 3SEC  20X 3SEC 2#  2 x 10 20X 3SEC 2#        Bridges  20X 3SEC  20X 3SEC  20X 3SEC  2 x 10 20X 3SEC        Prone on elbows   3 min  3 MIN  3 min 3 min NT       Prone press ups     20x   NT       Prone lying  NT NT  NT NT NT       Prone knee flexion arom:B:  20X  20X  20x 2#  2 x 10 NT       Prone TKE:B:  NT NT  20X 3SEC  2 x 10 NT       Prone hip extension:B  NT NT  NT NT NT                                 Knee flexion stretch in standing:B:     20 sec x 5 NT       Standing slr x 3:B:   20X  20X  20X 2#  2 x 10 20X        Standing ham curls:B:  20X  20X  20X 2#  2 x 10 20X        Standing hr and tr:B: NT 20X  20X  20X  2 x 10 20X        sls and tandem stance:B: NT NT NT NT NT NT       Side stepping and tandem ambulation NT NT NT NT NT 6X        Mini squats     2 x 10 20X        Forward step ups:B: 6"NT NT NT NT NT NT       Lateral step ups:  6"NT NT NT NT NT NT       Step downs:B: 4"NT NT     NT                                                           Ther Activity                                       Gait Training                                       Modalities             MHP to bilateral knees while supine 10 min     10 MIN

## 2022-09-29 ENCOUNTER — APPOINTMENT (OUTPATIENT)
Dept: PHYSICAL THERAPY | Age: 62
End: 2022-09-29
Payer: MEDICARE

## 2022-09-30 ENCOUNTER — APPOINTMENT (OUTPATIENT)
Dept: LAB | Facility: CLINIC | Age: 62
End: 2022-09-30
Payer: MEDICARE

## 2022-09-30 ENCOUNTER — APPOINTMENT (OUTPATIENT)
Dept: PHYSICAL THERAPY | Age: 62
End: 2022-09-30
Payer: MEDICARE

## 2022-09-30 ENCOUNTER — ANTICOAG VISIT (OUTPATIENT)
Dept: CARDIOLOGY CLINIC | Facility: CLINIC | Age: 62
End: 2022-09-30

## 2022-09-30 DIAGNOSIS — Z95.2 HISTORY OF PROSTHETIC HEART VALVE: Primary | ICD-10-CM

## 2022-10-08 DIAGNOSIS — I05.0 MITRAL VALVE STENOSIS, UNSPECIFIED ETIOLOGY: ICD-10-CM

## 2022-10-12 ENCOUNTER — ANTICOAG VISIT (OUTPATIENT)
Dept: CARDIOLOGY CLINIC | Facility: CLINIC | Age: 62
End: 2022-10-12

## 2022-10-12 ENCOUNTER — APPOINTMENT (OUTPATIENT)
Dept: LAB | Facility: CLINIC | Age: 62
End: 2022-10-12
Payer: MEDICARE

## 2022-10-12 DIAGNOSIS — Z95.2 HISTORY OF PROSTHETIC HEART VALVE: Primary | ICD-10-CM

## 2022-10-17 ENCOUNTER — OFFICE VISIT (OUTPATIENT)
Dept: NEUROLOGY | Facility: CLINIC | Age: 62
End: 2022-10-17
Payer: MEDICARE

## 2022-10-17 VITALS
SYSTOLIC BLOOD PRESSURE: 120 MMHG | BODY MASS INDEX: 44.92 KG/M2 | HEIGHT: 58 IN | WEIGHT: 214 LBS | OXYGEN SATURATION: 95 % | HEART RATE: 58 BPM | TEMPERATURE: 96.4 F | DIASTOLIC BLOOD PRESSURE: 80 MMHG

## 2022-10-17 DIAGNOSIS — G40.019 LOCALIZATION-RELATED EPILEPSY, INTRACTABLE (HCC): ICD-10-CM

## 2022-10-17 PROCEDURE — 99214 OFFICE O/P EST MOD 30 MIN: CPT | Performed by: NURSE PRACTITIONER

## 2022-10-17 RX ORDER — LEVETIRACETAM 500 MG/1
500 TABLET, FILM COATED ORAL 3 TIMES DAILY
Qty: 270 TABLET | Refills: 3 | Status: SHIPPED | OUTPATIENT
Start: 2022-10-17

## 2022-10-17 NOTE — ASSESSMENT & PLAN NOTE
Patient with intractable localization related epilepsy who has been free of clear seizures since her last visit  She is currently on keppra (BRAND ONLY) 500 mg three times per day  She denies any side effects or concerns related to this medication  While there have been no generalized tonic clonic seizures and typically she is not aware of her seizures, there was one episode where she felt very anxious and like she may have a seizure but it was brought on by an anxiety provoking situation  Discussed calling the office if these episodes become more frequent, especially if unprovoked by a stressful/anxiety provoking situation  Patient will continue with keppra 500 mg three times per day  She will call the office with possible seizures or concerns  With breakthrough seizures, her dose of keppra could certainly be increased  Follow up in 1 year or sooner if needed

## 2022-10-17 NOTE — PROGRESS NOTES
Review of Systems   Constitutional: Positive for fatigue  Negative for appetite change and fever  HENT: Negative  Negative for hearing loss, tinnitus, trouble swallowing and voice change  Eyes: Negative  Negative for photophobia, pain and visual disturbance  Respiratory: Negative  Negative for shortness of breath  Cardiovascular: Negative  Negative for palpitations  Gastrointestinal: Negative  Negative for nausea and vomiting  Endocrine: Negative  Negative for cold intolerance  Genitourinary: Negative  Negative for dysuria, frequency and urgency  Musculoskeletal: Negative  Negative for gait problem, myalgias and neck pain  Skin: Negative  Negative for rash  Allergic/Immunologic: Negative  Neurological: Negative  Negative for dizziness, tremors, seizures, syncope, facial asymmetry, speech difficulty, weakness, light-headedness, numbness and headaches  Hematological: Negative  Does not bruise/bleed easily  Psychiatric/Behavioral: Negative  Negative for confusion, hallucinations and sleep disturbance

## 2022-10-17 NOTE — PROGRESS NOTES
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  Seizures have been prevented with Brand Name [address], who is returning to Neurology office for follow up of her seizures  Assessment/Plan:    Localization-related epilepsy, intractable ([facility] [address] )  Patient with intractable localization related epilepsy who has been free of clear seizures since her last visit  She is currently on keppra (BRAND ONLY) 500 mg three times per day  She denies any side effects or concerns related to this medication  While there have been no generalized tonic clonic seizures and typically she is not aware of her seizures, there was one episode where she felt very anxious and like she may have a seizure but it was brought on by an anxiety provoking situation  Discussed calling the office if these episodes become more frequent, especially if unprovoked by a stressful/anxiety provoking situation  Patient will continue with keppra 500 mg three times per day  She will call the office with possible seizures or concerns  With breakthrough seizures, her dose of keppra could certainly be increased  Follow up in 1 year or sooner if needed  She will Return in about 1 year (around 10/17/2023) for Follow up with Dr Curtis Mohan  Subjective:  Charlie Ng is a 60 y o  female with  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  Seizures have been prevented with Brand Name [address], who is returning to Neurology office for follow up of her seizures  Last seen via telemedicine by Dr Curtis Mohan on 10/5/2021  At that time, it was noted that follow up was inconsistent throughout the years with intermittent episodes of staring off which were not identified as FIAS but likely behavioral events as she was aware that she was staring off  The localization of her epilepsy had discordant findings, left hemispheric onset (suspected to be temporal lobe) based on clinical semiology, EMU study in  in [address] [place] [address] and imaging study that showed right hippocampal atrophy (but bilateral mesial temporal structures appear to be T2 hyperintense)  She was to continue with brand name Keppra 500 mg TID and follow up in 1 year  Noted prior concerns for breakthrough seizures with generic levetiracetam in the past      Since her last visit, she has been doing well  She is currently on keppra (brand only) 500 mg three times per day  She has not had any clear seizures  No issues with taking medication  There was an episode recently where her friend had too much to drink and she had trouble getting her into her apartment and made her very anxious  She did feel like she was going to have a seizure (Felt like her eyes turned in) but was able to calm down and did not have a seizure  Skippy Medal provoked by the stress and anxiety surrounding situation  No episodes of significant anxiety without clear provoking factor  She did get her covid vaccines and did well  She is going to get her shingles vaccine tomorrow but is concerned about possible side effects  She does notice that she has fatigue when she does not take B12 every day  Current seizure medications:  - BRAND ONLY Keppra 500 mg TID   Latest Reference Range & Units 10/28/21 10:24   LEVETIRACETA (KEPPRA) 10 0 - 40 0 ug/mL 19 2     Other medications as per Epic  Seizure semiology:  She is not aware of her seizures    Big seizure - passes out and shakes  Smaller seizure - loss of awareness, unable to talk, fumbling with hands  Aura - feeling lightheaded, feeling like she is going to fall    Special Features  Status epilepticus: No  Self Injury Seizures: No  Precipitating Factors: stress     Epilepsy Risk Factors:  Abnormal pregnancy: No  Abnormal birth/: No  Abnormal Development: No  Febrile seizures, simple: No  Febrile seizures, complex: No  CNS infection: No  Mental retardation: No, but when she was in school, she was slower with reading and understanding/forgetful  Cerebral palsy: No  Head injury (moderate/severe): No  CNS neoplasm: No  CNS malformation: No  Neurosurgical procedure: No  Stroke: No  Alcohol abuse: No  Drug abuse: No  Consanguinity: No  Family history Sz/epilepsy:  Son with epilepsy, very similar type of seizures     Prior AEDs:  Dilantin, carbamazepine, gabapentin, lamotrigine, oxcarbazepine, topiramate, valproate, and most recently Keppra (above 1500mg TDD she is tired all the time)     Prior workup:  x  Imaging:  [facility] brain [facility]  4/5/2000  Volume and signal intensity at the hippocampus on both sides is within normal limits normal intracranial study and usual left scalp soft tissue lesion suggesting calcification     MRI brain 9/22/2014  Right hippocampal atrophy but no evidence of MTS     3/23/2015 carotid dopplers  Right - no evidence of stenosis  Left - 50-69% stenosis in the distal extracranial ICA  Vertebral arteries - flow is antegrade, no subclavian artery disease     EEGs:  Video EEG study performed 11/1/2010 until 11/5/2010 [facility])  Background shows bilateral 9-9 5 Hz posterior dominant rhythm  Event #1 11/4/2010   Left partial with secondary generalization, limited video showed head deviation to the right head retro-extension in stiffening followed by convulsive movements of all 4 extremities  Admixed slow theta and fast frequencies is seen in the left hemisphere followed by secondary generalization     Event #2 11/4/2010  Left partial with secondary generalization  Patient wakes up from sleep or automatism right hand automatism stared for eye deviation to the right and head deviation to the right and right facial pulling covers her face with the right hand briefly and secondary generalization  ictal rhythm is seen in both parasagittal regions more well formed on the left than left hemispheric channels maximum the left temporal area for 5-6 seconds this quickly spreads diffusely to the right hemisphere with secondary generalization poly-spikes and slow waves are seen in both hemispheres or tonic-clonic seizure     Event #3 11/4/2010  Left partial with secondary generalization  The patient woke up oral and bilateral automatisms staring head deviation to the right and right upper extremity stiffening  Once again obscured by muscle artifact 5-6 Hz slowing on the left anterior temporal leads and both parasagittal regions followed by slowing admixed with spikes and spike polyspike and slow wave discharges were seen  Frontotemporal regions with phase reversal at F7 T3 secondary generalization     The above findings are consistent with a localization-related partial epilepsy originating from the left hemisphere  Video are suggestive of left temporal onset      10/1/2014 EEG - Normal awake and drowsy  5/4-5/6/2015 48hours ambulatory EEG  Normal 2 days EEG recording     11/8/2016 3 hours video EEG  Normal 3 hours EEG; Several episodes of jerking of one arm or leg that were not epileptic seizures     1/12/2018, 5/22/2020 - Normal awake and drowsy EEG  6/29-7/1/2021 - 48 hours ambulatory EEG  Normal study; no clinical event     Past Psychiatric History:  Depression: Yes  Anxiety: Yes  Psychosis: No     I reviewed prior neurology notes, most recent labs, as documented in Epic/Impressto, and summarized above  Objective:    Blood pressure 120/80, pulse 58, temperature (!) 96 4 °F (35 8 °C), temperature source Temporal, height 4' 10" (1 473 m), weight 97 1 kg (214 lb), SpO2 95 %  Physical Exam  No apparent distress  Appears well nourished  Mood appropriate for situation     Neurologic Exam  Mental status- alert and oriented to person, place, and time  Speech appropriate for conversation  Good attention and knowledge       Cranial Nerves- PERRL, EOMS normal, facial sensation and muscles symmetric, hearing intact bilaterally to finger rubs, tongue midline, palate rise symmetrical, shoulder shrug symmetrical     Motor- No pronator drift  Appropriate strength  Moves all extremities freely  No tremor  Sensory-  Intact distally in all extremities to light touch  DTRs- 2+ in bilateral upper extremities  Unable to ellicit at bilateral knees, 1+ at ankles bilaterally  Gait- antalgic gait due to knee pain  Coordination- FNF intact  ROS:  Review of Systems   Constitutional: Positive for fatigue  Negative for appetite change and fever  HENT: Negative  Negative for hearing loss, tinnitus, trouble swallowing and voice change  Eyes: Negative  Negative for photophobia, pain and visual disturbance  Respiratory: Negative  Negative for shortness of breath  Cardiovascular: Negative  Negative for palpitations  Gastrointestinal: Negative  Negative for nausea and vomiting  Endocrine: Negative  Negative for cold intolerance  Genitourinary: Negative  Negative for dysuria, frequency and urgency  Musculoskeletal: Negative  Negative for gait problem, myalgias and neck pain  Skin: Negative  Negative for rash  Allergic/Immunologic: Negative  Neurological: Negative  Negative for dizziness, tremors, seizures, syncope, facial asymmetry, speech difficulty, weakness, light-headedness, numbness and headaches  Hematological: Negative  Does not bruise/bleed easily  Psychiatric/Behavioral: Negative  Negative for confusion, hallucinations and sleep disturbance  ROS obtained by MA and reviewed by myself  This note may have been created using voice recognition software  There may be unintentional errors such as grammatical errors, spelling errors, or pronoun errors

## 2022-10-17 NOTE — PATIENT INSTRUCTIONS
- Continue current dose of Keppra (BRAND ONLY) 500 mg three times per day  - Call the office with breakthrough seizures or concerns  - Follow up in 1 year or sooner if needed

## 2022-10-20 ENCOUNTER — CLINICAL SUPPORT (OUTPATIENT)
Dept: FAMILY MEDICINE CLINIC | Facility: CLINIC | Age: 62
End: 2022-10-20
Payer: MEDICARE

## 2022-10-20 DIAGNOSIS — Z23 ENCOUNTER FOR IMMUNIZATION: Primary | ICD-10-CM

## 2022-10-20 PROCEDURE — 90471 IMMUNIZATION ADMIN: CPT

## 2022-10-20 PROCEDURE — 90750 HZV VACC RECOMBINANT IM: CPT

## 2022-10-20 NOTE — PROGRESS NOTES
Name: Jovon Elliott      : 1960      MRN: [id number]  Encounter Provider: Alfred Prater LPN  Encounter Date: 10/20/2022   Encounter department: [place] [address]  Encounter for immunization  -     Zoster Vaccine Recombinant IM           Subjective      HPI  Review of Systems    Current Outpatient Medications on File Prior to Visit   Medication Sig   • Lancets (OneTouch Delica Plus [id number]) MISC USE AS INSTRUCTED   • acetaminophen (TYLENOL) 500 mg tablet Take 2 tablets by mouth daily as needed for pain   • albuterol (PROVENTIL HFA,VENTOLIN HFA) 90 mcg/act inhaler Inhale 2 puffs every 4 (four) hours as needed for wheezing   • Blood Glucose Monitoring Suppl (ACCU-CHEK EDILMA PLUS) w/Device KIT by Does not apply route 2 (two) times a day   • cetirizine (ZyrTEC) 10 mg tablet Take 1 tablet (10 mg total) by mouth daily   • Diclofenac Sodium (VOLTAREN) 1 % Apply 2 g topically 4 (four) times a day   • dorzolamide-timolol (COSOPT) 22 3-6 8 MG/ML ophthalmic solution    • Flovent  MCG/ACT inhaler INHALE TWO PUFFS TWO (TWO) TIMES A DAY RINSE MOUTH AFTER USE     • glucose blood (Accu-Chek SmartView) test strip USE DAILY AS INSTRUCTED   • hydrOXYzine HCL (ATARAX) 25 mg tablet Take 1 tablet (25 mg total) by mouth 2 (two) times a day   • Januvia 25 MG tablet TAKE ONE TABLET BY MOUTH DAILY   • Keppra 500 MG tablet Take 1 tablet (500 mg total) by mouth 3 (three) times a day   • Lancets (OneTouch Delica Plus [id number]) MISC USE ONCE DAILY AS DIRECTED   • lidocaine (Lidoderm) 5 % Apply 2 patches topically daily Remove & Discard patch within 12 hours or as directed by MD   • metFORMIN (GLUCOPHAGE) 500 mg tablet Take 1 tablet (500 mg total) by mouth daily at bedtime   • metoprolol tartrate (LOPRESSOR) 25 mg tablet TAKE HALF TABLET BY MOUTH EVERY 12 (TWELVE) HOURS   • omeprazole (PriLOSEC) 40 MG capsule TAKE ONE CAPSULE BY MOUTH DAILY   • OneTouch Verio test strip USE DAILY AS INSTRUCTED   • terbinafine (LamISIL) 1 % cream Apply topically 2 (two) times a day   • torsemide (DEMADEX) 20 mg tablet TAKE ONE TABLET BY MOUTH AS NEEDED AS DIRECTED   • warfarin (COUMADIN) 5 mg tablet TAKE 1/2 TO 1 TABLET DAILY BY MOUTH OR AS ORDERED BY PHYSICIAN  Objective     There were no vitals taken for this visit      Duran Douglass LPN playing on phone

## 2022-11-07 ENCOUNTER — ANTICOAG VISIT (OUTPATIENT)
Dept: CARDIOLOGY CLINIC | Facility: CLINIC | Age: 62
End: 2022-11-07

## 2022-11-07 ENCOUNTER — APPOINTMENT (OUTPATIENT)
Dept: LAB | Facility: CLINIC | Age: 62
End: 2022-11-07

## 2022-11-07 DIAGNOSIS — Z95.2 HISTORY OF PROSTHETIC HEART VALVE: Primary | ICD-10-CM

## 2022-11-11 DIAGNOSIS — L50.0 ALLERGIC URTICARIA: ICD-10-CM

## 2022-11-11 RX ORDER — HYDROXYZINE HYDROCHLORIDE 25 MG/1
TABLET, FILM COATED ORAL
Qty: 180 TABLET | Refills: 0 | Status: SHIPPED | OUTPATIENT
Start: 2022-11-11

## 2022-11-15 ENCOUNTER — TELEMEDICINE (OUTPATIENT)
Dept: FAMILY MEDICINE CLINIC | Facility: CLINIC | Age: 62
End: 2022-11-15

## 2022-11-15 VITALS — TEMPERATURE: 97.7 F | WEIGHT: 208 LBS | BODY MASS INDEX: 43.66 KG/M2 | HEIGHT: 58 IN

## 2022-11-15 DIAGNOSIS — J01.90 ACUTE NON-RECURRENT SINUSITIS, UNSPECIFIED LOCATION: Primary | ICD-10-CM

## 2022-11-15 RX ORDER — FLUTICASONE PROPIONATE 50 MCG
1 SPRAY, SUSPENSION (ML) NASAL DAILY
Qty: 9.9 ML | Refills: 0 | Status: SHIPPED | OUTPATIENT
Start: 2022-11-15

## 2022-11-15 RX ORDER — AMOXICILLIN AND CLAVULANATE POTASSIUM 875; 125 MG/1; MG/1
1 TABLET, FILM COATED ORAL EVERY 12 HOURS SCHEDULED
Qty: 14 TABLET | Refills: 0 | Status: SHIPPED | OUTPATIENT
Start: 2022-11-17 | End: 2022-11-24

## 2022-11-15 NOTE — PROGRESS NOTES
Virtual Regular Visit    Verification of patient location:    Patient is located in the following state in which I hold an active license [place]      Assessment/Plan:    Problem List Items Addressed This Visit        Respiratory    Acute non-recurrent sinusitis - Primary     Symptoms suggest acute sinusitis  Likely viral in etiology  Stable on exam and denies respiratory symptoms  Augmentin ordered for persistent symptoms  Fill date is set for Thursday  Flonase sent to the pharmacy to use immediately  Call precautions reviewed         Relevant Medications    amoxicillin-clavulanate (Augmentin) 875-125 mg per tablet (Start on 11/17/2022)    fluticasone (FLONASE) 50 mcg/act nasal spray               Reason for visit is   Chief Complaint   Patient presents with   • Nasal Congestion     X4 days   • Headache        Encounter provider Alverto Bruce MD    Provider located at [address] [address]      Recent Visits  No visits were found meeting these conditions  Showing recent visits within past 7 days and meeting all other requirements  Today's Visits  Date Type Provider Dept   11/15/22 Telemedicine Alverto Bruce MD [facility] today's visits and meeting all other requirements  Future Appointments  No visits were found meeting these conditions  Showing future appointments within next 150 days and meeting all other requirements       The patient was identified by name and date of birth  Nora Ag was informed that this is a telemedicine visit and that the visit is being conducted through the [employer]  She agrees to proceed     My office door was closed  No one else was in the room  She acknowledged consent and understanding of privacy and security of the video platform  The patient has agreed to participate and understands they can discontinue the visit at any time      Patient is aware this is a billable service  Juana Bermudez is a 60 y o  female who presents with headache, subjective fever, body aches, nasal congestion, and sneezing times 4-5 days  Denies cough, sick contacts, shortness of breath, chest tightness, GI symptoms, fever, recent travels, or rash  Patient had a negative home COVID test   Nasal discharge is clear         Past Medical History:   Diagnosis Date   • Abnormal ultrasound of pelvis     last assessed - 26Mar2015   • Asthma    • Asthma, persistent 8/1/2014   • Cerebral infarction [facility])     unspecified; last assessed - 30Aug2017   • Dizziness     last assessed - 25Aug2015   • Dysuria     last assessed - 12Aug2016   • Exposure to potentially hazardous body fluids     last assessed - 11Sep2014   • Heart disease    • Hematoma     of arm; last assessed - 20Jun2016   • Mitral valve stenosis    • Personal history of scoliosis    • Pneumonia    • Scar, hypertrophic     last assessed - [id number]   • Seizures ([facility] [address] )    • Shingles 2021   • Type 2 diabetes mellitus ([facility] [address] )    • Vision problems        Past Surgical History:   Procedure Laterality Date   • BREAST BIOPSY Left     many years ago in [facility] not really sure   • MITRAL VALVE REPLACEMENT  06/17/2015    MVR with 27 mm [employer] Optiform mechanical prostethic;  last assessed - [id number]   • TONSILLECTOMY     • TRANSLUMINAL ANGIOPLASTY      mitral valve; last assessed - 01Aug2014   • TUBAL LIGATION  1986    last assessed - 01Aug2014       Current Outpatient Medications   Medication Sig Dispense Refill   • acetaminophen (TYLENOL) 500 mg tablet Take 2 tablets by mouth daily as needed for pain 180 tablet 3   • albuterol (PROVENTIL HFA,VENTOLIN HFA) 90 mcg/act inhaler Inhale 2 puffs every 4 (four) hours as needed for wheezing 18 g 0   • [START ON 11/17/2022] amoxicillin-clavulanate (Augmentin) 875-125 mg per tablet Take 1 tablet by mouth every 12 (twelve) hours for 7 days Do not start before November 17, 2022  14 tablet 0   • Blood Glucose Monitoring Suppl (ACCU-CHEK EDILMA PLUS) w/Device KIT by Does not apply route 2 (two) times a day (Patient taking differently: Use daily) 1 kit 0   • cetirizine (ZyrTEC) 10 mg tablet Take 1 tablet (10 mg total) by mouth daily 90 tablet 0   • Diclofenac Sodium (VOLTAREN) 1 % Apply 2 g topically 4 (four) times a day 150 g 1   • dorzolamide-timolol (COSOPT) 22 3-6 8 MG/ML ophthalmic solution      • Flovent  MCG/ACT inhaler INHALE TWO PUFFS TWO (TWO) TIMES A DAY RINSE MOUTH AFTER USE  12 g 3   • fluticasone (FLONASE) 50 mcg/act nasal spray 1 spray into each nostril daily 9 9 mL 0   • glucose blood (Accu-Chek SmartView) test strip USE DAILY AS INSTRUCTED 100 each 11   • hydrOXYzine HCL (ATARAX) 25 mg tablet TAKE 1 TABLET (25 MG TOTAL) BY MOUTH TWO (TWO) TIMES A  tablet 0   • Januvia 25 MG tablet TAKE ONE TABLET BY MOUTH DAILY 30 tablet 1   • Keppra 500 MG tablet Take 1 tablet (500 mg total) by mouth 3 (three) times a day 270 tablet 3   • Lancets (OneTouch Delica Plus [id number]) MISC USE ONCE DAILY AS DIRECTED 100 each 3   • Lancets (OneTouch Delica Plus [id number]) MISC USE AS INSTRUCTED (Patient not taking: Reported on 11/15/2022) 100 each 4   • metFORMIN (GLUCOPHAGE) 500 mg tablet Take 1 tablet (500 mg total) by mouth daily at bedtime 180 tablet 3   • metoprolol tartrate (LOPRESSOR) 25 mg tablet TAKE HALF TABLET BY MOUTH EVERY 12 (TWELVE) HOURS 90 tablet 2   • omeprazole (PriLOSEC) 40 MG capsule TAKE ONE CAPSULE BY MOUTH DAILY 90 capsule 10   • OneTouch Verio test strip USE DAILY AS INSTRUCTED 100 strip 9   • terbinafine (LamISIL) 1 % cream Apply topically 2 (two) times a day 36 g 0   • torsemide (DEMADEX) 20 mg tablet TAKE ONE TABLET BY MOUTH AS NEEDED AS DIRECTED 90 tablet 10   • warfarin (COUMADIN) 5 mg tablet TAKE 1/2 TO 1 TABLET DAILY BY MOUTH OR AS ORDERED BY PHYSICIAN   90 tablet 3   • lidocaine (Lidoderm) 5 % Apply 2 patches topically daily Remove & Discard patch within 12 hours or as directed by MD (Patient not taking: Reported on 11/15/2022) 15 patch 0     No current facility-administered medications for this visit  Allergies   Allergen Reactions   • Codeine    • Prednisone Itching   • Codeine Polt-Chlorphen Polt Er Irritability, Itching and Rash       Review of Systems    Video Exam    Vitals:    11/15/22 1214   Temp: 97 7 °F (36 5 °C)   Weight: 94 3 kg (208 lb)   Height: 4' 10" (1 473 m)       Physical Exam  Constitutional:       General: She is not in acute distress  Appearance: Normal appearance  She is not ill-appearing  HENT:      Head: Normocephalic and atraumatic  Pulmonary:      Effort: Pulmonary effort is normal  No respiratory distress  Comments: No conversational dyspnea  Musculoskeletal:      Cervical back: No rigidity  Neurological:      Mental Status: She is alert and oriented to person, place, and time  Psychiatric:         Mood and Affect: Mood normal          Behavior: Behavior normal          Thought Content:  Thought content normal           I spent 8 minutes directly with the patient during this visit

## 2022-11-15 NOTE — ASSESSMENT & PLAN NOTE
Symptoms suggest acute sinusitis  Likely viral in etiology  Stable on exam and denies respiratory symptoms  Augmentin ordered for persistent symptoms    Fill date is set for Thursday  Flonase sent to the pharmacy to use immediately  Call precautions reviewed

## 2022-11-16 ENCOUNTER — TELEPHONE (OUTPATIENT)
Dept: ADMINISTRATIVE | Facility: OTHER | Age: 62
End: 2022-11-16

## 2022-11-16 NOTE — TELEPHONE ENCOUNTER
----- Message from Brennon Spaulding sent at 11/15/2022 12:11 PM EST -----  Regarding: care gap request/DM Eye Exam  11/15/22 12:11 PM    Hello, our patient attached above has had Diabetic Eye Exam completed/performed  Please assist in updating the patient chart by making an External outreach to [facility] facility located in [address] [place] [address], [address] [place] [address]  The date of service is 2022      Thank you,  Brennon [facility] SILVA

## 2022-11-16 NOTE — TELEPHONE ENCOUNTER
Upon review of the In Basket request and the patient's chart, initial outreach has been made via fax to facility  , please see Contacts section for details       Thank you  Nate Edmond, [place]

## 2022-11-16 NOTE — LETTER
Diabetic Eye Exam Form    Date Requested: 22  Patient: Valerie Ramirez  Patient : 1960   Referring Provider: Matthew Ribera MD      DIABETIC Eye Exam Date _______________________________      Type of Exam MUST be documented for Diabetic Eye Exams  Please CHECK ONE  Retinal Exam       Dilated Retinal Exam       OCT       Optomap-Iris Exam      Fundus Photography       Left Eye - Please check Retinopathy or No Retinopathy        Exam did show retinopathy    Exam did not show retinopathy       Right Eye - Please check Retinopathy or No Retinopathy       Exam did show retinopathy    Exam did not show retinopathy       Comments __________________________________________________________    Practice Providing Exam ______________________________________________    Exam Performed By (print name) _______________________________________      Provider Signature ___________________________________________________      These reports are needed for  compliance  Please fax this completed form and a copy of the Diabetic Eye Exam report to our office located at [place] [address] as soon as possible via [phone] chuck Fernández Yoel: Phone [phone]  We thank you for your assistance in treating our mutual patient

## 2022-11-21 NOTE — TELEPHONE ENCOUNTER
Upon review of the In Basket request we were able to locate, review, and update the patient chart as requested for Diabetic Eye Exam     Any additional questions or concerns should be emailed to the Practice Liaisons via the appropriate education email address, please do not reply via In Basket      Thank you  David Dunn

## 2022-12-05 ENCOUNTER — ANTICOAG VISIT (OUTPATIENT)
Dept: CARDIOLOGY CLINIC | Facility: CLINIC | Age: 62
End: 2022-12-05

## 2022-12-05 ENCOUNTER — APPOINTMENT (OUTPATIENT)
Dept: LAB | Facility: CLINIC | Age: 62
End: 2022-12-05

## 2022-12-05 DIAGNOSIS — Z95.2 HISTORY OF PROSTHETIC HEART VALVE: Primary | ICD-10-CM

## 2022-12-20 ENCOUNTER — IMMUNIZATIONS (OUTPATIENT)
Dept: FAMILY MEDICINE CLINIC | Facility: CLINIC | Age: 62
End: 2022-12-20

## 2022-12-20 DIAGNOSIS — Z23 ENCOUNTER FOR IMMUNIZATION: Primary | ICD-10-CM

## 2023-01-10 ENCOUNTER — TELEMEDICINE (OUTPATIENT)
Dept: FAMILY MEDICINE CLINIC | Facility: CLINIC | Age: 63
End: 2023-01-10

## 2023-01-10 VITALS — BODY MASS INDEX: 44.5 KG/M2 | TEMPERATURE: 97.7 F | WEIGHT: 212 LBS | HEIGHT: 58 IN

## 2023-01-10 DIAGNOSIS — J01.00 ACUTE NON-RECURRENT MAXILLARY SINUSITIS: Primary | ICD-10-CM

## 2023-01-10 RX ORDER — AMOXICILLIN AND CLAVULANATE POTASSIUM 875; 125 MG/1; MG/1
1 TABLET, FILM COATED ORAL EVERY 12 HOURS SCHEDULED
Qty: 14 TABLET | Refills: 0 | Status: SHIPPED | OUTPATIENT
Start: 2023-01-10 | End: 2023-01-17

## 2023-01-10 NOTE — ASSESSMENT & PLAN NOTE
Day 1 of illness  NAD  Poss sick contact  Has not tested  Asked to test for COVID at home and call if postive  Augmentin ordered if symptoms persist and COVID negative

## 2023-01-10 NOTE — PROGRESS NOTES
Virtual Regular Visit    Verification of patient location:    Patient is located in the following state in which I hold an active license [place]      Assessment/Plan:    Problem List Items Addressed This Visit        Respiratory    Acute non-recurrent sinusitis - Primary     Day 1 of illness  NAD  Poss sick contact  Has not tested  Asked to test for COVID at home and call if postive  Augmentin ordered if symptoms persist and COVID negative  Relevant Medications    amoxicillin-clavulanate (Augmentin) 875-125 mg per tablet            Reason for visit is   Chief Complaint   Patient presents with   • Other     Sore throat, congestion, fatigue, chills,-symptoms since yesterday-denies all other symptoms        Encounter provider Arelis Jovel MD    Provider located at [place] [address] [place] [address]      Recent Visits  No visits were found meeting these conditions  Showing recent visits within past 7 days and meeting all other requirements  Today's Visits  Date Type Provider Dept   01/10/23 Telemedicine Arelis Jovel MD  Gadiel Pate today's visits and meeting all other requirements  Future Appointments  No visits were found meeting these conditions  Showing future appointments within next 150 days and meeting all other requirements       The patient was identified by name and date of birth  Milady Thakkar was informed that this is a telemedicine visit and that the visit is being conducted through the [employer]  She agrees to proceed     My office door was closed  The patient was notified the following individuals were present in the room Devon Felix and Dr Lalit Wilhelm  She acknowledged consent and understanding of privacy and security of the video platform  The patient has agreed to participate and understands they can discontinue the visit at any time  Patient is aware this is a billable service  Jhonatan Crooks is a 58 y o  female seen virtually sore throat, ear pain, chills, facial tenderness, sinus headache, and PND that started yesterday  Went to a party on Sunday where they was a small group but no one was mask  Denies fever, rash, or recent travels  Did have nausea on Sunday but is asx  TMAX 98 7   Has not done a home COVID test      Past Medical History:   Diagnosis Date   • Abnormal ultrasound of pelvis     last assessed - 26Mar2015   • Asthma    • Asthma, persistent 8/1/2014   • Cerebral infarction [facility])     unspecified; last assessed - 30Aug2017   • Dizziness     last assessed - 25Aug2015   • Dysuria     last assessed - 12Aug2016   • Exposure to potentially hazardous body fluids     last assessed - 11Sep2014   • Heart disease    • Hematoma     of arm; last assessed - 20Jun2016   • Mitral valve stenosis    • Personal history of scoliosis    • Pneumonia    • Scar, hypertrophic     last assessed - [id number]   • Seizures ([facility] [address] )    • Shingles 2021   • Type 2 diabetes mellitus ([facility] [address] )    • Vision problems        Past Surgical History:   Procedure Laterality Date   • BREAST BIOPSY Left     many years ago in [facility] not really sure   • MITRAL VALVE REPLACEMENT  06/17/2015    MVR with 27 mm [employer] Optiform mechanical prostethic;  last assessed - [id number]   • TONSILLECTOMY     • TRANSLUMINAL ANGIOPLASTY      mitral valve; last assessed - 01Aug2014   • TUBAL LIGATION  1986    last assessed - 01Aug2014       Current Outpatient Medications   Medication Sig Dispense Refill   • acetaminophen (TYLENOL) 500 mg tablet Take 2 tablets by mouth daily as needed for pain 180 tablet 3   • albuterol (PROVENTIL HFA,VENTOLIN HFA) 90 mcg/act inhaler Inhale 2 puffs every 4 (four) hours as needed for wheezing 18 g 0   • amoxicillin-clavulanate (Augmentin) 875-125 mg per tablet Take 1 tablet by mouth every 12 (twelve) hours for 7 days 14 tablet 0   • Blood Glucose Monitoring Suppl (ACCU-CHEK EDILMA PLUS) w/Device KIT by Does not apply route 2 (two) times a day (Patient taking differently: Use daily) 1 kit 0   • cetirizine (ZyrTEC) 10 mg tablet Take 1 tablet (10 mg total) by mouth daily 90 tablet 0   • Diclofenac Sodium (VOLTAREN) 1 % Apply 2 g topically 4 (four) times a day 150 g 1   • Flovent  MCG/ACT inhaler INHALE TWO PUFFS TWO (TWO) TIMES A DAY RINSE MOUTH AFTER USE  12 g 3   • fluticasone (FLONASE) 50 mcg/act nasal spray 1 spray into each nostril daily 9 9 mL 0   • glucose blood (Accu-Chek SmartView) test strip USE DAILY AS INSTRUCTED 100 each 11   • hydrOXYzine HCL (ATARAX) 25 mg tablet TAKE 1 TABLET (25 MG TOTAL) BY MOUTH TWO (TWO) TIMES A  tablet 0   • Keppra 500 MG tablet Take 1 tablet (500 mg total) by mouth 3 (three) times a day 270 tablet 3   • Lancets (OneTouch Delica Plus [id number]) MISC USE ONCE DAILY AS DIRECTED 100 each 3   • Lancets (OneTouch Delica Plus [id number]) MISC USE AS INSTRUCTED (Patient not taking: Reported on 11/15/2022) 100 each 4   • metFORMIN (GLUCOPHAGE) 500 mg tablet Take 1 tablet (500 mg total) by mouth daily at bedtime 180 tablet 3   • metoprolol tartrate (LOPRESSOR) 25 mg tablet TAKE HALF TABLET BY MOUTH EVERY 12 (TWELVE) HOURS 90 tablet 2   • omeprazole (PriLOSEC) 40 MG capsule TAKE ONE CAPSULE BY MOUTH DAILY 90 capsule 10   • OneTouch Verio test strip USE DAILY AS INSTRUCTED 100 strip 9   • sitaGLIPtin (Januvia) 25 mg tablet Take 1 tablet (25 mg total) by mouth daily 30 tablet 5   • torsemide (DEMADEX) 20 mg tablet TAKE ONE TABLET BY MOUTH AS NEEDED AS DIRECTED 90 tablet 10   • warfarin (COUMADIN) 5 mg tablet TAKE 1/2 TO 1 TABLET DAILY BY MOUTH OR AS ORDERED BY PHYSICIAN   90 tablet 3   • dorzolamide-timolol (COSOPT) 22 3-6 8 MG/ML ophthalmic solution  (Patient not taking: Reported on 1/10/2023)     • lidocaine (Lidoderm) 5 % Apply 2 patches topically daily Remove & Discard patch within 12 hours or as directed by MD (Patient not taking: Reported on 11/15/2022) 15 patch 0   • terbinafine (LamISIL) 1 % cream Apply topically 2 (two) times a day 36 g 0     No current facility-administered medications for this visit  Allergies   Allergen Reactions   • Codeine    • Prednisone Itching   • Codeine Polt-Chlorphen Polt Er Irritability, Itching and Rash       Review of Systems   Per HPI    Video Exam    Vitals:    01/10/23 1525   Temp: 97 7 °F (36 5 °C)   TempSrc: Temporal   Weight: 96 2 kg (212 lb)   Height: 4' 10" (1 473 m)       Physical Exam  Vitals reviewed  Constitutional:       General: She is not in acute distress  Appearance: She is ill-appearing (tired appearing )  She is not toxic-appearing or diaphoretic  HENT:      Nose: Congestion present  Eyes:      Extraocular Movements: Extraocular movements intact  Pulmonary:      Effort: Pulmonary effort is normal  No respiratory distress  Breath sounds: No stridor  Musculoskeletal:      Cervical back: No rigidity  Skin:     Coloration: Skin is not pale  Neurological:      Mental Status: She is alert and oriented to person, place, and time            I spent 10 minutes directly with the patient during this visit

## 2023-01-11 ENCOUNTER — TELEPHONE (OUTPATIENT)
Dept: FAMILY MEDICINE CLINIC | Facility: CLINIC | Age: 63
End: 2023-01-11

## 2023-01-11 NOTE — TELEPHONE ENCOUNTER
Patient had a virtual with you yesterday  She called to let you know she tested positive for Covid today with a home test    She is inquiring if there are any additional instructions for her  She is starting to lose her voice as well      Please advise

## 2023-01-14 PROBLEM — J01.90 ACUTE NON-RECURRENT SINUSITIS: Status: RESOLVED | Noted: 2022-11-15 | Resolved: 2023-01-14

## 2023-02-06 ENCOUNTER — NURSE TRIAGE (OUTPATIENT)
Dept: OTHER | Facility: OTHER | Age: 63
End: 2023-02-06

## 2023-02-06 DIAGNOSIS — M17.12 PRIMARY OSTEOARTHRITIS OF LEFT KNEE: ICD-10-CM

## 2023-02-06 DIAGNOSIS — M76.52 PATELLAR TENDONITIS OF LEFT KNEE: ICD-10-CM

## 2023-02-06 DIAGNOSIS — L50.0 ALLERGIC URTICARIA: ICD-10-CM

## 2023-02-06 RX ORDER — HYDROXYZINE HYDROCHLORIDE 25 MG/1
25 TABLET, FILM COATED ORAL 2 TIMES DAILY
Qty: 180 TABLET | Refills: 0 | Status: SHIPPED | OUTPATIENT
Start: 2023-02-06

## 2023-02-06 NOTE — TELEPHONE ENCOUNTER
Left a message for pt that she would have to reach out to PCP for this refill or see a new orthopedic to establish care  She last saw Rafael Rothman 6 months ago and did not follow up with anyone

## 2023-02-06 NOTE — TELEPHONE ENCOUNTER
Patient called in stating she noticed eyelid swelling last week  States swelling is moving down to cheek bone  Denies vision changes or fever   States has thick discharge from left eye unsure if its coming from her eye or the eyelid  Advised appointment today  Pt state unable to be seen today  States she usually needs two day notice for transportation  Advised [facility] if unable to be seen at office  States she will call for ride  Provided care advice  Pt verbalized understanding  Reason for Disposition  • MODERATE-SEVERE eyelid swelling on one side  (Exception: due to a mosquito bite)    Answer Assessment - Initial Assessment Questions  1  ONSET: "When did the swelling start?" (e g , minutes, hours, days)      Last week   2  LOCATION: "What part of the eyelids is swollen?"    Left eyelid   3  SEVERITY: "How swollen is it?"      Swelling moving to cheek bone   4  ITCHING: "Is there any itching?" If Yes, ask: "How much?"   (Scale 1-10; mild, moderate or severe)      This morning mild   5  PAIN: "Is the swelling painful to touch?" If Yes, ask: "How painful is it?"   (Scale 1-10; mild, moderate or severe)    5/10  6  FEVER: "Do you have a fever?" If Yes, ask: "What is it, how was it measured, and when did it start?"       Denies   7  CAUSE: "What do you think is causing the swelling?"   unsure   8  RECURRENT SYMPTOM: "Have you had eyelid swelling before?" If Yes, ask: "When was the last time?" "What happened that time?"     Denies   9   OTHER SYMPTOMS: "Do you have any other symptoms?" (e g , blurred vision, eye discharge, rash, runny nose)     Has thick  discharge-clear , tender to touch    Protocols used: EYE - [facility]

## 2023-02-06 NOTE — TELEPHONE ENCOUNTER
After Visit Summary   4/14/2017    Katrina Morrow    MRN: [id number]           Patient Information     Date Of Birth          1988        Visit Information        Provider Department      4/14/2017 4:15 PM Laila Menendez DO [facility]        Care Instructions                                                         If you have any questions regarding your visit, Please contact your care team.    [facility] CLINIC HOURS TELEPHONE NUMBER   Laila Menendez DO.    IVET Glez -    МАРИНА Thompson RN       Monday, Wednesday, [facility] and [facility]  8:30a.m-5:00 p.m   [facility]  [address] [place]  [place] [address] [phone] ask for [facility]    Imaging [phone]       Urgent Care locations:    [facility] Saturday and Sunday   9 am - 5 pm    Monday-Friday   12 pm - 8 pm  Saturday and Sunday   9 am - 5 pm   [phone]     [facility] Labor and Delivery:  [phone]    If you need a medication refill, please contact your pharmacy. Please allow 3 business days for your refill to be completed.  As always, Thank you for trusting us with your healthcare needs!              Follow-ups after your visit        Your next 10 appointments already scheduled     May 11, 2017  4:00 PM CDT   LAB with LAB FIRST FLOOR [facility] ([facility])    [address] [facility] [id number] [phone]           Patient must bring picture ID.  Patient should be prepared to give a urine specimen  Please do not eat 10-12 hours before your appointment if you are coming in fasting for labs on lipids, cholesterol, or glucose (sugar).  Pregnant women should follow their Care Team instructions. Water with medications is okay. Do not drink coffee or other fluids.   If you have concerns about taking  your  Regarding: Swollen eye with fluids and swollen cheekbone  ----- Message from [facility] sent at 2/6/2023  1:34 PM EST -----  "My left eye is swollen and leaking fluids,and the swelling is going down to my cheekbone " "medications, please ask at office or if scheduling via [employer], send a message by clicking on Secure Messaging, Message Your Care Team.            May 11, 2017  4:15 PM CDT   ESTABLISHED PRENATAL with Laila Menendez,    [facility] [place] [place] ([facility] [place] [place])    [id number] [place] [id number] [phone]              Who to contact     If you have questions or need follow up information about today's clinic visit or your schedule please contact [facility] [place] [facility] directly at [phone].  Normal or non-critical lab and imaging results will be communicated to you by [employer], letter or phone within 4 business days after the clinic has received the results. If you do not hear from us within 7 days, please contact the clinic through [employer] or phone. If you have a critical or abnormal lab result, we will notify you by phone as soon as possible.  Submit refill requests through [employer] or call your pharmacy and they will forward the refill request to us. Please allow 3 business days for your refill to be completed.          Additional Information About Your Visit        BlownawayharEmpressr Information     [employer]ble lets you send messages to your doctor, view your test results, renew your prescriptions, schedule appointments and more. To sign up, go to [email or web address] . Click on \"Log in\" on the left side of the screen, which will take you to the Welcome page. Then click on \"Sign up Now\" on the right side of the page.     You will be asked to enter the access code listed below, as well as some personal information. Please follow the directions to create your username and password.     Your access code is: [email or web address]  Expires: 2017  3:51 PM     Your access code will  in 90 days. If you need help or a new code, please call your [facility] [place] or [phone].        Care EveryWhere ID     This is your Care EveryWhere ID. This could be used by other " organizations to access your [place] medical records  [id number]        Your Vitals Were     Pulse Last Period Pulse Oximetry Breastfeeding? BMI (Body Mass Index)       90 10/28/2016 99% No 25.47 kg/m2        Blood Pressure from Last 3 Encounters:   04/14/17 100/64   03/14/17 108/64    Weight from Last 3 Encounters:   04/14/17 58.2 kg (128 lb 3.2 oz)   03/14/17 56.3 kg (124 lb 3.2 oz)              Today, you had the following     No orders found for display         Today's Medication Changes          These changes are accurate as of: 4/14/17  4:26 PM.  If you have any questions, ask your nurse or doctor.               Stop taking these medicines if you haven't already. Please contact your care team if you have questions.     lidocaine 2 % topical gel   Commonly known as:  XYLOCAINE   Stopped by:  Laila Menendez,                     Primary Care Provider    [facility]       No address on file        Thank you!     Thank you for choosing [facility]  for your care. Our goal is always to provide you with excellent care. Hearing back from our patients is one way we can continue to improve our services. Please take a few minutes to complete the written survey that you may receive in the mail after your visit with us. Thank you!             Your Updated Medication List - Protect others around you: Learn how to safely use, store and throw away your medicines at www.disposemymeds.org.          This list is accurate as of: 4/14/17  4:26 PM.  Always use your most recent med list.                   Brand Name Dispense Instructions for use    acyclovir 200 MG capsule    ZOVIRAX     Take 800 mg by mouth Reported on 4/14/2017       FISH OIL PO          PRENATAL PLUS 27-1 MG Tabs          vitamin D 2000 UNITS Caps      Take 2,000 Units by mouth daily

## 2023-02-07 ENCOUNTER — TELEMEDICINE (OUTPATIENT)
Dept: FAMILY MEDICINE CLINIC | Facility: CLINIC | Age: 63
End: 2023-02-07

## 2023-02-07 VITALS
DIASTOLIC BLOOD PRESSURE: 93 MMHG | SYSTOLIC BLOOD PRESSURE: 173 MMHG | BODY MASS INDEX: 44.5 KG/M2 | WEIGHT: 212 LBS | HEART RATE: 60 BPM | HEIGHT: 58 IN

## 2023-02-07 DIAGNOSIS — H00.024 HORDEOLUM INTERNUM LEFT UPPER EYELID: Primary | ICD-10-CM

## 2023-02-07 DIAGNOSIS — H10.32 ACUTE CONJUNCTIVITIS OF LEFT EYE, UNSPECIFIED ACUTE CONJUNCTIVITIS TYPE: ICD-10-CM

## 2023-02-07 RX ORDER — ERYTHROMYCIN 5 MG/G
0.5 OINTMENT OPHTHALMIC
Qty: 3.5 G | Refills: 0 | Status: SHIPPED | OUTPATIENT
Start: 2023-02-07

## 2023-02-07 NOTE — PROGRESS NOTES
Virtual Regular Visit    Verification of patient location:    Patient is located in the following state in which I hold an active license [place]      Assessment/Plan:    Exam consistent with stye  Conjunctiva looks normal but with the eye drainage, recommend an abx eye ointment  Apply warm compress to the lid and avoid touching the stye  Call precautions  If symptoms persist, parker need to ne re-evaluated for possible cellulitis    Problem List Items Addressed This Visit    None  Visit Diagnoses     Hordeolum internum left upper eyelid    -  Primary    Relevant Medications    erythromycin (ILOTYCIN) ophthalmic ointment    Acute conjunctivitis of left eye, unspecified acute conjunctivitis type        Relevant Medications    erythromycin (ILOTYCIN) ophthalmic ointment               Reason for visit is   Chief Complaint   Patient presents with   • Eye Swelling     Left eye swelling x1 week   • Eye Drainage     X1 week   • Eye Pain     Left eye x1 week        Encounter provider Jeaneth Bearden MD    Provider located at [address] [address]      Recent Visits  No visits were found meeting these conditions  Showing recent visits within past 7 days and meeting all other requirements  Today's Visits  Date Type Provider Dept   02/07/23 Telemedicine Jeaneth Bearden MD [facility] today's visits and meeting all other requirements  Future Appointments  No visits were found meeting these conditions  Showing future appointments within next 150 days and meeting all other requirements       The patient was identified by name and date of birth  Audelia Morgan was informed that this is a telemedicine visit and that the visit is being [facility] through the [facility]  She agrees to proceed     My office door was closed  The patient was notified the following individuals were present in the room Tonio Royal and Dr Shearon Meigs  She acknowledged consent and understanding of privacy and security of the video platform  The patient has agreed to participate and understands they can discontinue the visit at any time  Patient is aware this is a billable service  Rafa Luna is a 58 y o  female seen virtually with left lid swelling, drainage, and pain around the eyes  Started 1 week ago  Applied ice and improved  No fevers, blurry vision, or pain w/ eye movement          Past Medical History:   Diagnosis Date   • Abnormal ultrasound of pelvis     last assessed - 26Mar2015   • Asthma    • Asthma, persistent 8/1/2014   • Cerebral infarction [facility])     unspecified; last assessed - 30Aug2017   • Dizziness     last assessed - 25Aug2015   • Dysuria     last assessed - 12Aug2016   • Exposure to potentially hazardous body fluids     last assessed - 11Sep2014   • Heart disease    • Hematoma     of arm; last assessed - 20Jun2016   • Mitral valve stenosis    • Personal history of scoliosis    • Pneumonia    • Scar, hypertrophic     last assessed - [id number]   • Seizures ([facility] [address] )    • Shingles 2021   • Type 2 diabetes mellitus ([email or web address]    • Vision problems        Past Surgical History:   Procedure Laterality Date   • BREAST BIOPSY Left     many years ago in [facility] not really sure   • MITRAL VALVE REPLACEMENT  06/17/2015    MVR with 27 mm [employer] Optiform mechanical prostethic;  last assessed - [id number]   • TONSILLECTOMY     • TRANSLUMINAL ANGIOPLASTY      mitral valve; last assessed - 01Aug2014   • TUBAL LIGATION  1986    last assessed - 01Aug2014       Current Outpatient Medications   Medication Sig Dispense Refill   • acetaminophen (TYLENOL) 500 mg tablet Take 2 tablets by mouth daily as needed for pain 180 tablet 3   • albuterol (PROVENTIL HFA,VENTOLIN HFA) 90 mcg/act inhaler Inhale 2 puffs every 4 (four) hours as needed for wheezing 18 g 0   • Blood Glucose Monitoring Suppl (ACCU-CHEK EDILMA PLUS) w/Device KIT by Does not apply route 2 (two) times a day (Patient taking differently: Use daily) 1 kit 0   • cetirizine (ZyrTEC) 10 mg tablet Take 1 tablet (10 mg total) by mouth daily 90 tablet 0   • Diclofenac Sodium (VOLTAREN) 1 % Apply 2 g topically 4 (four) times a day (Patient taking differently: Apply 2 g topically if needed) 150 g 1   • erythromycin (ILOTYCIN) ophthalmic ointment Administer 0 5 inches into the left eye daily at bedtime 3 5 g 0   • Flovent  MCG/ACT inhaler INHALE TWO PUFFS TWO (TWO) TIMES A DAY RINSE MOUTH AFTER USE  12 g 3   • fluticasone (FLONASE) 50 mcg/act nasal spray 1 spray into each nostril daily (Patient taking differently: 1 spray into each nostril if needed) 9 9 mL 0   • glucose blood (Accu-Chek SmartView) test strip USE DAILY AS INSTRUCTED 100 each 11   • hydrOXYzine HCL (ATARAX) 25 mg tablet Take 1 tablet (25 mg total) by mouth 2 (two) times a day 180 tablet 0   • Keppra 500 MG tablet Take 1 tablet (500 mg total) by mouth 3 (three) times a day 270 tablet 3   • Lancets (OneTouch Delica Plus [id number]) MISC USE ONCE DAILY AS DIRECTED 100 each 3   • Lancets (OneTouch Delica Plus [id number]) MISC USE AS INSTRUCTED (Patient not taking: Reported on 11/15/2022) 100 each 4   • metFORMIN (GLUCOPHAGE) 500 mg tablet Take 1 tablet (500 mg total) by mouth daily at bedtime 180 tablet 3   • metoprolol tartrate (LOPRESSOR) 25 mg tablet TAKE HALF TABLET BY MOUTH EVERY 12 (TWELVE) HOURS 90 tablet 2   • omeprazole (PriLOSEC) 40 MG capsule TAKE ONE CAPSULE BY MOUTH DAILY 90 capsule 10   • OneTouch Verio test strip USE DAILY AS INSTRUCTED 100 strip 9   • sitaGLIPtin (Januvia) 25 mg tablet Take 1 tablet (25 mg total) by mouth daily 30 tablet 5   • torsemide (DEMADEX) 20 mg tablet TAKE ONE TABLET BY MOUTH AS NEEDED AS DIRECTED 90 tablet 10   • warfarin (COUMADIN) 5 mg tablet TAKE 1/2 TO 1 TABLET DAILY BY MOUTH OR AS ORDERED BY PHYSICIAN  90 tablet 3     No current facility-administered medications for this visit  Allergies   Allergen Reactions   • Codeine    • Prednisone Itching   • Codeine Polt-Chlorphen Polt Er Irritability, Itching and Rash       Review of Systems   Eyes: Positive for pain (around the eye ), discharge and itching (left lid)  Negative for photophobia and visual disturbance  Skin:        Swelling and redness below the eye and near the zygomatic arch        Video Exam    Vitals:    02/07/23 1409   BP: (!) 173/93   Pulse: 60   Weight: 96 2 kg (212 lb)   Height: 4' 10" (1 473 m)       Physical Exam  Constitutional:       General: She is not in acute distress  Appearance: Normal appearance  She is not ill-appearing or toxic-appearing  Eyes:      General:         Left eye: Hordeolum present  No foreign body or discharge  Extraocular Movements:      Right eye: Normal extraocular motion  Left eye: Normal extraocular motion  Conjunctiva/sclera:      Left eye: Left conjunctiva is not injected  No hemorrhage  Skin:     Comments: Mild swelling but no erythema on the zygomatic arch    Neurological:      Mental Status: She is alert            I spent 10 minutes directly with the patient during this visit

## 2023-02-17 ENCOUNTER — ANTICOAG VISIT (OUTPATIENT)
Dept: CARDIOLOGY CLINIC | Facility: CLINIC | Age: 63
End: 2023-02-17

## 2023-02-17 ENCOUNTER — APPOINTMENT (OUTPATIENT)
Dept: LAB | Facility: CLINIC | Age: 63
End: 2023-02-17

## 2023-02-17 DIAGNOSIS — Z95.2 HISTORY OF PROSTHETIC HEART VALVE: Primary | ICD-10-CM

## 2023-03-29 ENCOUNTER — OFFICE VISIT (OUTPATIENT)
Dept: CARDIOLOGY CLINIC | Facility: CLINIC | Age: 63
End: 2023-03-29

## 2023-03-29 VITALS
BODY MASS INDEX: 45.56 KG/M2 | DIASTOLIC BLOOD PRESSURE: 70 MMHG | WEIGHT: 218 LBS | HEART RATE: 57 BPM | SYSTOLIC BLOOD PRESSURE: 148 MMHG

## 2023-03-29 DIAGNOSIS — Z86.2 HISTORY OF IRON DEFICIENCY ANEMIA: ICD-10-CM

## 2023-03-29 DIAGNOSIS — Z79.01 ANTICOAGULATION GOAL OF INR 2.5 TO 3.5: ICD-10-CM

## 2023-03-29 DIAGNOSIS — Z51.81 ANTICOAGULATION GOAL OF INR 2.5 TO 3.5: ICD-10-CM

## 2023-03-29 DIAGNOSIS — E66.01 MORBID OBESITY WITH BMI OF 45.0-49.9, ADULT (HCC): ICD-10-CM

## 2023-03-29 DIAGNOSIS — I15.9 SECONDARY HYPERTENSION: Primary | ICD-10-CM

## 2023-03-29 DIAGNOSIS — Z95.2 H/O MITRAL VALVE REPLACEMENT WITH MECHANICAL VALVE: ICD-10-CM

## 2023-03-29 DIAGNOSIS — D50.9 MICROCYTIC HYPOCHROMIC ANEMIA: ICD-10-CM

## 2023-03-29 DIAGNOSIS — G47.33 OSA (OBSTRUCTIVE SLEEP APNEA): ICD-10-CM

## 2023-03-29 DIAGNOSIS — Z79.01 CHRONIC ANTICOAGULATION: ICD-10-CM

## 2023-03-29 DIAGNOSIS — I09.9 RHEUMATIC HEART DISEASE: ICD-10-CM

## 2023-03-29 DIAGNOSIS — E11.9 TYPE 2 DIABETES MELLITUS WITHOUT COMPLICATION, WITHOUT LONG-TERM CURRENT USE OF INSULIN (HCC): ICD-10-CM

## 2023-03-29 DIAGNOSIS — I07.1 RHEUMATIC TRICUSPID VALVE REGURGITATION: ICD-10-CM

## 2023-03-29 NOTE — PATIENT INSTRUCTIONS
CARDIOLOGY ASSESSMENT & PLAN     1  Secondary hypertension  POCT ECG      2  Rheumatic tricuspid valve regurgitation  Iron Panel (Includes Ferritin, Iron Sat%, Iron, and TIBC)    TSH, 3rd generation with Free T4 reflex    CBC and differential    COMPREHENSIVE METABOLIC KFEEA(43)    NT-BNP [facility] only    Lipid panel    Echo complete w/ contrast if indicated      3  Morbid obesity with BMI of 45 0-49 9, adult ([address] )        4  H/O mitral valve replacement with mechanical valve        5  GERMAN (obstructive sleep apnea)        6  Type 2 diabetes mellitus without complication, without long-term current use of insulin ([facility])        7  Anticoagulation goal of INR 2 5 to 3 5        8  Chronic anticoagulation        9  Microcytic hypochromic anemia        10  Rheumatic heart disease        11  History of iron deficiency anemia  Iron Panel (Includes Ferritin, Iron Sat%, Iron, and TIBC)    CBC and differential        Rheumatic heart disease  Ms Paula Arguelles is a 44-year-old female with history of rheumatic heart disease status post mitral valve balloon valvuloplasty and subsequent mitral valve replacement in 2015, residual tricuspid valve regurgitation, and other comorbidities  From a symptom perspective she appears to be overall well with no recent symptoms that suggest prosthetic valve dysfunction  Her blood pressure is noted to be slightly on the higher side  She is not currently on antihypertensive medications  On physical examination there is no evidence of pulmonary or significant peripheral vascular congestion  ECG shows sinus bradycardia and is otherwise unremarkable  Her INR has been in therapeutic range  Her lipid profile from last year showed LDL about the desirable range  -- At this time I am advising her to continue current medications  -- I am going to request repeat blood work including comprehensive metabolic panel, CBC, iron studies, BNP level, lipid profile    -- I am advising her nonpharmacologic measures to improve her cholesterol   -- Will request an echocardiogram to reassess cardiac structure and function  -- Am providing her with a blood pressure tracking sheet with instructions to record random blood pressures  Our goal is to bring her systolic blood pressures close to 126 mmHg and diastolic blood pressure close to 70 mmHg  If her blood pressure is above this range she should be started on synovator/ARB therapy  -- We will follow-up with her 1 more time in 3 months to review the results of the tests  I am advising her to follow-up with her primary care physician within the 1 month to go over the results of the blood work and see if she needs treatment for iron deficiency anemia  -- Dietary and medical compliance are reinforced  She is advised to try to lose weight through dietary changes and physical activity  -- She is advised  to report any concerning symptoms such as chest pain, shortness of breath, decline in exercise tolerance or presyncope/syncope  -- Providing her with information about nonpharmacologic measures to improve cholesterol  NON-PHARMACOLOGIC MEASURES THAT IMPROVE CHOLESTEROL       1  Reduce intake of saturated fats to less than 7% of total calories  Some helpful tips to achieve this include:        -- use egg whites instead of whole eggs  -- use lean turkey-jerome instead of regular jerome        -- use low-fat margarine on your toast  instead of butter  -- use skim milk instead of whole milk  -- use Soy-based products as such as Soy milk, Tofu,    2  Reduced intake of dietary cholesterol to less than 200 mg per day  3  Increase intake of plant sterols and  viscous soluble fiber  -- Plant sterols and stanols (active daily doses, 400-3000 mg; expected LDL-C reduction of 8%-12%)            Plant sterols and stanols put in fat medium such as Margarine have been shown to lower LDL cholesterol Alverto Javed, 'Smart Balance', Take Control' and similar products)  --   Soluble fibers including beta-glucan, psyllium, and glucomannan (active daily doses, 5-15 g; expected LDL-C reduction of 5%-15%)          Soluble viscous fibers such as  Oat ?-glucan (Oat Fiber) and Psyllium husk ( Metamucil etc ) lower cholesterol by reducing absorption of cholesterol bile acids and           delaying digestion of lipids  4  Increase consumption of nuts, especially as a replacement for candy bars or other high simple-carbohydrate foods (Nuts, especially walnuts and almonds are rich in mono or polyunsaturated fats that lower LDL cholesterol  They also reduced the glycemic load of the diet and may reduce insulin secretion and improve satiety  There also rich in a my no acid arginine, which is the precursor of nitric oxide that promote vasodilation )    5  Reduce weight through increasing physical activity/exercise and modifying diet  6  Increase dietary intake of Omega 3 fatty acids ( found predominantly in fatty fish such as [place]/Tuna/Mackeral/Sardines/Herring as well as fish oil supplements)  Omega 3 fatty acids reduce plasma concentrations of triglycerides and decreased overall risk for coronary heart disease  7  Limit alcohol intake and frequency to minimum  8  Consider taking Nutraceutical supplements that may further lower blood cholesterol:  1  Berberine (active daily doses, 500-1500 mg; expected LDL-C reduction of 36%-10%);   2  Garlic (active daily doses, 5-6 g; expected LDL-C reduction of 5%-10%);  3  Green tea extracts (active daily doses,  g; expected LDL-C reduction of 5%)     HEALTH TIPS FOR PATIENTS WITH ABNORMAL CHOLESTEROL LEVELS INCLUDING ELEVATED TRIGLYCERIDES:    1  Sugar intake should be restricted  Most sugars, especially fructose, stimulatehepatic synthesis of free fatty acids ( FFAs) and thus FFA and triglycerides (TG) accumulation in the liver   Thisaccumulation drives VLDL production and increases plasma TG level in the mild to moderate range (e852-603 mg/dL)  Fructose is prevalent in the Western diet,being about half the content of sucrose (white or brown sugar), of high-fructosecorn syrup, and also of honey, and being more than half the content of the sugarin fruit juice and certain other sweeteners, like agave  Thus, restriction of sugar-sweetened beverages, other added sugars, and fruit juice (but not fruit) is usuallyhelpful to reduce TG levels  Nonsugar carbohydrate (starch) has a weak TGlevel-raising effect, mainly if low in fiber, so restricting total carbohydrates mayslightly reduce TG levels  2  Increase dietary fiber intake-- fibrous food lowers obstruction of bad cholesterol in the blood stream   Fiber has a modest effect to blunt TG level increase with sugar and other carbohydrates  3  Fat intake may need to be restricted  Dietary fat drives chylomicron production andrestricting dietary fat is the most effective way to reduce TG levels of more thanabout 800 mg/dL  Saturated fats are slightly more likely to lead to HTG than unsat-urated fats, but fatty fish has a paradoxic effect of lowering TG levels because ofthe several favorable effects of omega-3 oil on TG metabolism  4  Physical activity should be increased  Increased exercise tends to reduce TGlevels, likely because of increased muscle TG oxidation as a fuel source, improvedglucose and insulin metabolism (including increased LPL activity/TG lipolysis), anddecreased hepatic TG and FFA content  5  Weight loss should be encouraged  Whether by decreasing total calories orincreasing activity, negative caloric balance reduces TG levels, likely related toimprovement in insulin resistance and decreased hepatic TG and FFA content      6  Restriction of alcohol intake may be beneficial  Ethanol increases TG levels in manypatients at any level of intake, and so a trial of decreased consumption is warrantedfor plasma TG levels of more than about 200 mg/dL in patients who consume morethan about 2 servings per week  Tobacco and marijuana have minor TG level-raising effects that are generally not of clinical importance  7  Home remedies to reduce triglycerides:  Fresh garlic (Eat 2-3 raw garlic close on empty stomach daily), apple cider vinegar,Cayenne pepper, Cinnamon, Coriander seeds, Enzo mushroom, Nuts ( walnuts, almonds, yue nuts- Nuts are rich in polyunsaturated fatty acids, this component helps to reduce bad cholesterol)

## 2023-03-29 NOTE — PROGRESS NOTES
[facility]  [address] [facility], Reyna Suresh  Phone#  [phone]  Fax#  [phone]  *-*-*-*-*-*-*-*-*-*-*-*-*-*-*-*-*-*-*-*-*-*-*-*-*-*-*-*-*-*-*-*-*-*-*-*-*-*-*-*-*-*-*-*-*-*-*-*-*-*-*-*-*-*  ENCOUNTER DATE: 03/29/23 10:14 AM  PATIENT NAME: Jocelyn Ramirez   1960    [id number]  AGE:63 y o  SEX: female  Izabel Rodriguez MD     PRIMARY CARE PHYSICIAN: Reid Ng MD    DIAGNOSES:  1  Rheumatic heart disease, status post balloon valvuloplasty in 2014 in [address], status post mitral valve replacement with mechanical valve (7 mm [employer]) in June 2015 at [address]  2  Rheumatic tricuspid valve regurgitation  3  Chronic anticoagulation with warfarin, goal INR 2 5-3 5  4  Iron deficiency anemia  5  Mild obstructive sleep apnea by sleep study in 2020  6  Primary hypertension  7  Mild intermittent asthma  8  Benign positional vertigo  9  Diabetes mellitus  10  Obesity  11  Chronic diarrhea  12  Vitamin D deficiency  13  Uterine fibroid  14  Lymphedema of lower extremities  15  Paroxysmal atrial fibrillation, status post LOUIE cardioversion 2015  16  History of stroke  following mitral valve balloon valvoplasty in 2014, previously with right residual deficit now resolved  17   Degenerative joint disease with arthritis of the knees bilaterally  18  Epilepsy with last epileptic seizure in about 2016  19  History of shingles  20   GERD    ECHOCARDIOGRAM 7/21/2020:  Normal left ventricular function, EF 60%, normal regional wall motion, normal cavity size, indeterminate diastolic function, normal right ventricle size and function, normal right atrial size left atrium at upper limit of normal, normal aortic valve with mild aortic valve regurgitation, mechanical mitral valve prosthesis with very minimal stenosis with no significant regurgitation, moderate tricuspid valve regurgitation, estimated right ventricular systolic pressure at upper limit of normal, normal inferior vena cava size, normal aortic root size, no pericardial effusion  CARDIAC CATHETERIZATION 5/14/2015:  Left main: Normal  LAD: Normal, normal major diagonal branch  LCx: Normal  RCA: Normal       CURRENT ECG     Results for orders placed or performed in visit on 03/29/23   POCT ECG    Narrative    Sinus bradycardia rhythm with no significant ST-T wave abnormalities  No evidence of prior infarction, or chamber enlargement or hypertrophy  HR 57 bpm rightward axis normal intervals  CARDIOLOGY ASSESSMENT & PLAN     1  Secondary hypertension  POCT ECG      2  Rheumatic tricuspid valve regurgitation  Iron Panel (Includes Ferritin, Iron Sat%, Iron, and TIBC)    TSH, 3rd generation with Free T4 reflex    CBC and differential    COMPREHENSIVE METABOLIC TYLIP(89)    NT-BNP [facility] only    Lipid panel    Echo complete w/ contrast if indicated      3  Morbid obesity with BMI of 45 0-49 9, adult ([address] )        4  H/O mitral valve replacement with mechanical valve        5  GERMAN (obstructive sleep apnea)        6  Type 2 diabetes mellitus without complication, without long-term current use of insulin ([facility])        7  Anticoagulation goal of INR 2 5 to 3 5        8  Chronic anticoagulation        9  Microcytic hypochromic anemia        10  Rheumatic heart disease        11  History of iron deficiency anemia  Iron Panel (Includes Ferritin, Iron Sat%, Iron, and TIBC)    CBC and differential        Rheumatic heart disease  Ms Jose Napier is a 71-year-old female with history of rheumatic heart disease status post mitral valve balloon valvuloplasty and subsequent mitral valve replacement in 2015, residual tricuspid valve regurgitation, and other comorbidities  From a symptom perspective she appears to be overall well with no recent symptoms that suggest prosthetic valve dysfunction  Her blood pressure is noted to be slightly on the higher side    She is not currently on antihypertensive medications  On physical examination there is no evidence of pulmonary or significant peripheral vascular congestion  ECG shows sinus bradycardia and is otherwise unremarkable  Her INR has been in therapeutic range  Her lipid profile from last year showed LDL about the desirable range  -- At this time I am advising her to continue current medications  -- I am going to request repeat blood work including comprehensive metabolic panel, CBC, iron studies, BNP level, lipid profile  -- I am advising her nonpharmacologic measures to improve her cholesterol   -- Will request an echocardiogram to reassess cardiac structure and function  -- Am providing her with a blood pressure tracking sheet with instructions to record random blood pressures  Our goal is to bring her systolic blood pressures close to 706 mmHg and diastolic blood pressure close to 70 mmHg  If her blood pressure is above this range she should be started on synovator/ARB therapy  -- We will follow-up with her 1 more time in 3 months to review the results of the tests  I am advising her to follow-up with her primary care physician within the 1 month to go over the results of the blood work and see if she needs treatment for iron deficiency anemia  -- Dietary and medical compliance are reinforced  She is advised to try to lose weight through dietary changes and physical activity  -- She is advised  to report any concerning symptoms such as chest pain, shortness of breath, decline in exercise tolerance or presyncope/syncope  -- Providing her with information about nonpharmacologic measures to improve cholesterol  NON-PHARMACOLOGIC MEASURES THAT IMPROVE CHOLESTEROL       1  Reduce intake of saturated fats to less than 7% of total calories  Some helpful tips to achieve this include:        -- use egg whites instead of whole eggs          -- use lean turkey-jerome instead of regular jerome        -- use low-fat margarine on your toast  instead of butter  -- use skim milk instead of whole milk  -- use Soy-based products as such as Soy milk, Tofu,    2  Reduced intake of dietary cholesterol to less than 200 mg per day  3  Increase intake of plant sterols and  viscous soluble fiber  -- Plant sterols and stanols (active daily doses, 400-3000 mg; expected LDL-C reduction of 8%-12%)  Plant sterols and stanols put in fat medium such as Margarine have been shown to lower LDL cholesterol ('Benecol', 'Smart Balance', Take Control' and similar products)  --   Soluble fibers including beta-glucan, psyllium, and glucomannan (active daily doses, 5-15 g; expected LDL-C reduction of 5%-15%)          Soluble viscous fibers such as  Oat ?-glucan (Oat Fiber) and Psyllium husk ( Metamucil etc ) lower cholesterol by reducing absorption of cholesterol bile acids and           delaying digestion of lipids  4  Increase consumption of nuts, especially as a replacement for candy bars or other high simple-carbohydrate foods (Nuts, especially walnuts and almonds are rich in mono or polyunsaturated fats that lower LDL cholesterol  They also reduced the glycemic load of the diet and may reduce insulin secretion and improve satiety  There also rich in a my no acid arginine, which is the precursor of nitric oxide that promote vasodilation )    5  Reduce weight through increasing physical activity/exercise and modifying diet  6  Increase dietary intake of Omega 3 fatty acids ( found predominantly in fatty fish such as Kent/Tuna/Mackeral/Sardines/Herring as well as fish oil supplements)  Omega 3 fatty acids reduce plasma concentrations of triglycerides and decreased overall risk for coronary heart disease  7  Limit alcohol intake and frequency to minimum  8  Consider taking Nutraceutical supplements that may further lower blood cholesterol:  1   Berberine (active daily doses, 500-1500 mg; expected LDL-C reduction of 46%-31%);   2  Garlic (active daily doses, 5-6 g; expected LDL-C reduction of 5%-10%);  3  Green tea extracts (active daily doses,  g; expected LDL-C reduction of 5%)     HEALTH TIPS FOR PATIENTS WITH ABNORMAL CHOLESTEROL LEVELS INCLUDING ELEVATED TRIGLYCERIDES:    1  Sugar intake should be restricted  Most sugars, especially fructose, stimulatehepatic synthesis of free fatty acids ( FFAs) and thus FFA and triglycerides (TG) accumulation in the liver  Thisaccumulation drives VLDL production and increases plasma TG level in the mild to moderate range (d569-659 mg/dL)  Fructose is prevalent in the Western diet,being about half the content of sucrose (white or brown sugar), of high-fructosecorn syrup, and also of honey, and being more than half the content of the sugarin fruit juice and certain other sweeteners, like agave  Thus, restriction of sugar-sweetened beverages, other added sugars, and fruit juice (but not fruit) is usuallyhelpful to reduce TG levels  Nonsugar carbohydrate (starch) has a weak TGlevel-raising effect, mainly if low in fiber, so restricting total carbohydrates mayslightly reduce TG levels  2  Increase dietary fiber intake-- fibrous food lowers obstruction of bad cholesterol in the blood stream   Fiber has a modest effect to blunt TG level increase with sugar and other carbohydrates  3  Fat intake may need to be restricted  Dietary fat drives chylomicron production andrestricting dietary fat is the most effective way to reduce TG levels of more thanabout 800 mg/dL  Saturated fats are slightly more likely to lead to HTG than unsat-urated fats, but fatty fish has a paradoxic effect of lowering TG levels because ofthe several favorable effects of omega-3 oil on TG metabolism  4  Physical activity should be increased   Increased exercise tends to reduce TGlevels, likely because of increased muscle TG oxidation as a fuel source, improvedglucose and insulin metabolism (including increased LPL activity/TG lipolysis), anddecreased hepatic TG and FFA content  5  Weight loss should be encouraged  Whether by decreasing total calories orincreasing activity, negative caloric balance reduces TG levels, likely related toimprovement in insulin resistance and decreased hepatic TG and FFA content  6  Restriction of alcohol intake may be beneficial  Ethanol increases TG levels in manypatients at any level of intake, and so a trial of decreased consumption is warrantedfor plasma TG levels of more than about 200 mg/dL in patients who consume morethan about 2 servings per week  Tobacco and marijuana have minor TG level-raising effects that are generally not of clinical importance  7  Home remedies to reduce triglycerides:  Fresh garlic (Eat 2-3 raw garlic close on empty stomach daily), apple cider vinegar,Cayenne pepper, Cinnamon, Coriander seeds, Enzo mushroom, Nuts ( walnuts, almonds, yue nuts- Nuts are rich in polyunsaturated fatty acids, this component helps to reduce bad cholesterol)  INTERVAL HISTORY & HISTORY OF PRESENT ILLNESS     Jesus Carroll is here for follow-up regarding her cardiac comorbidities which include: History of rheumatic heart disease status post mitral valve replacement with mechanical valve in 2015, rheumatic tricuspid valve regurgitation, hypertension, diabetes, dyslipidemia and other comorbidities  She was previously seen by Dr James Carcamo in February 2022, at [facility] and now she is switching her care to [facility]   Her prior medical record is reviewed in detail prior to visit  Patient's mentions that since her  l or procedures/surgeries  She does mention having COVID-19 infection  [address] cardi[facility] office visit she has had no new major diagnoses or hospitalizations or illnesses from a symptom perspective she reports that she has been overall all right    She has had no typical anginal-like chest pain or unusual shortness of breath with activity or worsening orthopnea or PND  She has mild chronic pedal edema  She denies any palpitations although occasionally when she is sleeping she sometimes wake up suddenly anxious and feels palpitations transiently  Exercise tolerance is limited but overall stable  She reports that she usually is at home during winter but in other months she gets out and walks a lot  Her exercise tolerance has remained stable  She mentions that she monitors her vital signs at home intermittently and her blood pressures have been normal   Denies any bleeding or excessive bruising  Functional capacity status: Moderate to good   (Excellent- >10 METs; Good: (7-10 METs); Moderate (4-7 METs); Poor (<= 4 METs)    Any chronic stressors: None   (feeling of poor health, financial problems, and social isolation etc)  Tobacco or alcohol dependence: He has never been a smoker  She does not drink  Family history significant for her dad having heart disease and dying at age 58 due to a stroke  There is no family history of sudden cardiac death  Current cardiac medications: Warfarin, metoprolol tartrate 25 mg twice daily, Januvia, insulin  Last routine chemistry blood work is from 4/22/2022: Sodium 139 potassium 4 1 chloride 107 bicarb 31 BUN 20 creatinine 0 88 fasting glucose 149 normal LFTs except for borderline increased alk phos at 137, total cholesterol 206 triglyceride 163 HDL 54 calculated   Last INR is from 2/17/2023 and it was 3 25  Hemoglobin A1c 6 3 in July 2022, TSH 1 85 in January 2018    REVIEW OF SYSTEMS   Positive for: As noted above in HPI  Negative for: All remaining as reviewed below and in HPI      SYSTEM SYMPTOMS REVIEWED:  General--weight change, fever, night sweats  Respiratory--cough, wheezing, shortness of breath, sputum production  Cardiovascular--chest pain, syncope, dyspnea on exertion, edema, decline in exercise tolerance, claudication   Gastrointestinal--persistent vomiting, diarrhea, abdominal distention, blood in stool   Muscular or skeletal--joint pain or swelling   Neurologic--headaches, syncope, abnormal movement  Hematologic--history of easy bruising and bleeding   Endocrine--thyroid enlargement, heat or cold intolerance, polyuria   Psychiatric--anxiety, depression     *-*-*-*-*-*-*-*-*-*-*-*-*-*-*-*-*-*-*-*-*-*-*-*-*-*-*-*-*-*-*-*-*-*-*-*-*-*-*-*-*-*-*-*-*-*-*-*-*-*-*-*-*-*-  VITAL SIGNS     CURRENT VITAL SIGNS:   Vitals:    03/29/23 0925   BP: 148/70   BP Location: Right arm   Patient Position: Sitting   Cuff Size: Large   Pulse: 57   Weight: 98 9 kg (218 lb)       BMI: Body mass index is 45 56 kg/m²  WEIGHTS:   Wt Readings from Last 25 Encounters:   03/29/23 98 9 kg (218 lb)   02/07/23 96 2 kg (212 lb)   01/10/23 96 2 kg (212 lb)   11/15/22 94 3 kg (208 lb)   10/17/22 97 1 kg (214 lb)   07/13/22 94 kg (207 lb 3 2 oz)   06/03/22 97 5 kg (215 lb)   05/10/22 97 5 kg (215 lb)   05/05/22 97 1 kg (214 lb)   03/08/22 99 2 kg (218 lb 12 8 oz)   02/01/22 100 kg (221 lb)   09/23/21 106 kg (233 lb)   08/26/21 108 kg (237 lb)   08/16/21 106 kg (233 lb)   07/06/21 109 kg (240 lb 12 8 oz)   06/23/21 110 kg (242 lb 12 8 oz)   06/19/21 111 kg (245 lb)   06/15/21 114 kg (250 lb 12 8 oz)   05/03/21 116 kg (255 lb 12 8 oz)   12/21/20 112 kg (248 lb)   11/02/20 111 kg (244 lb 6 4 oz)   09/07/20 108 kg (239 lb)   09/03/20 108 kg (239 lb)   08/13/20 109 kg (239 lb 6 4 oz)   08/07/20 107 kg (235 lb)        *-*-*-*-*-*-*-*-*-*-*-*-*-*-*-*-*-*-*-*-*-*-*-*-*-*-*-*-*-*-*-*-*-*-*-*-*-*-*-*-*-*-*-*-*-*-*-*-*-*-*-*-*-*-  PHYSICAL EXAM     General Appearance:    Alert, cooperative, no distress, appears stated age, slightly obese   Head, Eyes, ENT:    No obvious abnormality, moist mucous mebranes  Neck:   Supple, no carotid bruit or JVD   Back:     Symmetric, no curvature  Lungs:     Respirations unlabored   Clear to auscultation bilaterally,    Chest wall:    No tenderness or deformity   Heart: Regular rate and rhythm, mechanical heart sounds without any murmur   Abdomen:     Soft, non-tender, Obese   Extremities:   Extremities warm, no cyanosis, lipodystrophy changes without definite edema  Skin:   No venostatic changes in lower extremities  Normal skin color, texture, and turgor   No rashes or lesions     *-*-*-*-*-*-*-*-*-*-*-*-*-*-*-*-*-*-*-*-*-*-*-*-*-*-*-*-*-*-*-*-*-*-*-*-*-*-*-*-*-*-*-*-*-*-*-*-*-*-*-*-*-*-  CURRENT MEDICATIONS LIST     Current Outpatient Medications:   •  acetaminophen (TYLENOL) 500 mg tablet, Take 2 tablets by mouth daily as needed for pain, Disp: 180 tablet, Rfl: 3  •  albuterol (PROVENTIL HFA,VENTOLIN HFA) 90 mcg/act inhaler, Inhale 2 puffs every 4 (four) hours as needed for wheezing, Disp: 18 g, Rfl: 0  •  Blood Glucose Monitoring Suppl (ACCU-CHEK EDILMA PLUS) w/Device KIT, by Does not apply route 2 (two) times a day (Patient taking differently: Use daily), Disp: 1 kit, Rfl: 0  •  cetirizine (ZyrTEC) 10 mg tablet, Take 1 tablet (10 mg total) by mouth daily, Disp: 90 tablet, Rfl: 0  •  Diclofenac Sodium (VOLTAREN) 1 %, Apply 2 g topically 4 (four) times a day (Patient taking differently: Apply 2 g topically if needed), Disp: 150 g, Rfl: 1  •  Flovent  MCG/ACT inhaler, INHALE TWO PUFFS TWO (TWO) TIMES A DAY RINSE MOUTH AFTER USE , Disp: 12 g, Rfl: 3  •  fluticasone (FLONASE) 50 mcg/act nasal spray, 1 spray into each nostril daily (Patient taking differently: 1 spray into each nostril if needed), Disp: 9 9 mL, Rfl: 0  •  glucose blood (Accu-Chek SmartView) test strip, USE DAILY AS INSTRUCTED, Disp: 100 each, Rfl: 11  •  hydrOXYzine HCL (ATARAX) 25 mg tablet, Take 1 tablet (25 mg total) by mouth 2 (two) times a day, Disp: 180 tablet, Rfl: 0  •  Keppra 500 MG tablet, Take 1 tablet (500 mg total) by mouth 3 (three) times a day, Disp: 270 tablet, Rfl: 3  •  Lancets (OneTouch Delica Plus [id number]) MISC, USE ONCE DAILY AS DIRECTED, Disp: 100 each, Rfl: 3  •  Lancets (OneTouch Tex Plus [id number]) MISC, USE AS INSTRUCTED (Patient not taking: Reported on 11/15/2022), Disp: 100 each, Rfl: 4  •  metFORMIN (GLUCOPHAGE) 500 mg tablet, Take 1 tablet (500 mg total) by mouth daily at bedtime, Disp: 180 tablet, Rfl: 3  •  metoprolol tartrate (LOPRESSOR) 25 mg tablet, TAKE HALF TABLET BY MOUTH EVERY 12 (TWELVE) HOURS, Disp: 90 tablet, Rfl: 2  •  omeprazole (PriLOSEC) 40 MG capsule, TAKE ONE CAPSULE BY MOUTH DAILY, Disp: 90 capsule, Rfl: 10  •  OneTouch Verio test strip, USE DAILY AS INSTRUCTED, Disp: 100 strip, Rfl: 9  •  sitaGLIPtin (Januvia) 25 mg tablet, Take 1 tablet (25 mg total) by mouth daily, Disp: 30 tablet, Rfl: 1  •  torsemide (DEMADEX) 20 mg tablet, TAKE ONE TABLET BY MOUTH AS NEEDED AS DIRECTED, Disp: 90 tablet, Rfl: 10  •  warfarin (COUMADIN) 5 mg tablet, TAKE 1/2 TO 1 TABLET DAILY BY MOUTH OR AS ORDERED BY PHYSICIAN , Disp: 90 tablet, Rfl: 3  •  erythromycin (ILOTYCIN) ophthalmic ointment, Administer 0 5 inches into the left eye daily at bedtime (Patient not taking: Reported on 3/29/2023), Disp: 3 5 g, Rfl: 0       ALLERGIES     Allergies   Allergen Reactions   • Codeine    • Prednisone Itching   • Statins Headache   • Codeine Polt-Chlorphen Polt Er Irritability, Itching and Rash       *-*-*-*-*-*-*-*-*-*-*-*-*-*-*-*-*-*-*-*-*-*-*-*-*-*-*-*-*-*-*-*-*-*-*-*-*-*-*-*-*-*-*-*-*-*-*-*-*-*-*-*-*-*-  LABORATORY DATA     Sodium   Date Value Ref Range Status   06/07/2017 139 135 - 146 mmol/L Final   12/06/2016 138 135 - 146 mmol/L Final   09/01/2016 138 135 - 146 mmol/L Final     Sodium   Date Value Ref Range Status   06/07/2017 139 135 - 146 mmol/L Final   12/06/2016 138 135 - 146 mmol/L Final   09/01/2016 138 135 - 146 mmol/L Final     Potassium   Date Value Ref Range Status   04/22/2022 4 1 3 5 - 5 3 mmol/L Final   06/19/2021 4 0 3 5 - 5 3 mmol/L Final     Comment:     Slightly Hemolyzed;  Results May be Affected   05/20/2021 4 3 3 5 - 5 3 mmol/L Final   01/31/2018 4 2 3 5 - 5 3 mmol/L Final   06/07/2017 3 9 3 5 - 5 3 mmol/L Final   12/06/2016 3 7 3 5 - 5 3 mmol/L Final   09/01/2016 3 7 3 5 - 5 3 mmol/L Final     Chloride   Date Value Ref Range Status   04/22/2022 107 100 - 108 mmol/L Final   06/19/2021 104 100 - 108 mmol/L Final   05/20/2021 109 (H) 100 - 108 mmol/L Final   01/31/2018 104 98 - 110 mmol/L Final   06/07/2017 101 98 - 110 mmol/L Final   12/06/2016 98 98 - 110 mmol/L Final   09/01/2016 99 98 - 110 mmol/L Final     CO2   Date Value Ref Range Status   04/22/2022 31 21 - 32 mmol/L Final   06/19/2021 30 21 - 32 mmol/L Final   05/20/2021 28 21 - 32 mmol/L Final   01/31/2018 29 20 - 31 mmol/L Final   06/07/2017 30 20 - 31 mmol/L Final   12/06/2016 30 20 - 31 mmol/L Final   09/01/2016 27 20 - 31 mmol/L Final     Anion Gap   Date Value Ref Range Status   06/20/2015 5 4 - 13 mmol/L Final   06/19/2015 2 (L) 4 - 13 mmol/L Final   06/18/2015 6 4 - 13 mmol/L Final     BUN   Date Value Ref Range Status   04/22/2022 20 5 - 25 mg/dL Final   06/19/2021 14 5 - 25 mg/dL Final   05/20/2021 12 5 - 25 mg/dL Final   01/31/2018 16 7 - 25 mg/dL Final   06/07/2017 12 7 - 25 mg/dL Final   12/06/2016 11 7 - 25 mg/dL Final   09/01/2016 15 7 - 25 mg/dL Final     Creatinine   Date Value Ref Range Status   04/22/2022 0 88 0 60 - 1 30 mg/dL Final     Comment:     Standardized to IDMS reference method   06/19/2021 0 77 0 60 - 1 30 mg/dL Final     Comment:     Standardized to IDMS reference method   05/20/2021 0 74 0 60 - 1 30 mg/dL Final     Comment:     Standardized to IDMS reference method   06/07/2017 0 80 0 50 - 1 05 mg/dL Final     Comment:     Result Comment: For patients >52years of age, the reference limit  for Creatinine is approximately 13% higher for people  identified as -American      12/06/2016 0 93 0 50 - 1 05 mg/dL Final     Comment:     Result Comment: For patients >52years of age, the reference limit  for Creatinine is approximately 13% higher for people  identified as -American  09/01/2016 0 86 0 50 - 1 05 mg/dL Final     Comment:     Result Comment: For patients >52years of age, the reference limit  for Creatinine is approximately 13% higher for people  identified as -American  eGFR   Date Value Ref Range Status   04/22/2022 70 ml/min/1 73sq m Final   06/19/2021 83 ml/min/1 73sq m Final   05/20/2021 87 ml/min/1 73sq m Final     Glucose   Date Value Ref Range Status   06/20/2015 124 65 - 140 mg/dL Final     Comment:     If patient is fasting, the ADA then defines impaired fasting glucose as  >100 mg/dl and diabetes as  >or equal to 126 mg/dl  06/19/2015 105 65 - 140 mg/dL Final     Comment:     If patient is fasting, the ADA then defines impaired fasting glucose as  >100 mg/dl and diabetes as  >or equal to 126 mg/dl  06/18/2015 138 65 - 140 mg/dL Final     Comment:     If patient is fasting, the ADA then defines impaired fasting glucose as  >100 mg/dl and diabetes as  >or equal to 126 mg/dl  Calcium   Date Value Ref Range Status   04/22/2022 9 9 8 3 - 10 1 mg/dL Final   06/19/2021 9 5 8 3 - 10 1 mg/dL Final   05/20/2021 10 1 8 3 - 10 1 mg/dL Final   01/31/2018 9 7 8 6 - 10 4 mg/dL Final   06/07/2017 9 2 8 6 - 10 4 mg/dL Final   12/06/2016 9 6 8 6 - 10 4 mg/dL Final   09/01/2016 9 6 8 6 - 10 4 mg/dL Final     AST   Date Value Ref Range Status   04/22/2022 20 5 - 45 U/L Final     Comment:     Specimen collection should occur prior to Sulfasalazine administration due to the potential for falsely depressed results  06/19/2021 33 5 - 45 U/L Final     Comment:     Slightly Hemolyzed; Results May be Affected  Specimen collection should occur prior to Sulfasalazine administration due to the potential for falsely depressed results  05/20/2021 27 5 - 45 U/L Final     Comment:     Specimen collection should occur prior to Sulfasalazine administration due to the potential for falsely depressed results      01/31/2018 18 10 - 35 U/L Final   06/07/2017 19 10 - 35 U/L Final   02/24/2017 21 10 - 35 U/L Final   12/06/2016 20 10 - 35 U/L Final     ALT   Date Value Ref Range Status   04/22/2022 19 12 - 78 U/L Final     Comment:     Specimen collection should occur prior to Sulfasalazine and/or Sulfapyridine administration due to the potential for falsely depressed results  06/19/2021 28 12 - 78 U/L Final     Comment:     Specimen collection should occur prior to Sulfasalazine and/or Sulfapyridine administration due to the potential for falsely depressed results  05/20/2021 27 12 - 78 U/L Final     Comment:     Specimen collection should occur prior to Sulfasalazine and/or Sulfapyridine administration due to the potential for falsely depressed results      01/31/2018 13 6 - 29 U/L Final   06/07/2017 14 6 - 29 U/L Final     Comment:     Performed at: [id number] [address] Road,[facility] MD Tania, [address] [place] [phone]     02/24/2017 15 6 - 29 U/L Final     Comment:     Performed at: [address],[facility] MD Tania, [address] [place] [phone]     12/06/2016 14 6 - 29 U/L Final     Comment:     Performed at: [address],[facility] MD Tania, [facility] [address], [place] [phone]       Alkaline Phosphatase   Date Value Ref Range Status   04/22/2022 137 (H) 46 - 116 U/L Final   06/19/2021 129 (H) 46 - 116 U/L Final   05/20/2021 122 (H) 46 - 116 U/L Final   01/31/2018 145 (H) 33 - 130 U/L Final   06/07/2017 156 (H) 33 - 130 U/L Final   02/24/2017 167 (H) 33 - 130 U/L Final   12/06/2016 168 (H) 33 - 130 U/L Final     Total Protein   Date Value Ref Range Status   06/07/2017 6 9 6 1 - 8 1 g/dL Final   02/24/2017 7 3 6 1 - 8 1 g/dL Final   12/06/2016 7 2 6 1 - 8 1 g/dL Final     Total Bilirubin   Date Value Ref Range Status   06/07/2017 0 8 0 2 - 1 2 mg/dL Final   02/24/2017 0 9 0 2 - 1 2 mg/dL Final   12/06/2016 0 8 0 2 - 1 2 mg/dL Final     Magnesium   Date Value Ref Range Status 06/18/2015 2 5 1 6 - 2 6 mg/dL Final     Comment:     The above 1 analytes were performed by [facility] [id number]     07/26/2014 2 0 1 6 - 2 6 mg/dL Final     Comment:     The above 1 analytes [place] [id number]       WBC   Date Value Ref Range Status   06/19/2021 6 95 4 31 - 10 16 Thousand/uL Final   02/17/2021 6 44 4 31 - 10 16 Thousand/uL Final   09/07/2020 5 77 4 31 - 10 16 Thousand/uL Final   06/07/2017 6 3 3 8 - 10 8 Thousand/uL Final   10/19/2016 6 3 3 8 - 10 8 Thousand/uL Final   02/23/2016 5 7 3 8 - 10 8 Thousand/uL Final     Hemoglobin   Date Value Ref Range Status   06/19/2021 11 8 11 5 - 15 4 g/dL Final   02/17/2021 11 2 (L) 11 5 - 15 4 g/dL Final   09/07/2020 11 3 (L) 11 5 - 15 4 g/dL Final   06/07/2017 12 9 11 7 - 15 5 g/dL Final   10/19/2016 12 1 11 7 - 15 5 g/dL Final   02/23/2016 10 6 (L) 11 7 - 15 5 g/dL Final     Platelets   Date Value Ref Range Status   06/19/2021 295 149 - 390 Thousands/uL Final   02/17/2021 268 149 - 390 Thousands/uL Final   09/07/2020 250 149 - 390 Thousands/uL Final   06/07/2017 257 140 - 400 Thousand/uL Final   10/19/2016 265 140 - 400 Thousand/uL Final   02/23/2016 255 140 - 400 Thousand/uL Final     PTT   Date Value Ref Range Status   01/18/2019 63 (H) 26 - 38 seconds Final     Comment:     Therapeutic Heparin Range =  60-90 seconds   03/08/2018 70 (H) 23 - 35 seconds Final   06/20/2015 75 (H) 24 - 35 Seconds Final     Comment:     Therapeutic Heparin Range =  60-90 seconds  The above 1 analytes [place] [id number]     06/20/2015 100 (H) 24 - 35 Seconds Final     Comment:     Therapeutic Heparin Range =  60-90 seconds  The above 1 analytes were performed [place] [id number]       INR   Date Value Ref Range Status   02/17/2023 3 25 (H) 0 84 - 1 19 Final   12/05/2022 2 79 (H) 0 84 - 1 19 Final   11/08/2017 3 5 (H)  Final Comment:     Result Comment: Reference Range                     0 9-1 1  Moderate-intensity Warfarin Therapy 2 0-3 0  Higher-intensity Warfarin Therapy   3 0-4 0     09/22/2017 2 7 (H)  Final     Comment:     Result Comment: Reference Range                     0 9-1 1  Moderate-intensity Warfarin Therapy 2 0-3 0  Higher-intensity Warfarin Therapy   3 0-4 0       No results found for: CKMB, DIGOXIN  TSH   Date Value Ref Range Status   01/31/2018 1 85 0 40 - 4 50 mIU/L Final     Cholesterol   Date Value Ref Range Status   06/07/2017 202 (H) 125 - 200 mg/dL Final      Hemoglobin A1C   Date Value Ref Range Status   07/13/2022 6 3 6 5 Final   06/27/2020 6 7 (H) Normal 3 8-5 6%; PreDiabetic 5 7-6 4%; Diabetic >=6 5%; Glycemic control for adults with diabetes <7 0% % Final   01/31/2018 6 3 (H) <5 7 % of total Hgb Final     Comment:     For someone without known diabetes, a hemoglobin   A1c value between 5 7% and 6 4% is consistent with  prediabetes and should be confirmed with a   follow-up test      For someone with known diabetes, a value <7%  indicates that their diabetes is well controlled  A1c  targets should be individualized based on duration of  diabetes, age, comorbid conditions, and other  considerations  This assay result is consistent with an increased risk  of diabetes  Currently, no consensus exists regarding use of  hemoglobin A1c for diagnosis of diabetes for children            Urine Culture   Date Value Ref Range Status   06/19/2021 40,000-49,000 cfu/ml  Final     Comment:     Mixed Contaminants X4     INFLU B PCR   Date Value Ref Range Status   03/12/2018 None Detected None Detected Final     RSV PCR   Date Value Ref Range Status   03/12/2018 None Detected None Detected Final       *-*-*-*-*-*-*-*-*-*-*-*-*-*-*-*-*-*-*-*-*-*-*-*-*-*-*-*-*-*-*-*-*-*-*-*-*-*-*-*-*-*-*-*-*-*-*-*-*-*-*-*-*-*-  PREVIOUS CARDIOLOGY & RADIOLOGY TEST RESULTS   I have personally reviewed pertinent results of cardiovascular tests noted below  Results for orders placed during the hospital encounter of 20    Echo complete with contrast if indicated    Narrative  [address]  [facility], [address] [facility]  [phone]    Transthoracic Echocardiogram  2D, M-mode, Doppler, and Color Doppler    Study date:  2020    Patient: Gt Sanchez  MR number: [id number]  Account number: [de-identified]  : 1960  Age: 63 years  Gender: Female  Status: Outpatient  Location: [address] [place] [address] [facility]  Height: 59 in  Weight: 234 lb  BP: 116/ 60 mmHg    Indications: Mitral valve replacement    Diagnoses: Z95 2 - Presence of prosthetic heart valve    Sonographer:  LAKSHMI Sands  Primary Physician:  Esther Lennon MD  Referring Physician:  Ian Crawford MD  Group:  [facility]  Interpreting Physician:  Jason Soria MD    SUMMARY    COMPARISONS:  Tricuspid regurgitation has worsened  LEFT VENTRICLE:  Systolic function was normal  Ejection fraction was estimated to be 60 %  There were no regional wall motion abnormalities  LEFT ATRIUM:  Size was at the upper limits of normal     MITRAL VALVE:  A mechanical prosthesis was present, #27 [employer]  It exhibited normal function  Transmitral velocity was minimally increased  There was very minimal stenosis, mean gradient probably the same  There was no significant regurgitation  AORTIC VALVE:  The valve was trileaflet  Leaflets exhibited normal thickness and normal cuspal separation  There was mild regurgitation  TRICUSPID VALVE:  There was moderate regurgitation  Pulmonary artery systolic pressure was at the upper limits of normal     COMPARISONS:  Tricuspid regurgitation has worsened      HISTORY: PRIOR HISTORY: Hypertension, mechanical MVR, asthma, type 2 diabetes, cerebral infarction, obstructive sleep apnea, epilepsy    PROCEDURE: The study was performed in the [address]  This was a routine study  The transthoracic approach was used  The study included complete 2D imaging, M-mode, complete spectral Doppler, and color Doppler  Image  quality was adequate  LEFT VENTRICLE: Size was normal  Systolic function was normal  Ejection fraction was estimated to be 60 %  There were no regional wall motion abnormalities  Wall thickness was normal  DOPPLER: The study was not technically sufficient to  allow evaluation of LV diastolic function  RIGHT VENTRICLE: The size was normal  Systolic function was normal  Wall thickness was normal     LEFT ATRIUM: Size was at the upper limits of normal     RIGHT ATRIUM: Size was normal     MITRAL VALVE: A mechanical prosthesis was present, #27 [employer]  It exhibited normal function  DOPPLER: Transmitral velocity was minimally increased  There was very minimal stenosis, mean gradient probably the same  There was no  significant regurgitation  AORTIC VALVE: The valve was trileaflet  Leaflets exhibited normal thickness and normal cuspal separation  DOPPLER: Transaortic velocity was within the normal range  There was no evidence for stenosis  There was mild regurgitation  TRICUSPID VALVE: The valve structure was normal  There was normal leaflet separation  DOPPLER: The transtricuspid velocity was within the normal range  There was no evidence for stenosis  There was moderate regurgitation  Pulmonary artery  systolic pressure was at the upper limits of normal     PULMONIC VALVE: Leaflets exhibited normal thickness, no calcification, and normal cuspal separation  DOPPLER: The transpulmonic velocity was within the normal range  There was no significant regurgitation  PERICARDIUM: There was no pericardial effusion  The pericardium was normal in appearance  AORTA: The root exhibited normal size  SYSTEMIC VEINS: IVC: The inferior vena cava was normal in size      SYSTEM MEASUREMENT TABLES    2D  %FS: 31 43 %  Ao Diam: 2 52 cm  EDV(Teich): 70 82 ml  EF(Cube): 67 75 %  EF(Teich): 59 85 %  ESV(Cube): 20 94 ml  ESV(Teich): 28 43 ml  IVSd: 0 71 cm  LA Area: 18 26 cm2  LVEDV MOD A4C: 95 99 ml  LVEF MOD A4C: 59 96 %  LVESV MOD A4C: 38 44 ml  LVIDd: 4 02 cm  LVIDs: 2 76 cm  LVLd A4C: 7 5 cm  LVLs A4C: 6 25 cm  LVPWd: 1 06 cm  RA Area: 12 15 cm2  RV Diam : 3 08 cm  SV MOD A4C: 57 55 ml  SV(Cube): 44 ml  SV(Teich): 42 39 ml    CW  AR Dec Aguada: 2 53 m/s2  AR Dec Time: 1501 84 ms  AR PHT: 435 53 ms  AR Vmax: 3 8 m/s  AR maxP 8 mmHg  TR Vmax: 2 49 m/s  TR maxP 95 mmHg    MM  TAPSE: 1 49 cm    PW  E': 0 03 m/s    [employer] Accredited Echocardiography Laboratory    Prepared and electronically signed by    Jean Claude Zaragoza MD  Signed 2020 15:44:38    No results found for this or any previous visit  No results found for this or any previous visit  No results found for this or any previous visit  US pelvis complete w transvaginal  Narrative: PELVIC ULTRASOUND, COMPLETE    INDICATION:  The patient is 60years old  R10 2: Pelvic and perineal pain  COMPARISON: Pelvic ultrasound dated 3/3/2015  TECHNIQUE:   Transabdominal pelvic ultrasound was performed in sagittal and transverse planes with a curvilinear transducer  Additional transvaginal imaging was performed to better evaluate the endometrium and ovaries  Imaging included volumetric   sweeps as well as traditional still imaging technique  FINDINGS:    UTERUS:  The uterus is anteverted in position, measuring 6 1 x 2 4 x 4 1 cm  1 9 x 1 7 x 1 8 cm uterine mass, presumably a fibroid  1 4 x 1 8 x 1 5 cm uterine mass, also presumably a fibroid  The cervix appears within normal limits  ENDOMETRIUM:    The endometrial echo complex has an AP caliber of 3 5 mm  Trace fluid in the endometrial cavity  No sonographically evident endometrial mass      OVARIES/ADNEXA:  Right ovary:  0 8 x 0 7 x 0 7 cm  0 2 mL  No suspicious right ovarian abnormality  Doppler flow within normal limits  Left ovary:  1 2 x 0 7 x 1 4 cm  0 6 mL  No suspicious left ovarian abnormality  Vascularity within normal limits  No suspicious adnexal mass or loculated collections  There is no free fluid  Impression:    Two small uterine fibroids measuring up to 1 9 cm  Trace fluid in the endometrial cavity  No sonographically evident endometrial mass  No adnexal mass  Workstation performed: [id number]  Mammo screening bilateral w 3d & cad  Narrative: DIAGNOSIS: Screening mammogram for breast cancer; Encounter for screening   mammogram for malignant neoplasm of breast     TECHNIQUE:  Digital screening mammography was performed  Computer Aided Detection   (CAD) analyzed all applicable images  COMPARISONS: Prior breast imaging dated: 08/05/2020, 12/22/2016,   05/25/2016, 05/03/2016, and 09/26/2014    RELEVANT HISTORY:   Family Breast Cancer History: History of breast cancer in Sister, Maternal   Grandmother  Family Medical History: Family medical history includes breast cancer in 2   relatives (maternal grandmother, sister)  Personal History: No known relevant hormone history  Surgical history   includes breast biopsy  No known relevant medical history  The patient is scheduled in a reminder system for screening mammography  8-10% of cancers will be missed on mammography  Management of a palpable   abnormality must be based on clinical grounds  Patients will be notified   of their results via letter from our facility  Accredited by [email or web address] and FDA  RISK ASSESSMENT:   5 Year Tyrer-Cuzick: 1 86 %  10 Year Tyrer-Cuzick: 3 47 %  Lifetime Tyrer-Cuzick: 7 79 %    TISSUE DENSITY:   There are scattered areas of fibroglandular density  INDICATION: Victoria Jones is a 60 y o  female presenting for   screening mammography      FINDINGS:   Bilateral  There are no suspicious masses, grouped microcalcifications or areas of   unexplained architectural distortion  The skin and nipple areolar complex   are unremarkable  Benign calcifications in the upper-outer left breast are unchanged  Impression: No mammographic evidence of malignancy  ASSESSMENT/BI-RADS CATEGORY:  Left: 2 - Benign  Right: 2 - Benign  Overall: 2 - Benign    RECOMMENDATION:       - Routine screening mammogram in 1 year for both breasts      Workstation ID: [id number]        *-*-*-*-*-*-*-*-*-*-*-*-*-*-*-*-*-*-*-*-*-*-*-*-*-*-*-*-*-*-*-*-*-*-*-*-*-*-*-*-*-*-*-*-*-*-*-*-*-*-*-*-*-*-  SIGNATURES:   [unfilled]   Mary Lou Burnham MD; [facility]    *-*-*-*-*-*-*-*-*-*-*-*-*-*-*-*-*-*-*-*-*-*-*-*-*-*-*-*-*-*-*-*-*-*-*-*-*-*-*-*-*-*-*-*-*-*-*-*-*-*-*-*-*-*-  PAST MEDICAL HISTORY:  Past Medical History:   Diagnosis Date   • Abnormal ultrasound of pelvis     last assessed - 26Mar2015   • Asthma    • Asthma, persistent 8/1/2014   • Cerebral infarction [facility])     unspecified; last assessed - 30Aug2017   • Dizziness     last assessed - [id number]   • Dysuria     last assessed - [id number]   • Exposure to potentially hazardous body fluids     last assessed - 11Sep2014   • Heart disease    • Hematoma     of arm; last assessed - 20Jun2016   • Mitral valve stenosis    • Personal history of scoliosis    • Pneumonia    • Scar, hypertrophic     last assessed - [id number]   • Seizures ([facility] [address] )    • Shingles 2021   • Type 2 diabetes mellitus ([facility] [address] )    • Vision problems     PAST SURGICAL HISTORY:  Past Surgical History:   Procedure Laterality Date   • BREAST BIOPSY Left     many years ago in [facility] not really sure   • MITRAL VALVE REPLACEMENT  06/17/2015    MVR with 27 mm [employer] Optiform mechanical prostethic;  last assessed - [id number]   • TONSILLECTOMY     • TRANSLUMINAL ANGIOPLASTY      mitral valve; last assessed - 01Aug2014   • TUBAL LIGATION  1986    last assessed - 01Aug2014         FAMILY HISTORY:  Family History   Problem Relation Age of Onset   • Cancer Mother    • Dementia Mother    • Diabetes Mother    • Hypertension Mother    • Hyperlipidemia Mother    • Cervical cancer Mother    • Heart disease Father    • Coronary artery disease Father    • Hypertension Father    • Heart attack Father    • Diabetes Sister    • Breast cancer Sister 58   • No Known Problems Daughter    • Diabetes Maternal Grandmother    • Breast cancer Maternal Grandmother    • Diabetes Brother    • Hypertension Brother    • Hyperlipidemia Brother    • Seizures Son    • Psoriasis Son    • Heart disease Paternal Aunt    • Heart disease Paternal Aunt    • Diabetes Other     SOCIAL HISTORY:  Social History     Tobacco Use   Smoking Status Never   Smokeless Tobacco Never      Social History     Substance and Sexual Activity   Alcohol Use No     Social History     Substance and Sexual Activity   Drug Use No    [id number]     *-*-*-*-*-*-*-*-*-*-*-*-*-*-*-*-*-*-*-*-*-*-*-*-*-*-*-*-*-*-*-*-*-*-*-*-*-*-*-*-*-*-*-*-*-*-*-*-*-*-*-*-*-*  ALLERGIES:  Allergies   Allergen Reactions   • Codeine    • Prednisone Itching   • Statins Headache   • Codeine Polt-Chlorphen Polt Er Irritability, Itching and Rash    CURRENT SCHEDULED MEDICATIONS:    Current Outpatient Medications:   •  acetaminophen (TYLENOL) 500 mg tablet, Take 2 tablets by mouth daily as needed for pain, Disp: 180 tablet, Rfl: 3  •  albuterol (PROVENTIL HFA,VENTOLIN HFA) 90 mcg/act inhaler, Inhale 2 puffs every 4 (four) hours as needed for wheezing, Disp: 18 g, Rfl: 0  •  Blood Glucose Monitoring Suppl (ACCU-CHEK EDILMA PLUS) w/Device KIT, by Does not apply route 2 (two) times a day (Patient taking differently: Use daily), Disp: 1 kit, Rfl: 0  •  cetirizine (ZyrTEC) 10 mg tablet, Take 1 tablet (10 mg total) by mouth daily, Disp: 90 tablet, Rfl: 0  •  Diclofenac Sodium (VOLTAREN) 1 %, Apply 2 g topically 4 (four) times a day (Patient taking differently: Apply 2 g topically if needed), Disp: 150 g, Rfl: 1  •  Flovent  MCG/ACT inhaler, INHALE TWO PUFFS TWO (TWO) TIMES A DAY RINSE MOUTH AFTER USE , Disp: 12 g, Rfl: 3  •  fluticasone (FLONASE) 50 mcg/act nasal spray, 1 spray into each nostril daily (Patient taking differently: 1 spray into each nostril if needed), Disp: 9 9 mL, Rfl: 0  •  glucose blood (Accu-Chek SmartView) test strip, USE DAILY AS INSTRUCTED, Disp: 100 each, Rfl: 11  •  hydrOXYzine HCL (ATARAX) 25 mg tablet, Take 1 tablet (25 mg total) by mouth 2 (two) times a day, Disp: 180 tablet, Rfl: 0  •  Keppra 500 MG tablet, Take 1 tablet (500 mg total) by mouth 3 (three) times a day, Disp: 270 tablet, Rfl: 3  •  Lancets (OneTouch Delica Plus [id number]) MISC, USE ONCE DAILY AS DIRECTED, Disp: 100 each, Rfl: 3  •  Lancets (OneTouch Delica Plus [id number]) MISC, USE AS INSTRUCTED (Patient not taking: Reported on 11/15/2022), Disp: 100 each, Rfl: 4  •  metFORMIN (GLUCOPHAGE) 500 mg tablet, Take 1 tablet (500 mg total) by mouth daily at bedtime, Disp: 180 tablet, Rfl: 3  •  metoprolol tartrate (LOPRESSOR) 25 mg tablet, TAKE HALF TABLET BY MOUTH EVERY 12 (TWELVE) HOURS, Disp: 90 tablet, Rfl: 2  •  omeprazole (PriLOSEC) 40 MG capsule, TAKE ONE CAPSULE BY MOUTH DAILY, Disp: 90 capsule, Rfl: 10  •  OneTouch Verio test strip, USE DAILY AS INSTRUCTED, Disp: 100 strip, Rfl: 9  •  sitaGLIPtin (Januvia) 25 mg tablet, Take 1 tablet (25 mg total) by mouth daily, Disp: 30 tablet, Rfl: 1  •  torsemide (DEMADEX) 20 mg tablet, TAKE ONE TABLET BY MOUTH AS NEEDED AS DIRECTED, Disp: 90 tablet, Rfl: 10  •  warfarin (COUMADIN) 5 mg tablet, TAKE 1/2 TO 1 TABLET DAILY BY MOUTH OR AS ORDERED BY PHYSICIAN , Disp: 90 tablet, Rfl: 3  •  erythromycin (ILOTYCIN) ophthalmic ointment, Administer 0 5 inches into the left eye daily at bedtime (Patient not taking: Reported on 3/29/2023), Disp: 3 5 g, Rfl: 0     *-*-*-*-*-*-*-*-*-*-*-*-*-*-*-*-*-*-*-*-*-*-*-*-*-*-*-*-*-*-*-*-*-*-*-*-*-*-*-*-*-*-*-*-*-*-*-*-*-*-*-*-*-*

## 2023-03-29 NOTE — ASSESSMENT & PLAN NOTE
Ms Andrew Kwok is a 70-year-old female with history of rheumatic heart disease status post mitral valve balloon valvuloplasty and subsequent mitral valve replacement in 2015, residual tricuspid valve regurgitation, and other comorbidities  From a symptom perspective she appears to be overall well with no recent symptoms that suggest prosthetic valve dysfunction  Her blood pressure is noted to be slightly on the higher side  She is not currently on antihypertensive medications  On physical examination there is no evidence of pulmonary or significant peripheral vascular congestion  ECG shows sinus bradycardia and is otherwise unremarkable  Her INR has been in therapeutic range  Her lipid profile from last year showed LDL about the desirable range  -- At this time I am advising her to continue current medications  -- I am going to request repeat blood work including comprehensive metabolic panel, CBC, iron studies, BNP level, lipid profile  -- I am advising her nonpharmacologic measures to improve her cholesterol   -- Will request an echocardiogram to reassess cardiac structure and function  -- Am providing her with a blood pressure tracking sheet with instructions to record random blood pressures  Our goal is to bring her systolic blood pressures close to 329 mmHg and diastolic blood pressure close to 70 mmHg  If her blood pressure is above this range she should be started on synovator/ARB therapy  -- We will follow-up with her 1 more time in 3 months to review the results of the tests  I am advising her to follow-up with her primary care physician within the 1 month to go over the results of the blood work and see if she needs treatment for iron deficiency anemia  -- Dietary and medical compliance are reinforced  She is advised to try to lose weight through dietary changes and physical activity    -- She is advised  to report any concerning symptoms such as chest pain, shortness of breath, decline in exercise tolerance or presyncope/syncope  -- Providing her with information about nonpharmacologic measures to improve cholesterol  NON-PHARMACOLOGIC MEASURES THAT IMPROVE CHOLESTEROL       1  Reduce intake of saturated fats to less than 7% of total calories  Some helpful tips to achieve this include:        -- use egg whites instead of whole eggs  -- use lean turkey-jerome instead of regular jerome        -- use low-fat margarine on your toast  instead of butter  -- use skim milk instead of whole milk  -- use Soy-based products as such as Soy milk, Tofu,    2  Reduced intake of dietary cholesterol to less than 200 mg per day  3  Increase intake of plant sterols and  viscous soluble fiber  -- Plant sterols and stanols (active daily doses, 400-3000 mg; expected LDL-C reduction of 8%-12%)  Plant sterols and stanols put in fat medium such as Margarine have been shown to lower LDL cholesterol ('Benecol', 'Smart Balance', Take Control' and similar products)  --   Soluble fibers including beta-glucan, psyllium, and glucomannan (active daily doses, 5-15 g; expected LDL-C reduction of 5%-15%)          Soluble viscous fibers such as  Oat ?-glucan (Oat Fiber) and Psyllium husk ( Metamucil etc ) lower cholesterol by reducing absorption of cholesterol bile acids and           delaying digestion of lipids  4  Increase consumption of nuts, especially as a replacement for candy bars or other high simple-carbohydrate foods (Nuts, especially walnuts and almonds are rich in mono or polyunsaturated fats that lower LDL cholesterol  They also reduced the glycemic load of the diet and may reduce insulin secretion and improve satiety  There also rich in a my no acid arginine, which is the precursor of nitric oxide that promote vasodilation )    5  Reduce weight through increasing physical activity/exercise and modifying diet      6  Increase dietary intake of Omega 3 fatty acids ( found predominantly in fatty fish such as [place]/Tuna/[place]/Sardines/Herring as well as fish oil supplements)  Omega 3 fatty acids reduce plasma concentrations of triglycerides and decreased overall risk for coronary heart disease  7  Limit alcohol intake and frequency to minimum  8  Consider taking Nutraceutical supplements that may further lower blood cholesterol:  1  Berberine (active daily doses, 500-1500 mg; expected LDL-C reduction of 90%-58%);   2  Garlic (active daily doses, 5-6 g; expected LDL-C reduction of 5%-10%);  3  Green tea extracts (active daily doses,  g; expected LDL-C reduction of 5%)     HEALTH TIPS FOR PATIENTS WITH ABNORMAL CHOLESTEROL LEVELS INCLUDING ELEVATED TRIGLYCERIDES:    1  Sugar intake should be restricted  Most sugars, especially fructose, stimulatehepatic synthesis of free fatty acids ( FFAs) and thus FFA and triglycerides (TG) accumulation in the liver  Thisaccumulation drives VLDL production and increases plasma TG level in the mild to moderate range ([id number] mg/dL)  Fructose is prevalent in the Western diet,being about half the content of sucrose (white or brown sugar), of high-fructosecorn syrup, and also of honey, and being more than half the content of the sugarin fruit juice and certain other sweeteners, like agave  Thus, restriction of sugar-sweetened beverages, other added sugars, and fruit juice (but not fruit) is usuallyhelpful to reduce TG levels  Nonsugar carbohydrate (starch) has a weak TGlevel-raising effect, mainly if low in fiber, so restricting total carbohydrates mayslightly reduce TG levels  2  Increase dietary fiber intake-- fibrous food lowers obstruction of bad cholesterol in the blood stream   Fiber has a modest effect to blunt TG level increase with sugar and other carbohydrates  3  Fat intake may need to be restricted   Dietary fat drives chylomicron production andrestricting dietary fat is the most effective way to reduce TG levels of more thanabout 800 mg/dL  Saturated fats are slightly more likely to lead to HTG than unsat-urated fats, but fatty fish has a paradoxic effect of lowering TG levels because ofthe several favorable effects of omega-3 oil on TG metabolism  4  Physical activity should be increased  Increased exercise tends to reduce TGlevels, likely because of increased muscle TG oxidation as a fuel source, improvedglucose and insulin metabolism (including increased LPL activity/TG lipolysis), anddecreased hepatic TG and FFA content  5  Weight loss should be encouraged  Whether by decreasing total calories orincreasing activity, negative caloric balance reduces TG levels, likely related toimprovement in insulin resistance and decreased hepatic TG and FFA content  6  Restriction of alcohol intake may be beneficial  Ethanol increases TG levels in manypatients at any level of intake, and so a trial of decreased consumption is warrantedfor plasma TG levels of more than about 200 mg/dL in patients who consume morethan about 2 servings per week  Tobacco and marijuana have minor TG level-raising effects that are generally not of clinical importance  7  Home remedies to reduce triglycerides:  Fresh garlic (Eat 2-3 raw garlic close on empty stomach daily), apple cider vinegar,Cayenne pepper, Cinnamon, Coriander seeds, Enzo mushroom, Nuts ( walnuts, almonds, yue nuts- Nuts are rich in polyunsaturated fatty acids, this component helps to reduce bad cholesterol)

## 2023-03-31 ENCOUNTER — TELEPHONE (OUTPATIENT)
Dept: CARDIOLOGY CLINIC | Facility: CLINIC | Age: 63
End: 2023-03-31

## 2023-03-31 DIAGNOSIS — Z95.2 S/P MVR (MITRAL VALVE REPLACEMENT): Primary | ICD-10-CM

## 2023-04-03 ENCOUNTER — CLINICAL SUPPORT (OUTPATIENT)
Dept: FAMILY MEDICINE CLINIC | Facility: CLINIC | Age: 63
End: 2023-04-03

## 2023-04-03 DIAGNOSIS — J30.9 ALLERGIC RHINITIS, UNSPECIFIED SEASONALITY, UNSPECIFIED TRIGGER: ICD-10-CM

## 2023-04-03 DIAGNOSIS — Z23 ENCOUNTER FOR IMMUNIZATION: Primary | ICD-10-CM

## 2023-04-03 RX ORDER — CETIRIZINE HYDROCHLORIDE 10 MG/1
10 TABLET ORAL DAILY
Qty: 90 TABLET | Refills: 2 | Status: SHIPPED | OUTPATIENT
Start: 2023-04-03

## 2023-04-03 RX ORDER — CETIRIZINE HYDROCHLORIDE 10 MG/1
10 TABLET ORAL DAILY
Qty: 90 TABLET | Refills: 0 | OUTPATIENT
Start: 2023-04-03

## 2023-04-03 NOTE — PROGRESS NOTES
Name: Jessee Young      : 1960      MRN: [id number]  Encounter Provider: Nurse Noreen SILVA  Encounter Date: 4/3/2023   Encounter department: [place] [address]  Encounter for immunization  -     Zoster Vaccine Recombinant IM           Subjective      HPI  Review of Systems    Current Outpatient Medications on File Prior to Visit   Medication Sig   • acetaminophen (TYLENOL) 500 mg tablet Take 2 tablets by mouth daily as needed for pain   • albuterol (PROVENTIL HFA,VENTOLIN HFA) 90 mcg/act inhaler Inhale 2 puffs every 4 (four) hours as needed for wheezing   • Blood Glucose Monitoring Suppl (ACCU-CHEK EDILMA PLUS) w/Device KIT by Does not apply route 2 (two) times a day (Patient taking differently: Use daily)   • Diclofenac Sodium (VOLTAREN) 1 % Apply 2 g topically 4 (four) times a day (Patient taking differently: Apply 2 g topically if needed)   • Flovent  MCG/ACT inhaler INHALE TWO PUFFS TWO (TWO) TIMES A DAY RINSE MOUTH AFTER USE     • fluticasone (FLONASE) 50 mcg/act nasal spray 1 spray into each nostril daily (Patient taking differently: 1 spray into each nostril if needed)   • glucose blood (Accu-Chek SmartView) test strip USE DAILY AS INSTRUCTED   • hydrOXYzine HCL (ATARAX) 25 mg tablet Take 1 tablet (25 mg total) by mouth 2 (two) times a day   • Keppra 500 MG tablet Take 1 tablet (500 mg total) by mouth 3 (three) times a day   • Lancets (OneTouch Delica Plus [id number]) MISC USE ONCE DAILY AS DIRECTED   • metFORMIN (GLUCOPHAGE) 500 mg tablet Take 1 tablet (500 mg total) by mouth daily at bedtime   • metoprolol tartrate (LOPRESSOR) 25 mg tablet TAKE HALF TABLET BY MOUTH EVERY 12 (TWELVE) HOURS   • omeprazole (PriLOSEC) 40 MG capsule TAKE ONE CAPSULE BY MOUTH DAILY   • OneTouch Verio test strip USE DAILY AS INSTRUCTED   • sitaGLIPtin (Januvia) 25 mg tablet Take 1 tablet (25 mg total) by mouth daily   • torsemide (DEMADEX) 20 mg tablet TAKE ONE TABLET BY MOUTH AS NEEDED AS DIRECTED   • warfarin (COUMADIN) 5 mg tablet TAKE 1/2 TO 1 TABLET DAILY BY MOUTH OR AS ORDERED BY PHYSICIAN  • [DISCONTINUED] cetirizine (ZyrTEC) 10 mg tablet Take 1 tablet (10 mg total) by mouth daily   • [DISCONTINUED] erythromycin (ILOTYCIN) ophthalmic ointment Administer 0 5 inches into the left eye daily at bedtime (Patient not taking: Reported on 3/29/2023)   • [DISCONTINUED] Lancets (OneTouch Delica Plus [id number]) MISC USE AS INSTRUCTED (Patient not taking: Reported on 11/15/2022)       Objective     There were no vitals taken for this visit      Nurse Izabel SILVA

## 2023-04-07 ENCOUNTER — APPOINTMENT (OUTPATIENT)
Dept: LAB | Facility: CLINIC | Age: 63
End: 2023-04-07

## 2023-04-07 ENCOUNTER — ANTICOAG VISIT (OUTPATIENT)
Dept: CARDIOLOGY CLINIC | Facility: CLINIC | Age: 63
End: 2023-04-07

## 2023-04-07 DIAGNOSIS — I07.1 RHEUMATIC TRICUSPID VALVE REGURGITATION: ICD-10-CM

## 2023-04-07 DIAGNOSIS — Z95.2 HISTORY OF PROSTHETIC HEART VALVE: Primary | ICD-10-CM

## 2023-04-07 DIAGNOSIS — Z86.2 HISTORY OF IRON DEFICIENCY ANEMIA: ICD-10-CM

## 2023-04-07 LAB
ALBUMIN SERPL BCP-MCNC: 3.5 G/DL (ref 3.5–5)
ALP SERPL-CCNC: 133 U/L (ref 46–116)
ALT SERPL W P-5'-P-CCNC: 19 U/L (ref 12–78)
ANION GAP SERPL CALCULATED.3IONS-SCNC: 3 MMOL/L (ref 4–13)
AST SERPL W P-5'-P-CCNC: 21 U/L (ref 5–45)
BASOPHILS # BLD AUTO: 0.08 THOUSANDS/ÂΜL (ref 0–0.1)
BASOPHILS NFR BLD AUTO: 1 % (ref 0–1)
BILIRUB SERPL-MCNC: 0.62 MG/DL (ref 0.2–1)
BUN SERPL-MCNC: 15 MG/DL (ref 5–25)
CALCIUM SERPL-MCNC: 9.6 MG/DL (ref 8.3–10.1)
CHLORIDE SERPL-SCNC: 108 MMOL/L (ref 96–108)
CHOLEST SERPL-MCNC: 221 MG/DL
CO2 SERPL-SCNC: 27 MMOL/L (ref 21–32)
CREAT SERPL-MCNC: 0.82 MG/DL (ref 0.6–1.3)
EOSINOPHIL # BLD AUTO: 0.16 THOUSAND/ÂΜL (ref 0–0.61)
EOSINOPHIL NFR BLD AUTO: 3 % (ref 0–6)
ERYTHROCYTE [DISTWIDTH] IN BLOOD BY AUTOMATED COUNT: 15.2 % (ref 11.6–15.1)
FERRITIN SERPL-MCNC: 36 NG/ML (ref 8–388)
GFR SERPL CREATININE-BSD FRML MDRD: 75 ML/MIN/1.73SQ M
GLUCOSE P FAST SERPL-MCNC: 141 MG/DL (ref 65–99)
HCT VFR BLD AUTO: 38 % (ref 34.8–46.1)
HDLC SERPL-MCNC: 48 MG/DL
HGB BLD-MCNC: 11.3 G/DL (ref 11.5–15.4)
IMM GRANULOCYTES # BLD AUTO: 0.02 THOUSAND/UL (ref 0–0.2)
IMM GRANULOCYTES NFR BLD AUTO: 0 % (ref 0–2)
INR PPP: 3 (ref 0.84–1.19)
IRON SATN MFR SERPL: 9 % (ref 15–50)
IRON SERPL-MCNC: 35 UG/DL (ref 50–170)
LDLC SERPL CALC-MCNC: 135 MG/DL (ref 0–100)
LYMPHOCYTES # BLD AUTO: 1.48 THOUSANDS/ÂΜL (ref 0.6–4.47)
LYMPHOCYTES NFR BLD AUTO: 25 % (ref 14–44)
MCH RBC QN AUTO: 23.7 PG (ref 26.8–34.3)
MCHC RBC AUTO-ENTMCNC: 29.7 G/DL (ref 31.4–37.4)
MCV RBC AUTO: 80 FL (ref 82–98)
MONOCYTES # BLD AUTO: 0.42 THOUSAND/ÂΜL (ref 0.17–1.22)
MONOCYTES NFR BLD AUTO: 7 % (ref 4–12)
NEUTROPHILS # BLD AUTO: 3.69 THOUSANDS/ÂΜL (ref 1.85–7.62)
NEUTS SEG NFR BLD AUTO: 64 % (ref 43–75)
NONHDLC SERPL-MCNC: 173 MG/DL
NRBC BLD AUTO-RTO: 0 /100 WBCS
NT-PROBNP SERPL-MCNC: 352 PG/ML
PLATELET # BLD AUTO: 264 THOUSANDS/UL (ref 149–390)
PMV BLD AUTO: 10.4 FL (ref 8.9–12.7)
POTASSIUM SERPL-SCNC: 3.9 MMOL/L (ref 3.5–5.3)
PROT SERPL-MCNC: 7.7 G/DL (ref 6.4–8.4)
PROTHROMBIN TIME: 31.4 SECONDS (ref 11.6–14.5)
RBC # BLD AUTO: 4.76 MILLION/UL (ref 3.81–5.12)
SODIUM SERPL-SCNC: 138 MMOL/L (ref 135–147)
TIBC SERPL-MCNC: 400 UG/DL (ref 250–450)
TRIGL SERPL-MCNC: 192 MG/DL
TSH SERPL DL<=0.05 MIU/L-ACNC: 1.61 UIU/ML (ref 0.45–4.5)
WBC # BLD AUTO: 5.85 THOUSAND/UL (ref 4.31–10.16)

## 2023-04-07 NOTE — PROGRESS NOTES
Spoke with patient, advised INR good, current dose verified, continue 5 mg Sun Tues Fri, 2 5 mg all other days, will recheck in 4 weeks 5/5/23

## 2023-05-16 LAB
LEFT EYE DIABETIC RETINOPATHY: NORMAL
RIGHT EYE DIABETIC RETINOPATHY: NORMAL

## 2023-05-23 ENCOUNTER — TELEPHONE (OUTPATIENT)
Dept: CARDIOLOGY CLINIC | Facility: CLINIC | Age: 63
End: 2023-05-23

## 2023-05-25 ENCOUNTER — APPOINTMENT (OUTPATIENT)
Dept: LAB | Facility: CLINIC | Age: 63
End: 2023-05-25

## 2023-05-25 ENCOUNTER — ANTICOAG VISIT (OUTPATIENT)
Dept: CARDIOLOGY CLINIC | Facility: CLINIC | Age: 63
End: 2023-05-25

## 2023-05-25 DIAGNOSIS — D50.9 IRON DEFICIENCY ANEMIA, UNSPECIFIED IRON DEFICIENCY ANEMIA TYPE: ICD-10-CM

## 2023-05-25 DIAGNOSIS — Z95.2 HISTORY OF PROSTHETIC HEART VALVE: Primary | ICD-10-CM

## 2023-05-25 LAB
FERRITIN SERPL-MCNC: 13 NG/ML (ref 11–307)
IRON SATN MFR SERPL: 10 % (ref 15–50)
IRON SERPL-MCNC: 36 UG/DL (ref 50–170)
TIBC SERPL-MCNC: 376 UG/DL (ref 250–450)

## 2023-06-08 ENCOUNTER — APPOINTMENT (OUTPATIENT)
Dept: LAB | Facility: CLINIC | Age: 63
End: 2023-06-08
Payer: MEDICARE

## 2023-06-08 ENCOUNTER — ANTICOAG VISIT (OUTPATIENT)
Dept: CARDIOLOGY CLINIC | Facility: CLINIC | Age: 63
End: 2023-06-08

## 2023-06-08 DIAGNOSIS — Z95.2 HISTORY OF PROSTHETIC HEART VALVE: Primary | ICD-10-CM

## 2023-06-08 NOTE — PROGRESS NOTES
Spoke with patient, advised INR good, current dose verified, continue 5 mg Mon Fri, 2 5 mg all other days, will recheck in 3 weeks 6/29/23

## 2023-06-12 DIAGNOSIS — E13.9 DIABETES 1.5, MANAGED AS TYPE 2 (HCC): ICD-10-CM

## 2023-06-19 ENCOUNTER — OFFICE VISIT (OUTPATIENT)
Dept: CARDIOLOGY CLINIC | Facility: CLINIC | Age: 63
End: 2023-06-19
Payer: MEDICARE

## 2023-06-19 VITALS
HEART RATE: 66 BPM | DIASTOLIC BLOOD PRESSURE: 70 MMHG | SYSTOLIC BLOOD PRESSURE: 120 MMHG | BODY MASS INDEX: 45.15 KG/M2 | WEIGHT: 221 LBS

## 2023-06-19 DIAGNOSIS — E78.5 DYSLIPIDEMIA: ICD-10-CM

## 2023-06-19 DIAGNOSIS — Z95.2 HISTORY OF PROSTHETIC HEART VALVE: ICD-10-CM

## 2023-06-19 DIAGNOSIS — Z95.2 H/O MITRAL VALVE REPLACEMENT WITH MECHANICAL VALVE: ICD-10-CM

## 2023-06-19 DIAGNOSIS — E78.2 MIXED HYPERLIPIDEMIA: ICD-10-CM

## 2023-06-19 DIAGNOSIS — I07.1 RHEUMATIC TRICUSPID VALVE REGURGITATION: ICD-10-CM

## 2023-06-19 DIAGNOSIS — Z78.9 STATIN INTOLERANCE: ICD-10-CM

## 2023-06-19 DIAGNOSIS — I09.9 RHEUMATIC HEART DISEASE: Primary | ICD-10-CM

## 2023-06-19 DIAGNOSIS — I15.9 SECONDARY HYPERTENSION: ICD-10-CM

## 2023-06-19 PROCEDURE — 99214 OFFICE O/P EST MOD 30 MIN: CPT | Performed by: INTERNAL MEDICINE

## 2023-06-19 RX ORDER — OMEGA-3-ACID ETHYL ESTERS 1 G/1
2 CAPSULE, LIQUID FILLED ORAL 2 TIMES DAILY
Qty: 180 CAPSULE | Refills: 3 | Status: SHIPPED | OUTPATIENT
Start: 2023-06-19

## 2023-06-19 RX ORDER — SPIRONOLACTONE 25 MG/1
25 TABLET ORAL DAILY
Qty: 30 TABLET | Refills: 3 | Status: SHIPPED | OUTPATIENT
Start: 2023-06-19 | End: 2023-06-26

## 2023-06-19 RX ORDER — ROSUVASTATIN CALCIUM 5 MG/1
5 TABLET, COATED ORAL 3 TIMES WEEKLY
Qty: 30 TABLET | Refills: 3 | Status: SHIPPED | OUTPATIENT
Start: 2023-06-19

## 2023-06-19 NOTE — PROGRESS NOTES
[facility]  [facility] [address] [facility], [place]ovanna Bernal [address]  Phone#  [phone]  Fax#  [phone]  *-*-*-*-*-*-*-*-*-*-*-*-*-*-*-*-*-*-*-*-*-*-*-*-*-*-*-*-*-*-*-*-*-*-*-*-*-*-*-*-*-*-*-*-*-*-*-*-*-*-*-*-*-*  ENCOUNTER DATE: 06/19/23 7:35 PM  PATIENT NAME: Jovita Ramirez   1960    [id number]  AGE:63 y o  SEX: female  Izabel Rodriguez MD     PRIMARY CARE PHYSICIAN: Modesta Anderson MD    DIAGNOSES:  1  Rheumatic heart disease, status post balloon valvuloplasty in 2014 in [address], status post mitral valve replacement with mechanical valve (7 mm [employer]) in June 2015 at [address]  2  Rheumatic tricuspid valve regurgitation  3  Chronic anticoagulation with warfarin, goal INR 2 5-3 5  4  Iron deficiency anemia  5  Mild obstructive sleep apnea by sleep study in 2020  6  Primary hypertension  7  Mild intermittent asthma  8  Benign positional vertigo  9  Diabetes mellitus  10  Obesity  11  Chronic diarrhea  12  Vitamin D deficiency  13  Uterine fibroid  14  Lymphedema of lower extremities  15  Paroxysmal atrial fibrillation, status post LOUIE cardioversion 2015  16  History of stroke  following mitral valve balloon valvoplasty in 2014, previously with right residual deficit now resolved  17   Degenerative joint disease with arthritis of the knees bilaterally  18  Epilepsy with last epileptic seizure in about 2016  19  History of shingles  20   GERD    ECHOCARDIOGRAM 4/10/2023:   Mild concentric left ventricular hypertrophy, normal left ventricle systolic function, indeterminate diastolic function, EF 52%, normal right ventricle function, mild left atrial cavity enlargement, dilated right atrium, mildly thickened aortic valve with no aortic valve stenosis, mild aortic valve regurgitation, mechanical mitral valve prosthesis with mild stenosis, mean gradient 5 mmHg, trace mitral valve regurgitation, mild to moderate tricuspid valve regurgitation, mild to moderate pulmonary hypertension, no pericardial effusion  ECHOCARDIOGRAM 7/21/2020:  Normal left ventricular function, EF 60%, normal regional wall motion, normal cavity size, indeterminate diastolic function, normal right ventricle size and function, normal right atrial size left atrium at upper limit of normal, normal aortic valve with mild aortic valve regurgitation, mechanical mitral valve prosthesis with very minimal stenosis with no significant regurgitation, moderate tricuspid valve regurgitation, estimated right ventricular systolic pressure at upper limit of normal, normal inferior vena cava size, normal aortic root size, no pericardial effusion  CARDIAC CATHETERIZATION 5/14/2015:  Left main: Normal  LAD: Normal, normal major diagonal branch  LCx: Normal  RCA: Normal    CURRENT ECG     No results found for this visit on 06/19/23  CARDIOLOGY ASSESSMENT & PLAN     1  Rheumatic heart disease        2  Secondary hypertension  spironolactone (ALDACTONE) 25 mg tablet    Basic metabolic panel    Creatine Kinase, Total    C-reactive protein      3  Mixed hyperlipidemia  omega-3-acid ethyl esters (LOVAZA) 1 g capsule    rosuvastatin (CRESTOR) 5 mg tablet      4  Rheumatic tricuspid valve regurgitation        5  Dyslipidemia        6  History of prosthetic heart valve        7  Statin intolerance  Creatine Kinase, Total    C-reactive protein      8  H/O mitral valve replacement with mechanical valve          Rheumatic heart disease  Mr Chivo Reed is overall stable from cardiac perspective although she has noticed increasing frequency of headaches which some of which she relates to antihypertensive medication losartan  She gives no history that suggest decompensated heart failure or pulmonary hypertension or arrhythmias  Blood pressure is well controlled  She is noted to have progressive weight gain  She takes furosemide on an as-needed basis    On physical examination trace pedal edema is noted  Last blood work from April is significant for stable renal function, elevated NT proBNP level, increased lipids  Her INR is therapeutic  -- Today we discussed the options regarding treatment of her dyslipidemia  She is agreeable to try low-dose of statin  She previously was on pravastatin and reported muscle aches with that  At this time I am prescribing her prescription omega-3 fatty acids  In addition I am advising her to take rosuvastatin 3 times weekly 5 mg  Advised her that if she is noticing increasing muscle aches or fatigue with the initiation of this medicine then she can discontinue it  -- I am okay with her discontinuing losartan  I am putting her on spironolactone 25 mg once daily  -- Depending on her symptoms we will get work-up for inflammatory markers and creatinine kinase along with a basic metabolic panel in 1 month time  -- She is advised to discuss her headaches with her primary care physician and call us if they become more frequent after the medication changes or if she experiences any visual symptoms or lightheadedness or dizziness  Advised her to monitor her blood pressures  -- Dietary and medical compliance are reinforced  -- She is advised  to report any concerning symptoms such as chest pain, shortness of breath, decline in exercise tolerance or presyncope/syncope  NON-PHARMACOLOGIC MEASURES THAT IMPROVE CHOLESTEROL       1  Reduce intake of saturated fats to less than 7% of total calories  Some helpful tips to achieve this include:        -- use egg whites instead of whole eggs  -- use lean turkey-jerome instead of regular jerome        -- use low-fat margarine on your toast  instead of butter  -- use skim milk instead of whole milk  -- use Soy-based products as such as Soy milk, Tofu,    2  Reduced intake of dietary cholesterol to less than 200 mg per day  3  Increase intake of plant sterols and  viscous soluble fiber      -- Plant sterols and stanols (active daily doses, 400-3000 mg; expected LDL-C reduction of 8%-12%)  Plant sterols and stanols put in fat medium such as Margarine have been shown to lower LDL cholesterol ('Benecol', 'Smart Balance', Take Control' and similar products)  --   Soluble fibers including beta-glucan, psyllium, and glucomannan (active daily doses, 5-15 g; expected LDL-C reduction of 5%-15%)          Soluble viscous fibers such as  Oat ?-glucan (Oat Fiber) and Psyllium husk ( Metamucil etc ) lower cholesterol by reducing absorption of cholesterol bile acids and           delaying digestion of lipids  4  Increase consumption of nuts, especially as a replacement for candy bars or other high simple-carbohydrate foods (Nuts, especially walnuts and almonds are rich in mono or polyunsaturated fats that lower LDL cholesterol  They also reduced the glycemic load of the diet and may reduce insulin secretion and improve satiety  There also rich in a my no acid arginine, which is the precursor of nitric oxide that promote vasodilation )    5  Reduce weight through increasing physical activity/exercise and modifying diet  6  Increase dietary intake of Omega 3 fatty acids ( found predominantly in fatty fish such as [place]/Tuna/[place]/Sardines/Herring as well as fish oil supplements)  Omega 3 fatty acids reduce plasma concentrations of triglycerides and decreased overall risk for coronary heart disease  7  Limit alcohol intake and frequency to minimum  8  Consider taking Nutraceutical supplements that may further lower blood cholesterol:  1  Berberine (active daily doses, 500-1500 mg; expected LDL-C reduction of 65%-96%);   2  Garlic (active daily doses, 5-6 g; expected LDL-C reduction of 5%-10%);  3  Green tea extracts (active daily doses,  g; expected LDL-C reduction of 5%)     HEALTH TIPS FOR PATIENTS WITH ABNORMAL CHOLESTEROL LEVELS INCLUDING ELEVATED TRIGLYCERIDES:    1   Sugar intake should be restricted  Most sugars, especially fructose, stimulatehepatic synthesis of free fatty acids ( FFAs) and thus FFA and triglycerides (TG) accumulation in the liver  Thisaccumulation drives VLDL production and increases plasma TG level in the mild to moderate range (v602-077 mg/dL)  Fructose is prevalent in the Western diet,being about half the content of sucrose (white or brown sugar), of high-fructosecorn syrup, and also of honey, and being more than half the content of the sugarin fruit juice and certain other sweeteners, like agave  Thus, restriction of sugar-sweetened beverages, other added sugars, and fruit juice (but not fruit) is usuallyhelpful to reduce TG levels  Nonsugar carbohydrate (starch) has a weak TGlevel-raising effect, mainly if low in fiber, so restricting total carbohydrates mayslightly reduce TG levels  2  Increase dietary fiber intake-- fibrous food lowers obstruction of bad cholesterol in the blood stream   Fiber has a modest effect to blunt TG level increase with sugar and other carbohydrates  3  Fat intake may need to be restricted  Dietary fat drives chylomicron production andrestricting dietary fat is the most effective way to reduce TG levels of more thanabout 800 mg/dL  Saturated fats are slightly more likely to lead to HTG than unsat-urated fats, but fatty fish has a paradoxic effect of lowering TG levels because ofthe several favorable effects of omega-3 oil on TG metabolism  4  Physical activity should be increased  Increased exercise tends to reduce TGlevels, likely because of increased muscle TG oxidation as a fuel source, improvedglucose and insulin metabolism (including increased LPL activity/TG lipolysis), anddecreased hepatic TG and FFA content  5  Weight loss should be encouraged   Whether by decreasing total calories orincreasing activity, negative caloric balance reduces TG levels, likely related toimprovement in insulin resistance and decreased hepatic TG and FFA content  6  Restriction of alcohol intake may be beneficial  Ethanol increases TG levels in manypatients at any level of intake, and so a trial of decreased consumption is warrantedfor plasma TG levels of more than about 200 mg/dL in patients who consume morethan about 2 servings per week  Tobacco and marijuana have minor TG level-raising effects that are generally not of clinical importance  7  Home remedies to reduce triglycerides:  Fresh garlic (Eat 2-3 raw garlic close on empty stomach daily), apple cider vinegar,Cayenne pepper, Cinnamon, Coriander seeds, Enzo mushroom, Nuts ( walnuts, almonds, yue nuts- Nuts are rich in polyunsaturated fatty acids, this component helps to reduce bad cholesterol)  INTERVAL HISTORY & HISTORY OF PRESENT ILLNESS     Shirley Villalba is here for follow-up regarding her cardiac comorbidities which include: History of rheumatic heart disease status post mitral valve replacement with mechanical valve in 2015, rheumatic tricuspid valve regurgitation, hypertension, diabetes, dyslipidemia and other comorbidities  She was last seen by me in March 23  At last visit she was for to undergo an echocardiogram     Today she mentions that she has been overall all right but has been dealing with significant headaches  She mentions that he started after she initiated losartan therapy prescribed by her family physician  Denies chest pain shortness of breath with normal activities or palpitations or dizziness or passing out or near passing out  Does report lightheaded when she is bending down and also gets short of breath at the time  Denies any passing out or near passing out  Denies any excessive bruising or any bleeding  Functional capacity status: Moderate to good   (Excellent- >10 METs; Good: (7-10 METs); Moderate (4-7 METs);  Poor (<= 4 METs)    Any chronic stressors: None   (feeling of poor health, financial problems, and social isolation etc)     Tobacco or alcohol dependence: He has never been a smoker  She does not drink  Family history significant for her dad having heart disease and dying at age 58 due to a stroke  There is no family history of sudden cardiac death  Current cardiac medications: Warfarin, metoprolol tartrate 25 mg twice daily, losartan 25 mg daily, torsemide 20 mg as needed Januvia, insulin  Blood work 4/7/2023:  Sodium 138 potassium 3 9 chloride 108 bicarb 27 BUN 15 creatinine 0 82  Alk phos slightly increased at 133, normal LFTs otherwise  NT proBNP level 352  Cholesterol 221 triglyceride 192 HDL 48 calculated   Decreased iron stores  Hemoglobin 11 3 hematocrit 38 0 MCV decreased at 80  Last INR 3 07 on 6/8/2023  TSH 6 5 in April 2023  Hemoglobin A1c 6 5 in April 2023    REVIEW OF SYSTEMS   Positive for: As noted above in HPI  Negative for: All remaining as reviewed below and in HPI  SYSTEM SYMPTOMS REVIEWED:  General--weight change, fever, night sweats  Respiratory--cough, wheezing, shortness of breath, sputum production  Cardiovascular--chest pain, syncope, dyspnea on exertion, edema, decline in exercise tolerance, claudication   Gastrointestinal--persistent vomiting, diarrhea, abdominal distention, blood in stool   Muscular or skeletal--joint pain or swelling   Neurologic--headaches, syncope, abnormal movement  Hematologic--history of easy bruising and bleeding   Endocrine--thyroid enlargement, heat or cold intolerance, polyuria   Psychiatric--anxiety, depression     *-*-*-*-*-*-*-*-*-*-*-*-*-*-*-*-*-*-*-*-*-*-*-*-*-*-*-*-*-*-*-*-*-*-*-*-*-*-*-*-*-*-*-*-*-*-*-*-*-*-*-*-*-*-  VITAL SIGNS     CURRENT VITAL SIGNS:   Vitals:    06/19/23 1358   BP: 120/70   BP Location: Left arm   Patient Position: Sitting   Cuff Size: Large   Pulse: 66   Weight: 100 kg (221 lb)       BMI: Body mass index is 45 15 kg/m²      WEIGHTS:   Wt Readings from Last 25 Encounters:   06/19/23 100 kg (221 lb)   04/12/23 99 2 kg (218 lb 12 8 oz)   04/10/23 98 9 kg (218 lb)   03/29/23 98 9 kg (218 lb)   02/07/23 96 2 kg (212 lb)   01/10/23 96 2 kg (212 lb)   11/15/22 94 3 kg (208 lb)   10/17/22 97 1 kg (214 lb)   07/13/22 94 kg (207 lb 3 2 oz)   06/03/22 97 5 kg (215 lb)   05/10/22 97 5 kg (215 lb)   05/05/22 97 1 kg (214 lb)   03/08/22 99 2 kg (218 lb 12 8 oz)   02/01/22 100 kg (221 lb)   09/23/21 106 kg (233 lb)   08/26/21 108 kg (237 lb)   08/16/21 106 kg (233 lb)   07/06/21 109 kg (240 lb 12 8 oz)   06/23/21 110 kg (242 lb 12 8 oz)   06/19/21 111 kg (245 lb)   06/15/21 114 kg (250 lb 12 8 oz)   05/03/21 116 kg (255 lb 12 8 oz)   12/21/20 112 kg (248 lb)   11/02/20 111 kg (244 lb 6 4 oz)   09/07/20 108 kg (239 lb)        *-*-*-*-*-*-*-*-*-*-*-*-*-*-*-*-*-*-*-*-*-*-*-*-*-*-*-*-*-*-*-*-*-*-*-*-*-*-*-*-*-*-*-*-*-*-*-*-*-*-*-*-*-*-  PHYSICAL EXAM     General Appearance:    Alert, cooperative, no distress, appears stated age, slightly obese   Head, Eyes, ENT:    No obvious abnormality, moist mucous mebranes  Neck:   Supple, no carotid bruit or JVD   Back:     Symmetric, no curvature  Lungs:     Respirations unlabored  Clear to auscultation bilaterally,    Chest wall:    No tenderness or deformity   Heart:    Regular rate and rhythm, brisk bmechanical heart sounds without any murmur   Abdomen:     Soft, non-tender, Obese   Extremities:   Extremities warm, no cyanosis, lipodystrophy changes without definite edema  Skin:   No venostatic changes in lower extremities  Normal skin color, texture, and turgor   No rashes or lesions     *-*-*-*-*-*-*-*-*-*-*-*-*-*-*-*-*-*-*-*-*-*-*-*-*-*-*-*-*-*-*-*-*-*-*-*-*-*-*-*-*-*-*-*-*-*-*-*-*-*-*-*-*-*-  CURRENT MEDICATIONS LIST     Current Outpatient Medications:   •  acetaminophen (TYLENOL) 500 mg tablet, Take 2 tablets by mouth daily as needed for pain, Disp: 180 tablet, Rfl: 3  •  albuterol (PROVENTIL HFA,VENTOLIN HFA) 90 mcg/act inhaler, Inhale 2 puffs every 4 (four) hours as needed for wheezing, Disp: 18 g, Rfl: 0  •  Blood Glucose Monitoring Suppl (ACCU-CHEK EDILMA PLUS) w/Device KIT, by Does not apply route 2 (two) times a day (Patient taking differently: Use daily), Disp: 1 kit, Rfl: 0  •  cetirizine (ZyrTEC) 10 mg tablet, Take 1 tablet (10 mg total) by mouth daily, Disp: 90 tablet, Rfl: 2  •  Diclofenac Sodium (VOLTAREN) 1 %, Apply 2 g topically 4 (four) times a day (Patient taking differently: Apply 2 g topically if needed), Disp: 150 g, Rfl: 1  •  dorzolamide-timolol (COSOPT) 22 3-6 8 MG/ML ophthalmic solution, , Disp: , Rfl:   •  ferrous sulfate 324 (65 Fe) mg, Take 1 tablet (324 mg total) by mouth daily before breakfast, Disp: 30 tablet, Rfl: 2  •  Flovent  MCG/ACT inhaler, INHALE TWO PUFFS TWO (TWO) TIMES A DAY RINSE MOUTH AFTER USE , Disp: 12 g, Rfl: 3  •  fluticasone (FLONASE) 50 mcg/act nasal spray, 1 spray into each nostril daily (Patient taking differently: 1 spray into each nostril if needed), Disp: 9 9 mL, Rfl: 0  •  glucose blood (Accu-Chek SmartView) test strip, USE DAILY AS INSTRUCTED, Disp: 100 each, Rfl: 11  •  hydrOXYzine HCL (ATARAX) 25 mg tablet, TAKE ONE TABLET BY MOUTH TWICE A DAY, Disp: 180 tablet, Rfl: 0  •  Keppra 500 MG tablet, Take 1 tablet (500 mg total) by mouth 3 (three) times a day, Disp: 270 tablet, Rfl: 3  •  Lancets (OneTouch Delica Plus [id number]) MISC, USE ONCE DAILY AS DIRECTED, Disp: 100 each, Rfl: 3  •  metFORMIN (GLUCOPHAGE) 500 mg tablet, Take 1 tablet (500 mg total) by mouth daily at bedtime, Disp: 180 tablet, Rfl: 3  •  metoprolol tartrate (LOPRESSOR) 25 mg tablet, TAKE HALF TABLET BY MOUTH EVERY 12 (TWELVE) HOURS, Disp: 90 tablet, Rfl: 2  •  omega-3-acid ethyl esters (LOVAZA) 1 g capsule, Take 2 capsules (2 g total) by mouth 2 (two) times a day, Disp: 180 capsule, Rfl: 3  •  omeprazole (PriLOSEC) 40 MG capsule, TAKE ONE CAPSULE BY MOUTH DAILY, Disp: 90 capsule, Rfl: 10  •  OneTouch Verio test strip, USE DAILY AS INSTRUCTED, Disp: 100 strip, Rfl: 9  •  rosuvastatin (CRESTOR) 5 mg tablet, Take 1 tablet (5 mg total) by mouth 3 (three) times a week, Disp: 30 tablet, Rfl: 3  •  sitaGLIPtin (Januvia) 25 mg tablet, Take 1 tablet (25 mg total) by mouth daily, Disp: 30 tablet, Rfl: 2  •  spironolactone (ALDACTONE) 25 mg tablet, Take 1 tablet (25 mg total) by mouth daily, Disp: 30 tablet, Rfl: 3  •  torsemide (DEMADEX) 20 mg tablet, TAKE ONE TABLET BY MOUTH AS NEEDED AS DIRECTED, Disp: 90 tablet, Rfl: 10  •  warfarin (COUMADIN) 5 mg tablet, TAKE 1/2 TO 1 TABLET DAILY BY MOUTH OR AS ORDERED BY PHYSICIAN , Disp: 90 tablet, Rfl: 2       ALLERGIES     Allergies   Allergen Reactions   • Codeine    • Prednisone Itching   • Statins Headache   • Codeine Polt-Chlorphen Polt Er Irritability, Itching and Rash       *-*-*-*-*-*-*-*-*-*-*-*-*-*-*-*-*-*-*-*-*-*-*-*-*-*-*-*-*-*-*-*-*-*-*-*-*-*-*-*-*-*-*-*-*-*-*-*-*-*-*-*-*-*-  LABORATORY DATA     Sodium   Date Value Ref Range Status   06/07/2017 139 135 - 146 mmol/L Final   12/06/2016 138 135 - 146 mmol/L Final   09/01/2016 138 135 - 146 mmol/L Final     Sodium   Date Value Ref Range Status   06/07/2017 139 135 - 146 mmol/L Final   12/06/2016 138 135 - 146 mmol/L Final   09/01/2016 138 135 - 146 mmol/L Final     Potassium   Date Value Ref Range Status   04/07/2023 3 9 3 5 - 5 3 mmol/L Final   04/22/2022 4 1 3 5 - 5 3 mmol/L Final   06/19/2021 4 0 3 5 - 5 3 mmol/L Final     Comment:     Slightly Hemolyzed;  Results May be Affected   01/31/2018 4 2 3 5 - 5 3 mmol/L Final   06/07/2017 3 9 3 5 - 5 3 mmol/L Final   12/06/2016 3 7 3 5 - 5 3 mmol/L Final   09/01/2016 3 7 3 5 - 5 3 mmol/L Final     Chloride   Date Value Ref Range Status   04/07/2023 108 96 - 108 mmol/L Final   04/22/2022 107 100 - 108 mmol/L Final   06/19/2021 104 100 - 108 mmol/L Final   01/31/2018 104 98 - 110 mmol/L Final   06/07/2017 101 98 - 110 mmol/L Final   12/06/2016 98 98 - 110 mmol/L Final   09/01/2016 99 98 - 110 mmol/L Final     CO2   Date Value Ref Range Status   04/07/2023 27 21 - 32 mmol/L Final   04/22/2022 31 21 - 32 mmol/L Final   06/19/2021 30 21 - 32 mmol/L Final   01/31/2018 29 20 - 31 mmol/L Final   06/07/2017 30 20 - 31 mmol/L Final   12/06/2016 30 20 - 31 mmol/L Final   09/01/2016 27 20 - 31 mmol/L Final     Anion Gap   Date Value Ref Range Status   06/20/2015 5 4 - 13 mmol/L Final   06/19/2015 2 (L) 4 - 13 mmol/L Final   06/18/2015 6 4 - 13 mmol/L Final     BUN   Date Value Ref Range Status   04/07/2023 15 5 - 25 mg/dL Final   04/22/2022 20 5 - 25 mg/dL Final   06/19/2021 14 5 - 25 mg/dL Final   01/31/2018 16 7 - 25 mg/dL Final   06/07/2017 12 7 - 25 mg/dL Final   12/06/2016 11 7 - 25 mg/dL Final   09/01/2016 15 7 - 25 mg/dL Final     Creatinine   Date Value Ref Range Status   04/07/2023 0 82 0 60 - 1 30 mg/dL Final     Comment:     Standardized to IDMS reference method   04/22/2022 0 88 0 60 - 1 30 mg/dL Final     Comment:     Standardized to IDMS reference method   06/19/2021 0 77 0 60 - 1 30 mg/dL Final     Comment:     Standardized to IDMS reference method   06/07/2017 0 80 0 50 - 1 05 mg/dL Final     Comment:     Result Comment: For patients >52years of age, the reference limit  for Creatinine is approximately 13% higher for people  identified as -American      12/06/2016 0 93 0 50 - 1 05 mg/dL Final     Comment:     Result Comment: For patients >52years of age, the reference limit  for Creatinine is approximately 13% higher for people  identified as -American      09/01/2016 0 86 0 50 - 1 05 mg/dL Final     Comment:     Result Comment: For patients >52years of age, the reference limit  for Creatinine is approximately 13% higher for people  identified as -American         eGFR   Date Value Ref Range Status   04/07/2023 75 ml/min/1 73sq m Final   04/22/2022 70 ml/min/1 73sq m Final   06/19/2021 83 ml/min/1 73sq m Final     Glucose   Date Value Ref Range Status   06/20/2015 124 65 - 140 mg/dL Final     Comment:     If patient is fasting, the ADA then defines impaired fasting glucose as  >100 mg/dl and diabetes as  >or equal to 126 mg/dl  06/19/2015 105 65 - 140 mg/dL Final     Comment:     If patient is fasting, the ADA then defines impaired fasting glucose as  >100 mg/dl and diabetes as  >or equal to 126 mg/dl  06/18/2015 138 65 - 140 mg/dL Final     Comment:     If patient is fasting, the ADA then defines impaired fasting glucose as  >100 mg/dl and diabetes as  >or equal to 126 mg/dl  Calcium   Date Value Ref Range Status   04/07/2023 9 6 8 3 - 10 1 mg/dL Final   04/22/2022 9 9 8 3 - 10 1 mg/dL Final   06/19/2021 9 5 8 3 - 10 1 mg/dL Final   01/31/2018 9 7 8 6 - 10 4 mg/dL Final   06/07/2017 9 2 8 6 - 10 4 mg/dL Final   12/06/2016 9 6 8 6 - 10 4 mg/dL Final   09/01/2016 9 6 8 6 - 10 4 mg/dL Final     AST   Date Value Ref Range Status   04/07/2023 21 5 - 45 U/L Final     Comment:     Specimen collection should occur prior to Sulfasalazine administration due to the potential for falsely depressed results  04/22/2022 20 5 - 45 U/L Final     Comment:     Specimen collection should occur prior to Sulfasalazine administration due to the potential for falsely depressed results  06/19/2021 33 5 - 45 U/L Final     Comment:     Slightly Hemolyzed; Results May be Affected  Specimen collection should occur prior to Sulfasalazine administration due to the potential for falsely depressed results  01/31/2018 18 10 - 35 U/L Final   06/07/2017 19 10 - 35 U/L Final   02/24/2017 21 10 - 35 U/L Final   12/06/2016 20 10 - 35 U/L Final     ALT   Date Value Ref Range Status   04/07/2023 19 12 - 78 U/L Final     Comment:     Specimen collection should occur prior to Sulfasalazine and/or Sulfapyridine administration due to the potential for falsely depressed results  04/22/2022 19 12 - 78 U/L Final     Comment:     Specimen collection should occur prior to Sulfasalazine and/or Sulfapyridine administration due to the potential for falsely depressed results  06/19/2021 28 12 - 78 U/L Final     Comment:     Specimen collection should occur prior to Sulfasalazine and/or Sulfapyridine administration due to the potential for falsely depressed results      01/31/2018 13 6 - 29 U/L Final   06/07/2017 14 6 - 29 U/L Final     Comment:     Performed at: [id number] [facility], [place], [address] [place], [place] [address]     02/24/2017 15 6 - 29 U/L Final     Comment:     Performed at: [id number] [email or web address] Floor, [place], [address] [place], [place] [address]     12/06/2016 14 6 - 29 U/L Final     Comment:     Performed at: [id number] [facility],[address] [facility], [place], [facility] [address], [place] [address]       Alkaline Phosphatase   Date Value Ref Range Status   04/07/2023 133 (H) 46 - 116 U/L Final   04/22/2022 137 (H) 46 - 116 U/L Final   06/19/2021 129 (H) 46 - 116 U/L Final   01/31/2018 145 (H) 33 - 130 U/L Final   06/07/2017 156 (H) 33 - 130 U/L Final   02/24/2017 167 (H) 33 - 130 U/L Final   12/06/2016 168 (H) 33 - 130 U/L Final     Total Protein   Date Value Ref Range Status   06/07/2017 6 9 6 1 - 8 1 g/dL Final   02/24/2017 7 3 6 1 - 8 1 g/dL Final   12/06/2016 7 2 6 1 - 8 1 g/dL Final     Total Bilirubin   Date Value Ref Range Status   06/07/2017 0 8 0 2 - 1 2 mg/dL Final   02/24/2017 0 9 0 2 - 1 2 mg/dL Final   12/06/2016 0 8 0 2 - 1 2 mg/dL Final     Magnesium   Date Value Ref Range Status   06/18/2015 2 5 1 6 - 2 6 mg/dL Final     Comment:     The above 1 analytes were performed by [id number]     07/26/2014 2 0 1 6 - 2 6 mg/dL Final     Comment:     The above 1 analytes were performed by Loc  [address] [place] [id number]       WBC   Date Value Ref Range Status   04/07/2023 5 85 4 31 - 10 16 Thousand/uL Final   06/19/2021 6 95 4 31 - 10 16 Thousand/uL Final   02/17/2021 6 44 4 31 - 10 16 Thousand/uL Final   06/07/2017 6 3 3 8 - 10 8 "Thousand/uL Final   10/19/2016 6 3 3 8 - 10 8 Thousand/uL Final   02/23/2016 5 7 3 8 - 10 8 Thousand/uL Final     Hemoglobin   Date Value Ref Range Status   04/07/2023 11 3 (L) 11 5 - 15 4 g/dL Final   06/19/2021 11 8 11 5 - 15 4 g/dL Final   02/17/2021 11 2 (L) 11 5 - 15 4 g/dL Final   06/07/2017 12 9 11 7 - 15 5 g/dL Final   10/19/2016 12 1 11 7 - 15 5 g/dL Final   02/23/2016 10 6 (L) 11 7 - 15 5 g/dL Final     Platelets   Date Value Ref Range Status   04/07/2023 264 149 - 390 Thousands/uL Final   06/19/2021 295 149 - 390 Thousands/uL Final   02/17/2021 268 149 - 390 Thousands/uL Final   06/07/2017 257 140 - 400 Thousand/uL Final   10/19/2016 265 140 - 400 Thousand/uL Final   02/23/2016 255 140 - 400 Thousand/uL Final     PTT   Date Value Ref Range Status   01/18/2019 63 (H) 26 - 38 seconds Final     Comment:     Therapeutic Heparin Range =  60-90 seconds   03/08/2018 70 (H) 23 - 35 seconds Final   06/20/2015 75 (H) 24 - 35 Seconds Final     Comment:     Therapeutic Heparin Range =  60-90 seconds  The above 1 analytes were performed by Loc  [address] [place], [place] [address] [address]     06/20/2015 100 (H) 24 - 35 Seconds Final     Comment:     Therapeutic Heparin Range =  60-90 seconds  The above 1 analytes were performed by Loc  [address] [place] [address],[place] [address]       INR   Date Value Ref Range Status   06/08/2023 3 07 (H) 0 84 - 1 19 Final   05/25/2023 3 59 (H) 0 84 - 1 19 Final   11/08/2017 3 5 (H)  Final     Comment:     Result Comment: Reference Range                     0 9-1 1  Moderate-intensity Warfarin Therapy 2 0-3 0  Higher-intensity Warfarin Therapy   3 0-4 0     09/22/2017 2 7 (H)  Final     Comment:     Result Comment: Reference Range                     0 9-1 1  Moderate-intensity Warfarin Therapy 2 0-3 0  Higher-intensity Warfarin Therapy   3 0-4 0       No results found for: \"CKMB\", \"DIGOXIN\"  TSH   Date Value Ref Range Status   01/31/2018 1 85 0 40 - 4 50 mIU/L Final     Cholesterol " Date Value Ref Range Status   2017 202 (H) 125 - 200 mg/dL Final      Hemoglobin A1C   Date Value Ref Range Status   2023 6 5 6 5 Final   2020 6 7 (H) Normal 3 8-5 6%; PreDiabetic 5 7-6 4%; Diabetic >=6 5%; Glycemic control for adults with diabetes <7 0% % Final   2018 6 3 (H) <5 7 % of total Hgb Final     Comment:     For someone without known diabetes, a hemoglobin   A1c value between 5 7% and 6 4% is consistent with  prediabetes and should be confirmed with a   follow-up test      For someone with known diabetes, a value <7%  indicates that their diabetes is well controlled  A1c  targets should be individualized based on duration of  diabetes, age, comorbid conditions, and other  considerations  This assay result is consistent with an increased risk  of diabetes  Currently, no consensus exists regarding use of  hemoglobin A1c for diagnosis of diabetes for children  Urine Culture   Date Value Ref Range Status   2021 40,000-49,000 cfu/ml  Final     Comment:     Mixed Contaminants X4     INFLU B PCR   Date Value Ref Range Status   2018 None Detected None Detected Final     RSV PCR   Date Value Ref Range Status   2018 None Detected None Detected Final       *-*-*-*-*-*-*-*-*-*-*-*-*-*-*-*-*-*-*-*-*-*-*-*-*-*-*-*-*-*-*-*-*-*-*-*-*-*-*-*-*-*-*-*-*-*-*-*-*-*-*-*-*-*-  PREVIOUS CARDIOLOGY & RADIOLOGY TEST RESULTS   I have personally reviewed pertinent results of cardiovascular tests noted below      Results for orders placed during the hospital encounter of 20    Echo complete with contrast if indicated    Narrative  [address]  [facility], [address] [facility]  [phone]    Transthoracic Echocardiogram  2D, M-mode, Doppler, and Color Doppler    Study date:  2020    Patient: Sj Trinidad  MR number: [id number]  Account number: [de-identified]  : 1960  Age: 63 years  Gender: Female  Status: Outpatient  Location: [address] [place] [address] and [facility] [place]  Height: 59 in  Weight: 234 lb  BP: 116/ 60 mmHg    Indications: Mitral valve replacement    Diagnoses: Z95 2 - Presence of prosthetic heart valve    Sonographer:  LAKSHMI Rubalcava  Primary Physician:  Dimitri Rios MD  Referring Physician:  John Hunter MD  Group:  [id number]  Interpreting Physician:  Guy Angelucci, MD    SUMMARY    COMPARISONS:  Tricuspid regurgitation has worsened  LEFT VENTRICLE:  Systolic function was normal  Ejection fraction was estimated to be 60 %  There were no regional wall motion abnormalities  LEFT ATRIUM:  Size was at the upper limits of normal     MITRAL VALVE:  A mechanical prosthesis was present, #27 [employer]  It exhibited normal function  Transmitral velocity was minimally increased  There was very minimal stenosis, mean gradient probably the same  There was no significant regurgitation  AORTIC VALVE:  The valve was trileaflet  Leaflets exhibited normal thickness and normal cuspal separation  There was mild regurgitation  TRICUSPID VALVE:  There was moderate regurgitation  Pulmonary artery systolic pressure was at the upper limits of normal     COMPARISONS:  Tricuspid regurgitation has worsened  HISTORY: PRIOR HISTORY: Hypertension, mechanical MVR, asthma, type 2 diabetes, cerebral infarction, obstructive sleep apnea, epilepsy    PROCEDURE: The study was performed in the [address] [facility] [place]  This was a routine study  The transthoracic approach was used  The study included complete 2D imaging, M-mode, complete spectral Doppler, and color Doppler  Image  quality was adequate  LEFT VENTRICLE: Size was normal  Systolic function was normal  Ejection fraction was estimated to be 60 %  There were no regional wall motion abnormalities   Wall thickness was normal  DOPPLER: The study was not technically sufficient to  allow evaluation of LV diastolic function  RIGHT VENTRICLE: The size was normal  Systolic function was normal  Wall thickness was normal     LEFT ATRIUM: Size was at the upper limits of normal     RIGHT ATRIUM: Size was normal     MITRAL VALVE: A mechanical prosthesis was present, #27 [employer]  It exhibited normal function  DOPPLER: Transmitral velocity was minimally increased  There was very minimal stenosis, mean gradient probably the same  There was no  significant regurgitation  AORTIC VALVE: The valve was trileaflet  Leaflets exhibited normal thickness and normal cuspal separation  DOPPLER: Transaortic velocity was within the normal range  There was no evidence for stenosis  There was mild regurgitation  TRICUSPID VALVE: The valve structure was normal  There was normal leaflet separation  DOPPLER: The transtricuspid velocity was within the normal range  There was no evidence for stenosis  There was moderate regurgitation  Pulmonary artery  systolic pressure was at the upper limits of normal     PULMONIC VALVE: Leaflets exhibited normal thickness, no calcification, and normal cuspal separation  DOPPLER: The transpulmonic velocity was within the normal range  There was no significant regurgitation  PERICARDIUM: There was no pericardial effusion  The pericardium was normal in appearance  AORTA: The root exhibited normal size  SYSTEMIC VEINS: IVC: The inferior vena cava was normal in size      SYSTEM MEASUREMENT TABLES    2D  %FS: 31 43 %  Ao Diam: 2 52 cm  EDV(Teich): 70 82 ml  EF(Cube): 67 75 %  EF(Teich): 59 85 %  ESV(Cube): 20 94 ml  ESV(Teich): 28 43 ml  IVSd: 0 71 cm  LA Area: 18 26 cm2  LVEDV MOD A4C: 95 99 ml  LVEF MOD A4C: 59 96 %  LVESV MOD A4C: 38 44 ml  LVIDd: 4 02 cm  LVIDs: 2 76 cm  LVLd A4C: 7 5 cm  LVLs A4C: 6 25 cm  LVPWd: 1 06 cm  RA Area: 12 15 cm2  RV Diam : 3 08 cm  SV MOD A4C: 57 55 ml  SV(Cube): 44 ml  SV(Teich): 42 39 ml    CW  AR Dec [place]: 2 53 m/s2  AR Dec Time: 1501 84 ms  AR PHT: 435 53 ms  AR Vmax: 3 8 m/s  AR maxP 8 mmHg  TR Vmax: 2 49 m/s  TR maxP 95 mmHg    MM  TAPSE: 1 49 cm    PW  E': 0 03 m/s    [facility] Accredited [facility]chocardiography Laboratory    Prepared and electronically signed by    Norm Butler MD  Signed 2020 15:44:38    No results found for this or any previous visit  No results found for this or any previous visit  No results found for this or any previous visit  Echo complete w/ contrast if indicated  •  Left Ventricle: Wall thickness is mildly increased  There is mild   concentric hypertrophy  The left ventricular ejection fraction is 60%  Systolic function is normal  Wall motion is normal   •  Right Atrium: The atrium is dilated  •  Aortic Valve: There is mild regurgitation  •  Mitral Valve: There is a mechanical mitral valve prosthesis  No 27 [employer] mechanical valve () There is mild stenosis  The mitral   valve mean gradient is 5mmHg  •  Tricuspid Valve: There is mild to moderate regurgitation  The right   ventricular systolic pressure is mildly to moderately elevated  The   estimated right ventricular systolic pressure is 87 64 mmHg          *-*-*-*-*-*-*-*-*-*-*-*-*-*-*-*-*-*-*-*-*-*-*-*-*-*-*-*-*-*-*-*-*-*-*-*-*-*-*-*-*-*-*-*-*-*-*-*-*-*-*-*-*-*-  SIGNATURES:   @OOV@   Mary Lou Burnham MD; [facility]    *-*-*-*-*-*-*-*-*-*-*-*-*-*-*-*-*-*-*-*-*-*-*-*-*-*-*-*-*-*-*-*-*-*-*-*-*-*-*-*-*-*-*-*-*-*-*-*-*-*-*-*-*-*-  PAST MEDICAL HISTORY:  Past Medical History:   Diagnosis Date   • Abnormal ultrasound of pelvis     last assessed - 2015   • Asthma    • Asthma, persistent 2014   • Cerebral infarction [facility])     unspecified; last assessed - 2017   • Dizziness     last assessed - 2015   • Dysuria     last assessed - 2016   • Exposure to potentially hazardous body fluids     last assessed - 2014   • Heart disease    • Hematoma     of arm; last assessed - 2016   • Mitral valve stenosis    • Personal history of scoliosis    • Pneumonia    • Scar, hypertrophic     last assessed - [id number]   • Seizures ([facility] )    • Shingles 2021   • Type 2 diabetes mellitus ([facility] )    • Vision problems     PAST SURGICAL HISTORY:  Past Surgical History:   Procedure Laterality Date   • BREAST BIOPSY Left     many years ago in [facility] not really sure   • MITRAL VALVE REPLACEMENT  06/17/2015    MVR with 27 mm [employer] Optiform mechanical prostethic;  last assessed - [id number]   • TONSILLECTOMY     • TRANSLUMINAL ANGIOPLASTY      mitral valve; last assessed - 01Aug2014   • TUBAL LIGATION  1986    last assessed - 01Aug2014         FAMILY HISTORY:  Family History   Problem Relation Age of Onset   • Cancer Mother    • Dementia Mother    • Diabetes Mother    • Hypertension Mother    • Hyperlipidemia Mother    • Cervical cancer Mother    • Heart disease Father    • Coronary artery disease Father    • Hypertension Father    • Heart attack Father    • Diabetes Sister    • Breast cancer Sister 58   • No Known Problems Daughter    • Diabetes Maternal Grandmother    • Breast cancer Maternal Grandmother    • Diabetes Brother    • Hypertension Brother    • Hyperlipidemia Brother    • Seizures Son    • Psoriasis Son    • Heart disease Paternal Aunt    • Heart disease Paternal Aunt    • Diabetes Other     SOCIAL HISTORY:  Social History     Tobacco Use   Smoking Status Never   • Passive exposure: Never   Smokeless Tobacco Never      Social History     Substance and Sexual Activity   Alcohol Use No     Social History     Substance and Sexual Activity   Drug Use No    [id number]     *-*-*-*-*-*-*-*-*-*-*-*-*-*-*-*-*-*-*-*-*-*-*-*-*-*-*-*-*-*-*-*-*-*-*-*-*-*-*-*-*-*-*-*-*-*-*-*-*-*-*-*-*-*  ALLERGIES:  Allergies   Allergen Reactions   • Codeine    • Prednisone Itching   • Statins Headache   • Codeine Polt-Chlorphen Polt Er Irritability, Itching and Rash    CURRENT SCHEDULED MEDICATIONS:    Current Outpatient Medications:   •  acetaminophen (TYLENOL) 500 mg tablet, Take 2 tablets by mouth daily as needed for pain, Disp: 180 tablet, Rfl: 3  •  albuterol (PROVENTIL HFA,VENTOLIN HFA) 90 mcg/act inhaler, Inhale 2 puffs every 4 (four) hours as needed for wheezing, Disp: 18 g, Rfl: 0  •  Blood Glucose Monitoring Suppl (ACCU-CHEK EDILMA PLUS) w/Device KIT, by Does not apply route 2 (two) times a day (Patient taking differently: Use daily), Disp: 1 kit, Rfl: 0  •  cetirizine (ZyrTEC) 10 mg tablet, Take 1 tablet (10 mg total) by mouth daily, Disp: 90 tablet, Rfl: 2  •  Diclofenac Sodium (VOLTAREN) 1 %, Apply 2 g topically 4 (four) times a day (Patient taking differently: Apply 2 g topically if needed), Disp: 150 g, Rfl: 1  •  dorzolamide-timolol (COSOPT) 22 3-6 8 MG/ML ophthalmic solution, , Disp: , Rfl:   •  ferrous sulfate 324 (65 Fe) mg, Take 1 tablet (324 mg total) by mouth daily before breakfast, Disp: 30 tablet, Rfl: 2  •  Flovent  MCG/ACT inhaler, INHALE TWO PUFFS TWO (TWO) TIMES A DAY RINSE MOUTH AFTER USE , Disp: 12 g, Rfl: 3  •  fluticasone (FLONASE) 50 mcg/act nasal spray, 1 spray into each nostril daily (Patient taking differently: 1 spray into each nostril if needed), Disp: 9 9 mL, Rfl: 0  •  glucose blood (Accu-Chek SmartView) test strip, USE DAILY AS INSTRUCTED, Disp: 100 each, Rfl: 11  •  hydrOXYzine HCL (ATARAX) 25 mg tablet, TAKE ONE TABLET BY MOUTH TWICE A DAY, Disp: 180 tablet, Rfl: 0  •  Keppra 500 MG tablet, Take 1 tablet (500 mg total) by mouth 3 (three) times a day, Disp: 270 tablet, Rfl: 3  •  Lancets (OneTouch Delica Plus [id number]) MISC, USE ONCE DAILY AS DIRECTED, Disp: 100 each, Rfl: 3  •  metFORMIN (GLUCOPHAGE) 500 mg tablet, Take 1 tablet (500 mg total) by mouth daily at bedtime, Disp: 180 tablet, Rfl: 3  •  metoprolol tartrate (LOPRESSOR) 25 mg tablet, TAKE HALF TABLET BY MOUTH EVERY 12 (TWELVE) HOURS, Disp: 90 tablet, Rfl: 2  •  omega-3-acid ethyl esters (LOVAZA) 1 g capsule, Take 2 capsules (2 g total) by mouth 2 (two) times a day, Disp: 180 capsule, Rfl: 3  •  omeprazole (PriLOSEC) 40 MG capsule, TAKE ONE CAPSULE BY MOUTH DAILY, Disp: 90 capsule, Rfl: 10  •  OneTouch Verio test strip, USE DAILY AS INSTRUCTED, Disp: 100 strip, Rfl: 9  •  rosuvastatin (CRESTOR) 5 mg tablet, Take 1 tablet (5 mg total) by mouth 3 (three) times a week, Disp: 30 tablet, Rfl: 3  •  sitaGLIPtin (Januvia) 25 mg tablet, Take 1 tablet (25 mg total) by mouth daily, Disp: 30 tablet, Rfl: 2  •  spironolactone (ALDACTONE) 25 mg tablet, Take 1 tablet (25 mg total) by mouth daily, Disp: 30 tablet, Rfl: 3  •  torsemide (DEMADEX) 20 mg tablet, TAKE ONE TABLET BY MOUTH AS NEEDED AS DIRECTED, Disp: 90 tablet, Rfl: 10  •  warfarin (COUMADIN) 5 mg tablet, TAKE 1/2 TO 1 TABLET DAILY BY MOUTH OR AS ORDERED BY PHYSICIAN , Disp: 90 tablet, Rfl: 2     *-*-*-*-*-*-*-*-*-*-*-*-*-*-*-*-*-*-*-*-*-*-*-*-*-*-*-*-*-*-*-*-*-*-*-*-*-*-*-*-*-*-*-*-*-*-*-*-*-*-*-*-*-*

## 2023-06-19 NOTE — PATIENT INSTRUCTIONS
CARDIOLOGY ASSESSMENT & PLAN     1  Rheumatic heart disease        2  Secondary hypertension  spironolactone (ALDACTONE) 25 mg tablet      3  Mixed hyperlipidemia  omega-3-acid ethyl esters (LOVAZA) 1 g capsule    rosuvastatin (CRESTOR) 5 mg tablet      4  Rheumatic tricuspid valve regurgitation        5  Dyslipidemia        6  History of prosthetic heart valve        7  Statin intolerance        8  H/O mitral valve replacement with mechanical valve          Rheumatic heart disease  Mr Mary Ellen Martinez is overall stable from cardiac perspective although she has noticed increasing frequency of headaches which some of which she relates to antihypertensive medication losartan  She gives no history that suggest decompensated heart failure or pulmonary hypertension or arrhythmias  Blood pressure is well controlled  She is noted to have progressive weight gain  She takes furosemide on an as-needed basis  On physical examination trace pedal edema is noted  Last blood work from April is significant for stable renal function, elevated NT proBNP level, increased lipids  Her INR is therapeutic  -- Today we discussed the options regarding treatment of her dyslipidemia  She is agreeable to try low-dose of statin  She previously was on pravastatin and reported muscle aches with that  At this time I am prescribing her prescription omega-3 fatty acids  In addition I am advising her to take rosuvastatin 3 times weekly 5 mg  Advised her that if she is noticing increasing muscle aches or fatigue with the initiation of this medicine then she can discontinue it  -- I am okay with her discontinuing losartan  I am putting her on spironolactone 25 mg once daily  -- Depending on her symptoms we will get work-up for inflammatory markers and creatinine kinase along with a basic metabolic panel in 1 month time    -- She is advised to discuss her headaches with her primary care physician and call us if they become more frequent after the medication changes or if she experiences any visual symptoms or lightheadedness or dizziness  Advised her to monitor her blood pressures  -- Dietary and medical compliance are reinforced  -- She is advised  to report any concerning symptoms such as chest pain, shortness of breath, decline in exercise tolerance or presyncope/syncope  NON-PHARMACOLOGIC MEASURES THAT IMPROVE CHOLESTEROL       1  Reduce intake of saturated fats to less than 7% of total calories  Some helpful tips to achieve this include:        -- use egg whites instead of whole eggs  -- use lean turkey-jerome instead of regular jerome        -- use low-fat margarine on your toast  instead of butter  -- use skim milk instead of whole milk  -- use Soy-based products as such as Soy milk, Tofu,    2  Reduced intake of dietary cholesterol to less than 200 mg per day  3  Increase intake of plant sterols and  viscous soluble fiber  -- Plant sterols and stanols (active daily doses, 400-3000 mg; expected LDL-C reduction of 8%-12%)  Plant sterols and stanols put in fat medium such as Margarine have been shown to lower LDL cholesterol ('Benecol', 'Smart Balance', Take Control' and similar products)  --   Soluble fibers including beta-glucan, psyllium, and glucomannan (active daily doses, 5-15 g; expected LDL-C reduction of 5%-15%)          Soluble viscous fibers such as  Oat ?-glucan (Oat Fiber) and Psyllium husk ( Metamucil etc ) lower cholesterol by reducing absorption of cholesterol bile acids and           delaying digestion of lipids  4  Increase consumption of nuts, especially as a replacement for candy bars or other high simple-carbohydrate foods (Nuts, especially walnuts and almonds are rich in mono or polyunsaturated fats that lower LDL cholesterol  They also reduced the glycemic load of the diet and may reduce insulin secretion and improve satiety    There also rich in a my no acid arginine, which is the precursor of nitric oxide that promote vasodilation )    5  Reduce weight through increasing physical activity/exercise and modifying diet  6  Increase dietary intake of Omega 3 fatty acids ( found predominantly in fatty fish such as [place]/Tuna/Mackeral/Sardines/Herring as well as fish oil supplements)  Omega 3 fatty acids reduce plasma concentrations of triglycerides and decreased overall risk for coronary heart disease  7  Limit alcohol intake and frequency to minimum  8  Consider taking Nutraceutical supplements that may further lower blood cholesterol:  1  Berberine (active daily doses, 500-1500 mg; expected LDL-C reduction of 81%-49%);   2  Garlic (active daily doses, 5-6 g; expected LDL-C reduction of 5%-10%);  3  Green tea extracts (active daily doses,  g; expected LDL-C reduction of 5%)     HEALTH TIPS FOR PATIENTS WITH ABNORMAL CHOLESTEROL LEVELS INCLUDING ELEVATED TRIGLYCERIDES:    1  Sugar intake should be restricted  Most sugars, especially fructose, stimulatehepatic synthesis of free fatty acids ( FFAs) and thus FFA and triglycerides (TG) accumulation in the liver  Thisaccumulation drives VLDL production and increases plasma TG level in the mild to moderate range (c428-613 mg/dL)  Fructose is prevalent in the Western diet,being about half the content of sucrose (white or brown sugar), of high-fructosecorn syrup, and also of honey, and being more than half the content of the sugarin fruit juice and certain other sweeteners, like agave  Thus, restriction of sugar-sweetened beverages, other added sugars, and fruit juice (but not fruit) is usuallyhelpful to reduce TG levels  Nonsugar carbohydrate (starch) has a weak TGlevel-raising effect, mainly if low in fiber, so restricting total carbohydrates mayslightly reduce TG levels      2  Increase dietary fiber intake-- fibrous food lowers obstruction of bad cholesterol in the blood stream   Fiber has a modest effect to blunt TG level increase with sugar and other carbohydrates  3  Fat intake may need to be restricted  Dietary fat drives chylomicron production andrestricting dietary fat is the most effective way to reduce TG levels of more thanabout 800 mg/dL  Saturated fats are slightly more likely to lead to HTG than unsat-urated fats, but fatty fish has a paradoxic effect of lowering TG levels because ofthe several favorable effects of omega-3 oil on TG metabolism  4  Physical activity should be increased  Increased exercise tends to reduce TGlevels, likely because of increased muscle TG oxidation as a fuel source, improvedglucose and insulin metabolism (including increased LPL activity/TG lipolysis), anddecreased hepatic TG and FFA content  5  Weight loss should be encouraged  Whether by decreasing total calories orincreasing activity, negative caloric balance reduces TG levels, likely related toimprovement in insulin resistance and decreased hepatic TG and FFA content  6  Restriction of alcohol intake may be beneficial  Ethanol increases TG levels in manypatients at any level of intake, and so a trial of decreased consumption is warrantedfor plasma TG levels of more than about 200 mg/dL in patients who consume morethan about 2 servings per week  Tobacco and marijuana have minor TG level-raising effects that are generally not of clinical importance  7  Home remedies to reduce triglycerides:  Fresh garlic (Eat 2-3 raw garlic close on empty stomach daily), apple cider vinegar,Cayenne pepper, Cinnamon, Coriander seeds, Enzo mushroom, Nuts ( walnuts, almonds, yue nuts- Nuts are rich in polyunsaturated fatty acids, this component helps to reduce bad cholesterol)

## 2023-06-19 NOTE — ASSESSMENT & PLAN NOTE
----- Message from Kristy Escudero sent at 9/1/2020  9:19 AM CDT -----  Contact: Howie Calzada  [phone]  Pharmacy is calling to clarify an RX.  RX name:  Augmentin and Doxycycline   What do they need to clarify:  both of these were called in for patient. Should she have them both?  Comments:       Mr Cassandra Calvillo is overall stable from cardiac perspective although she has noticed increasing frequency of headaches which some of which she relates to antihypertensive medication losartan  She gives no history that suggest decompensated heart failure or pulmonary hypertension or arrhythmias  Blood pressure is well controlled  She is noted to have progressive weight gain  She takes furosemide on an as-needed basis  On physical examination trace pedal edema is noted  Last blood work from April is significant for stable renal function, elevated NT proBNP level, increased lipids  Her INR is therapeutic  -- Today we discussed the options regarding treatment of her dyslipidemia  She is agreeable to try low-dose of statin  She previously was on pravastatin and reported muscle aches with that  At this time I am prescribing her prescription omega-3 fatty acids  In addition I am advising her to take rosuvastatin 3 times weekly 5 mg  Advised her that if she is noticing increasing muscle aches or fatigue with the initiation of this medicine then she can discontinue it  -- I am okay with her discontinuing losartan  I am putting her on spironolactone 25 mg once daily  -- Depending on her symptoms we will get work-up for inflammatory markers and creatinine kinase along with a basic metabolic panel in 1 month time  -- She is advised to discuss her headaches with her primary care physician and call us if they become more frequent after the medication changes or if she experiences any visual symptoms or lightheadedness or dizziness  Advised her to monitor her blood pressures  -- Dietary and medical compliance are reinforced  -- She is advised  to report any concerning symptoms such as chest pain, shortness of breath, decline in exercise tolerance or presyncope/syncope  NON-PHARMACOLOGIC MEASURES THAT IMPROVE CHOLESTEROL       1  Reduce intake of saturated fats to less than 7% of total calories  Some helpful tips to achieve this include:        -- use egg whites instead of whole eggs  -- use lean turkey-jerome instead of regular jerome        -- use low-fat margarine on your toast  instead of butter  -- use skim milk instead of whole milk  -- use Soy-based products as such as Soy milk, Tofu,    2  Reduced intake of dietary cholesterol to less than 200 mg per day  3  Increase intake of plant sterols and  viscous soluble fiber  -- Plant sterols and stanols (active daily doses, 400-3000 mg; expected LDL-C reduction of 8%-12%)  Plant sterols and stanols put in fat medium such as Margarine have been shown to lower LDL cholesterol ('Benecol', 'Smart Balance', Take Control' and similar products)  --   Soluble fibers including beta-glucan, psyllium, and glucomannan (active daily doses, 5-15 g; expected LDL-C reduction of 5%-15%)          Soluble viscous fibers such as  Oat ?-glucan (Oat Fiber) and Psyllium husk ( Metamucil etc ) lower cholesterol by reducing absorption of cholesterol bile acids and           delaying digestion of lipids  4  Increase consumption of nuts, especially as a replacement for candy bars or other high simple-carbohydrate foods (Nuts, especially walnuts and almonds are rich in mono or polyunsaturated fats that lower LDL cholesterol  They also reduced the glycemic load of the diet and may reduce insulin secretion and improve satiety  There also rich in a my no acid arginine, which is the precursor of nitric oxide that promote vasodilation )    5  Reduce weight through increasing physical activity/exercise and modifying diet  6  Increase dietary intake of Omega 3 fatty acids ( found predominantly in fatty fish such as [place]/Tuna/Mackeral/Sardines/Herring as well as fish oil supplements)  Omega 3 fatty acids reduce plasma concentrations of triglycerides and decreased overall risk for coronary heart disease      7  Limit alcohol intake and frequency to minimum  8  Consider taking Nutraceutical supplements that may further lower blood cholesterol:  1  Berberine (active daily doses, 500-1500 mg; expected LDL-C reduction of 64%-40%);   2  Garlic (active daily doses, 5-6 g; expected LDL-C reduction of 5%-10%);  3  Green tea extracts (active daily doses,  g; expected LDL-C reduction of 5%)     HEALTH TIPS FOR PATIENTS WITH ABNORMAL CHOLESTEROL LEVELS INCLUDING ELEVATED TRIGLYCERIDES:    1  Sugar intake should be restricted  Most sugars, especially fructose, stimulatehepatic synthesis of free fatty acids ( FFAs) and thus FFA and triglycerides (TG) accumulation in the liver  Thisaccumulation drives VLDL production and increases plasma TG level in the mild to moderate range ([id number] mg/dL)  Fructose is prevalent in the Western diet,being about half the content of sucrose (white or brown sugar), of high-fructosecorn syrup, and also of honey, and being more than half the content of the sugarin fruit juice and certain other sweeteners, like agave  Thus, restriction of sugar-sweetened beverages, other added sugars, and fruit juice (but not fruit) is usuallyhelpful to reduce TG levels  Nonsugar carbohydrate (starch) has a weak TGlevel-raising effect, mainly if low in fiber, so restricting total carbohydrates mayslightly reduce TG levels  2  Increase dietary fiber intake-- fibrous food lowers obstruction of bad cholesterol in the blood stream   Fiber has a modest effect to blunt TG level increase with sugar and other carbohydrates  3  Fat intake may need to be restricted  Dietary fat drives chylomicron production andrestricting dietary fat is the most effective way to reduce TG levels of more thanabout 800 mg/dL  Saturated fats are slightly more likely to lead to HTG than unsat-urated fats, but fatty fish has a paradoxic effect of lowering TG levels because ofthe several favorable effects of omega-3 oil on TG metabolism      4  Physical activity should be increased  Increased exercise tends to reduce TGlevels, likely because of increased muscle TG oxidation as a fuel source, improvedglucose and insulin metabolism (including increased LPL activity/TG lipolysis), anddecreased hepatic TG and FFA content  5  Weight loss should be encouraged  Whether by decreasing total calories orincreasing activity, negative caloric balance reduces TG levels, likely related toimprovement in insulin resistance and decreased hepatic TG and FFA content  6  Restriction of alcohol intake may be beneficial  Ethanol increases TG levels in manypatients at any level of intake, and so a trial of decreased consumption is warrantedfor plasma TG levels of more than about 200 mg/dL in patients who consume morethan about 2 servings per week  Tobacco and marijuana have minor TG level-raising effects that are generally not of clinical importance  7  Home remedies to reduce triglycerides:  Fresh garlic (Eat 2-3 raw garlic close on empty stomach daily), apple cider vinegar,Cayenne pepper, Cinnamon, Coriander seeds, Enzo mushroom, Nuts ( walnuts, almonds, yue nuts- Nuts are rich in polyunsaturated fatty acids, this component helps to reduce bad cholesterol)

## 2023-06-26 ENCOUNTER — OFFICE VISIT (OUTPATIENT)
Dept: FAMILY MEDICINE CLINIC | Facility: CLINIC | Age: 63
End: 2023-06-26
Payer: MEDICARE

## 2023-06-26 VITALS
SYSTOLIC BLOOD PRESSURE: 118 MMHG | BODY MASS INDEX: 44.43 KG/M2 | HEIGHT: 59 IN | WEIGHT: 220.4 LBS | OXYGEN SATURATION: 96 % | HEART RATE: 66 BPM | RESPIRATION RATE: 18 BRPM | DIASTOLIC BLOOD PRESSURE: 72 MMHG | TEMPERATURE: 96.6 F

## 2023-06-26 DIAGNOSIS — M79.605 PAIN IN BOTH LOWER EXTREMITIES: Primary | ICD-10-CM

## 2023-06-26 DIAGNOSIS — D50.8 OTHER IRON DEFICIENCY ANEMIA: ICD-10-CM

## 2023-06-26 DIAGNOSIS — R53.82 CHRONIC FATIGUE: ICD-10-CM

## 2023-06-26 DIAGNOSIS — M70.62 TROCHANTERIC BURSITIS, LEFT HIP: ICD-10-CM

## 2023-06-26 DIAGNOSIS — D50.9 IRON DEFICIENCY ANEMIA, UNSPECIFIED IRON DEFICIENCY ANEMIA TYPE: ICD-10-CM

## 2023-06-26 DIAGNOSIS — I15.9 SECONDARY HYPERTENSION: ICD-10-CM

## 2023-06-26 DIAGNOSIS — M79.604 PAIN IN BOTH LOWER EXTREMITIES: Primary | ICD-10-CM

## 2023-06-26 PROCEDURE — 99214 OFFICE O/P EST MOD 30 MIN: CPT | Performed by: FAMILY MEDICINE

## 2023-06-26 RX ORDER — METHYLPREDNISOLONE 4 MG/1
TABLET ORAL
Qty: 21 EACH | Refills: 0 | Status: SHIPPED | OUTPATIENT
Start: 2023-06-26

## 2023-06-26 RX ORDER — FERROUS SULFATE TAB EC 324 MG (65 MG FE EQUIVALENT) 324 (65 FE) MG
324 TABLET DELAYED RESPONSE ORAL
Qty: 30 TABLET | Refills: 2
Start: 2023-06-26

## 2023-06-26 NOTE — PROGRESS NOTES
Name: Chivo Reed      : 1960      MRN: [id number]  Encounter Provider: Adrian Saleh MD  Encounter Date: 2023   Encounter department: [facility]     1  Pain in both lower extremities  -     methylPREDNISolone 4 MG tablet therapy pack; Use as directed on package    2  Trochanteric bursitis, left hip  Comments:  Leg pain for several weeks  States it started after intiating losartan  Unable to lay on the left  No swelling  Affects her gait  Pain in the lateral hip as well as the whole leg  Both legs are sensitive to touch  Recommend rest, cold, and medrol dose hermelindo  Unable to do NSAIDs given cardiac history  Declined steroid injection  May also need to resume PT  Reinforced exercise to help lower the weight    Orders:  -     methylPREDNISolone 4 MG tablet therapy pack; Use as directed on package    3  Other iron deficiency anemia  Comments:  On 325 mg of iron daily  Will increase to 325 mg BID w/ vitamin C  Recheck in 3 months  Declined infusion due to fear of needles  Orders:  -     CBC and differential; Future  -     Iron Panel (Includes Ferritin, Iron Sat%, Iron, and TIBC); Future    4  Chronic fatigue  Assessment & Plan:  W/ ANDREEA  Will increase iron to BID  Already taking fiber  5  Secondary hypertension  Assessment & Plan:  Saw cardiology earlier this month and switched Losartan to Aldactone  Pt states losartan caused leg pain  Pt did not start aldactone to the side effect profile  BP today at goal ( 118/72)  Pt may hold on starting medication as BP is well controlled       6  Iron deficiency anemia, unspecified iron deficiency anemia type  -     ferrous sulfate 324 (65 Fe) mg; Take 1 tablet (324 mg total) by mouth 2 (two) times a day before meals         Subjective      Here with left leg pain for several week  Unable to lay on the left hip  Affects her gait and is now using a cane for support  No injuries  Lost 20 lbs but regained it   No leg swelling  Took tylenol  No numbness or tingling  Still feels tired  Taking iron once a day  Fears needles  Pain in the medial right great toe  Podiatry comes by to trim her nails and do diabetic foot exams  No swelling or pus  Leg Pain       Review of Systems   Constitutional: Positive for activity change and fatigue  Negative for appetite change  Cardiovascular: Negative for leg swelling  Musculoskeletal: Positive for arthralgias  Skin: Positive for color change (right great toe )  Current Outpatient Medications on File Prior to Visit   Medication Sig   • acetaminophen (TYLENOL) 500 mg tablet Take 2 tablets by mouth daily as needed for pain   • albuterol (PROVENTIL HFA,VENTOLIN HFA) 90 mcg/act inhaler Inhale 2 puffs every 4 (four) hours as needed for wheezing   • cetirizine (ZyrTEC) 10 mg tablet Take 1 tablet (10 mg total) by mouth daily   • Diclofenac Sodium (VOLTAREN) 1 % Apply 2 g topically 4 (four) times a day (Patient taking differently: Apply 2 g topically if needed)   • dorzolamide-timolol (COSOPT) 22 3-6 8 MG/ML ophthalmic solution    • Flovent  MCG/ACT inhaler INHALE TWO PUFFS TWO (TWO) TIMES A DAY RINSE MOUTH AFTER USE     • fluticasone (FLONASE) 50 mcg/act nasal spray 1 spray into each nostril daily (Patient taking differently: 1 spray into each nostril if needed)   • hydrOXYzine HCL (ATARAX) 25 mg tablet TAKE ONE TABLET BY MOUTH TWICE A DAY   • Keppra 500 MG tablet Take 1 tablet (500 mg total) by mouth 3 (three) times a day   • Lancets (OneTouch Delica Plus [id number]) MISC USE ONCE DAILY AS DIRECTED   • metFORMIN (GLUCOPHAGE) 500 mg tablet Take 1 tablet (500 mg total) by mouth daily at bedtime   • metoprolol tartrate (LOPRESSOR) 25 mg tablet TAKE HALF TABLET BY MOUTH EVERY 12 (TWELVE) HOURS   • omega-3-acid ethyl esters (LOVAZA) 1 g capsule Take 2 capsules (2 g total) by mouth 2 (two) times a day   • omeprazole (PriLOSEC) 40 MG capsule TAKE ONE CAPSULE BY MOUTH DAILY   • Oneuch "Verio test strip USE DAILY AS INSTRUCTED   • rosuvastatin (CRESTOR) 5 mg tablet Take 1 tablet (5 mg total) by mouth 3 (three) times a week   • sitaGLIPtin (Januvia) 25 mg tablet Take 1 tablet (25 mg total) by mouth daily   • torsemide (DEMADEX) 20 mg tablet TAKE ONE TABLET BY MOUTH AS NEEDED AS DIRECTED   • warfarin (COUMADIN) 5 mg tablet TAKE 1/2 TO 1 TABLET DAILY BY MOUTH OR AS ORDERED BY PHYSICIAN  • [DISCONTINUED] ferrous sulfate 324 (65 Fe) mg Take 1 tablet (324 mg total) by mouth daily before breakfast   • [DISCONTINUED] spironolactone (ALDACTONE) 25 mg tablet Take 1 tablet (25 mg total) by mouth daily   • Blood Glucose Monitoring Suppl (ACCU-CHEK EDILMA PLUS) w/Device KIT by Does not apply route 2 (two) times a day (Patient not taking: Reported on 6/26/2023)   • [DISCONTINUED] glucose blood (Accu-Chek SmartView) test strip USE DAILY AS INSTRUCTED (Patient not taking: Reported on 6/26/2023)       Objective     /72 (BP Location: Left arm, Patient Position: Sitting, Cuff Size: Large)   Pulse 66   Temp (!) 96 6 °F (35 9 °C) (Tympanic)   Resp 18   Ht 4' 10 66\" (1 49 m)   Wt 100 kg (220 lb 6 4 oz)   SpO2 96%   BMI 45 03 kg/m²     Physical Exam  Vitals reviewed  Eyes:      Extraocular Movements: Extraocular movements intact  Musculoskeletal:         General: Tenderness (throughout both legs) present  Right hip: Tenderness present  Left hip: Tenderness and bony tenderness present  Decreased range of motion  Normal strength  Feet:    Feet:      Left foot:      Toenail Condition: Left toenails are ingrown  Neurological:      Mental Status: She is oriented to person, place, and time  Psychiatric:         Mood and Affect: Mood normal          Behavior: Behavior normal          Thought Content:  Thought content normal        Cas Fernandes MD  "

## 2023-06-26 NOTE — ASSESSMENT & PLAN NOTE
Saw cardiology earlier this month and switched Losartan to Aldactone  Pt states losartan caused leg pain  Pt did not start aldactone to the side effect profile  BP today at goal ( 118/72)   Pt may hold on starting medication as BP is well controlled

## 2023-06-28 DIAGNOSIS — I05.0 MITRAL VALVE STENOSIS, UNSPECIFIED ETIOLOGY: ICD-10-CM

## 2023-07-10 DIAGNOSIS — E13.9 DIABETES 1.5, MANAGED AS TYPE 2 (HCC): ICD-10-CM

## 2023-07-15 ENCOUNTER — APPOINTMENT (OUTPATIENT)
Dept: LAB | Facility: CLINIC | Age: 63
End: 2023-07-15
Payer: MEDICARE

## 2023-07-15 DIAGNOSIS — D50.8 OTHER IRON DEFICIENCY ANEMIA: ICD-10-CM

## 2023-07-15 DIAGNOSIS — Z78.9 NORMAL TISSUE: ICD-10-CM

## 2023-07-15 DIAGNOSIS — I15.9 SECONDARY HYPERTENSION: ICD-10-CM

## 2023-07-15 DIAGNOSIS — Z78.9 STATIN INTOLERANCE: ICD-10-CM

## 2023-07-15 LAB
BASOPHILS # BLD AUTO: 0.06 THOUSANDS/ÂΜL (ref 0–0.1)
BASOPHILS NFR BLD AUTO: 1 % (ref 0–1)
CK SERPL-CCNC: 56 U/L (ref 26–192)
CRP SERPL QL: 21.7 MG/L
EOSINOPHIL # BLD AUTO: 0.14 THOUSAND/ÂΜL (ref 0–0.61)
EOSINOPHIL NFR BLD AUTO: 2 % (ref 0–6)
ERYTHROCYTE [DISTWIDTH] IN BLOOD BY AUTOMATED COUNT: 16.2 % (ref 11.6–15.1)
FERRITIN SERPL-MCNC: 19 NG/ML (ref 11–307)
HCT VFR BLD AUTO: 39.3 % (ref 34.8–46.1)
HGB BLD-MCNC: 11.8 G/DL (ref 11.5–15.4)
IMM GRANULOCYTES # BLD AUTO: 0.02 THOUSAND/UL (ref 0–0.2)
IMM GRANULOCYTES NFR BLD AUTO: 0 % (ref 0–2)
IRON SATN MFR SERPL: 15 % (ref 15–50)
IRON SERPL-MCNC: 52 UG/DL (ref 50–170)
LYMPHOCYTES # BLD AUTO: 1.45 THOUSANDS/ÂΜL (ref 0.6–4.47)
LYMPHOCYTES NFR BLD AUTO: 23 % (ref 14–44)
MCH RBC QN AUTO: 25 PG (ref 26.8–34.3)
MCHC RBC AUTO-ENTMCNC: 30 G/DL (ref 31.4–37.4)
MCV RBC AUTO: 83 FL (ref 82–98)
MONOCYTES # BLD AUTO: 0.36 THOUSAND/ÂΜL (ref 0.17–1.22)
MONOCYTES NFR BLD AUTO: 6 % (ref 4–12)
NEUTROPHILS # BLD AUTO: 4.2 THOUSANDS/ÂΜL (ref 1.85–7.62)
NEUTS SEG NFR BLD AUTO: 68 % (ref 43–75)
NRBC BLD AUTO-RTO: 0 /100 WBCS
PLATELET # BLD AUTO: 246 THOUSANDS/UL (ref 149–390)
PMV BLD AUTO: 10.9 FL (ref 8.9–12.7)
RBC # BLD AUTO: 4.72 MILLION/UL (ref 3.81–5.12)
TIBC SERPL-MCNC: 356 UG/DL (ref 250–450)
WBC # BLD AUTO: 6.23 THOUSAND/UL (ref 4.31–10.16)

## 2023-07-15 PROCEDURE — 82550 ASSAY OF CK (CPK): CPT

## 2023-07-15 PROCEDURE — 83540 ASSAY OF IRON: CPT

## 2023-07-15 PROCEDURE — 86140 C-REACTIVE PROTEIN: CPT

## 2023-07-15 PROCEDURE — 82728 ASSAY OF FERRITIN: CPT

## 2023-07-15 PROCEDURE — 83550 IRON BINDING TEST: CPT

## 2023-07-15 PROCEDURE — 85025 COMPLETE CBC W/AUTO DIFF WBC: CPT

## 2023-07-17 ENCOUNTER — APPOINTMENT (OUTPATIENT)
Dept: LAB | Facility: CLINIC | Age: 63
End: 2023-07-17
Payer: MEDICARE

## 2023-07-17 DIAGNOSIS — Z78.9 STATIN INTOLERANCE: ICD-10-CM

## 2023-07-17 DIAGNOSIS — D50.8 OTHER IRON DEFICIENCY ANEMIA: ICD-10-CM

## 2023-07-17 DIAGNOSIS — Z78.9 NORMAL TISSUE: ICD-10-CM

## 2023-07-17 DIAGNOSIS — I15.9 SECONDARY HYPERTENSION: ICD-10-CM

## 2023-07-18 ENCOUNTER — ANTICOAG VISIT (OUTPATIENT)
Dept: CARDIOLOGY CLINIC | Facility: CLINIC | Age: 63
End: 2023-07-18

## 2023-07-18 DIAGNOSIS — Z95.2 HISTORY OF PROSTHETIC HEART VALVE: Primary | ICD-10-CM

## 2023-07-19 ENCOUNTER — OFFICE VISIT (OUTPATIENT)
Dept: FAMILY MEDICINE CLINIC | Facility: CLINIC | Age: 63
End: 2023-07-19
Payer: MEDICARE

## 2023-07-19 VITALS
WEIGHT: 224 LBS | HEART RATE: 63 BPM | SYSTOLIC BLOOD PRESSURE: 138 MMHG | TEMPERATURE: 96 F | DIASTOLIC BLOOD PRESSURE: 72 MMHG | RESPIRATION RATE: 16 BRPM | OXYGEN SATURATION: 96 % | BODY MASS INDEX: 45.77 KG/M2

## 2023-07-19 DIAGNOSIS — M25.50 POLYARTHRALGIA: ICD-10-CM

## 2023-07-19 DIAGNOSIS — M79.18 PAIN OF PARASPINAL MUSCLE: ICD-10-CM

## 2023-07-19 DIAGNOSIS — D50.9 IRON DEFICIENCY ANEMIA, UNSPECIFIED IRON DEFICIENCY ANEMIA TYPE: Primary | ICD-10-CM

## 2023-07-19 DIAGNOSIS — E53.8 B12 DEFICIENCY: ICD-10-CM

## 2023-07-19 DIAGNOSIS — R10.9 FLANK PAIN: ICD-10-CM

## 2023-07-19 DIAGNOSIS — R53.82 CHRONIC FATIGUE: ICD-10-CM

## 2023-07-19 DIAGNOSIS — Z88.9 DRUG ALLERGY: ICD-10-CM

## 2023-07-19 DIAGNOSIS — E78.5 DYSLIPIDEMIA: ICD-10-CM

## 2023-07-19 LAB
SL AMB  POCT GLUCOSE, UA: NORMAL
SL AMB LEUKOCYTE ESTERASE,UA: NORMAL
SL AMB POCT BILIRUBIN,UA: NORMAL
SL AMB POCT BLOOD,UA: NORMAL
SL AMB POCT CLARITY,UA: CLEAR
SL AMB POCT COLOR,UA: YELLOW
SL AMB POCT KETONES,UA: NORMAL
SL AMB POCT NITRITE,UA: NORMAL
SL AMB POCT PH,UA: 6
SL AMB POCT SPECIFIC GRAVITY,UA: 1.01
SL AMB POCT URINE PROTEIN: NORMAL
SL AMB POCT UROBILINOGEN: NORMAL

## 2023-07-19 PROCEDURE — 99214 OFFICE O/P EST MOD 30 MIN: CPT | Performed by: FAMILY MEDICINE

## 2023-07-19 PROCEDURE — 81002 URINALYSIS NONAUTO W/O SCOPE: CPT | Performed by: FAMILY MEDICINE

## 2023-07-19 RX ORDER — CYCLOBENZAPRINE HCL 5 MG
5 TABLET ORAL
Qty: 15 TABLET | Refills: 0 | Status: SHIPPED | OUTPATIENT
Start: 2023-07-19

## 2023-07-19 RX ORDER — FERROUS SULFATE TAB EC 324 MG (65 MG FE EQUIVALENT) 324 (65 FE) MG
324 TABLET DELAYED RESPONSE ORAL
Qty: 90 TABLET | Refills: 1 | Status: SHIPPED | OUTPATIENT
Start: 2023-07-19

## 2023-07-19 RX ORDER — SPIRONOLACTONE 25 MG/1
TABLET ORAL
COMMUNITY
Start: 2023-07-19 | End: 2023-07-19

## 2023-07-19 NOTE — PROGRESS NOTES
Name: Mark Hawkins      : 1960      MRN: [id number]  Encounter Provider: Chaparro Avina MD  Encounter Date: 2023   Encounter department: [address] [place] [address]  today following an episode where she became flushed, nauseous, and had diarrhea. This was a day after starting methylprednisolone. Symptoms have improved and is having a soft stool. Does have a history of chronic diarrhea. Likely an allergy to steroids. BW that was done shortly after this incident showed no evidence of infection and urine today was normal. Pt is no longer taking steroids and update allergy list. Iron improved but should continue taking iron supplements. She has h/o b12 deficiency but not recent levels. Tolerating statin QOD. Will recheck LFTS and lipids prior to next appt. Back pain reported. She has been doing more exercise. Will provide night time dose of flexeril. Aware not to take with atarax. Admits to depressive symptoms. Offered to add medication to help the mood but declined. F/u at next scheduled appt or sooner as needed    1. Iron deficiency anemia, unspecified iron deficiency anemia type  -     Iron Panel (Includes Ferritin, Iron Sat%, Iron, and TIBC); Future  -     ferrous sulfate 324 (65 Fe) mg; Take 1 tablet (324 mg total) by mouth daily before breakfast    2. Dyslipidemia  -     Comprehensive metabolic panel; Future  -     Lipid panel; Future    3. B12 deficiency  -     Vitamin B12; Future    4. Pain of paraspinal muscle  -     cyclobenzaprine (FLEXERIL) 5 mg tablet; Take 1 tablet (5 mg total) by mouth daily at bedtime    5. Flank pain  -     POCT urine dip    6. Chronic fatigue  -     Lyme Total AB W Reflex to IGM/IGG; Future    7. Polyarthralgia  -     Lyme Total AB W Reflex to IGM/IGG; Future    8. Drug allergy           Subjective      Seen today after an episode where she had abdominal pain, diarrhea, and hot flashes.  States it started as abdominal pain then felt the urge to move her bowels. Pain improved but later came back and has another episode of diarrhea. She developed vertigo later that day which lasted 2 days. These symptoms started shortly after taking the first dose of methylprednisolone. She does have an allergy to prednsion ebut was unsure if she tolerated methylprednisolone in the past. She had blood work a few days later to follow up anemia which showed elevated CRP ( 21) but normal WBC. Most recent . While on prednisone, it went up to 160's. No longer taking the steroids. Feeling tired. Chronic but has gotten worse. Also feels anxious but does not wish to start any medication. She does take atarax twice daily. She report anhedonia and difficulty sleeping. Refuses to meet new people or talk to her friends. Back has been bothersome. Hurts throughout her spine. She recently started lifting 3 lbs weights and using a resistance band in an effort to lose weight. Denies fever, chills, chest tightness, or URI symptoms. Has pain in the flanks but attributes it to her back. Increase urinary frequency but is drinking more fluids. No longer having diarrhea but stools are soft. No melena or vomiting. Difficulty staying asleep. History of sleep apnea. History of b12 deficiency an di is on 500 mg capsules. Denies blood in urine but does have a history of kindey stones. Review of Systems   Constitutional: Positive for fatigue. Negative for fever. Respiratory: Positive for shortness of breath (chronic ). Negative for chest tightness and wheezing. Gastrointestinal: Positive for abdominal pain (resolved ), diarrhea (soft ) and nausea. Negative for vomiting. Musculoskeletal: Positive for arthralgias, back pain, gait problem and joint swelling (chronic ).        Current Outpatient Medications on File Prior to Visit   Medication Sig   • acetaminophen (TYLENOL) 500 mg tablet Take 2 tablets by mouth daily as needed for pain   • albuterol (PROVENTIL HFA,VENTOLIN HFA) 90 mcg/act inhaler Inhale 2 puffs every 4 (four) hours as needed for wheezing   • Blood Glucose Monitoring Suppl (ACCU-CHEK EDILMA PLUS) w/Device KIT by Does not apply route 2 (two) times a day   • cetirizine (ZyrTEC) 10 mg tablet Take 1 tablet (10 mg total) by mouth daily   • dorzolamide-timolol (COSOPT) 22.3-6.8 MG/ML ophthalmic solution    • Flovent  MCG/ACT inhaler INHALE TWO PUFFS TWO (TWO) TIMES A DAY RINSE MOUTH AFTER USE. • fluticasone (FLONASE) 50 mcg/act nasal spray 1 spray into each nostril daily (Patient taking differently: 1 spray into each nostril if needed)   • hydrOXYzine HCL (ATARAX) 25 mg tablet TAKE ONE TABLET BY MOUTH TWICE A DAY   • Keppra 500 MG tablet Take 1 tablet (500 mg total) by mouth 3 (three) times a day   • Lancets (OneTouch Delica Plus [id number]) MISC USE ONCE DAILY AS DIRECTED   • metFORMIN (GLUCOPHAGE) 500 mg tablet Take 1 tablet (500 mg total) by mouth daily at bedtime   • metoprolol tartrate (LOPRESSOR) 25 mg tablet TAKE HALF TABLET BY MOUTH EVERY 12 (TWELVE) HOURS   • omega-3-acid ethyl esters (LOVAZA) 1 g capsule Take 2 capsules (2 g total) by mouth 2 (two) times a day   • omeprazole (PriLOSEC) 40 MG capsule TAKE ONE CAPSULE BY MOUTH DAILY   • OneTouch Verio test strip USE DAILY AS INSTRUCTED   • rosuvastatin (CRESTOR) 5 mg tablet Take 1 tablet (5 mg total) by mouth 3 (three) times a week   • sitaGLIPtin (Januvia) 25 mg tablet Take 1 tablet (25 mg total) by mouth daily   • torsemide (DEMADEX) 20 mg tablet TAKE ONE TABLET BY MOUTH AS NEEDED AS DIRECTED   • warfarin (COUMADIN) 5 mg tablet TAKE 1/2 TO 1 TABLET DAILY BY MOUTH OR AS ORDERED BY PHYSICIAN.    • [DISCONTINUED] Diclofenac Sodium (VOLTAREN) 1 % Apply 2 g topically 4 (four) times a day (Patient not taking: Reported on 7/19/2023)   • [DISCONTINUED] ferrous sulfate 324 (65 Fe) mg Take 1 tablet (324 mg total) by mouth 2 (two) times a day before meals (Patient not taking: Reported on 7/19/2023)   • [DISCONTINUED] methylPREDNISolone 4 MG tablet therapy pack Use as directed on package (Patient not taking: Reported on 7/19/2023)   • [DISCONTINUED] spironolactone (ALDACTONE) 25 mg tablet  (Patient not taking: Reported on 7/19/2023)       Objective     /72   Pulse 63   Temp (!) 96 °F (35.6 °C)   Resp 16   Wt 102 kg (224 lb)   SpO2 96%   BMI 45.77 kg/m²     Physical Exam  Vitals reviewed. Constitutional:       General: She is not in acute distress. Appearance: Normal appearance. She is not ill-appearing or toxic-appearing. HENT:      Head: Normocephalic and atraumatic. Right Ear: Tympanic membrane normal.      Left Ear: Tympanic membrane normal.   Eyes:      Extraocular Movements: Extraocular movements intact. Cardiovascular:      Rate and Rhythm: Normal rate and regular rhythm. Heart sounds: No murmur heard. Pulmonary:      Effort: Pulmonary effort is normal.      Breath sounds: Normal breath sounds. Abdominal:      General: There is no distension. Palpations: Abdomen is soft. There is no mass. Tenderness: There is abdominal tenderness (RLQ and suprapubic region ). There is no right CVA tenderness, left CVA tenderness, guarding or rebound. Hernia: No hernia is present. Lymphadenopathy:      Cervical: No cervical adenopathy. Skin:     General: Skin is warm. Findings: No rash. Neurological:      Mental Status: She is alert and oriented to person, place, and time. Psychiatric:         Attention and Perception: Attention normal.         Mood and Affect: Mood is anxious.          Speech: Speech normal.         Behavior: Behavior normal.         Cognition and Memory: Cognition normal.       John Daley MD

## 2023-08-05 DIAGNOSIS — J30.9 ALLERGIC RHINITIS, UNSPECIFIED SEASONALITY, UNSPECIFIED TRIGGER: ICD-10-CM

## 2023-08-07 RX ORDER — CETIRIZINE HYDROCHLORIDE 10 MG/1
10 TABLET ORAL DAILY
Qty: 90 TABLET | Refills: 0 | OUTPATIENT
Start: 2023-08-07

## 2023-08-09 DIAGNOSIS — L50.0 ALLERGIC URTICARIA: ICD-10-CM

## 2023-08-09 DIAGNOSIS — E13.9 DIABETES 1.5, MANAGED AS TYPE 2 (HCC): ICD-10-CM

## 2023-08-09 RX ORDER — HYDROXYZINE HYDROCHLORIDE 25 MG/1
25 TABLET, FILM COATED ORAL 2 TIMES DAILY
Qty: 180 TABLET | Refills: 0 | Status: SHIPPED | OUTPATIENT
Start: 2023-08-09

## 2023-08-25 ENCOUNTER — TELEPHONE (OUTPATIENT)
Dept: CARDIOLOGY CLINIC | Facility: CLINIC | Age: 63
End: 2023-08-25

## 2023-08-26 ENCOUNTER — APPOINTMENT (OUTPATIENT)
Dept: LAB | Facility: CLINIC | Age: 63
End: 2023-08-26
Payer: MEDICARE

## 2023-08-28 ENCOUNTER — ANTICOAG VISIT (OUTPATIENT)
Dept: CARDIOLOGY CLINIC | Facility: CLINIC | Age: 63
End: 2023-08-28

## 2023-08-28 DIAGNOSIS — Z95.2 HISTORY OF PROSTHETIC HEART VALVE: Primary | ICD-10-CM

## 2023-08-28 NOTE — PROGRESS NOTES
Spoke with patient, advised INR a little low, advised to take 5 mg Mon Fri, 2.5 mg all other days, will recheck in 3 weeks 9/16/23

## 2023-09-04 DIAGNOSIS — E13.9 DIABETES 1.5, MANAGED AS TYPE 2 (HCC): ICD-10-CM

## 2023-09-04 RX ORDER — SITAGLIPTIN 25 MG/1
25 TABLET, FILM COATED ORAL DAILY
Qty: 30 TABLET | Refills: 4 | Status: SHIPPED | OUTPATIENT
Start: 2023-09-04

## 2023-09-05 DIAGNOSIS — E13.9 DIABETES 1.5, MANAGED AS TYPE 2 (HCC): ICD-10-CM

## 2023-09-05 DIAGNOSIS — J45.20 MILD INTERMITTENT ASTHMA WITHOUT COMPLICATION: ICD-10-CM

## 2023-09-05 DIAGNOSIS — G40.019 LOCALIZATION-RELATED EPILEPSY, INTRACTABLE (HCC): ICD-10-CM

## 2023-09-05 RX ORDER — LEVETIRACETAM 500 MG/1
500 TABLET, FILM COATED ORAL 3 TIMES DAILY
Qty: 270 TABLET | Refills: 3 | Status: SHIPPED | OUTPATIENT
Start: 2023-09-05

## 2023-09-05 RX ORDER — FLUTICASONE PROPIONATE 110 UG/1
AEROSOL, METERED RESPIRATORY (INHALATION)
Qty: 12 G | Refills: 0 | OUTPATIENT
Start: 2023-09-05

## 2023-09-13 DIAGNOSIS — K21.9 GASTROESOPHAGEAL REFLUX DISEASE: ICD-10-CM

## 2023-09-14 ENCOUNTER — TELEPHONE (OUTPATIENT)
Age: 63
End: 2023-09-14

## 2023-09-14 DIAGNOSIS — K21.9 GASTROESOPHAGEAL REFLUX DISEASE: ICD-10-CM

## 2023-09-14 DIAGNOSIS — J45.20 MILD INTERMITTENT ASTHMA WITHOUT COMPLICATION: ICD-10-CM

## 2023-09-14 RX ORDER — DEXAMETHASONE 4 MG/1
TABLET ORAL
Qty: 12 G | Refills: 2 | OUTPATIENT
Start: 2023-09-14

## 2023-09-14 RX ORDER — FLUTICASONE PROPIONATE 110 UG/1
2 AEROSOL, METERED RESPIRATORY (INHALATION) 2 TIMES DAILY
Qty: 12 G | Refills: 3 | Status: SHIPPED | OUTPATIENT
Start: 2023-09-14

## 2023-09-14 RX ORDER — OMEPRAZOLE 40 MG/1
40 CAPSULE, DELAYED RELEASE ORAL DAILY
Qty: 90 CAPSULE | Refills: 0 | Status: SHIPPED | OUTPATIENT
Start: 2023-09-14

## 2023-09-14 RX ORDER — OMEPRAZOLE 40 MG/1
40 CAPSULE, DELAYED RELEASE ORAL DAILY
Qty: 90 CAPSULE | Refills: 0 | OUTPATIENT
Start: 2023-09-14

## 2023-09-14 NOTE — TELEPHONE ENCOUNTER
PA REQUEST FOR COVERAGE DETERMINATION OF FLOVENT HAS BEEN SUBMITTED TO PLAN    WILL AWAIT PLAN RESPONSE 1-5 BUSINESS DAYS    CMJOSE LUIS KEY [id number]

## 2023-09-14 NOTE — TELEPHONE ENCOUNTER
Reason for call:   [x] Refill   [] Prior Auth  [] Other:     Office:   [x] PCP/Provider -  RYLAN REYES  [] Speciality/Provider -     Medication: OMEPRAZOLE    Dose/Frequency: 40mg, QD    Quantity: #90    Pharmacy: [facility]    Does the patient have enough for 3 days? [] Yes   [x] No - Send as HP to POD    Is the patient having symptoms?    [x] No   [] Yes -

## 2023-09-19 ENCOUNTER — APPOINTMENT (OUTPATIENT)
Dept: LAB | Facility: CLINIC | Age: 63
End: 2023-09-19
Payer: MEDICARE

## 2023-09-19 ENCOUNTER — ANTICOAG VISIT (OUTPATIENT)
Dept: CARDIOLOGY CLINIC | Facility: CLINIC | Age: 63
End: 2023-09-19

## 2023-09-19 DIAGNOSIS — Z95.2 HISTORY OF PROSTHETIC HEART VALVE: Primary | ICD-10-CM

## 2023-09-19 DIAGNOSIS — D50.9 IRON DEFICIENCY ANEMIA, UNSPECIFIED IRON DEFICIENCY ANEMIA TYPE: ICD-10-CM

## 2023-09-19 DIAGNOSIS — E78.5 DYSLIPIDEMIA: ICD-10-CM

## 2023-09-19 DIAGNOSIS — R53.82 CHRONIC FATIGUE: ICD-10-CM

## 2023-09-19 DIAGNOSIS — E53.8 B12 DEFICIENCY: ICD-10-CM

## 2023-09-19 DIAGNOSIS — M25.50 POLYARTHRALGIA: ICD-10-CM

## 2023-09-19 NOTE — PROGRESS NOTES
Spoke with patient, advised INR good, current dose verified, continue 5 mg Mon Fri, 2.5 mg all other days, will recheck in 3 weeks 10/10/23

## 2023-09-28 ENCOUNTER — APPOINTMENT (OUTPATIENT)
Dept: LAB | Facility: CLINIC | Age: 63
End: 2023-09-28
Payer: MEDICARE

## 2023-09-28 LAB
ALBUMIN SERPL BCP-MCNC: 4 G/DL (ref 3.5–5)
ALP SERPL-CCNC: 125 U/L (ref 34–104)
ALT SERPL W P-5'-P-CCNC: 13 U/L (ref 7–52)
ANION GAP SERPL CALCULATED.3IONS-SCNC: 12 MMOL/L
AST SERPL W P-5'-P-CCNC: 20 U/L (ref 13–39)
B BURGDOR IGG+IGM SER QL IA: NEGATIVE
BILIRUB SERPL-MCNC: 0.71 MG/DL (ref 0.2–1)
BUN SERPL-MCNC: 14 MG/DL (ref 5–25)
CALCIUM SERPL-MCNC: 9.5 MG/DL (ref 8.4–10.2)
CHLORIDE SERPL-SCNC: 106 MMOL/L (ref 96–108)
CHOLEST SERPL-MCNC: 163 MG/DL
CO2 SERPL-SCNC: 24 MMOL/L (ref 21–32)
CREAT SERPL-MCNC: 0.88 MG/DL (ref 0.6–1.3)
FERRITIN SERPL-MCNC: 30 NG/ML (ref 11–307)
GFR SERPL CREATININE-BSD FRML MDRD: 69 ML/MIN/1.73SQ M
GLUCOSE P FAST SERPL-MCNC: 133 MG/DL (ref 65–99)
HDLC SERPL-MCNC: 48 MG/DL
IRON SATN MFR SERPL: 14 % (ref 15–50)
IRON SERPL-MCNC: 52 UG/DL (ref 50–212)
LDLC SERPL CALC-MCNC: 86 MG/DL (ref 0–100)
NONHDLC SERPL-MCNC: 115 MG/DL
POTASSIUM SERPL-SCNC: 4.2 MMOL/L (ref 3.5–5.3)
PROT SERPL-MCNC: 7.3 G/DL (ref 6.4–8.4)
SODIUM SERPL-SCNC: 142 MMOL/L (ref 135–147)
TIBC SERPL-MCNC: 377 UG/DL (ref 250–450)
TRIGL SERPL-MCNC: 146 MG/DL
UIBC SERPL-MCNC: 325 UG/DL (ref 155–355)
VIT B12 SERPL-MCNC: 1448 PG/ML (ref 180–914)

## 2023-09-28 PROCEDURE — 83540 ASSAY OF IRON: CPT

## 2023-09-28 PROCEDURE — 80053 COMPREHEN METABOLIC PANEL: CPT

## 2023-09-28 PROCEDURE — 82728 ASSAY OF FERRITIN: CPT

## 2023-09-28 PROCEDURE — 36415 COLL VENOUS BLD VENIPUNCTURE: CPT

## 2023-09-28 PROCEDURE — 82607 VITAMIN B-12: CPT

## 2023-09-28 PROCEDURE — 80061 LIPID PANEL: CPT

## 2023-09-28 PROCEDURE — 86618 LYME DISEASE ANTIBODY: CPT

## 2023-09-28 PROCEDURE — 83550 IRON BINDING TEST: CPT

## 2023-09-28 NOTE — TELEPHONE ENCOUNTER
----- Message from Evannetta Pace sent at 9/12/2023 10:49 AM EDT -----  Patient needs refill for Flovent  MCG/ACT inhaler  [facility] - [place], [place] - [address]

## 2023-10-12 ENCOUNTER — OFFICE VISIT (OUTPATIENT)
Dept: FAMILY MEDICINE CLINIC | Facility: CLINIC | Age: 63
End: 2023-10-12
Payer: MEDICARE

## 2023-10-12 VITALS
OXYGEN SATURATION: 99 % | BODY MASS INDEX: 44.88 KG/M2 | SYSTOLIC BLOOD PRESSURE: 120 MMHG | WEIGHT: 222.6 LBS | TEMPERATURE: 97.2 F | HEART RATE: 69 BPM | DIASTOLIC BLOOD PRESSURE: 74 MMHG | RESPIRATION RATE: 18 BRPM | HEIGHT: 59 IN

## 2023-10-12 DIAGNOSIS — Z23 ENCOUNTER FOR IMMUNIZATION: ICD-10-CM

## 2023-10-12 DIAGNOSIS — E11.9 TYPE 2 DIABETES MELLITUS WITHOUT COMPLICATION, WITHOUT LONG-TERM CURRENT USE OF INSULIN (HCC): ICD-10-CM

## 2023-10-12 DIAGNOSIS — Z00.00 ENCOUNTER FOR ANNUAL PHYSICAL EXAM: Primary | ICD-10-CM

## 2023-10-12 DIAGNOSIS — M79.604 ACUTE LEG PAIN, RIGHT: ICD-10-CM

## 2023-10-12 DIAGNOSIS — M17.11 OSTEOARTHRITIS OF RIGHT KNEE, UNSPECIFIED OSTEOARTHRITIS TYPE: ICD-10-CM

## 2023-10-12 LAB — SL AMB POCT HEMOGLOBIN AIC: 7.1 (ref ?–6.5)

## 2023-10-12 PROCEDURE — 90686 IIV4 VACC NO PRSV 0.5 ML IM: CPT

## 2023-10-12 PROCEDURE — 83036 HEMOGLOBIN GLYCOSYLATED A1C: CPT | Performed by: FAMILY MEDICINE

## 2023-10-12 PROCEDURE — 99396 PREV VISIT EST AGE 40-64: CPT | Performed by: FAMILY MEDICINE

## 2023-10-12 PROCEDURE — 90471 IMMUNIZATION ADMIN: CPT

## 2023-10-12 NOTE — PROGRESS NOTES
[address] [facility]    NAME: Chava Ramirez  AGE: 58 y.o. SEX: female  : 1960     DATE: 10/14/2023     Assessment and Plan:     Problem List Items Addressed This Visit       Type 2 diabetes mellitus without complication, without long-term current use of insulin ([address])       Lab Results   Component Value Date    HGBA1C 7.1 (A) 10/12/2023   A1c was 6.5 and is now 7.1. Currently on metformin 500 mg daily and Januvia 25 mg daily. Patient would like to discontinue metformin due to what was read online regarding side effects. I reviewed potential side effects with patient but would like to dc metformin. We will replace metformin with Jardiance 25 mg daily and recheck A1c in 3 months. Patient is up-to-date for diabetic foot exam and was recently performed. We will try to obtain records from her podiatrist.         Relevant Medications    Empagliflozin (JARDIANCE) 10 MG TABS tablet    Other Relevant Orders    POCT hemoglobin A1c (Completed)     Other Visit Diagnoses       Encounter for annual physical exam    -  Primary    65-year-old female with a history of diabetes for annual physical.  Complains of right medial knee pain. Possibly arthritis flare but will rule out DVT. If Doppler is negative, will proceed with physical therapy    Encounter for immunization        Flu shot consented and administered today    Relevant Orders    influenza vaccine, quadrivalent, 0.5 mL, preservative-free, for adult and pediatric patients 6 mos+ (AFLURIA, FLUARIX, FLULAVAL, FLUZONE) (Completed)    Acute leg pain, right        Complains of right medial knee pain.   Possibly arthritis but given how sensitive the area is to light palpation will obtain a Doppler to rule out DVT    Relevant Orders    VAS lower limb venous duplex study, unilateral/limited    Osteoarthritis of right knee, unspecified osteoarthritis type        Relevant Orders    Ambulatory Referral to Physical Therapy            Immunizations and preventive care screenings were discussed with patient today. Appropriate education was printed on patient's after visit summary. Counseling:  Exercise: the importance of regular exercise/physical activity was discussed. Recommend exercise 3-5 times per week for at least 30 minutes. Return in 3 months (on 1/12/2024) for Diabetes Follow-up. Chief Complaint:     Chief Complaint   Patient presents with   • Physical Exam   • Care Gap Request     Flu vaccine  A1C      History of Present Illness:     Adult Annual Physical   Patient here for a comprehensive physical exam. The patient reports problems - right knee pain and right flank pain ( along the bra line) . Right medial knee pain for 1 month. Sensisitve touch. Swelling noted also on the thigh  Pain worse with ambulation. No redness. Some swelling appreciated. History of OA and did PT in the past which helped. Also received CSI but didn't get long relief  Also complains of right flank pain. Also tender to touch. History of shingles. No rashes present    Diet and Physical Activity  Diet/Nutrition:  consumes a balance diet but doesn't like red meat . Exercise:  walking at the mall or the treadmill. Does 15-50 minutes  walks at home 2 miles per she goes to the mall   . Depression Screening  PHQ-2/9 Depression Screening         General Health  Sleep:  sleep interrupted. Melatonin helps . Hearing: normal - bilateral.  Vision:  glaucoma in the left eyes . [facility]  Dental:  fear of the dentist and hasn't been seen for 6 years  . /GYN Health  Patient is: postmenopausal  Last menstrual period: 38years old  Contraceptive method:  none . Advanced Care Planning  Do you have an advanced directive? No, but the form was provided  Do you have a durable medical power of ?  no     Review of Systems:     Review of Systems   Past Medical History:     Past Medical History:   Diagnosis Date   • Abnormal ultrasound of pelvis     last assessed - 26Mar2015   • Asthma    • Asthma, persistent 8/1/2014   • Cerebral infarction [facility])     unspecified; last assessed - 30Aug2017   • Dizziness     last assessed - 25Aug2015   • Dysuria     last assessed - 12Aug2016   • Exposure to potentially hazardous body fluids     last assessed - 11Sep2014   • Heart disease    • Hematoma     of arm; last assessed - 20Jun2016   • Mitral valve stenosis    • Personal history of scoliosis    • Pneumonia    • Scar, hypertrophic     last assessed - [id number]   • Seizures ([address])    • Shingles 2021   • Type 2 diabetes mellitus ([address])    • Vision problems       Past Surgical History:     Past Surgical History:   Procedure Laterality Date   • BREAST BIOPSY Left     many years ago in [facility] not really sure   • MITRAL VALVE REPLACEMENT  06/17/2015    MVR with 27 mm [employer] Optiform mechanical prostethic;  last assessed - [id number]   • TONSILLECTOMY     • TRANSLUMINAL ANGIOPLASTY      mitral valve; last assessed - 01Aug2014   • TUBAL LIGATION  1986    last assessed - 01Aug2014      Social History:     Social History     Socioeconomic History   • Marital status: Single     Spouse name: None   • Number of children: None   • Years of education: None   • Highest education level: None   Occupational History   • None   Tobacco Use   • Smoking status: Never     Passive exposure: Never   • Smokeless tobacco: Never   Vaping Use   • Vaping Use: Never used   Substance and Sexual Activity   • Alcohol use: No   • Drug use: No   • Sexual activity: Not Currently     Partners: Male   Other Topics Concern   • None   Social History Narrative    Caffeine use     Social Determinants of Health     Financial Resource Strain: Low Risk  (3/15/2021)    Overall Financial Resource Strain (CARDIA)    • Difficulty of Paying Living Expenses: Not hard at all   Food Insecurity: No Food Insecurity (3/15/2021)    Hunger Vital Sign    • Worried About [place] in the Last Year: Never true    • Ran Out of Food in the Last Year: Never true   Transportation Needs: No Transportation Needs (3/15/2021)    PRAPARE - Transportation    • Lack of Transportation (Medical): No    • Lack of Transportation (Non-Medical):  No   Physical Activity: Not on file   Stress: Not on file   Social Connections: Not on file   Intimate Partner Violence: Not on file   Housing Stability: Not on file      Family History:     Family History   Problem Relation Age of Onset   • Cancer Mother    • Dementia Mother    • Diabetes Mother    • Hypertension Mother    • Hyperlipidemia Mother    • Cervical cancer Mother    • Heart disease Father    • Coronary artery disease Father    • Hypertension Father    • Heart attack Father    • Diabetes Sister    • Breast cancer Sister 58   • No Known Problems Daughter    • Diabetes Maternal Grandmother    • Breast cancer Maternal Grandmother    • Diabetes Brother    • Hypertension Brother    • Hyperlipidemia Brother    • Seizures Son    • Psoriasis Son    • Heart disease Paternal Aunt    • Heart disease Paternal Aunt    • Diabetes Other       Current Medications:     Current Outpatient Medications   Medication Sig Dispense Refill   • acetaminophen (TYLENOL) 500 mg tablet Take 2 tablets by mouth daily as needed for pain 180 tablet 3   • albuterol (PROVENTIL HFA,VENTOLIN HFA) 90 mcg/act inhaler Inhale 2 puffs every 4 (four) hours as needed for wheezing 18 g 0   • Blood Glucose Monitoring Suppl (ACCU-CHEK EDILMA PLUS) w/Device KIT by Does not apply route 2 (two) times a day 1 kit 0   • cetirizine (ZyrTEC) 10 mg tablet Take 1 tablet (10 mg total) by mouth daily 90 tablet 2   • cyclobenzaprine (FLEXERIL) 5 mg tablet Take 1 tablet (5 mg total) by mouth daily at bedtime 15 tablet 0   • dorzolamide-timolol (COSOPT) 22.3-6.8 MG/ML ophthalmic solution      • Empagliflozin (JARDIANCE) 10 MG TABS tablet Take 1 tablet (10 mg total) by mouth daily 90 tablet 3   • fluticasone (FLONASE) 50 mcg/act nasal spray 1 spray into each nostril daily (Patient taking differently: 1 spray into each nostril if needed) 9.9 mL 0   • fluticasone (Flovent HFA) 110 MCG/ACT inhaler Inhale 2 puffs 2 (two) times a day Rinse mouth after use. 12 g 3   • hydrOXYzine HCL (ATARAX) 25 mg tablet Take 1 tablet (25 mg total) by mouth 2 (two) times a day 180 tablet 0   • Keppra 500 MG tablet Take 1 tablet (500 mg total) by mouth 3 (three) times a day 270 tablet 3   • Lancets (OneTouch Delica Plus [id number]) MISC USE ONCE DAILY AS DIRECTED 100 each 3   • metoprolol tartrate (LOPRESSOR) 25 mg tablet TAKE HALF TABLET BY MOUTH EVERY 12 (TWELVE) HOURS 90 tablet 1   • omega-3-acid ethyl esters (LOVAZA) 1 g capsule Take 2 capsules (2 g total) by mouth 2 (two) times a day 180 capsule 3   • OneTouch Verio test strip USE DAILY AS INSTRUCTED 100 strip 9   • rosuvastatin (CRESTOR) 5 mg tablet Take 1 tablet (5 mg total) by mouth 3 (three) times a week 30 tablet 3   • sitaGLIPtin (Januvia) 25 mg tablet Take 1 tablet (25 mg total) by mouth daily 30 tablet 0   • torsemide (DEMADEX) 20 mg tablet TAKE ONE TABLET BY MOUTH AS NEEDED AS DIRECTED 90 tablet 10   • warfarin (COUMADIN) 5 mg tablet TAKE 1/2 TO 1 TABLET DAILY BY MOUTH OR AS ORDERED BY PHYSICIAN. 90 tablet 2   • ferrous sulfate 324 (65 Fe) mg Take 1 tablet (324 mg total) by mouth daily before breakfast 90 tablet 0   • omeprazole (PriLOSEC) 40 MG capsule Take 1 capsule (40 mg total) by mouth daily 90 capsule 0     No current facility-administered medications for this visit. Allergies:      Allergies   Allergen Reactions   • Codeine    • Methylprednisolone GI Intolerance     Diarrhea, nausea, hot flashes   • Prednisone Itching   • Statins Headache   • Codeine Polt-Chlorphen Polt Er Irritability, Itching and Rash      Physical Exam:     /74 (BP Location: Right arm, Patient Position: Sitting, Cuff Size: Large)   Pulse 69   Temp (!) 97.2 °F (36.2 °C) (Tympanic)   Resp 18   Ht 4' 10.66" (1.49 m)   Wt 101 kg (222 lb 9.6 oz)   SpO2 99%   BMI 45.48 kg/m²     Physical Exam  Vitals reviewed. Constitutional:       General: She is not in acute distress. Appearance: Normal appearance. She is not ill-appearing. HENT:      Head: Normocephalic and atraumatic. Right Ear: Tympanic membrane normal.      Left Ear: Tympanic membrane normal.   Eyes:      Extraocular Movements: Extraocular movements intact. Cardiovascular:      Rate and Rhythm: Normal rate and regular rhythm. Heart sounds: No murmur heard. Pulmonary:      Effort: Pulmonary effort is normal.      Breath sounds: Normal breath sounds. Musculoskeletal:         General: Swelling present. Right knee: Swelling and bony tenderness present. No effusion, erythema or ecchymosis. Tenderness present over the medial joint line. Legs:       Comments: Medial sided right knee   Skin:     General: Skin is warm. Findings: No bruising or rash. Neurological:      Mental Status: She is alert and oriented to person, place, and time.    Psychiatric:         Mood and Affect: Mood normal.         Behavior: Behavior normal.          Makenzie Lee MD  [address] [place] [address]

## 2023-10-13 DIAGNOSIS — K21.9 GASTROESOPHAGEAL REFLUX DISEASE: ICD-10-CM

## 2023-10-13 DIAGNOSIS — D50.9 IRON DEFICIENCY ANEMIA, UNSPECIFIED IRON DEFICIENCY ANEMIA TYPE: ICD-10-CM

## 2023-10-13 RX ORDER — OMEPRAZOLE 40 MG/1
40 CAPSULE, DELAYED RELEASE ORAL DAILY
Qty: 90 CAPSULE | Refills: 0 | Status: SHIPPED | OUTPATIENT
Start: 2023-10-13

## 2023-10-13 RX ORDER — FERROUS SULFATE 324(65)MG
324 TABLET, DELAYED RELEASE (ENTERIC COATED) ORAL
Qty: 90 TABLET | Refills: 0 | Status: SHIPPED | OUTPATIENT
Start: 2023-10-13

## 2023-10-14 NOTE — PATIENT INSTRUCTIONS

## 2023-10-14 NOTE — ASSESSMENT & PLAN NOTE
Lab Results   Component Value Date    HGBA1C 7.1 (A) 10/12/2023     A1c was 6.5 and is now 7.1. Currently on metformin 500 mg daily and Januvia 25 mg daily. Patient would like to discontinue metformin due to what was read online regarding side effects. I reviewed potential side effects with patient but would like to dc metformin. We will replace metformin with Jardiance 25 mg daily and recheck A1c in 3 months. Patient is up-to-date for diabetic foot exam and was recently performed.   We will try to obtain records from her podiatrist, Dr. Anthony Ruelas ([phone])

## 2023-10-16 ENCOUNTER — TELEPHONE (OUTPATIENT)
Dept: ADMINISTRATIVE | Facility: OTHER | Age: 63
End: 2023-10-16

## 2023-10-16 NOTE — LETTER
Diabetic Foot Exam Form    Date Requested: 10/16/23  Patient: Kesha Cazares  Patient : 1960   Referring Provider: Boo Rocha MD    Diabetic Foot Exam Performed with shoes and socks removed        Yes         No     Date of Diabetic Foot Exam ______________________________  Risk Score ____________________________________________    Left Foot       Visual Inspection         Monofilament Testing Sensory Exam        Pedal Pulses         Additional Comments         Right Foot      Visual Inspection         Monofilament Testing Sensory Exam       Pedal Pulses         Additional Comments         Comments __________________________________________________________    Practice Providing Exam ______________________________________________    Exam Performed By (print name) _______________________________________      Provider Signature ___________________________________________________      These reports are needed for  compliance. Please fax this completed form and a copy of the Diabetic Foot Exam report to our office located at [address] [place] [address] as soon as possible via Fax [phone] chuck Malone Bias: Phone [phone]    We thank you for your assistance in treating our mutual patient.

## 2023-10-16 NOTE — TELEPHONE ENCOUNTER
----- Message from Stasia Sandhoff, MD sent at 10/14/2023 11:06 AM EDT -----  Regarding: diabetic foot exam  Hello,     Patient sees podiatry at Dr. Liborio Oscar office in [facility].   We please obtain records of her last diabetic foot exam.    Thank you

## 2023-10-16 NOTE — TELEPHONE ENCOUNTER
Upon review of the In Basket request and the patient's chart, initial outreach has been made via fax to facility. Please see Contacts section for details.      Thank you  Ifeoma Menchaca MA

## 2023-10-17 ENCOUNTER — OFFICE VISIT (OUTPATIENT)
Dept: NEUROLOGY | Facility: CLINIC | Age: 63
End: 2023-10-17
Payer: MEDICARE

## 2023-10-17 VITALS
WEIGHT: 225.2 LBS | BODY MASS INDEX: 45.4 KG/M2 | DIASTOLIC BLOOD PRESSURE: 61 MMHG | HEART RATE: 67 BPM | TEMPERATURE: 96.1 F | SYSTOLIC BLOOD PRESSURE: 136 MMHG | HEIGHT: 59 IN

## 2023-10-17 DIAGNOSIS — F51.01 PRIMARY INSOMNIA: ICD-10-CM

## 2023-10-17 DIAGNOSIS — G47.19 EXCESSIVE DAYTIME SLEEPINESS: ICD-10-CM

## 2023-10-17 DIAGNOSIS — G40.019 LOCALIZATION-RELATED EPILEPSY, INTRACTABLE (HCC): Primary | ICD-10-CM

## 2023-10-17 PROCEDURE — 99214 OFFICE O/P EST MOD 30 MIN: CPT | Performed by: PSYCHIATRY & NEUROLOGY

## 2023-10-17 RX ORDER — LEVETIRACETAM 500 MG/1
500 TABLET, FILM COATED ORAL 3 TIMES DAILY
Qty: 270 TABLET | Refills: 3 | Status: SHIPPED | OUTPATIENT
Start: 2023-10-17
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
## 2023-10-17 NOTE — PROGRESS NOTES
Review of Systems   Constitutional:  Negative for appetite change, fatigue and fever. HENT: Negative. Negative for hearing loss, tinnitus, trouble swallowing and voice change. Eyes: Negative. Negative for photophobia, pain and visual disturbance. Respiratory: Negative. Negative for shortness of breath. Cardiovascular: Negative. Negative for palpitations. Gastrointestinal: Negative. Negative for nausea and vomiting. Endocrine: Negative. Negative for cold intolerance. Genitourinary: Negative. Negative for dysuria, frequency and urgency. Musculoskeletal:  Negative for back pain, gait problem, myalgias and neck pain. Skin: Negative. Negative for rash. Allergic/Immunologic: Negative. Neurological: Negative. Negative for dizziness, tremors, seizures, syncope, facial asymmetry, speech difficulty, weakness, light-headedness, numbness and headaches. Hematological: Negative. Does not bruise/bleed easily. Psychiatric/Behavioral: Negative. Negative for confusion, hallucinations and sleep disturbance.

## 2023-10-17 NOTE — PATIENT INSTRUCTIONS
Plan:   Focal Epilepsy  1 - continue with Keppra Brand Name 500mg three times a day  2 - check levetiracetam level  3 -call the office if you have breakthrough seizure, staring spells, periods of unresponsiveness, or periods of confusion. Chronic insomnia, GERMAN  - referral to sleep medicine for sleep hygiene evaluation and GERMAN  - referral to Psychiatry Services for cognitive behavioral therapy for insomnia    Follow-up with [facility] practitioner in 1 year; sooner if you have recurrent seizure. Insomnia:  1) Get up at the same time seven days out of the week. 2) Only go to bed when feeling sleepy. 3) Wind down in the evening without electronics. 4) Stimulus Control: If lying in bed for 15-20 minutes (estimated because the clock is turned away so you cannot see it) and you are not asleep get up and do something relaxing in a different room (reading a magazine article, solitaire with a deck of cards). If you are reading a book, pick a very boring book. Do this in the middle of the night as well if awake. Avoid doing work or getting on the computer. 5) Bedroom for sleep only. No watching TV or using electronics (computer, phone, tablet etc.)  in bed. 6) Turn clock away so you cannot see it in bed. 7) Exercise regularly but try to avoid exercise within 4 hours of bedtime. Morning exercise is best.  8) Avoid caffeine in the afternoon. Considering tapering down on caffeine by decreasing by one beverage with caffeine every 3 days until off. 9) Avoid smoking near bedtime  10) Avoid alcohol before bed. If you consume one alcoholic beverage allow 3 hours between that drink and bedtime. If you consume two alcoholic beverages allow 5 hours  Between those drinks and bedtime. Alcohol may lead to waking at night. 11) Avoid napping except for driving safety. If you feel to sleepy to drive do not drive. If you get sleepy while driving pull over and nap. You may resume driving once you feel alert.

## 2023-10-18 NOTE — TELEPHONE ENCOUNTER
Upon review of the In Basket request we were able to locate, review, and update the patient chart as requested for Diabetic Foot Exam.    Any additional questions or concerns should be emailed to the Practice Liaisons via the appropriate education email address, please do not reply via In Basket.     Thank you  Daniel Smiley MA

## 2023-10-19 PROBLEM — U07.1 COVID-19 VIRUS INFECTION: Status: RESOLVED | Noted: 2021-01-07 | Resolved: 2023-10-19

## 2023-10-20 ENCOUNTER — HOSPITAL ENCOUNTER (OUTPATIENT)
Dept: NON INVASIVE DIAGNOSTICS | Facility: HOSPITAL | Age: 63
Discharge: HOME | End: 2023-10-20
Payer: MEDICARE

## 2023-10-20 DIAGNOSIS — M79.604 ACUTE LEG PAIN, RIGHT: ICD-10-CM

## 2023-10-20 PROCEDURE — 93971 EXTREMITY STUDY: CPT

## 2023-10-25 ENCOUNTER — TELEPHONE (OUTPATIENT)
Dept: CARDIOLOGY CLINIC | Facility: CLINIC | Age: 63
End: 2023-10-25

## 2023-10-31 ENCOUNTER — TELEPHONE (OUTPATIENT)
Age: 63
End: 2023-10-31

## 2023-10-31 DIAGNOSIS — J45.20 MILD INTERMITTENT ASTHMA WITHOUT COMPLICATION: Primary | ICD-10-CM

## 2023-10-31 NOTE — TELEPHONE ENCOUNTER
Caller: Rula Watts    Doctor: Vignesh Crisostomo,     Reason for call: Spacer for the Flovent, patient lost it, and she had it for 6 yrs.     Please call patient to confirm    Pharmacy: [id number] [facility]    Call back#: [phone]

## 2023-11-01 ENCOUNTER — TELEPHONE (OUTPATIENT)
Age: 63
End: 2023-11-01

## 2023-11-01 ENCOUNTER — TELEPHONE (OUTPATIENT)
Dept: PSYCHIATRY | Facility: CLINIC | Age: 63
End: 2023-11-01

## 2023-11-01 DIAGNOSIS — E13.9 DIABETES 1.5, MANAGED AS TYPE 2 (HCC): ICD-10-CM

## 2023-11-01 DIAGNOSIS — L50.0 ALLERGIC URTICARIA: ICD-10-CM

## 2023-11-01 RX ORDER — SITAGLIPTIN 25 MG/1
25 TABLET, FILM COATED ORAL DAILY
Qty: 30 TABLET | Refills: 0 | Status: SHIPPED | OUTPATIENT
Start: 2023-11-01

## 2023-11-01 RX ORDER — HYDROXYZINE HYDROCHLORIDE 25 MG/1
25 TABLET, FILM COATED ORAL 2 TIMES DAILY
Qty: 180 TABLET | Refills: 1 | Status: SHIPPED | OUTPATIENT
Start: 2023-11-01

## 2023-11-01 NOTE — TELEPHONE ENCOUNTER
Reason for call:   [address] calling requesting a spacer for patients albuterol inhaler    [] Refill   [] Prior Auth  [x] Other:     Office:   [x] PCP/Provider - Dr Emani Sutton  [] Specialty/Provider -     Medication: Albuterol  inhaler spacer    Pharmacy: [facility]

## 2023-11-01 NOTE — TELEPHONE ENCOUNTER
Patient called the Rx line. Was very upset. Said that she called the pharmacy and they never got script for the spacer. She would like it called into [facility] and would like the doctor to call her when it is called in.

## 2023-11-01 NOTE — TELEPHONE ENCOUNTER
Called pt in regards to referral letter sent through [address] [place] [address]. Pt states is interested in talk therapy services.  Pt will be added to proper wait list. Closing referral.

## 2023-11-02 NOTE — TELEPHONE ENCOUNTER
Called patient and informed that we will refax the script to [facility].  Advised to contact the pharmacy later on today to make sure they received it

## 2023-11-07 ENCOUNTER — ANTICOAG VISIT (OUTPATIENT)
Dept: CARDIOLOGY CLINIC | Facility: CLINIC | Age: 63
End: 2023-11-07

## 2023-11-07 ENCOUNTER — APPOINTMENT (OUTPATIENT)
Dept: LAB | Facility: CLINIC | Age: 63
End: 2023-11-07
Payer: MEDICARE

## 2023-11-07 DIAGNOSIS — Z95.2 HISTORY OF PROSTHETIC HEART VALVE: Primary | ICD-10-CM

## 2023-11-12 DIAGNOSIS — K21.9 GASTROESOPHAGEAL REFLUX DISEASE: ICD-10-CM

## 2023-11-13 RX ORDER — OMEPRAZOLE 40 MG/1
40 CAPSULE, DELAYED RELEASE ORAL DAILY
Qty: 90 CAPSULE | Refills: 1 | Status: SHIPPED | OUTPATIENT
Start: 2023-11-13

## 2023-11-20 ENCOUNTER — EVALUATION (OUTPATIENT)
Dept: PHYSICAL THERAPY | Age: 63
End: 2023-11-20
Payer: MEDICARE

## 2023-11-20 DIAGNOSIS — M17.11 PRIMARY OSTEOARTHRITIS OF RIGHT KNEE: Primary | ICD-10-CM

## 2023-11-20 DIAGNOSIS — M17.11 OSTEOARTHRITIS OF RIGHT KNEE, UNSPECIFIED OSTEOARTHRITIS TYPE: ICD-10-CM

## 2023-11-20 PROCEDURE — 97162 PT EVAL MOD COMPLEX 30 MIN: CPT | Performed by: PHYSICAL THERAPIST

## 2023-11-20 NOTE — PROGRESS NOTES
PT Evaluation     Today's date: 2023  Patient name: Elfego Lyles  : 1960  MRN: [id number]  Referring provider: Sergei Ramos MD  Dx:   Encounter Diagnosis     ICD-10-CM    1. Primary osteoarthritis of right knee  M17.11       2. Osteoarthritis of right knee, unspecified osteoarthritis type  M17.11 Ambulatory Referral to Physical Therapy                     Assessment  Assessment details: Patient seen for PT evaluation for R knee OA. Patient notes that she's also been having increase in back pain the past few weeks, attributes her pain to her walking and knee pain with walking. Patient notes that she uses a cane for walking when she walks longer distances. PT assessed patient's lumbar spine pain. Patient presents with muscle weakness, lack of core and spine strength, lack of glute and B LE strength to assist with higher level functioning activities; leads to increased back pain with sit>stand, standing and walking and prolonged sitting. PT talked with patient about her posture, recommended her to use a pillow for support, and initiated light HEP stretching and strengthening exercises. Patient is a good candidate for PT. Recommending 1-2x/week x 6-8 weeks. Impairments: abnormal gait, abnormal or restricted ROM, activity intolerance, impaired balance, impaired physical strength, lacks appropriate home exercise program, pain with function, poor posture  and poor body mechanics  Functional limitations: Patient with increased back and B R>L knee pain with: standing, walking, sit<>stand, with IADLs, with stair climbing, with recreational activities, and with light household chores. Patient notes that she often stays inside of her apartment as her pain limits her ability to tolerate long distances with activities. Understanding of Dx/Px/POC: good   Prognosis: good    Goals  Impairment Goals to be met within 4 weeks.   - Decrease pain to 5/10 lumbar spine  - Improve ROM to 105 degrees R knee flexion  - Increase strength by 1/2 MMT throughout  - Improve spine and core strength for patient to report decrease in back pain from flexed position     Functional Goals to be met within 4-6 weeks. - Return to Prior Level of Function  - Increase Functional Status Measure to: expected  - Patient will be independent with HEP  - Patient to be able to tolerate a daily walking program, starting with 5 min using her treadmill at home, if able       Plan  Patient would benefit from: skilled physical therapy  Planned modality interventions: cryotherapy and thermotherapy: hydrocollator packs  Planned therapy interventions: abdominal trunk stabilization, joint mobilization, IASTM, kinesiology taping, manual therapy, neuromuscular re-education, patient education, postural training, strengthening, stretching, therapeutic activities, therapeutic exercise, home exercise program, gait training and body mechanics training  Frequency: 2x week  Duration in weeks: 8  Treatment plan discussed with: patient        Subjective Evaluation    History of Present Illness  Mechanism of injury: Patient with long history of R>L B knee pain. Patient also notes that she is also having central low back pain that has started a few weeks ago. Patient feels it could be related to her knee as her knee has been progressively bothering her and is painful. Patient reports that her back pain hurts with prolonged sitting, standing, sit<>stand transfers, getting in/out of the car, walking; takes OTC Tylenol for pain and lays down to alleviate her back pain.      Patient Goals  Patient goals for therapy: decreased pain, increased motion, improved balance, increased strength and independence with ADLs/IADLs  Patient goal: "To be with less pain with functional activities."  Pain  Current pain ratin  At best pain ratin  At worst pain ratin  Location: knees (above R>L); lumbar spine 8-9/10 constant  Quality: sharp and dull ache  Relieving factors: relaxation  Aggravating factors: standing, stair climbing, walking, sitting and lifting  Progression: worsening    Social Support  Steps to enter house: no  Stairs in house: no   Lives in: apartment (senior high rise building)  Lives with: alone    Employment status: not working    Diagnostic Tests  X-ray: abnormal (OA R knee)  Treatments  No previous or current treatments        Objective     Concurrent Complaints  Positive for disturbed sleep. Negative for night pain, bladder dysfunction, bowel dysfunction and saddle (S4) numbness    Postural Observations  Seated posture: fair  Standing posture: fair  Correction of posture: has no consistent effect      Neurological Testing     Sensation     Lumbar   Left   Intact: light touch    Right   Intact: light touch    Active Range of Motion     Lumbar   Flexion: 100 degrees  with pain  Extension: 35 degrees   Left lateral flexion:  Restriction level: moderate  Right lateral flexion:  Restriction level: moderate  Left rotation:  WFL  Right rotation:  WFL  Left Hip   Flexion: 90 degrees     Right Hip   Flexion: 90 degrees   Left Knee   Flexion: 105 degrees with pain  Extension: 0 degrees with pain    Right Knee   Flexion: 95 degrees with pain  Extension: 0 degrees with pain  Mechanical Assessment    Cervical      Thoracic      Lumbar    Standing extension: repeated movements  Pain intensity: better  Pain level: decreased    Strength/Myotome Testing     Left Hip   Planes of Motion   Flexion: 3+  Abduction: 3+  Adduction: 3+    Right Hip   Planes of Motion   Flexion: 3+  Abduction: 3+  Adduction: 3+    Left Knee   Flexion: 3+  Extension: 3+    Right Knee   Flexion: 3+  Extension: 3+    Left Ankle/Foot   Dorsiflexion: 4  Plantar flexion: 4    Right Ankle/Foot   Dorsiflexion: 4  Plantar flexion: 4    Tests     Lumbar     Left   Negative crossed SLR, passive SLR and slump test.     Right   Negative crossed SLR, passive SLR and slump test.     Left Hip   Negative EVE. Right Hip   Negative EVE. Ambulation     Observational Gait   Gait: antalgic   Decreased walking speed and stride length. Additional Observational Gait Details  Patient ambulates with minimal flexion/extension of the B knees, maintains an extended position at the knees as it's painful with bending. Patient notes that she does use the [facility] at times when needed for ambulating longer distances. Precautions: SEIZURE disorder - takes medication regularly to prevent them. (H/o seizures x 30 years). Manuals 11/20       Patella mobs                                Neuro Re-Ed                bridges        Sup DLS alt marches        TA ball press, standing                                Ther Ex                Standing lumbar extension                Nustep?                 Ham stretch, strap        LTR                SLR flex, supine        SAQ                Standing marches, alternating        SLR hip abd, stand, alternate        Ham curls        HR                Leg press                Step ups FW, lateral                        Ther Activity                        Gait Training        Laps with SPC                Modalities

## 2023-11-28 ENCOUNTER — APPOINTMENT (OUTPATIENT)
Dept: PHYSICAL THERAPY | Age: 63
End: 2023-11-28
Payer: MEDICARE

## 2023-12-04 ENCOUNTER — OFFICE VISIT (OUTPATIENT)
Dept: PHYSICAL THERAPY | Age: 63
End: 2023-12-04
Payer: MEDICARE

## 2023-12-04 DIAGNOSIS — M17.11 PRIMARY OSTEOARTHRITIS OF RIGHT KNEE: ICD-10-CM

## 2023-12-04 DIAGNOSIS — M17.11 OSTEOARTHRITIS OF RIGHT KNEE, UNSPECIFIED OSTEOARTHRITIS TYPE: Primary | ICD-10-CM

## 2023-12-04 PROCEDURE — 97116 GAIT TRAINING THERAPY: CPT | Performed by: PHYSICAL THERAPIST

## 2023-12-04 PROCEDURE — 97110 THERAPEUTIC EXERCISES: CPT | Performed by: PHYSICAL THERAPIST

## 2023-12-04 NOTE — PROGRESS NOTES
Daily Note     Today's date: 2023  Patient name: Alondra Ulloa  : 1960  MRN: [id number]  Referring provider: Yordy Medellin MD  Dx:   Encounter Diagnosis     ICD-10-CM    1. Osteoarthritis of right knee, unspecified osteoarthritis type  M17.11       2. Primary osteoarthritis of right knee  M17.11                      Subjective: Patient reports that her whole body is hurting her today. Patient also notes that she has an ingrown toe nail that she plans to see [facility] for assistance and to schedule a podiatry appt. Objective: See treatment diary below      Assessment: Tolerated treatment fair. Patient demonstrated fatigue post treatment, exhibited good technique with therapeutic exercises, would benefit from continued PT, and PT initially trying supine exercises, mat table too hard for patient's back, increased back pain during therapy- gave patient a hot pack for her lumbar spine to ease her back pain (after irritation from lying supine), utilized MH while patient exercising to ease her back pain. Patient tolerating seated, standing exercises well today; limited walking distance and standing/repetitive activities as patient having toe pain. Gait training with SPC, good gait speed, does ambulate with trendelenburg gait pattern, minimal knee flex/ext during swing phase, appears to maintain an extended knee position throughout stance phase. Patient eager to participate in therapy, back and full body pain limiting tolerance today. Plan: Continue per plan of care. Precautions: SEIZURE disorder - takes medication regularly to prevent them. (H/o seizures x 30 years).        Manuals  12/      Patella mobs  NT                              Neuro Re-Ed                bridges  NT      Sup DLS alt marches  NT      TA ball press, standing  10x:05                              Ther Ex                Standing lumbar extension  NT back pain              Nustep, seat #6  X10' Ham stretch, strap  Seated 5x:10 with stool      LTR  NT              SLR flex, supine  NT      SAQ  NT              Standing marches, alternating  Seated 10x ea      SLR hip abd, stand, alternate  10x ea      Ham curls  10x ea      HR  Held - ingrown nail              Leg press  NV trial small weight              Step ups FW, lateral                        Ther Activity                        Gait Training        Laps with SPC  X 10' with SPC, level surfaces, working on knee flex/ext during swing phase.                Modalities

## 2023-12-05 DIAGNOSIS — E13.9 DIABETES 1.5, MANAGED AS TYPE 2 (HCC): ICD-10-CM

## 2023-12-05 DIAGNOSIS — L50.0 ALLERGIC URTICARIA: ICD-10-CM

## 2023-12-05 LAB
LEFT EYE DIABETIC RETINOPATHY: NORMAL
RIGHT EYE DIABETIC RETINOPATHY: NORMAL

## 2023-12-05 RX ORDER — HYDROXYZINE HYDROCHLORIDE 25 MG/1
25 TABLET, FILM COATED ORAL 2 TIMES DAILY
Qty: 180 TABLET | Refills: 0 | Status: SHIPPED | OUTPATIENT
Start: 2023-12-05

## 2023-12-06 ENCOUNTER — TELEPHONE (OUTPATIENT)
Dept: CARDIOLOGY CLINIC | Facility: CLINIC | Age: 63
End: 2023-12-06

## 2023-12-07 ENCOUNTER — ANTICOAG VISIT (OUTPATIENT)
Dept: CARDIOLOGY CLINIC | Facility: CLINIC | Age: 63
End: 2023-12-07

## 2023-12-07 ENCOUNTER — APPOINTMENT (OUTPATIENT)
Dept: LAB | Facility: CLINIC | Age: 63
End: 2023-12-07
Payer: MEDICARE

## 2023-12-07 DIAGNOSIS — Z95.2 HISTORY OF PROSTHETIC HEART VALVE: Primary | ICD-10-CM

## 2023-12-07 DIAGNOSIS — Z95.2 S/P MVR (MITRAL VALVE REPLACEMENT): ICD-10-CM

## 2023-12-07 LAB
INR PPP: 2.86 (ref 0.84–1.19)
PROTHROMBIN TIME: 29.4 SECONDS (ref 11.6–14.5)

## 2023-12-07 PROCEDURE — 85610 PROTHROMBIN TIME: CPT

## 2023-12-07 NOTE — PROGRESS NOTES
Spoke with patient, advised INR good, when verifying dose, patient states she has been taking 5 mg Mon only, 2.5 mg all other days.  Advised to continue same dose, will recheck in 3 weeks 12/28/23 negative

## 2023-12-08 ENCOUNTER — TELEPHONE (OUTPATIENT)
Age: 63
End: 2023-12-08

## 2023-12-09 ENCOUNTER — OFFICE VISIT (OUTPATIENT)
Dept: URGENT CARE | Age: 63
End: 2023-12-09
Payer: MEDICARE

## 2023-12-09 VITALS
HEIGHT: 60 IN | SYSTOLIC BLOOD PRESSURE: 162 MMHG | DIASTOLIC BLOOD PRESSURE: 77 MMHG | OXYGEN SATURATION: 97 % | BODY MASS INDEX: 43.59 KG/M2 | TEMPERATURE: 97.4 F | HEART RATE: 59 BPM | WEIGHT: 222 LBS | RESPIRATION RATE: 16 BRPM

## 2023-12-09 DIAGNOSIS — L60.0 INGROWN RIGHT BIG TOENAIL: Primary | ICD-10-CM

## 2023-12-09 PROCEDURE — 99213 OFFICE O/P EST LOW 20 MIN: CPT | Performed by: NURSE PRACTITIONER

## 2023-12-09 RX ORDER — CEPHALEXIN 500 MG/1
500 CAPSULE ORAL 3 TIMES DAILY
Qty: 21 CAPSULE | Refills: 0 | Status: SHIPPED | OUTPATIENT
Start: 2023-12-09 | End: 2023-12-16

## 2023-12-09 NOTE — PROGRESS NOTES
[facility]        NAME: Mee Jose is a 58 y.o. female  : 1960    MRN: [id number]  DATE: 2023  TIME: 12:07 PM    Assessment and Plan   Ingrown right big toenail [L60.0]  1. Ingrown right big toenail  cephalexin (KEFLEX) 500 mg capsule    Ambulatory Referral to Podiatry            Patient Instructions     Take medication as prescribed  Follow-up with podiatry  Follow up with PCP in 3-5 days. Proceed to  ER if symptoms worsen. Chief Complaint     Chief Complaint   Patient presents with    Ingrown Toenail     Pt with c/o right great toe ingrown nail. History of Present Illness       HPI  Presents to clinic with complaint of pain to the right great toe with ingrown toenail. Duration 1 week. Denies fever    Review of Systems   Review of Systems   Constitutional:  Negative for fever. Skin:  Positive for color change (red). Negative for wound. Neurological:  Negative for tremors and numbness.          Current Medications       Current Outpatient Medications:     cephalexin (KEFLEX) 500 mg capsule, Take 1 capsule (500 mg total) by mouth 3 (three) times a day for 7 days, Disp: 21 capsule, Rfl: 0    acetaminophen (TYLENOL) 500 mg tablet, Take 2 tablets by mouth daily as needed for pain, Disp: 180 tablet, Rfl: 3    albuterol (PROVENTIL HFA,VENTOLIN HFA) 90 mcg/act inhaler, Inhale 2 puffs every 4 (four) hours as needed for wheezing, Disp: 18 g, Rfl: 0    Blood Glucose Monitoring Suppl (ACCU-CHEK EDILMA PLUS) w/Device KIT, by Does not apply route 2 (two) times a day, Disp: 1 kit, Rfl: 0    cetirizine (ZyrTEC) 10 mg tablet, Take 1 tablet (10 mg total) by mouth daily, Disp: 90 tablet, Rfl: 2    cyclobenzaprine (FLEXERIL) 5 mg tablet, Take 1 tablet (5 mg total) by mouth daily at bedtime, Disp: 15 tablet, Rfl: 0    dorzolamide-timolol (COSOPT) 22.3-6.8 MG/ML ophthalmic solution, , Disp: , Rfl:     Empagliflozin (JARDIANCE) 10 MG TABS tablet, Take 1 tablet (10 mg total) by mouth daily, Disp: 90 tablet, Rfl: 3    ferrous sulfate 324 (65 Fe) mg, Take 1 tablet (324 mg total) by mouth daily before breakfast, Disp: 90 tablet, Rfl: 0    fluticasone (FLONASE) 50 mcg/act nasal spray, 1 spray into each nostril daily (Patient taking differently: 1 spray into each nostril if needed), Disp: 9.9 mL, Rfl: 0    fluticasone (Flovent HFA) 110 MCG/ACT inhaler, Inhale 2 puffs 2 (two) times a day Rinse mouth after use., Disp: 12 g, Rfl: 3    hydrOXYzine HCL (ATARAX) 25 mg tablet, Take 1 tablet (25 mg total) by mouth 2 (two) times a day, Disp: 180 tablet, Rfl: 0    Keppra 500 MG tablet, Take 1 tablet (500 mg total) by mouth 3 (three) times a day, Disp: 270 tablet, Rfl: 3    Lancets (OneTouch Delica Plus [id number]) MISC, USE ONCE DAILY AS DIRECTED, Disp: 100 each, Rfl: 3    metoprolol tartrate (LOPRESSOR) 25 mg tablet, TAKE HALF TABLET BY MOUTH EVERY 12 (TWELVE) HOURS, Disp: 90 tablet, Rfl: 1    omega-3-acid ethyl esters (LOVAZA) 1 g capsule, Take 2 capsules (2 g total) by mouth 2 (two) times a day, Disp: 180 capsule, Rfl: 3    omeprazole (PriLOSEC) 40 MG capsule, Take 1 capsule (40 mg total) by mouth daily, Disp: 90 capsule, Rfl: 1    OneTouch Verio test strip, USE DAILY AS INSTRUCTED, Disp: 100 strip, Rfl: 9    rosuvastatin (CRESTOR) 5 mg tablet, Take 1 tablet (5 mg total) by mouth 3 (three) times a week, Disp: 30 tablet, Rfl: 3    sitaGLIPtin (Januvia) 25 mg tablet, Take 1 tablet (25 mg total) by mouth daily, Disp: 30 tablet, Rfl: 3    torsemide (DEMADEX) 20 mg tablet, TAKE ONE TABLET BY MOUTH AS NEEDED AS DIRECTED, Disp: 90 tablet, Rfl: 10    warfarin (COUMADIN) 5 mg tablet, TAKE 1/2 TO 1 TABLET DAILY BY MOUTH OR AS ORDERED BY PHYSICIAN., Disp: 90 tablet, Rfl: 2    Current Allergies     Allergies as of 12/09/2023 - Reviewed 12/09/2023   Allergen Reaction Noted    Codeine  07/16/2016    Methylprednisolone GI Intolerance 07/19/2023    Prednisone Itching 08/12/2016    Statins Headache 03/29/2023    Codeine polt-chlorphen polt er Irritability, Itching, and Rash 08/01/2014            The following portions of the patient's history were reviewed and updated as appropriate: allergies, current medications, past family history, past medical history, past social history, past surgical history and problem list.     Past Medical History:   Diagnosis Date    Abnormal ultrasound of pelvis     last assessed - 26Mar2015    Asthma     Asthma, persistent 08/01/2014    Cerebral infarction ([address])     unspecified; last assessed - [id number]    COVID-19 virus infection 01/07/2021    Dizziness     last assessed - [id number]    Dysuria     last assessed - [id number]    Exposure to potentially hazardous body fluids     last assessed - [id number]    Heart disease     Hematoma     of arm; last assessed - 20Jun2016    Mitral valve stenosis     Personal history of scoliosis     Pneumonia     Scar, hypertrophic     last assessed - [id number]    Seizures ([address])     Shingles 2021    Type 2 diabetes mellitus ([address])     Vision problems        Past Surgical History:   Procedure Laterality Date    BREAST BIOPSY Left     many years ago in [place]- not really sure    MITRAL VALVE REPLACEMENT  06/17/2015    MVR with 27 mm [employer] Optiform mechanical prostethic;  last assessed - [id number]    TONSILLECTOMY      TRANSLUMINAL ANGIOPLASTY      mitral valve; last assessed - [id number]    [address]    last assessed - 82Bqw2022       Family History   Problem Relation Age of Onset    Cancer Mother     Dementia Mother     Diabetes Mother     Hypertension Mother     Hyperlipidemia Mother     Cervical cancer Mother     Heart disease Father     Coronary artery disease Father     Hypertension Father     Heart attack Father     Diabetes Sister     Breast cancer Sister 58    No Known Problems Daughter     Diabetes Maternal Grandmother     Breast cancer Maternal Grandmother     Diabetes Brother     Hypertension Brother     Hyperlipidemia Brother     Seizures Son     Psoriasis Son     Heart disease Paternal Aunt     Heart disease Paternal Aunt     Diabetes Other          Medications have been verified. Objective   /77 (BP Location: Left arm, Patient Position: Sitting)   Pulse 59   Temp (!) 97.4 °F (36.3 °C)   Resp 16   Ht 4' 11.5" (1.511 m)   Wt 101 kg (222 lb)   SpO2 97%   BMI 44.09 kg/m²   No LMP recorded. Patient is postmenopausal.       Physical Exam     Physical Exam  Musculoskeletal:         General: Swelling (moderate, around the R great toenail.) and tenderness (withy palpation of affected toe) present. No deformity. Skin:     Capillary Refill: Capillary refill takes less than 2 seconds. Findings: Erythema (mild to moderate, R great toe) present. No bruising.       Comments: Everlene Dunning of the R great toenail

## 2024-01-02 DIAGNOSIS — E78.2 MIXED HYPERLIPIDEMIA: ICD-10-CM

## 2024-01-02 RX ORDER — OMEGA-3-ACID ETHYL ESTERS 1 G/1
2 CAPSULE, LIQUID FILLED ORAL 2 TIMES DAILY
Qty: 180 CAPSULE | Refills: 4 | Status: SHIPPED | OUTPATIENT
Start: 2024-01-02

## 2024-01-08 DIAGNOSIS — L50.0 ALLERGIC URTICARIA: ICD-10-CM

## 2024-01-08 RX ORDER — HYDROXYZINE HYDROCHLORIDE 25 MG/1
25 TABLET, FILM COATED ORAL 2 TIMES DAILY
Qty: 180 TABLET | Refills: 1 | Status: SHIPPED | OUTPATIENT
Start: 2024-01-08

## 2024-01-11 ENCOUNTER — TELEPHONE (OUTPATIENT)
Dept: CARDIOLOGY CLINIC | Facility: CLINIC | Age: 64
End: 2024-01-11

## 2024-01-11 ENCOUNTER — OFFICE VISIT (OUTPATIENT)
Dept: PODIATRY | Facility: CLINIC | Age: 64
End: 2024-01-11
Payer: MEDICARE

## 2024-01-11 VITALS
RESPIRATION RATE: 18 BRPM | SYSTOLIC BLOOD PRESSURE: 137 MMHG | HEIGHT: 59 IN | BODY MASS INDEX: 44.84 KG/M2 | DIASTOLIC BLOOD PRESSURE: 80 MMHG | HEART RATE: 76 BPM

## 2024-01-11 DIAGNOSIS — M79.674 PAIN IN TOE OF RIGHT FOOT: ICD-10-CM

## 2024-01-11 DIAGNOSIS — L60.0 INGROWN RIGHT BIG TOENAIL: ICD-10-CM

## 2024-01-11 DIAGNOSIS — E11.9 CONTROLLED TYPE 2 DIABETES MELLITUS WITHOUT COMPLICATION, WITHOUT LONG-TERM CURRENT USE OF INSULIN (HCC): Primary | ICD-10-CM

## 2024-01-11 PROCEDURE — 11730 AVULSION NAIL PLATE SIMPLE 1: CPT | Performed by: PODIATRIST

## 2024-01-11 PROCEDURE — 99202 OFFICE O/P NEW SF 15 MIN: CPT | Performed by: PODIATRIST

## 2024-01-11 RX ORDER — LIDOCAINE HYDROCHLORIDE AND EPINEPHRINE 10; 10 MG/ML; UG/ML
1 INJECTION, SOLUTION INFILTRATION; PERINEURAL ONCE
Status: COMPLETED | OUTPATIENT
Start: 2024-01-11 | End: 2024-01-11

## 2024-01-11 RX ORDER — LIDOCAINE HYDROCHLORIDE 10 MG/ML
1 INJECTION, SOLUTION EPIDURAL; INFILTRATION; INTRACAUDAL; PERINEURAL ONCE
Status: COMPLETED | OUTPATIENT
Start: 2024-01-11 | End: 2024-01-11

## 2024-01-11 RX ADMIN — LIDOCAINE HYDROCHLORIDE 1 ML: 10 INJECTION, SOLUTION EPIDURAL; INFILTRATION; INTRACAUDAL; PERINEURAL at 17:54

## 2024-01-11 RX ADMIN — LIDOCAINE HYDROCHLORIDE AND EPINEPHRINE 1 ML: 10; 10 INJECTION, SOLUTION INFILTRATION; PERINEURAL at 17:53

## 2024-01-11 NOTE — PROGRESS NOTES
Assessment/Plan:    Explained to patient that she is dealing with a painful ingrown nail along the lateral nail border of the right great toe.  She also has a mild ingrown nail pain along the medial nail border of the same toe.    Recommended partial avulsion as follows: Anesthesia via 2 cc of a 1: 1 mixture of 1% Xylocaine with epinephrine and 1% Xylocaine plain.  Betadine prep of toe performed.  The lateral nail border of the right hallux was avulsed to the eponychium.  Superficial avulsion of the medial border.  Bacitracin dressing to the lateral nail border right hallux.  Patient to soak twice daily in warm water followed by Neosporin dressing.  Reappoint 1 week.  Note: Normal bleeding noted from toe following procedure.    No problem-specific Assessment & Plan notes found for this encounter.       Diagnoses and all orders for this visit:    Controlled type 2 diabetes mellitus without complication, without long-term current use of insulin (HCC)    Ingrown right big toenail  -     Ambulatory Referral to Podiatry  -     lidocaine (PF) (XYLOCAINE-MPF) 1 % injection 1 mL  -     lidocaine-epinephrine (XYLOCAINE/EPINEPHRINE) 1 %-1:100,000 injection 1 mL    Pain in toe of right foot          Subjective:      Patient ID: Florecita Ramirez is a 64 y.o. female.    HPI    Patient, a 64-year-old type II diabetic female utilizing Januvia for control presents with painful ingrown toenail affecting the lateral nail border of the right great toe.  Patient notes that the nail was recently infected.  She has been given antibiotics and the infection seems to have resolved.  The pain persist however along the lateral nail border.    I personally reviewed an A1c dated 10/12/2023.  It was 7.1.    I personally reviewed an A1c dated 4/12/2023.  It was 6.5.    The following portions of the patient's history were reviewed and updated as appropriate: allergies, current medications, past family history, past medical history, past social  "history, past surgical history, and problem list.    Review of Systems   Cardiovascular:         History of rheumatic heart disease   Gastrointestinal:         Reflux   Psychiatric/Behavioral: Negative.               Objective:      /80   Pulse 76   Resp 18   Ht 4' 11\" (1.499 m)   BMI 44.84 kg/m²          Physical Exam  Constitutional:       Appearance: Normal appearance.   Cardiovascular:      Comments: Trace pedal pulses bilateral  Musculoskeletal:         General: Normal range of motion.   Skin:     Comments: Pain with palpation lateral nail border right hallux.  Minor discomfort with palpation medial nail border right hallux.  There is no purulent drainage or evidence of infection.   Neurological:      General: No focal deficit present.      Mental Status: She is oriented to person, place, and time.         Nail removal    Date/Time: 1/11/2024 5:30 PM    Performed by: Rio Kessler DPM  Authorized by: Rio Kessler DPM    Patient location:  ClinicUniversal Protocol:  Consent: Verbal consent obtained.  Risks and benefits: risks, benefits and alternatives were discussed  Consent given by: patient  Patient understanding: patient states understanding of the procedure being performed    Location:     Foot:  R big toe  Pre-procedure details:     Skin preparation:  Betadine  Anesthesia (see MAR for exact dosages):     Anesthesia method:  Nerve block    Block needle gauge:  25 G    Block anesthetic:  Lidocaine 1% WITH epi and lidocaine 1% w/o epi    Block injection procedure:  Anatomic landmarks identified    Block outcome:  Anesthesia achieved  Nail Removal:     Nail removed:  Partial    Nail side:  Lateral    Nail bed sutured: no    Ingrown nail:     Wedge excision of skin: no      Nail matrix removed or ablated:  None  Post-procedure details:     Dressing:  4x4 sterile gauze, antibiotic ointment and gauze roll    Patient tolerance of procedure:  Tolerated well, no immediate complications          "

## 2024-01-12 DIAGNOSIS — G40.019 LOCALIZATION-RELATED EPILEPSY, INTRACTABLE (HCC): ICD-10-CM

## 2024-01-12 RX ORDER — LEVETIRACETAM 500 MG/1
500 TABLET, FILM COATED ORAL 3 TIMES DAILY
Qty: 270 TABLET | Refills: 0 | Status: CANCELLED | OUTPATIENT
Start: 2024-01-12

## 2024-01-18 ENCOUNTER — APPOINTMENT (OUTPATIENT)
Dept: LAB | Facility: CLINIC | Age: 64
End: 2024-01-18
Payer: MEDICARE

## 2024-01-18 ENCOUNTER — ANTICOAG VISIT (OUTPATIENT)
Dept: CARDIOLOGY CLINIC | Facility: CLINIC | Age: 64
End: 2024-01-18

## 2024-01-18 ENCOUNTER — OFFICE VISIT (OUTPATIENT)
Dept: PODIATRY | Facility: CLINIC | Age: 64
End: 2024-01-18
Payer: MEDICARE

## 2024-01-18 VITALS
RESPIRATION RATE: 18 BRPM | BODY MASS INDEX: 44.84 KG/M2 | HEART RATE: 72 BPM | HEIGHT: 59 IN | DIASTOLIC BLOOD PRESSURE: 67 MMHG | SYSTOLIC BLOOD PRESSURE: 118 MMHG

## 2024-01-18 DIAGNOSIS — L60.0 INGROWN RIGHT BIG TOENAIL: Primary | ICD-10-CM

## 2024-01-18 DIAGNOSIS — E11.9 CONTROLLED TYPE 2 DIABETES MELLITUS WITHOUT COMPLICATION, WITHOUT LONG-TERM CURRENT USE OF INSULIN (HCC): ICD-10-CM

## 2024-01-18 DIAGNOSIS — J30.9 ALLERGIC RHINITIS, UNSPECIFIED SEASONALITY, UNSPECIFIED TRIGGER: ICD-10-CM

## 2024-01-18 DIAGNOSIS — I05.0 MITRAL VALVE STENOSIS, UNSPECIFIED ETIOLOGY: ICD-10-CM

## 2024-01-18 DIAGNOSIS — D50.9 IRON DEFICIENCY ANEMIA, UNSPECIFIED IRON DEFICIENCY ANEMIA TYPE: ICD-10-CM

## 2024-01-18 DIAGNOSIS — Z95.2 HISTORY OF PROSTHETIC HEART VALVE: Primary | ICD-10-CM

## 2024-01-18 PROCEDURE — 99212 OFFICE O/P EST SF 10 MIN: CPT | Performed by: PODIATRIST

## 2024-01-18 RX ORDER — CETIRIZINE HYDROCHLORIDE 10 MG/1
10 TABLET ORAL DAILY
Qty: 90 TABLET | Refills: 1 | Status: SHIPPED | OUTPATIENT
Start: 2024-01-18

## 2024-01-18 RX ORDER — FERROUS SULFATE 324(65)MG
324 TABLET, DELAYED RELEASE (ENTERIC COATED) ORAL
Qty: 90 TABLET | Refills: 1 | Status: SHIPPED | OUTPATIENT
Start: 2024-01-18

## 2024-01-18 NOTE — TELEPHONE ENCOUNTER
Reason for call:   [x] Refill   [] Prior Auth  [] Other:     Office:   [x] PCP/Provider - Dr Martin  [] Specialty/Provider -     Medication:   cetirizine Take 1 tablet (10 mg total) by mouth daily    ferrous sulfate - Take 1 tablet (324 mg total) by mouth daily   Metoprolol 25 mg - HALF TABLET BY MOUTH EVERY 12 (TWELVE) HOURS     Dose/Frequency: see above    Quantity: 90D w refill    Pharmacy: [facility]    Does the patient have enough for 3 days?   [] Yes   [x] No - Send as HP to POD

## 2024-01-18 NOTE — PROGRESS NOTES
Patient presents 1 week post partial avulsion medial lateral nail border right hallux.  Surgical site healing uneventfully with no evidence of infection.  Patient may discontinue soaks and Neosporin.    Trimmed elongated toenails at patient request.  Patient is rescheduled in 3 months for palliative nail care.

## 2024-02-06 DIAGNOSIS — E13.9 DIABETES 1.5, MANAGED AS TYPE 2 (HCC): ICD-10-CM

## 2024-02-06 DIAGNOSIS — L50.0 ALLERGIC URTICARIA: ICD-10-CM

## 2024-02-07 RX ORDER — HYDROXYZINE HYDROCHLORIDE 25 MG/1
25 TABLET, FILM COATED ORAL 2 TIMES DAILY
Qty: 60 TABLET | Refills: 2 | Status: SHIPPED | OUTPATIENT
Start: 2024-02-07

## 2024-02-12 DIAGNOSIS — I05.0 MITRAL VALVE STENOSIS, UNSPECIFIED ETIOLOGY: ICD-10-CM

## 2024-02-12 DIAGNOSIS — D50.9 IRON DEFICIENCY ANEMIA, UNSPECIFIED IRON DEFICIENCY ANEMIA TYPE: ICD-10-CM

## 2024-02-12 DIAGNOSIS — J30.9 ALLERGIC RHINITIS, UNSPECIFIED SEASONALITY, UNSPECIFIED TRIGGER: ICD-10-CM

## 2024-02-12 RX ORDER — FERROUS SULFATE 324(65)MG
324 TABLET, DELAYED RELEASE (ENTERIC COATED) ORAL
Qty: 90 TABLET | Refills: 0 | Status: SHIPPED | OUTPATIENT
Start: 2024-02-12

## 2024-02-12 RX ORDER — CETIRIZINE HYDROCHLORIDE 10 MG/1
10 TABLET ORAL DAILY
Qty: 90 TABLET | Refills: 0 | Status: SHIPPED | OUTPATIENT
Start: 2024-02-12

## 2024-02-21 PROBLEM — R05.9 COUGH: Status: RESOLVED | Noted: 2018-03-12 | Resolved: 2024-02-21

## 2024-02-21 PROBLEM — H10.13 ALLERGIC CONJUNCTIVITIS AND RHINITIS, BILATERAL: Status: RESOLVED | Noted: 2020-04-06 | Resolved: 2024-02-21

## 2024-02-21 PROBLEM — J30.9 ALLERGIC CONJUNCTIVITIS AND RHINITIS, BILATERAL: Status: RESOLVED | Noted: 2020-04-06 | Resolved: 2024-02-21

## 2024-02-26 ENCOUNTER — NURSE TRIAGE (OUTPATIENT)
Age: 64
End: 2024-02-26

## 2024-02-26 DIAGNOSIS — I05.9 MITRAL VALVE DISORDER: Primary | ICD-10-CM

## 2024-02-26 NOTE — TELEPHONE ENCOUNTER
"Patient called in to report dizziness, chills off and on, and elevated blood sugar=282 since noon today.  Reports BS usually around 130-140; no PO intake to correlate with BS results. Has taken all medications today. Reports history of vertigo and room spins when she holds her head back to take eye dorps; once upright spinning stops; dizziness and chills continue. No OV available today. Please follow up with patient for evaluation or go to urgent care/ER.    Chills off and on  BS-282 small bagel with cream cheese and coffee  Taking eye drops to reduce pressure in eyes prescribed by Dr. Jeremy Gan at [facility]  INR lab order entered by Cardio Billy; patient  will get done as soon as she is feeling well NO INR since due 2/7/24.    Reason for Disposition   Lightheadedness (dizziness) present now, after 2 hours of rest and fluids    Answer Assessment - Initial Assessment Questions  1. DESCRIPTION: \"Describe your dizziness.\"      Sitting still; head is hurting; feels dizzy; head back feels like the room spinning. Head upright room stops spinning  2. LIGHTHEADED: \"Do you feel lightheaded?\" (e.g., somewhat faint, woozy, weak upon standing)      Stand up slowly;   3. VERTIGO: \"Do you feel like either you or the room is spinning or tilting?\" (i.e. vertigo)      Room spins when head is back (took eye drops)  4. SEVERITY: \"How bad is it?\"  \"Do you feel like you are going to faint?\" \"Can you stand and walk?\"    - MILD: Feels slightly dizzy, but walking normally.    - MODERATE: Feels very unsteady when walking, but not falling; interferes with normal activities (e.g., school, work) .    - SEVERE: Unable to walk without falling, or requires assistance to walk without falling; feels like passing out now.       Dizzy only; does feel like she will fail or pass out  5. ONSET:  \"When did the dizziness begin?\"      Today around noon  6. AGGRAVATING FACTORS: \"Does anything make it worse?\" (e.g., standing, change " "in head position)      Just head back. Stood up and walked slowly to check blood sugar at 1230  7. HEART RATE: \"Can you tell me your heart rate?\" \"How many beats in 15 seconds?\"  (Note: not all patients can do this)        Does not feel palpitations. Needs to check INR; lab order .  8. CAUSE: \"What do you think is causing the dizziness?\"      Unsure  9. RECURRENT SYMPTOM: \"Have you had dizziness before?\" If Yes, ask: \"When was the last time?\" \"What happened that time?\"      Yes, has history of vertigo  10. OTHER SYMPTOMS: \"Do you have any other symptoms?\" (e.g., fever, chest pain, vomiting, diarrhea, bleeding)        blood sugar=282 at 12:30 PM (normal OO=344-233) before light lunch of tuna/crackers/water. Right shoulder pain resolved. Chills off and on  11. PREGNANCY: \"Is there any chance you are pregnant?\" \"When was your last menstrual period?\"        Post menopausal    Protocols used: Dizziness-ADULT-OH    "

## 2024-02-27 NOTE — TELEPHONE ENCOUNTER
Called patient and was disconnected. Called a second time and was disconnected again. Called a third time and went straight to voicemail    LMOM asked how patient was feeling since call of dizziness. Asked if she was seen in urgent care or ER for dizziness. Offered to scheduled appointment if patient is still not feeling well

## 2024-02-27 NOTE — TELEPHONE ENCOUNTER
Patient called back and stated her sugar really went down and is much better  today - 143 compared to 282, 201 yesterday.  She did not go to urgent care or the ER.  She is feeling much better today. Patient states she may feel very slight dizziness but not enough to mention.  She just had crackers and coffee.  Offered to schedule an appointment, but patient declined and stated she is scheduled on 3/4 and will keep that appointment.

## 2024-03-02 ENCOUNTER — APPOINTMENT (OUTPATIENT)
Dept: LAB | Facility: CLINIC | Age: 64
End: 2024-03-02
Payer: COMMERCIAL

## 2024-03-02 DIAGNOSIS — I05.9 MITRAL VALVE DISORDER: ICD-10-CM

## 2024-03-02 LAB
INR PPP: 2.68 (ref 0.84–1.19)
PROTHROMBIN TIME: 28 SECONDS (ref 11.6–14.5)

## 2024-03-02 PROCEDURE — 36415 COLL VENOUS BLD VENIPUNCTURE: CPT

## 2024-03-02 PROCEDURE — 85610 PROTHROMBIN TIME: CPT

## 2024-03-04 ENCOUNTER — OFFICE VISIT (OUTPATIENT)
Dept: FAMILY MEDICINE CLINIC | Facility: CLINIC | Age: 64
End: 2024-03-04
Payer: COMMERCIAL

## 2024-03-04 ENCOUNTER — ANTICOAG VISIT (OUTPATIENT)
Dept: CARDIOLOGY CLINIC | Facility: CLINIC | Age: 64
End: 2024-03-04

## 2024-03-04 VITALS
HEIGHT: 59 IN | RESPIRATION RATE: 16 BRPM | TEMPERATURE: 97.5 F | SYSTOLIC BLOOD PRESSURE: 140 MMHG | DIASTOLIC BLOOD PRESSURE: 80 MMHG | WEIGHT: 218.4 LBS | BODY MASS INDEX: 44.03 KG/M2 | HEART RATE: 65 BPM | OXYGEN SATURATION: 97 %

## 2024-03-04 DIAGNOSIS — F41.8 DEPRESSION WITH ANXIETY: Primary | ICD-10-CM

## 2024-03-04 DIAGNOSIS — Z11.59 NEED FOR HEPATITIS C SCREENING TEST: ICD-10-CM

## 2024-03-04 DIAGNOSIS — E11.9 TYPE 2 DIABETES MELLITUS WITHOUT COMPLICATION, WITHOUT LONG-TERM CURRENT USE OF INSULIN (HCC): ICD-10-CM

## 2024-03-04 DIAGNOSIS — D50.8 IRON DEFICIENCY ANEMIA SECONDARY TO INADEQUATE DIETARY IRON INTAKE: ICD-10-CM

## 2024-03-04 DIAGNOSIS — E66.01 CLASS 3 SEVERE OBESITY WITH SERIOUS COMORBIDITY AND BODY MASS INDEX (BMI) OF 40.0 TO 44.9 IN ADULT, UNSPECIFIED OBESITY TYPE (HCC): ICD-10-CM

## 2024-03-04 DIAGNOSIS — Z95.2 HISTORY OF PROSTHETIC HEART VALVE: Primary | ICD-10-CM

## 2024-03-04 DIAGNOSIS — I73.9 PAD (PERIPHERAL ARTERY DISEASE) (HCC): ICD-10-CM

## 2024-03-04 DIAGNOSIS — E13.9 DIABETES 1.5, MANAGED AS TYPE 2 (HCC): ICD-10-CM

## 2024-03-04 DIAGNOSIS — G40.019 LOCALIZATION-RELATED EPILEPSY, INTRACTABLE (HCC): ICD-10-CM

## 2024-03-04 DIAGNOSIS — J45.20 MILD INTERMITTENT ASTHMA WITHOUT COMPLICATION: ICD-10-CM

## 2024-03-04 LAB
CREAT UR-MCNC: 48.8 MG/DL
MICROALBUMIN UR-MCNC: <7 MG/L
MICROALBUMIN/CREAT 24H UR: <14 MG/G CREATININE (ref 0–30)
SL AMB POCT HEMOGLOBIN AIC: 8.1 (ref ?–6.5)

## 2024-03-04 PROCEDURE — 82570 ASSAY OF URINE CREATININE: CPT | Performed by: FAMILY MEDICINE

## 2024-03-04 PROCEDURE — 99214 OFFICE O/P EST MOD 30 MIN: CPT | Performed by: FAMILY MEDICINE

## 2024-03-04 PROCEDURE — 82043 UR ALBUMIN QUANTITATIVE: CPT | Performed by: FAMILY MEDICINE

## 2024-03-04 PROCEDURE — 83036 HEMOGLOBIN GLYCOSYLATED A1C: CPT | Performed by: FAMILY MEDICINE

## 2024-03-04 NOTE — PROGRESS NOTES
Name: Florecita Ramirez      : 1960      MRN: [id number]  Encounter Provider: Sena Martin MD  Encounter Date: 3/4/2024   Encounter department: [facility]    Assessment & Plan     Here for follow up. A1c up to 8.1. On Jauvia 25 mg and Jardiance 10 mg. Suggest adding another agent but would rather may dietary changes. Also plans to exercise more. Did agree to increase jardiance from 10-25 mg daily. Recheck in 3 months. FBW ordered. On Statin for PAD. Asthma controlled. Mood stable. On keprra for seizures and has not had any recent seizures. Having difficulty refilling keppra but has already contact neurology and working with her local pharmacist . Still has a pills left and to call if not resolved this week.    1. Depression with anxiety  -     Lipid panel; Future    2. PAD (peripheral artery disease) (HCC)    3. Diabetes 1.5, managed as type 2 (HCC)  -     POCT hemoglobin A1c  -     Lipid panel; Future  -     Comprehensive metabolic panel; Future    4. Mild intermittent asthma without complication    5. Need for hepatitis C screening test  -     Hepatitis C antibody; Future    6. Class 3 severe obesity with serious comorbidity and body mass index (BMI) of 40.0 to 44.9 in adult, unspecified obesity type (HCC)  -     Lipid panel; Future    7. Iron deficiency anemia secondary to inadequate dietary iron intake  -     Iron Panel (Includes Ferritin, Iron Sat%, Iron, and TIBC); Future    8. Type 2 diabetes mellitus without complication, without long-term current use of insulin (HCC)  -     Empagliflozin 25 MG TABS; Take 1 tablet (25 mg total) by mouth daily  -     Albumin / creatinine urine ratio    9. Localization-related epilepsy, intractable (HCC)           Subjective      Here for follow up   BS this am 152  Last week, she had headaches, dizziness, and high blood sugar   Grandchild came to visit for a few week ago and wasn't following a diabetic diet   A1c up from 7.1-->8.1      Review  of Systems   Respiratory: Negative.     Cardiovascular:  Negative for chest pain and palpitations.   Neurological:  Positive for dizziness and headaches (resolved).       Current Outpatient Medications on File Prior to Visit   Medication Sig   • acetaminophen (TYLENOL) 500 mg tablet Take 2 tablets by mouth daily as needed for pain   • albuterol (PROVENTIL HFA,VENTOLIN HFA) 90 mcg/act inhaler Inhale 2 puffs every 4 (four) hours as needed for wheezing   • Blood Glucose Monitoring Suppl (ACCU-CHEK EDILMA PLUS) w/Device KIT by Does not apply route 2 (two) times a day   • cetirizine (ZyrTEC) 10 mg tablet Take 1 tablet (10 mg total) by mouth daily   • dorzolamide-timolol (COSOPT) 22.3-6.8 MG/ML ophthalmic solution    • ferrous sulfate 324 (65 Fe) mg Take 1 tablet (324 mg total) by mouth daily before breakfast   • fluticasone (FLONASE) 50 mcg/act nasal spray 1 spray into each nostril daily (Patient taking differently: 1 spray into each nostril if needed)   • fluticasone (Flovent HFA) 110 MCG/ACT inhaler Inhale 2 puffs 2 (two) times a day Rinse mouth after use.   • hydrOXYzine HCL (ATARAX) 25 mg tablet Take 1 tablet (25 mg total) by mouth 2 (two) times a day   • Keppra 500 MG tablet Take 1 tablet (500 mg total) by mouth 3 (three) times a day   • Lancets (OneTouch Delica Plus [id number]) MISC USE ONCE DAILY AS DIRECTED   • metoprolol tartrate (LOPRESSOR) 25 mg tablet Take 0.5 tablets (12.5 mg total) by mouth every 12 (twelve) hours   • omega-3-acid ethyl esters (LOVAZA) 1 g capsule TAKE TWO CAPSULES BY MOUTH TWICE A DAY   • omeprazole (PriLOSEC) 40 MG capsule Take 1 capsule (40 mg total) by mouth daily   • OneTouch Verio test strip USE DAILY AS INSTRUCTED   • rosuvastatin (CRESTOR) 5 mg tablet Take 1 tablet (5 mg total) by mouth 3 (three) times a week   • sitaGLIPtin (Januvia) 25 mg tablet Take 1 tablet (25 mg total) by mouth daily   • torsemide (DEMADEX) 20 mg tablet TAKE ONE TABLET BY MOUTH AS NEEDED AS DIRECTED   • warfarin  "(COUMADIN) 5 mg tablet TAKE 1/2 TO 1 TABLET DAILY BY MOUTH OR AS ORDERED BY PHYSICIAN.   • [DISCONTINUED] Empagliflozin (JARDIANCE) 10 MG TABS tablet Take 1 tablet (10 mg total) by mouth daily   • cyclobenzaprine (FLEXERIL) 5 mg tablet Take 1 tablet (5 mg total) by mouth daily at bedtime (Patient not taking: Reported on 3/4/2024)       Objective     /80 (BP Location: Left arm, Patient Position: Sitting, Cuff Size: Large)   Pulse 65   Temp 97.5 °F (36.4 °C) (Tympanic)   Resp 16   Ht 4' 11\" (1.499 m)   Wt 99.1 kg (218 lb 6.4 oz)   SpO2 97%   BMI 44.11 kg/m²     Physical Exam  Cardiovascular:      Rate and Rhythm: Normal rate and regular rhythm.      Heart sounds: Murmur (mid systolic click) heard.   Pulmonary:      Effort: Pulmonary effort is normal. No respiratory distress.      Breath sounds: Normal breath sounds. No stridor. No wheezing or rales.   Neurological:      Mental Status: She is oriented to person, place, and time.   Psychiatric:         Mood and Affect: Mood normal.         Behavior: Behavior normal.     Sena Martin MD    "

## 2024-03-05 NOTE — PROGRESS NOTES
Cardiology Follow Up    Florecita Ramirez  1960  [id number]  [facility]  [address]  [place] [id number] [phone]    1. Type 2 diabetes mellitus without complication, without long-term current use of insulin (HCC)  Echo complete w/ contrast if indicated    POCT ECG      2. GERMAN (obstructive sleep apnea)  Echo complete w/ contrast if indicated    POCT ECG      3. Rheumatic heart disease  Echo complete w/ contrast if indicated    POCT ECG      4. PAD (peripheral artery disease) (HCC)  Echo complete w/ contrast if indicated    POCT ECG      5. H/O mitral valve replacement with mechanical valve  Echo complete w/ contrast if indicated    POCT ECG      6. Dyslipidemia  Echo complete w/ contrast if indicated    POCT ECG          Interval History: Patient here for follow-up visit.  Patient with RHD, with mitral balloon valvuloplasty in 2014 in [facility] complicated by CVA.  She had MVR with mechanical valve, #27 mm [employer], in June 2015 at [facility].  Patient has HTN, DM, mild COPD and PAF with LOUIE/CV done 2015.  Cardiac catheterization done 5/14/2015 was normal.  Echocardiogram done 4/2023 demonstrated LVEF of 60% with mild LVH.  There was ROGELIO.  An appropriately functioning mechanical prosthesis was noted in the mitral position with a mean gradient of 5 mmHg.  Mild to moderate TR with mild to moderate elevation in PASP was noted.  Goal PT/INR is 2.5-3.5.  Patient has had no chest pain or significant dyspnea.  EKG today demonstrates sinus bradycardia at a rate of 56 with RAD and nonspecific ST segment change with no significant difference compared to prior EKG done 3/29/2023.  I see her sister Veronica.    Patient Active Problem List   Diagnosis   • History of prosthetic heart valve   • Abnormal mammogram   • Cervical high risk HPV (human papillomavirus) test positive   • Depression with anxiety   • Elevated blood pressure  reading   • Esophageal reflux   • Excessive daytime sleepiness   • Fatigue   • Hypokalemia   • Fecal occult blood test positive   • Insomnia   • Intertrigo   • Iron deficiency anemia   • Localization-related epilepsy, intractable ([facility])   • Low grade squamous intraepithelial lesion (LGSIL) on cervical Pap smear   • Microcytic hypochromic anemia   • Obesity   • Primary osteoarthritis of right knee   • Overactive bladder   • Plantar fasciitis   • Sleep disorder, circadian, delayed sleep phase type   • Urge incontinence of urine   • Urinary retention   • Urticaria, chronic   • Uterine fibroid   • Vitamin D insufficiency   • Xerosis of skin   • History of stroke   • Wrist swelling, left   • Chest wall pain   • Bilateral lower extremity edema   • H/O mitral valve replacement with mechanical valve   • Posterior knee pain, right   • Left sided abdominal pain   • Vertigo   • GERMAN (obstructive sleep apnea)   • Panic reaction   • Secondary hypertension   • Dyslipidemia   • Acute right ankle pain   • Mild intermittent asthma   • Sprain of anterior talofibular ligament of right ankle   • Peroneal tendonitis   • Posterior tibial tendinitis of right lower extremity   • BPPV (benign paroxysmal positional vertigo)   • Bilateral leg pain   • Lymphedema of both lower extremities   • Tricuspid regurgitation   • Morbid obesity with BMI of 45.0-49.9, adult ([facility])   • PAD (peripheral artery disease) ([facility])   • Diarrhea   • Herpes zoster without complication   • Chronic diarrhea   • Type 2 diabetes mellitus without complication, without long-term current use of insulin ([facility])   • Tinea pedis of left foot   • Rheumatic heart disease   • Anticoagulation goal of INR 2.5 to 3.5   • Chronic anticoagulation   • Statin intolerance   • Mild intermittent asthma without complication     Past Medical History:   Diagnosis Date   • Abnormal ultrasound of pelvis     last assessed - 26Mar2015   • Asthma    • Asthma, persistent 08/01/2014   • Cerebral  infarction (HCC)     unspecified; last assessed - 30Aug2017   • COVID-19 virus infection 01/07/2021   • Dizziness     last assessed - 25Aug2015   • Dysuria     last assessed - 12Aug2016   • Exposure to potentially hazardous body fluids     last assessed - 11Sep2014   • Heart disease    • Hematoma     of arm; last assessed - 20Jun2016   • Mitral valve stenosis    • Personal history of scoliosis    • Pneumonia    • Scar, hypertrophic     last assessed - 17Nov2015   • Seizures (HCC)    • Shingles 2021   • Type 2 diabetes mellitus (HCC)    • Vision problems      Social History     Socioeconomic History   • Marital status: Single     Spouse name: Not on file   • Number of children: Not on file   • Years of education: Not on file   • Highest education level: Not on file   Occupational History   • Not on file   Tobacco Use   • Smoking status: Never     Passive exposure: Never   • Smokeless tobacco: Never   Vaping Use   • Vaping status: Never Used   Substance and Sexual Activity   • Alcohol use: No   • Drug use: No   • Sexual activity: Not Currently     Partners: Male   Other Topics Concern   • Not on file   Social History Narrative    Caffeine use     Social Determinants of Health     Financial Resource Strain: Low Risk  (3/15/2021)    Overall Financial Resource Strain (CARDIA)    • Difficulty of Paying Living Expenses: Not hard at all   Food Insecurity: No Food Insecurity (3/15/2021)    Hunger Vital Sign    • Worried About Running Out of Food in the Last Year: Never true    • Ran Out of Food in the Last Year: Never true   Transportation Needs: No Transportation Needs (3/15/2021)    PRAPARE - Transportation    • Lack of Transportation (Medical): No    • Lack of Transportation (Non-Medical): No   Physical Activity: Not on file   Stress: Not on file   Social Connections: Not on file   Intimate Partner Violence: Not on file   Housing Stability: Not on file      Family History   Problem Relation Age of Onset   • Cancer Mother     • Dementia Mother    • Diabetes Mother    • Hypertension Mother    • Hyperlipidemia Mother    • Cervical cancer Mother    • Heart disease Father    • Coronary artery disease Father    • Hypertension Father    • Heart attack Father    • Diabetes Sister    • Breast cancer Sister 62   • No Known Problems Daughter    • Diabetes Maternal Grandmother    • Breast cancer Maternal Grandmother    • Diabetes Brother    • Hypertension Brother    • Hyperlipidemia Brother    • Seizures Son    • Psoriasis Son    • Heart disease Paternal Aunt    • Heart disease Paternal Aunt    • Diabetes Other      Past Surgical History:   Procedure Laterality Date   • BREAST BIOPSY Left     many years ago in [facility] not really sure   • MITRAL VALVE REPLACEMENT  06/17/2015    MVR with 27 mm [employer]un [employer] Optiform mechanical prostethic;  last assessed - 03Feb2016   • TONSILLECTOMY     • TRANSLUMINAL ANGIOPLASTY      mitral valve; last assessed - 01Aug2014   • TUBAL LIGATION  1986    last assessed - 01Aug2014       Current Outpatient Medications:   •  acetaminophen (TYLENOL) 500 mg tablet, Take 2 tablets by mouth daily as needed for pain, Disp: 180 tablet, Rfl: 3  •  albuterol (PROVENTIL HFA,VENTOLIN HFA) 90 mcg/act inhaler, Inhale 2 puffs every 4 (four) hours as needed for wheezing, Disp: 18 g, Rfl: 0  •  Blood Glucose Monitoring Suppl (ACCU-CHEK EDILMA PLUS) w/Device KIT, by Does not apply route 2 (two) times a day, Disp: 1 kit, Rfl: 0  •  cetirizine (ZyrTEC) 10 mg tablet, Take 1 tablet (10 mg total) by mouth daily, Disp: 90 tablet, Rfl: 0  •  dorzolamide-timolol (COSOPT) 22.3-6.8 MG/ML ophthalmic solution, , Disp: , Rfl:   •  Empagliflozin 25 MG TABS, Take 1 tablet (25 mg total) by mouth daily, Disp: 30 tablet, Rfl: 5  •  ferrous sulfate 324 (65 Fe) mg, Take 1 tablet (324 mg total) by mouth daily before breakfast, Disp: 90 tablet, Rfl: 0  •  fluticasone (FLONASE) 50 mcg/act nasal spray, 1 spray into each nostril daily (Patient taking  differently: 1 spray into each nostril if needed), Disp: 9.9 mL, Rfl: 0  •  fluticasone (Flovent HFA) 110 MCG/ACT inhaler, Inhale 2 puffs 2 (two) times a day Rinse mouth after use., Disp: 12 g, Rfl: 3  •  hydrOXYzine HCL (ATARAX) 25 mg tablet, Take 1 tablet (25 mg total) by mouth 2 (two) times a day, Disp: 60 tablet, Rfl: 2  •  Keppra 500 MG tablet, Take 1 tablet (500 mg total) by mouth 3 (three) times a day, Disp: 270 tablet, Rfl: 3  •  Lancets (OneTouch Delica Plus [id number]) MISC, USE ONCE DAILY AS DIRECTED, Disp: 100 each, Rfl: 3  •  metoprolol tartrate (LOPRESSOR) 25 mg tablet, Take 0.5 tablets (12.5 mg total) by mouth every 12 (twelve) hours, Disp: 90 tablet, Rfl: 1  •  omega-3-acid ethyl esters (LOVAZA) 1 g capsule, TAKE TWO CAPSULES BY MOUTH TWICE A DAY, Disp: 180 capsule, Rfl: 4  •  omeprazole (PriLOSEC) 40 MG capsule, Take 1 capsule (40 mg total) by mouth daily, Disp: 90 capsule, Rfl: 1  •  OneTouch Verio test strip, USE DAILY AS INSTRUCTED, Disp: 100 strip, Rfl: 9  •  rosuvastatin (CRESTOR) 5 mg tablet, Take 1 tablet (5 mg total) by mouth 3 (three) times a week, Disp: 30 tablet, Rfl: 3  •  sitaGLIPtin (Januvia) 25 mg tablet, Take 1 tablet (25 mg total) by mouth daily, Disp: 30 tablet, Rfl: 5  •  torsemide (DEMADEX) 20 mg tablet, TAKE ONE TABLET BY MOUTH AS NEEDED AS DIRECTED, Disp: 90 tablet, Rfl: 10  •  warfarin (COUMADIN) 5 mg tablet, TAKE 1/2 TO 1 TABLET DAILY BY MOUTH OR AS ORDERED BY PHYSICIAN., Disp: 90 tablet, Rfl: 2  •  cyclobenzaprine (FLEXERIL) 5 mg tablet, Take 1 tablet (5 mg total) by mouth daily at bedtime (Patient not taking: Reported on 3/4/2024), Disp: 15 tablet, Rfl: 0  Allergies   Allergen Reactions   • Codeine    • Methylprednisolone GI Intolerance     Diarrhea, nausea, hot flashes   • Prednisone Itching   • Statins Headache   • Codeine Polt-Chlorphen Polt Er Irritability, Itching and Rash       Labs:not applicable  Imaging: No results found.    Review of Systems:  Review of Systems  "  All other systems reviewed and are negative.      Physical Exam:  /70 (BP Location: Left arm, Patient Position: Sitting, Cuff Size: Large)   Pulse 56   Ht 4' 11\" (1.499 m)   Wt 98.9 kg (218 lb)   SpO2 99%   BMI 44.03 kg/m²   Physical Exam  Vitals reviewed.   Constitutional:       Appearance: She is well-developed.   HENT:      Head: Normocephalic and atraumatic.   Eyes:      Conjunctiva/sclera: Conjunctivae normal.      Pupils: Pupils are equal, round, and reactive to light.   Cardiovascular:      Rate and Rhythm: Normal rate.      Heart sounds: Normal heart sounds.      Comments: S1-S2 with crisp prosthetic heart sounds  Pulmonary:      Effort: Pulmonary effort is normal.      Breath sounds: Normal breath sounds.   Musculoskeletal:      Cervical back: Normal range of motion and neck supple.   Skin:     General: Skin is warm and dry.   Neurological:      Mental Status: She is alert and oriented to person, place, and time.         Discussion/Summary:I will continue the patient's current medical regimen.  The patient appears well compensated.  I have asked the patient to call if there is a problem in the interim otherwise I will see the patient in six months time. The patient is due for an echo prior to the next visit to assess LV wall thickness and systolic function.  "

## 2024-03-11 DIAGNOSIS — E11.9 TYPE 2 DIABETES MELLITUS WITHOUT COMPLICATION, WITHOUT LONG-TERM CURRENT USE OF INSULIN (HCC): ICD-10-CM

## 2024-03-11 DIAGNOSIS — E13.9 DIABETES 1.5, MANAGED AS TYPE 2 (HCC): ICD-10-CM

## 2024-03-13 ENCOUNTER — TELEPHONE (OUTPATIENT)
Dept: NEUROLOGY | Facility: CLINIC | Age: 64
End: 2024-03-13

## 2024-03-13 NOTE — TELEPHONE ENCOUNTER
Received VM transcription from 3/11/24, 4:51 PM:    Hi my name is Florecita Ramirez and I'm calling about my medication Keppra. The pharmacy needs a new authorization prescription or something. Because I have a, a new, a new.... Please call me back. I need to speak to somebody. I can't think and talk fast. Okay. Bye. My phone number is [phone]. Thank you.  --------------------    Letter under media tab from insurance stating that drug not on formulary or it's included on formulary but subject to certain limits. Requires PA.    Spoke with pt and acknowledged message received. Pt confirms that her insurance is now [employer]. Pt confirms she takes brand only Keppra 500 mg tabs. Advised that task will be sent to appropriate clinical team and once we hear back from benefit plan, pt will be notified.    Best cb [phone], ok to leave detailed message.

## 2024-03-14 NOTE — TELEPHONE ENCOUNTER
3/12/24, 3:26    My name is Florecita Ramirez. Dr Chapman is my doctor. And I'd like to get authorization for my keppra bec they're not giving it to me at the pharmacy bec you need to authorize it. I have a different insurance so please give me a call back at [phone]. Thank you    Call returned to pt. Acknowledge vm. Pt states that pharmacy was able to give her 90 pills, possibly a temporary supply.

## 2024-03-15 ENCOUNTER — OFFICE VISIT (OUTPATIENT)
Dept: CARDIOLOGY CLINIC | Facility: CLINIC | Age: 64
End: 2024-03-15
Payer: COMMERCIAL

## 2024-03-15 VITALS
WEIGHT: 218 LBS | BODY MASS INDEX: 43.95 KG/M2 | HEIGHT: 59 IN | OXYGEN SATURATION: 99 % | SYSTOLIC BLOOD PRESSURE: 126 MMHG | HEART RATE: 56 BPM | DIASTOLIC BLOOD PRESSURE: 70 MMHG

## 2024-03-15 DIAGNOSIS — I73.9 PAD (PERIPHERAL ARTERY DISEASE) (HCC): ICD-10-CM

## 2024-03-15 DIAGNOSIS — E78.5 DYSLIPIDEMIA: ICD-10-CM

## 2024-03-15 DIAGNOSIS — I09.9 RHEUMATIC HEART DISEASE: ICD-10-CM

## 2024-03-15 DIAGNOSIS — G47.33 OSA (OBSTRUCTIVE SLEEP APNEA): ICD-10-CM

## 2024-03-15 DIAGNOSIS — Z95.2 H/O MITRAL VALVE REPLACEMENT WITH MECHANICAL VALVE: ICD-10-CM

## 2024-03-15 DIAGNOSIS — E11.9 TYPE 2 DIABETES MELLITUS WITHOUT COMPLICATION, WITHOUT LONG-TERM CURRENT USE OF INSULIN (HCC): Primary | ICD-10-CM

## 2024-03-15 PROCEDURE — 93000 ELECTROCARDIOGRAM COMPLETE: CPT | Performed by: INTERNAL MEDICINE

## 2024-03-15 PROCEDURE — 99214 OFFICE O/P EST MOD 30 MIN: CPT | Performed by: INTERNAL MEDICINE

## 2024-03-15 NOTE — TELEPHONE ENCOUNTER
[employer]  ID: [id number]  GROUP: [id number]  PCN: [id number]  BIN: [id number]    Key: [id number]   [employer] Medicare Part D at [phone]    [facility]:  ID: [id number]  GROUP: [id number]  PCN: [id number]  BIN: [id number]

## 2024-03-18 NOTE — TELEPHONE ENCOUNTER
Approved on March 15  Your request has been approved  Authorization Expiration Date: 12/31/2024  [place] Case ID #: [id number]      Notified patient of approval.

## 2024-03-28 ENCOUNTER — TELEPHONE (OUTPATIENT)
Age: 64
End: 2024-03-28

## 2024-03-28 DIAGNOSIS — G47.19 DAYTIME HYPERSOMNOLENCE: ICD-10-CM

## 2024-03-28 DIAGNOSIS — F51.01 PRIMARY INSOMNIA: Primary | ICD-10-CM

## 2024-03-29 ENCOUNTER — APPOINTMENT (OUTPATIENT)
Dept: LAB | Facility: CLINIC | Age: 64
End: 2024-03-29
Payer: COMMERCIAL

## 2024-03-29 ENCOUNTER — ANTICOAG VISIT (OUTPATIENT)
Dept: CARDIOLOGY CLINIC | Facility: CLINIC | Age: 64
End: 2024-03-29

## 2024-03-29 DIAGNOSIS — E66.01 CLASS 3 SEVERE OBESITY WITH SERIOUS COMORBIDITY AND BODY MASS INDEX (BMI) OF 40.0 TO 44.9 IN ADULT, UNSPECIFIED OBESITY TYPE (HCC): ICD-10-CM

## 2024-03-29 DIAGNOSIS — F41.8 DEPRESSION WITH ANXIETY: ICD-10-CM

## 2024-03-29 DIAGNOSIS — Z95.2 HISTORY OF PROSTHETIC HEART VALVE: Primary | ICD-10-CM

## 2024-03-29 DIAGNOSIS — E13.9 DIABETES 1.5, MANAGED AS TYPE 2 (HCC): ICD-10-CM

## 2024-03-29 DIAGNOSIS — I05.9 MITRAL VALVE DISORDER: ICD-10-CM

## 2024-03-29 DIAGNOSIS — D50.8 IRON DEFICIENCY ANEMIA SECONDARY TO INADEQUATE DIETARY IRON INTAKE: ICD-10-CM

## 2024-03-29 DIAGNOSIS — Z11.59 NEED FOR HEPATITIS C SCREENING TEST: ICD-10-CM

## 2024-03-29 LAB
ALBUMIN SERPL BCP-MCNC: 4.1 G/DL (ref 3.5–5)
ALP SERPL-CCNC: 120 U/L (ref 34–104)
ALT SERPL W P-5'-P-CCNC: 15 U/L (ref 7–52)
ANION GAP SERPL CALCULATED.3IONS-SCNC: 9 MMOL/L (ref 4–13)
AST SERPL W P-5'-P-CCNC: 20 U/L (ref 13–39)
BILIRUB SERPL-MCNC: 0.71 MG/DL (ref 0.2–1)
BUN SERPL-MCNC: 15 MG/DL (ref 5–25)
CALCIUM SERPL-MCNC: 9.5 MG/DL (ref 8.4–10.2)
CHLORIDE SERPL-SCNC: 105 MMOL/L (ref 96–108)
CHOLEST SERPL-MCNC: 161 MG/DL
CO2 SERPL-SCNC: 28 MMOL/L (ref 21–32)
CREAT SERPL-MCNC: 0.73 MG/DL (ref 0.6–1.3)
FERRITIN SERPL-MCNC: 43 NG/ML (ref 11–307)
GFR SERPL CREATININE-BSD FRML MDRD: 86 ML/MIN/1.73SQ M
GLUCOSE P FAST SERPL-MCNC: 145 MG/DL (ref 65–99)
HCV AB SER QL: NORMAL
HDLC SERPL-MCNC: 55 MG/DL
INR PPP: 2.38 (ref 0.84–1.19)
IRON SATN MFR SERPL: 15 % (ref 15–50)
IRON SERPL-MCNC: 51 UG/DL (ref 50–212)
LDLC SERPL CALC-MCNC: 82 MG/DL (ref 0–100)
NONHDLC SERPL-MCNC: 106 MG/DL
POTASSIUM SERPL-SCNC: 3.9 MMOL/L (ref 3.5–5.3)
PROT SERPL-MCNC: 7 G/DL (ref 6.4–8.4)
PROTHROMBIN TIME: 25.5 SECONDS (ref 11.6–14.5)
SODIUM SERPL-SCNC: 142 MMOL/L (ref 135–147)
TIBC SERPL-MCNC: 350 UG/DL (ref 250–450)
TRIGL SERPL-MCNC: 119 MG/DL
UIBC SERPL-MCNC: 299 UG/DL (ref 155–355)

## 2024-03-29 PROCEDURE — 86803 HEPATITIS C AB TEST: CPT

## 2024-03-29 PROCEDURE — 83550 IRON BINDING TEST: CPT

## 2024-03-29 PROCEDURE — 80061 LIPID PANEL: CPT

## 2024-03-29 PROCEDURE — 83540 ASSAY OF IRON: CPT

## 2024-03-29 PROCEDURE — 82728 ASSAY OF FERRITIN: CPT

## 2024-03-29 PROCEDURE — 80053 COMPREHEN METABOLIC PANEL: CPT

## 2024-03-29 PROCEDURE — 85610 PROTHROMBIN TIME: CPT

## 2024-04-02 ENCOUNTER — TELEPHONE (OUTPATIENT)
Dept: NEUROLOGY | Facility: CLINIC | Age: 64
End: 2024-04-02

## 2024-04-02 NOTE — TELEPHONE ENCOUNTER
----- Message from Theresa Chapman MD sent at 4/1/2024  3:16 PM EDT -----  Levetircetam level is lower than the past; has she been taking her medication as prescribed or has she missed a number of doses?

## 2024-04-03 DIAGNOSIS — E78.2 MIXED HYPERLIPIDEMIA: ICD-10-CM

## 2024-04-03 RX ORDER — ROSUVASTATIN CALCIUM 5 MG/1
TABLET, COATED ORAL
Qty: 30 TABLET | Refills: 8 | Status: SHIPPED | OUTPATIENT
Start: 2024-04-03

## 2024-04-04 ENCOUNTER — TELEPHONE (OUTPATIENT)
Dept: FAMILY MEDICINE CLINIC | Facility: CLINIC | Age: 64
End: 2024-04-04

## 2024-04-04 NOTE — TELEPHONE ENCOUNTER
Blood work for levetiracetam was done on 3/29/2024; so she should have been on Keppra 500mg three times a day.  If no seizures then no change in dosing of medication.

## 2024-04-04 NOTE — TELEPHONE ENCOUNTER
4/2/24, 1:08    My name is Florecita Ramirez. Somebody called me this morning and I am returning the call. My birthday is January 3rd of 1960. And I was waiting for somebody to call me back. Thank you very much.    See below

## 2024-04-04 NOTE — TELEPHONE ENCOUNTER
Received vm from 4/2 at 9:16am- Yes, I am returning the call. Somebody called me this morning and I just got the message. I'm returning the call. My name is Florecita [place]. Please call me back at [phone]. Thank you.  --------------------------------------------------------  Called pt.  States that She didn't have insurance in jan and feb and wasn't able to get her pills so she was stretching them out   She was taking keppra 500mg bid during this time.  She now has insurance and is taking 500mg TID.  She started taking TID in the beginning of march.   No seizures during this time    [phone] to leave detailed message

## 2024-04-08 DIAGNOSIS — E11.9 TYPE 2 DIABETES MELLITUS WITHOUT COMPLICATION, WITHOUT LONG-TERM CURRENT USE OF INSULIN (HCC): ICD-10-CM

## 2024-04-08 NOTE — TELEPHONE ENCOUNTER
PT inquiring the status of PA request of hydroxyzine. I informed her PA placed and awaiting response with the ins co.    Thanks

## 2024-04-08 NOTE — TELEPHONE ENCOUNTER
Medication: Lancets (OneTouch Delica Plus [id number]) M     Dose/Frequency: uSE ONCE DAILY AS DIRECTED     Quantity: 100    Pharmacy: [facility] - [place], [place] -     Office:   [x] PCP/Provider -   [] Speciality/Provider -     Does the patient have enough for 3 days?   [x] Yes   [] No - Send as HP to POD

## 2024-04-09 RX ORDER — LANCETS 33 GAUGE
EACH MISCELLANEOUS
Qty: 100 EACH | Refills: 1 | Status: SHIPPED | OUTPATIENT
Start: 2024-04-09

## 2024-04-09 NOTE — TELEPHONE ENCOUNTER
PA for Hydroxyzone 25 mg    Submitted via    []CMM-KEY   [x]Melissa-Case ID #     [id number]     Create Attachment     []Faxed to plan   []Other website   []Phone call Case ID #     Office notes sent, clinical questions answered. Awaiting determination    Turnaround time for your insurance to make a decision on your Prior Authorization can take 7-21 business days.

## 2024-04-11 NOTE — TELEPHONE ENCOUNTER
Denial scanned into media from [employer]. Patient has Susan  and the denial states PA should have been done under this insurance.     Please review and advise.

## 2024-04-12 NOTE — TELEPHONE ENCOUNTER
PA for Hydroxyzine HCL (ATARAX) 25 mg tablet     Submitted via    [x]CMM-KEY [id number]  []Surescripts-Case ID #   []Faxed to plan   []Other website   []Phone call Case ID #     Office notes sent, clinical questions answered. Awaiting determination    Turnaround time for your insurance to make a decision on your Prior Authorization can take 7-21 business days.

## 2024-04-18 ENCOUNTER — OFFICE VISIT (OUTPATIENT)
Dept: PODIATRY | Facility: CLINIC | Age: 64
End: 2024-04-18
Payer: COMMERCIAL

## 2024-04-18 VITALS — BODY MASS INDEX: 44.03 KG/M2 | HEIGHT: 59 IN | RESPIRATION RATE: 18 BRPM

## 2024-04-18 DIAGNOSIS — E11.9 CONTROLLED TYPE 2 DIABETES MELLITUS WITHOUT COMPLICATION, WITHOUT LONG-TERM CURRENT USE OF INSULIN (HCC): Primary | ICD-10-CM

## 2024-04-18 DIAGNOSIS — M21.41 PES PLANUS OF BOTH FEET: ICD-10-CM

## 2024-04-18 DIAGNOSIS — M21.42 PES PLANUS OF BOTH FEET: ICD-10-CM

## 2024-04-18 PROCEDURE — 99213 OFFICE O/P EST LOW 20 MIN: CPT | Performed by: PODIATRIST

## 2024-04-18 NOTE — PROGRESS NOTES
"Assessment/Plan:    Discussed principles of diabetic footcare.  Vascular status is within normal limits and sensorium is intact.  Referred patient to [facility] for new pair of running shoes.  Also recommended orthotics should patient still have back pain after getting a new pair shoes.  Trimmed elongated nails.  Reappoint 3 months for nail care.    No problem-specific Assessment & Plan notes found for this encounter.       Diagnoses and all orders for this visit:    Controlled type 2 diabetes mellitus without complication, without long-term current use of insulin ([facility])    Pes planus of both feet          Subjective:      Patient ID: Florecita Ramirez is a 64 y.o. female.    HPI    Patient, a 64-year-old female presents for her yearly diabetic foot exam.  Patient denies numbness or tingling in her feet.  She does note that she has a flatfoot deformity and extended walking admits to back pain.  She does not relate significant foot pain.  She also desires toenails trimmed.    I personally reviewed an A1c dated 3/4/2024.  It was 8.1.    I personally reviewed an A1c dated 10/12/2023.  It was 7.1.    The following portions of the patient's history were reviewed and updated as appropriate: allergies, current medications, past family history, past medical history, past social history, past surgical history, and problem list.    Review of Systems   Musculoskeletal:  Positive for back pain.   Neurological:  Negative for numbness.   Psychiatric/Behavioral: Negative.               Objective:      Resp 18   Ht 4' 11\" (1.499 m)   BMI 44.03 kg/m²          Physical Exam  Cardiovascular:      Pulses: no weak pulses.           Dorsalis pedis pulses are 2+ on the right side and 2+ on the left side.        Posterior tibial pulses are 2+ on the right side and 2+ on the left side.   Feet:      Right foot:      Skin integrity: No ulcer, skin breakdown, erythema, warmth, callus or dry skin.      Left foot:      Skin integrity: No " ulcer, skin breakdown, erythema, warmth, callus or dry skin.           Diabetic Foot Exam    Patient's shoes and socks removed.    Right Foot/Ankle   Right Foot Inspection  Skin Exam: skin normal and skin intact. No dry skin, no warmth, no callus, no erythema, no maceration, no abnormal color, no pre-ulcer, no ulcer and no callus.     Toe Exam: ROM and strength within normal limits.     Sensory   Vibration: intact  Proprioception: intact  Monofilament testing: intact    Vascular  Capillary refills: < 3 seconds  The right DP pulse is 2+. The right PT pulse is 2+.     Right Toe  - Comprehensive Exam  Ecchymosis: none  Arch: pes planus  Hammertoes: absent  Claw Toes: absent  Swelling: none   Tenderness: none       Left Foot/Ankle  Left Foot Inspection  Skin Exam: skin normal and skin intact. No dry skin, no warmth, no erythema, no maceration, normal color, no pre-ulcer, no ulcer and no callus.     Toe Exam: ROM and strength within normal limits.     Sensory   Vibration: intact  Proprioception: intact  Monofilament testing: intact    Vascular  Capillary refills: < 3 seconds  The left DP pulse is 2+. The left PT pulse is 2+.     Left Toe  - Comprehensive Exam  Ecchymosis: none  Arch: pes planus  Hammertoes: absent  Claw toes: absent  Swelling: none   Tenderness: none       Assign Risk Category  No deformity present  No loss of protective sensation  No weak pulses  Risk: 0

## 2024-04-23 ENCOUNTER — HOSPITAL ENCOUNTER (OUTPATIENT)
Dept: NON INVASIVE DIAGNOSTICS | Facility: CLINIC | Age: 64
Discharge: HOME | End: 2024-04-23
Payer: COMMERCIAL

## 2024-04-23 VITALS
WEIGHT: 218.03 LBS | HEIGHT: 59 IN | HEART RATE: 68 BPM | BODY MASS INDEX: 43.96 KG/M2 | DIASTOLIC BLOOD PRESSURE: 70 MMHG | SYSTOLIC BLOOD PRESSURE: 126 MMHG

## 2024-04-23 DIAGNOSIS — Z95.2 H/O MITRAL VALVE REPLACEMENT WITH MECHANICAL VALVE: ICD-10-CM

## 2024-04-23 DIAGNOSIS — I73.9 PAD (PERIPHERAL ARTERY DISEASE) (HCC): ICD-10-CM

## 2024-04-23 DIAGNOSIS — E78.5 DYSLIPIDEMIA: ICD-10-CM

## 2024-04-23 DIAGNOSIS — E11.9 TYPE 2 DIABETES MELLITUS WITHOUT COMPLICATION, WITHOUT LONG-TERM CURRENT USE OF INSULIN (HCC): ICD-10-CM

## 2024-04-23 DIAGNOSIS — G47.33 OSA (OBSTRUCTIVE SLEEP APNEA): ICD-10-CM

## 2024-04-23 DIAGNOSIS — I09.9 RHEUMATIC HEART DISEASE: ICD-10-CM

## 2024-04-23 LAB
AORTIC ROOT: 2.6 CM
AORTIC VALVE MEAN VELOCITY: 13.5 M/S
APICAL FOUR CHAMBER EJECTION FRACTION: 66 %
ASCENDING AORTA: 2.8 CM
AV AREA BY CONTINUOUS VTI: 1.5 CM2
AV AREA PEAK VELOCITY: 1.4 CM2
AV LVOT MEAN GRADIENT: 2 MMHG
AV LVOT PEAK GRADIENT: 4 MMHG
AV MEAN GRADIENT: 7 MMHG
AV PEAK GRADIENT: 14 MMHG
AV REGURGITATION PRESSURE HALF TIME: 523 MS
AV VALVE AREA: 1.99 CM2
AV VELOCITY RATIO: 0.53
BSA FOR ECHO PROCEDURE: 1.91 M2
DOP CALC AO PEAK VEL: 1.9 M/S
DOP CALC AO VTI: 41 CM
DOP CALC LVOT AREA: 3.14 CM2
DOP CALC LVOT CARDIAC INDEX: 2.19 L/MIN/M2
DOP CALC LVOT CARDIAC OUTPUT: 4.19 L/MIN
DOP CALC LVOT DIAMETER: 2 CM
DOP CALC LVOT PEAK VEL VTI: 26 CM
DOP CALC LVOT PEAK VEL: 1 M/S
DOP CALC LVOT STROKE INDEX: 33.5 ML/M2
DOP CALC LVOT STROKE VOLUME: 81.64 CM3
DOP CALC MV VTI: 41.93 CM
E WAVE DECELERATION TIME: 329 MS
E/A RATIO: 2.02
FRACTIONAL SHORTENING: 38 (ref 28–44)
INTERVENTRICULAR SEPTUM IN DIASTOLE (PARASTERNAL SHORT AXIS VIEW): 1 CM
INTERVENTRICULAR SEPTUM: 1 CM (ref 0.6–1.1)
LAAS-AP2: 19 CM2
LAAS-AP4: 19.2 CM2
LEFT ATRIUM SIZE: 3.3 CM
LEFT ATRIUM VOLUME (MOD BIPLANE): 53 ML
LEFT ATRIUM VOLUME INDEX (MOD BIPLANE): 27.7 ML/M2
LEFT INTERNAL DIMENSION IN SYSTOLE: 3 CM (ref 2.1–4)
LEFT VENTRICULAR INTERNAL DIMENSION IN DIASTOLE: 4.8 CM (ref 3.5–6)
LEFT VENTRICULAR POSTERIOR WALL IN END DIASTOLE: 1 CM
LEFT VENTRICULAR STROKE VOLUME: 75 ML
LVSV (TEICH): 75 ML
MV E'TISSUE VEL-LAT: 14 CM/S
MV E'TISSUE VEL-SEP: 5 CM/S
MV MEAN GRADIENT: 3 MMHG
MV PEAK A VEL: 0.82 M/S
MV PEAK E VEL: 166 CM/S
MV PEAK GRADIENT: 10 MMHG
MV STENOSIS PRESSURE HALF TIME: 95 MS
MV VALVE AREA BY CONTINUITY EQUATION: 1.95 CM2
MV VALVE AREA P 1/2 METHOD: 2.32
RA PRESSURE ESTIMATED: 8 MMHG
RIGHT ATRIUM AREA SYSTOLE A4C: 10.5 CM2
RIGHT VENTRICLE ID DIMENSION: 2.9 CM
RV PSP: 39 MMHG
SL CV AV DECELERATION TIME RETROGRADE: 1472 MS
SL CV AV PEAK GRADIENT RETROGRADE: 69 MMHG
SL CV LEFT ATRIUM LENGTH A2C: 5.6 CM
SL CV LV EF: 60
SL CV PED ECHO LEFT VENTRICLE DIASTOLIC VOLUME (MOD BIPLANE) 2D: 110 ML
SL CV PED ECHO LEFT VENTRICLE SYSTOLIC VOLUME (MOD BIPLANE) 2D: 35 ML
TR MAX PG: 31 MMHG
TR PEAK VELOCITY: 2.8 M/S
TRICUSPID VALVE PEAK REGURGITATION VELOCITY: 2.77 M/S

## 2024-04-23 PROCEDURE — 93306 TTE W/DOPPLER COMPLETE: CPT

## 2024-04-23 PROCEDURE — 93306 TTE W/DOPPLER COMPLETE: CPT | Performed by: INTERNAL MEDICINE

## 2024-04-26 ENCOUNTER — ANTICOAG VISIT (OUTPATIENT)
Dept: CARDIOLOGY CLINIC | Facility: CLINIC | Age: 64
End: 2024-04-26

## 2024-04-26 ENCOUNTER — APPOINTMENT (OUTPATIENT)
Dept: LAB | Facility: CLINIC | Age: 64
End: 2024-04-26
Payer: COMMERCIAL

## 2024-04-26 DIAGNOSIS — I05.9 MITRAL VALVE DISORDER: ICD-10-CM

## 2024-04-26 DIAGNOSIS — Z95.2 HISTORY OF PROSTHETIC HEART VALVE: Primary | ICD-10-CM

## 2024-04-26 LAB
INR PPP: 2.33 (ref 0.84–1.19)
PROTHROMBIN TIME: 25.1 SECONDS (ref 11.6–14.5)

## 2024-04-26 PROCEDURE — 36415 COLL VENOUS BLD VENIPUNCTURE: CPT

## 2024-04-26 PROCEDURE — 85610 PROTHROMBIN TIME: CPT

## 2024-04-26 NOTE — PROGRESS NOTES
"Patient running low again at 2.33.  Advised 5 mgs on Mondays and Fridays and 2.5 mgs all other days of the week.  Retest 5/10/24.  Spoke with patient directly.     Patient states she sees Dr Blackburn now in [place] office and \"wants to transfer to [facility]  coumad[facility] clinic\".  Will let Mary Kay know.   "

## 2024-05-02 DIAGNOSIS — J45.20 MILD INTERMITTENT ASTHMA WITHOUT COMPLICATION: ICD-10-CM

## 2024-05-03 DIAGNOSIS — D50.9 IRON DEFICIENCY ANEMIA, UNSPECIFIED IRON DEFICIENCY ANEMIA TYPE: ICD-10-CM

## 2024-05-03 DIAGNOSIS — J30.9 ALLERGIC RHINITIS, UNSPECIFIED SEASONALITY, UNSPECIFIED TRIGGER: ICD-10-CM

## 2024-05-03 RX ORDER — CETIRIZINE HYDROCHLORIDE 10 MG/1
10 TABLET ORAL DAILY
Qty: 90 TABLET | Refills: 1 | Status: SHIPPED | OUTPATIENT
Start: 2024-05-03

## 2024-05-03 RX ORDER — FLUTICASONE PROPIONATE 110 UG/1
2 AEROSOL, METERED RESPIRATORY (INHALATION) 2 TIMES DAILY
Qty: 12 G | Refills: 0 | Status: SHIPPED | OUTPATIENT
Start: 2024-05-03

## 2024-05-03 RX ORDER — FERROUS SULFATE 324(65)MG
324 TABLET, DELAYED RELEASE (ENTERIC COATED) ORAL
Qty: 90 TABLET | Refills: 1 | Status: SHIPPED | OUTPATIENT
Start: 2024-05-03

## 2024-05-03 NOTE — TELEPHONE ENCOUNTER
Reason for call:   [x] Refill   [] Prior Auth  [] Other:     Office:   [x] PCP/Provider - [facility]   [] Specialty/Provider -         Pharmacy: [facility]     Does the patient have enough for 3 days?   [] Yes   [x] No - Send as HP to POD

## 2024-05-06 DIAGNOSIS — I05.0 MITRAL VALVE STENOSIS, UNSPECIFIED ETIOLOGY: ICD-10-CM

## 2024-05-06 NOTE — TELEPHONE ENCOUNTER
Pharmacy did not receive the rx from 2/12    Reason for call:   [x] Refill   [] Prior Auth  [] Other:     Office:   [x] PCP/Provider - Dr. Martin  [] Specialty/Provider -     Medication: metoprolol tartrate (LOPRESSOR) 25 mg tablet Take 0.5 tablets (12.5 mg total) by mouth every 12 (twelve) hours     Quantity: 90    Pharmacy: [facility] - [place], [place] - [address]     Does the patient have enough for 3 days?   [x] Yes   [] No - Send as HP to POD

## 2024-05-10 DIAGNOSIS — E13.9 DIABETES 1.5, MANAGED AS TYPE 2 (HCC): ICD-10-CM

## 2024-05-15 DIAGNOSIS — Z95.2 HISTORY OF PROSTHETIC HEART VALVE: Primary | ICD-10-CM

## 2024-05-24 ENCOUNTER — ANTICOAG VISIT (OUTPATIENT)
Dept: CARDIOLOGY CLINIC | Facility: CLINIC | Age: 64
End: 2024-05-24

## 2024-05-24 ENCOUNTER — APPOINTMENT (OUTPATIENT)
Dept: LAB | Facility: CLINIC | Age: 64
End: 2024-05-24
Payer: COMMERCIAL

## 2024-05-24 DIAGNOSIS — Z95.2 HISTORY OF PROSTHETIC HEART VALVE: Primary | ICD-10-CM

## 2024-05-24 DIAGNOSIS — Z95.2 HISTORY OF PROSTHETIC HEART VALVE: ICD-10-CM

## 2024-05-24 LAB
INR PPP: 3.41 (ref 0.84–1.19)
PROTHROMBIN TIME: 33.6 SECONDS (ref 11.6–14.5)

## 2024-05-24 PROCEDURE — 36415 COLL VENOUS BLD VENIPUNCTURE: CPT

## 2024-05-24 PROCEDURE — 85610 PROTHROMBIN TIME: CPT

## 2024-06-04 ENCOUNTER — OFFICE VISIT (OUTPATIENT)
Dept: FAMILY MEDICINE CLINIC | Facility: CLINIC | Age: 64
End: 2024-06-04
Payer: COMMERCIAL

## 2024-06-04 VITALS
RESPIRATION RATE: 16 BRPM | HEIGHT: 59 IN | SYSTOLIC BLOOD PRESSURE: 130 MMHG | BODY MASS INDEX: 43.55 KG/M2 | OXYGEN SATURATION: 95 % | DIASTOLIC BLOOD PRESSURE: 80 MMHG | TEMPERATURE: 97.4 F | HEART RATE: 65 BPM | WEIGHT: 216 LBS

## 2024-06-04 DIAGNOSIS — I05.0 MITRAL VALVE STENOSIS, UNSPECIFIED ETIOLOGY: ICD-10-CM

## 2024-06-04 DIAGNOSIS — F51.01 PRIMARY INSOMNIA: ICD-10-CM

## 2024-06-04 DIAGNOSIS — F41.8 DEPRESSION WITH ANXIETY: ICD-10-CM

## 2024-06-04 DIAGNOSIS — H81.10 BENIGN PAROXYSMAL POSITIONAL VERTIGO, UNSPECIFIED LATERALITY: ICD-10-CM

## 2024-06-04 DIAGNOSIS — G56.02 CARPAL TUNNEL SYNDROME ON LEFT: ICD-10-CM

## 2024-06-04 DIAGNOSIS — Z00.00 MEDICARE ANNUAL WELLNESS VISIT, SUBSEQUENT: Primary | ICD-10-CM

## 2024-06-04 DIAGNOSIS — E66.01 CLASS 3 SEVERE OBESITY WITH SERIOUS COMORBIDITY AND BODY MASS INDEX (BMI) OF 40.0 TO 44.9 IN ADULT, UNSPECIFIED OBESITY TYPE (HCC): ICD-10-CM

## 2024-06-04 DIAGNOSIS — E11.9 TYPE 2 DIABETES MELLITUS WITHOUT COMPLICATION, WITHOUT LONG-TERM CURRENT USE OF INSULIN (HCC): ICD-10-CM

## 2024-06-04 DIAGNOSIS — M79.18 PAIN OF PARASPINAL MUSCLE: ICD-10-CM

## 2024-06-04 DIAGNOSIS — Z12.31 ENCOUNTER FOR SCREENING MAMMOGRAM FOR MALIGNANT NEOPLASM OF BREAST: ICD-10-CM

## 2024-06-04 DIAGNOSIS — B37.2 INTERTRIGO OF GENITOCRURAL REGION DUE TO CANDIDA SPECIES: ICD-10-CM

## 2024-06-04 LAB — SL AMB POCT HEMOGLOBIN AIC: 7.7 (ref ?–6.5)

## 2024-06-04 PROCEDURE — 83036 HEMOGLOBIN GLYCOSYLATED A1C: CPT | Performed by: FAMILY MEDICINE

## 2024-06-04 PROCEDURE — G0439 PPPS, SUBSEQ VISIT: HCPCS | Performed by: FAMILY MEDICINE

## 2024-06-04 RX ORDER — CYCLOBENZAPRINE HCL 5 MG
5 TABLET ORAL
Qty: 15 TABLET | Refills: 0 | Status: SHIPPED | OUTPATIENT
Start: 2024-06-04

## 2024-06-04 RX ORDER — CLOTRIMAZOLE 1 %
CREAM (GRAM) TOPICAL 2 TIMES DAILY
Qty: 60 G | Refills: 1 | Status: SHIPPED | OUTPATIENT
Start: 2024-06-04

## 2024-06-04 NOTE — PATIENT INSTRUCTIONS

## 2024-06-04 NOTE — PROGRESS NOTES
Ambulatory Visit  Name: Florecita Ramirez      : 1960      MRN: [id number]  Encounter Provider: Sena Martin MD  Encounter Date: 2024   Encounter department: [facility]    Assessment & Plan   1. Medicare annual wellness visit, subsequent  2. Mitral valve stenosis, unspecified etiology  Comments:  S/p MV replacement. ECHo earlier this year showed the valvce be functioning well and not stenosis. TR and MR mild and TR seeverity appears less than prior  3. Depression with anxiety  4. Class 3 severe obesity with serious comorbidity and body mass index (BMI) of 40.0 to 44.9 in adult, unspecified obesity type (HCC)  5. Type 2 diabetes mellitus without complication, without long-term current use of insulin (HCC)  Comments:  Measured 8.1 3 months ago. Jardiance increased from 10-> 25 mg. Januvia kept at 25 mg. Made dietary changes since we last met. A1c today ...  6. Encounter for screening mammogram for malignant neoplasm of breast  Comments:  Due. Last one was in        Preventive health issues were discussed with patient, and age appropriate screening tests were ordered as noted in patient's After Visit Summary. Personalized health advice and appropriate referrals for health education or preventive services given if needed, as noted in patient's After Visit Summary.    History of Present Illness     HPI   Patient Care Team:  Sena Martin MD as PCP - General (Family Medicine)  Idris Mccoy MD as PCP - PCP-[facility] ([facility])  Dalila Enriquez MD as PCP - PCP-[facility] ([facility])  MD Andria Escoto MD Kuei-Cheng Lim, MD Luis A Tejada, MD (Cardiology)    Review of Systems  Medical History Reviewed by provider this encounter:       Annual Wellness Visit Questionnaire   Florecita HERNANDEZ is here for her Welcome to Medicare visit.     Health Risk Assessment:   Patient rates overall health as fair. Patient feels that their physical health rating is slightly worse. Patient  is satisfied with their life. Eyesight was rated as slightly worse. Hearing was rated as slightly worse. Patient feels that their emotional and mental health rating is same. Patients states they are sometimes angry. Patient states they are often unusually tired/fatigued. Pain experienced in the last 7 days has been some. Patient's pain rating has been 5/10. Patient states that she has experienced no weight loss or gain in last 6 months.     Depression Screening:   PHQ-9 Score: 3      Fall Risk Screening:   In the past year, patient has experienced: history of falling in past year    Number of falls: 1  Injured during fall?: No    Feels unsteady when standing or walking?: No    Worried about falling?: No      Urinary Incontinence Screening:   Patient has leaked urine accidently in the last six months.     Home Safety:  Patient has trouble with stairs inside or outside of their home. Patient has working smoke alarms and has working carbon monoxide detector. Home safety hazards include: none.     Medications:   Patient is currently taking over-the-counter supplements. OTC medications include: see medication list. Patient is able to manage medications.     Activities of Daily Living (ADLs)/Instrumental Activities of Daily Living (IADLs):   Walk and transfer into and out of bed and chair?: Yes  Dress and groom yourself?: Yes    Bathe or shower yourself?: Yes    Feed yourself? Yes  Do your laundry/housekeeping?: Yes  Manage your money, pay your bills and track your expenses?: Yes  Make your own meals?: Yes    Do your own shopping?: Yes    Advance Care Planning:   Living will: No    Durable POA for healthcare: No    Advanced directive: Yes      PREVENTIVE SCREENINGS      Cardiovascular Screening:    General: Screening Not Indicated and History Lipid Disorder      Diabetes Screening:     General: Screening Not Indicated and History Diabetes      Colorectal Cancer Screening:     General: Screening Current      Breast Cancer  Screening:     General: Screening Current      Cervical Cancer Screening:    General: Screening Not Indicated      Lung Cancer Screening:     General: Screening Not Indicated      Hepatitis C Screening:    General: Screening Current    Screening, Brief Intervention, and Referral to Treatment (SBIRT)    Screening      AUDIT-C Screenin) How often did you have a drink containing alcohol in the past year? monthly or less  2) How many drinks did you have on a typical day when you were drinking in the past year? 1 to 2  3) How often did you have 6 or more drinks on one occasion in the past year? never    AUDIT-C Score: 1  Interpretation: Score 0-2 (female): Negative screen for alcohol misuse    Social Determinants of Health     Financial Resource Strain: Low Risk  (3/15/2021)    Overall Financial Resource Strain (CARDIA)     Difficulty of Paying Living Expenses: Not hard at all   Food Insecurity: No Food Insecurity (3/15/2021)    Hunger Vital Sign     Worried About Running Out of Food in the Last Year: Never true     Ran Out of Food in the Last Year: Never true   Transportation Needs: No Transportation Needs (3/15/2021)    PRAPARE - Transportation     Lack of Transportation (Medical): No     Lack of Transportation (Non-Medical): No     No results found.    Objective     There were no vitals taken for this visit.    Physical Exam  Administrative Statements

## 2024-06-04 NOTE — PROGRESS NOTES
Ambulatory Visit  Name: Florecita Ramirez      : 1960      MRN: [id number]  Encounter Provider: Sena Martin MD  Encounter Date: 2024   Encounter department: [facility]    Assessment & Plan   1. Medicare annual wellness visit, subsequent  2. Mitral valve stenosis, unspecified etiology  Comments:  S/p MV replacement. ECHo earlier this year showed the valvce be functioning well and not stenosis. TR and MR mild and TR seeverity appears less than prior  3. Depression with anxiety  4. Class 3 severe obesity with serious comorbidity and body mass index (BMI) of 40.0 to 44.9 in adult, unspecified obesity type (HCC)  5. Type 2 diabetes mellitus without complication, without long-term current use of insulin ([facility])  Comments:  Measured 8.1 3 months ago. Jardiance increased from 10-> 25 mg. Januvia kept at 25 mg. Made dietary changes since we last met. A1c today ...  Orders:  -     POCT hemoglobin A1c  6. Encounter for screening mammogram for malignant neoplasm of breast  Comments:  Due. Last one was in   Orders:  -     Mammo screening bilateral w 3d & cad; Future  7. Pain of paraspinal muscle  -     cyclobenzaprine (FLEXERIL) 5 mg tablet; Take 1 tablet (5 mg total) by mouth daily at bedtime  8. Benign paroxysmal positional vertigo, unspecified laterality  Comments:  Has  had several episodes of vertigo. Reccomdnd doing the eply maneuver  9. Carpal tunnel syndrome on left  Comments:  reports pain and burning sensation in the thumb. Reccomend wrist splint at night  10. Primary insomnia  Comments:  started 200-400 mg to help with sleep.  11. Intertrigo of genitocrural region due to Candida species  -     clotrimazole (LOTRIMIN) 1 % cream; Apply topically 2 (two) times a day       Preventive health issues were discussed with patient, and age appropriate screening tests were ordered as noted in patient's After Visit Summary. Personalized health advice and appropriate referrals for health  education or preventive services given if needed, as noted in patient's After Visit Summary.    History of Present Illness     Complains of left flank and lower back pain   Hot showers help   Has not applied anything  Has tried tyleonol with little relief   Having occasional dizziness and body ache started a week ago  No nasal congestion or cough  Went to [place] for mother's day   Substuted regular sugar with brown sugar  Taking higher dose of jardiance, Is urinating more   Will get burning sensation in the left thumb.  Hasn't done balance therapy  Still taking b12 and iron  Difficulty sleeping  Itching and in the intetrigo        Patient Care Team:  Sena Martin MD as PCP - General (Family Medicine)  Idris Mccoy MD as PCP - PCP-[facility] ([facility])  Dalila Enriquez MD as PCP - PCP-[facility] ([facility])  MD Andria Escoto MD Kuei-Cheng Lim, MD Luis A Tejada, MD (Cardiology)    Review of Systems  Medical History Reviewed by provider this encounter:       Annual Wellness Visit Questionnaire       Health Risk Assessment:   Patient rates overall health as fair. Patient feels that their physical health rating is slightly worse. Patient is satisfied with their life. Eyesight was rated as slightly worse. Hearing was rated as slightly worse. Patient feels that their emotional and mental health rating is same. Patients states they are sometimes angry. Patient states they are often unusually tired/fatigued. Pain experienced in the last 7 days has been some. Patient's pain rating has been 5/10. Patient states that she has experienced weight loss or gain in last 6 months.     Depression Screening:   PHQ-9 Score: 3      PREVENTIVE SCREENINGS      Cardiovascular Screening:    General: Screening Not Indicated and History Lipid Disorder      Diabetes Screening:     General: Screening Not Indicated and History Diabetes      Colorectal Cancer Screening:     General: Screening Current      Breast Cancer  "Screening:     General: Screening Current      Cervical Cancer Screening:    General: Screening Not Indicated      Lung Cancer Screening:     General: Screening Not Indicated      Hepatitis C Screening:    General: Screening Current    Screening, Brief Intervention, and Referral to Treatment (SBIRT)    Screening      AUDIT-C Screenin) How often did you have a drink containing alcohol in the past year? monthly or less  2) How many drinks did you have on a typical day when you were drinking in the past year? 1 to 2  3) How often did you have 6 or more drinks on one occasion in the past year? never    AUDIT-C Score: 1  Interpretation: Score 0-2 (female): Negative screen for alcohol misuse    Social Determinants of Health     Financial Resource Strain: Low Risk  (3/15/2021)    Overall Financial Resource Strain (CARDIA)     Difficulty of Paying Living Expenses: Not hard at all   Food Insecurity: No Food Insecurity (2024)    Hunger Vital Sign     Worried About Running Out of Food in the Last Year: Never true     Ran Out of Food in the Last Year: Never true   Transportation Needs: No Transportation Needs (2024)    PRAPARE - Transportation     Lack of Transportation (Medical): No     Lack of Transportation (Non-Medical): No   Housing Stability: Unknown (2024)    Housing Stability Vital Sign     Unable to Pay for Housing in the Last Year: No     Homeless in the Last Year: No   Utilities: Not At Risk (2024)    [facility] Utilities     Threatened with loss of utilities: No     Vision Screening    Right eye Left eye Both eyes   Without correction 20/20 20/20 20/20   With correction          Objective     /80 (BP Location: Left arm, Patient Position: Sitting, Cuff Size: Standard)   Pulse 65   Temp (!) 97.4 °F (36.3 °C) (Tympanic)   Resp 16   Ht 4' 11\" (1.499 m)   Wt 98 kg (216 lb)   SpO2 95%   BMI 43.63 kg/m²     Physical Exam  Constitutional:       General: She is not in acute distress.     " Appearance: Normal appearance. She is not ill-appearing or toxic-appearing.   HENT:      Head: Normocephalic and atraumatic.      Right Ear: Tympanic membrane normal.      Left Ear: Tympanic membrane normal.      Mouth/Throat:      Mouth: Mucous membranes are moist.   Eyes:      Extraocular Movements: Extraocular movements intact.   Cardiovascular:      Rate and Rhythm: Normal rate and regular rhythm.      Heart sounds: Murmur: click.   Pulmonary:      Effort: Pulmonary effort is normal.      Breath sounds: Normal breath sounds.   Abdominal:      General: There is no distension.      Palpations: Abdomen is soft. There is no mass.      Tenderness: There is abdominal tenderness. There is no guarding or rebound.      Hernia: No hernia is present.   Skin:     General: Skin is warm.      Comments: Fissures and erythema groin region    Neurological:      Mental Status: She is alert and oriented to person, place, and time.   Psychiatric:         Mood and Affect: Mood is anxious.         Behavior: Behavior normal.         Thought Content: Thought content normal.

## 2024-06-08 ENCOUNTER — NURSE TRIAGE (OUTPATIENT)
Dept: OTHER | Facility: OTHER | Age: 64
End: 2024-06-08

## 2024-06-08 DIAGNOSIS — K21.9 GASTROESOPHAGEAL REFLUX DISEASE: ICD-10-CM

## 2024-06-08 RX ORDER — OMEPRAZOLE 40 MG/1
40 CAPSULE, DELAYED RELEASE ORAL DAILY
Qty: 90 CAPSULE | Refills: 1 | Status: SHIPPED | OUTPATIENT
Start: 2024-06-08

## 2024-06-08 RX ORDER — OMEPRAZOLE 40 MG/1
40 CAPSULE, DELAYED RELEASE ORAL DAILY
Qty: 7 CAPSULE | Refills: 0 | Status: SHIPPED | OUTPATIENT
Start: 2024-06-08 | End: 2024-06-15

## 2024-06-08 NOTE — TELEPHONE ENCOUNTER
Informed patient that a 7 day supply of her medication has been sent to her designated pharmacy today and that the office will send in a full refill when they are open next week. Patient verbalized understanding, no further assistance needed at this time.

## 2024-06-08 NOTE — TELEPHONE ENCOUNTER
Regarding: Medication refill  ----- Message from Madeleine AMAYA sent at 6/8/2024  2:20 PM EDT -----    Medication Name :omeprazole (PriLOSEC) 40 MG capsule  Medication Dose and frequency: 40 mg  Daily   Medication Quantity: 90 Capsule

## 2024-06-08 NOTE — TELEPHONE ENCOUNTER
"Reason for Disposition  • [1] Caller requesting a prescription renewal (no refills left), no triage required, AND [2] triager able to renew prescription per department policy    Answer Assessment - Initial Assessment Questions  1. DRUG NAME: \"What medicine do you need to have refilled?\"      Omeprazole     2. REFILLS REMAINING: \"How many refills are remaining?\" (Note: The label on the medicine or pill bottle will show how many refills are remaining. If there are no refills remaining, then a renewal may be needed.)      0    3. EXPIRATION DATE: \"What is the expiration date?\" (Note: The label states when the prescription will , and thus can no longer be refilled.)      N/a    4. PRESCRIBING HCP: \"Who prescribed it?\" Reason: If prescribed by specialist, call should be referred to that group.      PCP    Protocols used: Medication Refill and Renewal Call-ADULT-    "

## 2024-06-13 ENCOUNTER — TELEPHONE (OUTPATIENT)
Age: 64
End: 2024-06-13

## 2024-06-14 DIAGNOSIS — L50.0 ALLERGIC URTICARIA: ICD-10-CM

## 2024-06-14 DIAGNOSIS — I05.0 MITRAL VALVE STENOSIS, UNSPECIFIED ETIOLOGY: ICD-10-CM

## 2024-06-14 DIAGNOSIS — D50.9 IRON DEFICIENCY ANEMIA, UNSPECIFIED IRON DEFICIENCY ANEMIA TYPE: ICD-10-CM

## 2024-06-15 RX ORDER — HYDROXYZINE HYDROCHLORIDE 25 MG/1
25 TABLET, FILM COATED ORAL 2 TIMES DAILY
Qty: 60 TABLET | Refills: 5 | Status: SHIPPED | OUTPATIENT
Start: 2024-06-15

## 2024-06-15 RX ORDER — FERROUS SULFATE 324(65)MG
324 TABLET, DELAYED RELEASE (ENTERIC COATED) ORAL
Qty: 90 TABLET | Refills: 1 | Status: SHIPPED | OUTPATIENT
Start: 2024-06-15

## 2024-06-18 LAB
LEFT EYE DIABETIC RETINOPATHY: NORMAL
RIGHT EYE DIABETIC RETINOPATHY: NORMAL

## 2024-07-22 ENCOUNTER — APPOINTMENT (OUTPATIENT)
Dept: LAB | Facility: CLINIC | Age: 64
End: 2024-07-22
Payer: COMMERCIAL

## 2024-07-22 ENCOUNTER — ANTICOAG VISIT (OUTPATIENT)
Dept: CARDIOLOGY CLINIC | Facility: CLINIC | Age: 64
End: 2024-07-22

## 2024-07-22 DIAGNOSIS — Z95.2 HISTORY OF PROSTHETIC HEART VALVE: Primary | ICD-10-CM

## 2024-07-22 DIAGNOSIS — Z95.2 HISTORY OF PROSTHETIC HEART VALVE: ICD-10-CM

## 2024-07-22 LAB
INR PPP: 3.09 (ref 0.84–1.19)
PROTHROMBIN TIME: 31.2 SECONDS (ref 11.6–14.5)

## 2024-07-22 PROCEDURE — 85610 PROTHROMBIN TIME: CPT

## 2024-07-22 PROCEDURE — 36415 COLL VENOUS BLD VENIPUNCTURE: CPT

## 2024-07-23 ENCOUNTER — OFFICE VISIT (OUTPATIENT)
Dept: PODIATRY | Facility: CLINIC | Age: 64
End: 2024-07-23
Payer: COMMERCIAL

## 2024-07-23 VITALS
HEART RATE: 58 BPM | RESPIRATION RATE: 18 BRPM | WEIGHT: 214 LBS | DIASTOLIC BLOOD PRESSURE: 81 MMHG | HEIGHT: 59 IN | BODY MASS INDEX: 43.14 KG/M2 | SYSTOLIC BLOOD PRESSURE: 128 MMHG

## 2024-07-23 DIAGNOSIS — E11.9 CONTROLLED TYPE 2 DIABETES MELLITUS WITHOUT COMPLICATION, WITHOUT LONG-TERM CURRENT USE OF INSULIN (HCC): Primary | ICD-10-CM

## 2024-07-23 DIAGNOSIS — B35.1 ONYCHOMYCOSIS: ICD-10-CM

## 2024-07-23 DIAGNOSIS — I73.9 PERIPHERAL VASCULAR DISEASE, UNSPECIFIED (HCC): ICD-10-CM

## 2024-07-23 PROCEDURE — 11720 DEBRIDE NAIL 1-5: CPT | Performed by: PODIATRIST

## 2024-07-23 PROCEDURE — RECHECK: Performed by: PODIATRIST

## 2024-07-23 NOTE — PROGRESS NOTES
Established patient with class findings presents for mycotic toenail care.  Vascular exam:  DP 0/4 bilateral; PT 0 4 bilateral  Derm exam:  Each great toenail is thickened and yellow with subungual debris as is the fifth toenails bilateral.  Remainder of toenails are elongated.  Diagnoses: Mycotic toenails great toe bilateral and fifth toe bilateral; diabetes mellitus  Treatment:  Debrided each mycotic toenail reducing nails in thickness and removing devitalized tissue and subungual debris.  Electrical and manual debridement performed.  Trimmed remaining elongated toenails.

## 2024-07-31 ENCOUNTER — OFFICE VISIT (OUTPATIENT)
Dept: FAMILY MEDICINE CLINIC | Facility: CLINIC | Age: 64
End: 2024-07-31
Payer: COMMERCIAL

## 2024-07-31 VITALS
TEMPERATURE: 97.8 F | RESPIRATION RATE: 16 BRPM | BODY MASS INDEX: 43.51 KG/M2 | WEIGHT: 215.8 LBS | HEART RATE: 73 BPM | SYSTOLIC BLOOD PRESSURE: 124 MMHG | OXYGEN SATURATION: 98 % | HEIGHT: 59 IN | DIASTOLIC BLOOD PRESSURE: 70 MMHG

## 2024-07-31 DIAGNOSIS — G89.29 CHRONIC BILATERAL LOW BACK PAIN WITHOUT SCIATICA: ICD-10-CM

## 2024-07-31 DIAGNOSIS — M54.50 CHRONIC BILATERAL LOW BACK PAIN WITHOUT SCIATICA: ICD-10-CM

## 2024-07-31 DIAGNOSIS — R53.82 CHRONIC FATIGUE: Primary | ICD-10-CM

## 2024-07-31 DIAGNOSIS — E11.9 TYPE 2 DIABETES MELLITUS WITHOUT COMPLICATION, WITHOUT LONG-TERM CURRENT USE OF INSULIN (HCC): ICD-10-CM

## 2024-07-31 DIAGNOSIS — S93.491S SPRAIN OF ANTERIOR TALOFIBULAR LIGAMENT OF RIGHT ANKLE, SEQUELA: ICD-10-CM

## 2024-07-31 DIAGNOSIS — R10.12 LEFT UPPER QUADRANT ABDOMINAL PAIN: ICD-10-CM

## 2024-07-31 PROCEDURE — 99214 OFFICE O/P EST MOD 30 MIN: CPT | Performed by: FAMILY MEDICINE

## 2024-07-31 PROCEDURE — G2211 COMPLEX E/M VISIT ADD ON: HCPCS | Performed by: FAMILY MEDICINE

## 2024-07-31 NOTE — PROGRESS NOTES
Ambulatory Visit  Name: Florecita Ramirez      : 1960      MRN: [id number]  Encounter Provider: Sena Martin MD  Encounter Date: 2024   Encounter department: [facility][1]    Assessment & Plan   1. Chronic fatigue  Comments:  Likely due to chronic anemia and or keppra.  2. Type 2 diabetes mellitus without complication, without long-term current use of insulin (HCC)  Comments:  A1c 7.7 in . Home FBS in the 120-150's. Continue Jardiance 25 mg and Januvia 25 mg daily. Continue diabetic diet and encourage exercise  3. Left upper quadrant abdominal pain  Comments:  Chronic pain worse when laying on the left and relieved with repositioning. Tenderness with palpation. Non radiating. CT A/P  Orders:  -     CT abdomen pelvis w contrast; Future; Expected date: 2024  4. Chronic bilateral low back pain without sciatica  Comments:  Chronic lower back pain with flares, PT helped in the past. Will reorder PT  Orders:  -     Ambulatory Referral to Physical Therapy; Future  5. Sprain of anterior talofibular ligament of right ankle, sequela  Comments:  History of ATFL sprain with recent pain. Tenderness below the lateral malleolus. Apply ice and elevated. Reccomend discussing this w. podiatrist       History of Present Illness     Fatigued   Avoiding the heat and staying indoors   Still has pain on the left side  Worse when she lays on her left side and relieved if she lays on the right.   Ankle feels weak and feels like its going to give out   Pain in the ankle. Started a week ago. Will get startled when she takes a nap during the day but doesn't happen at night        Review of Systems  Medical History Reviewed by provider this encounter:       Past Medical History   Past Medical History:   Diagnosis Date    Abnormal ultrasound of pelvis     last assessed - 2015    Asthma     Asthma, persistent 2014    Cerebral infarction (HCC)     unspecified; last assessed - 2017     COVID-19 virus infection 01/07/2021    Dizziness     last assessed - [id number]    Dysuria     last assessed - [id number]    Exposure to potentially hazardous body fluids     last assessed - [id number]    Heart disease     Hematoma     of arm; last assessed - 20Jun2016    Mitral valve stenosis     Personal history of scoliosis     Pneumonia     Scar, hypertrophic     last assessed - 17Nov2015    Seizures (HCC)     Shingles 2021    Type 2 diabetes mellitus (HCC)     Vision problems      Past Surgical History:   Procedure Laterality Date    BREAST BIOPSY Left     many years ago in [place]-pt not really sure    MITRAL VALVE REPLACEMENT  06/17/2015    MVR with 27 mm [employer] Optiform mechanical prostethic;  last assessed - [id number]    TONSILLECTOMY      TRANSLUMINAL ANGIOPLASTY      mitral valve; last assessed - 01Aug2014    TUBAL LIGATION  1986    last assessed - 01Aug2014     Family History   Problem Relation Age of Onset    Cancer Mother     Dementia Mother     Diabetes Mother     Hypertension Mother     Hyperlipidemia Mother     Cervical cancer Mother     Heart disease Father     Coronary artery disease Father     Hypertension Father     Heart attack Father     Diabetes Sister     Breast cancer Sister 62    No Known Problems Daughter     Diabetes Maternal Grandmother     Breast cancer Maternal Grandmother     Diabetes Brother     Hypertension Brother     Hyperlipidemia Brother     Seizures Son     Psoriasis Son     Heart disease Paternal Aunt     Heart disease Paternal Aunt     Diabetes Other      Current Outpatient Medications on File Prior to Visit   Medication Sig Dispense Refill    albuterol (PROVENTIL HFA,VENTOLIN HFA) 90 mcg/act inhaler Inhale 2 puffs every 4 (four) hours as needed for wheezing 18 g 0    Blood Glucose Monitoring Suppl (ACCU-CHEK EDILMA PLUS) w/Device KIT by Does not apply route 2 (two) times a day 1 kit 0    cetirizine (ZyrTEC) 10 mg tablet Take 1 tablet (10 mg total) by mouth daily 90  tablet 1    clotrimazole (LOTRIMIN) 1 % cream Apply topically 2 (two) times a day 60 g 1    cyclobenzaprine (FLEXERIL) 5 mg tablet Take 1 tablet (5 mg total) by mouth daily at bedtime 15 tablet 0    dorzolamide-timolol (COSOPT) 22.3-6.8 MG/ML ophthalmic solution       Empagliflozin 25 MG TABS Take 1 tablet (25 mg total) by mouth daily 30 tablet 5    ferrous sulfate 324 (65 Fe) mg Take 1 tablet (324 mg total) by mouth daily before breakfast 90 tablet 1    fluticasone (FLONASE) 50 mcg/act nasal spray 1 spray into each nostril daily (Patient taking differently: 1 spray into each nostril if needed) 9.9 mL 0    fluticasone (Flovent HFA) 110 MCG/ACT inhaler Inhale 2 puffs 2 (two) times a day Rinse mouth after use. 12 g 0    hydrOXYzine HCL (ATARAX) 25 mg tablet Take 1 tablet (25 mg total) by mouth 2 (two) times a day 60 tablet 5    Keppra 500 MG tablet Take 1 tablet (500 mg total) by mouth 3 (three) times a day 270 tablet 3    Lancets (OneTouch Delica Plus [id number]) MISC USE ONCE DAILY AS DIRECTED 100 each 1    metoprolol tartrate (LOPRESSOR) 25 mg tablet Take 0.5 tablets (12.5 mg total) by mouth every 12 (twelve) hours 90 tablet 1    omeprazole (PriLOSEC) 40 MG capsule TAKE 1 CAPSULE BY MOUTH DAILY 90 capsule 1    OneTouch Verio test strip USE DAILY AS INSTRUCTED 100 strip 9    rosuvastatin (CRESTOR) 5 mg tablet TAKE ONE TABLET BY MOUTH THREE TIMES A WEEK 30 tablet 8    sitaGLIPtin (Januvia) 25 mg tablet Take 1 tablet (25 mg total) by mouth daily 100 tablet 5    torsemide (DEMADEX) 20 mg tablet TAKE ONE TABLET BY MOUTH AS NEEDED AS DIRECTED 90 tablet 10    warfarin (COUMADIN) 5 mg tablet TAKE 1/2 TO 1 TABLET DAILY BY MOUTH OR AS ORDERED BY PHYSICIAN. 90 tablet 2    acetaminophen (TYLENOL) 500 mg tablet Take 2 tablets by mouth daily as needed for pain 180 tablet 3    [DISCONTINUED] omega-3-acid ethyl esters (LOVAZA) 1 g capsule TAKE TWO CAPSULES BY MOUTH TWICE A  capsule 4    [DISCONTINUED] omeprazole (PriLOSEC)  40 MG capsule Take 1 capsule (40 mg total) by mouth daily for 7 days 7 capsule 0     No current facility-administered medications on file prior to visit.     Allergies   Allergen Reactions    Codeine     Methylprednisolone GI Intolerance     Diarrhea, nausea, hot flashes    Prednisone Itching    Statins Headache    Codeine Polt-Chlorphen Polt Er Irritability, Itching and Rash      Current Outpatient Medications on File Prior to Visit   Medication Sig Dispense Refill    albuterol (PROVENTIL HFA,VENTOLIN HFA) 90 mcg/act inhaler Inhale 2 puffs every 4 (four) hours as needed for wheezing 18 g 0    Blood Glucose Monitoring Suppl (ACCU-CHEK EDILMA PLUS) w/Device KIT by Does not apply route 2 (two) times a day 1 kit 0    cetirizine (ZyrTEC) 10 mg tablet Take 1 tablet (10 mg total) by mouth daily 90 tablet 1    clotrimazole (LOTRIMIN) 1 % cream Apply topically 2 (two) times a day 60 g 1    cyclobenzaprine (FLEXERIL) 5 mg tablet Take 1 tablet (5 mg total) by mouth daily at bedtime 15 tablet 0    dorzolamide-timolol (COSOPT) 22.3-6.8 MG/ML ophthalmic solution       Empagliflozin 25 MG TABS Take 1 tablet (25 mg total) by mouth daily 30 tablet 5    ferrous sulfate 324 (65 Fe) mg Take 1 tablet (324 mg total) by mouth daily before breakfast 90 tablet 1    fluticasone (FLONASE) 50 mcg/act nasal spray 1 spray into each nostril daily (Patient taking differently: 1 spray into each nostril if needed) 9.9 mL 0    fluticasone (Flovent HFA) 110 MCG/ACT inhaler Inhale 2 puffs 2 (two) times a day Rinse mouth after use. 12 g 0    hydrOXYzine HCL (ATARAX) 25 mg tablet Take 1 tablet (25 mg total) by mouth 2 (two) times a day 60 tablet 5    Keppra 500 MG tablet Take 1 tablet (500 mg total) by mouth 3 (three) times a day 270 tablet 3    Lancets (OneTouch Delica Plus [id number]) MISC USE ONCE DAILY AS DIRECTED 100 each 1    metoprolol tartrate (LOPRESSOR) 25 mg tablet Take 0.5 tablets (12.5 mg total) by mouth every 12 (twelve) hours 90 tablet 1     "omeprazole (PriLOSEC) 40 MG capsule TAKE 1 CAPSULE BY MOUTH DAILY 90 capsule 1    OneTouch Verio test strip USE DAILY AS INSTRUCTED 100 strip 9    rosuvastatin (CRESTOR) 5 mg tablet TAKE ONE TABLET BY MOUTH THREE TIMES A WEEK 30 tablet 8    sitaGLIPtin (Januvia) 25 mg tablet Take 1 tablet (25 mg total) by mouth daily 100 tablet 5    torsemide (DEMADEX) 20 mg tablet TAKE ONE TABLET BY MOUTH AS NEEDED AS DIRECTED 90 tablet 10    warfarin (COUMADIN) 5 mg tablet TAKE 1/2 TO 1 TABLET DAILY BY MOUTH OR AS ORDERED BY PHYSICIAN. 90 tablet 2    acetaminophen (TYLENOL) 500 mg tablet Take 2 tablets by mouth daily as needed for pain 180 tablet 3    [DISCONTINUED] omega-3-acid ethyl esters (LOVAZA) 1 g capsule TAKE TWO CAPSULES BY MOUTH TWICE A  capsule 4    [DISCONTINUED] omeprazole (PriLOSEC) 40 MG capsule Take 1 capsule (40 mg total) by mouth daily for 7 days 7 capsule 0     No current facility-administered medications on file prior to visit.      Social History     Tobacco Use    Smoking status: Never     Passive exposure: Never    Smokeless tobacco: Never   Vaping Use    Vaping status: Never Used   Substance and Sexual Activity    Alcohol use: No    Drug use: No    Sexual activity: Not Currently     Partners: Male     Objective     /70 (BP Location: Left arm, Patient Position: Sitting, Cuff Size: Large)   Pulse 73   Temp 97.8 °F (36.6 °C) (Tympanic)   Resp 16   Ht 4' 11\" (1.499 m)   Wt 97.9 kg (215 lb 12.8 oz)   SpO2 98%   BMI 43.59 kg/m²     Physical Exam  Constitutional:       General: She is not in acute distress.     Appearance: Normal appearance. She is not ill-appearing.   HENT:      Head: Normocephalic and atraumatic.   Eyes:      Extraocular Movements: Extraocular movements intact.   Cardiovascular:      Rate and Rhythm: Normal rate and regular rhythm.      Heart sounds: Murmur (mid systlic click) heard.   Pulmonary:      Effort: Pulmonary effort is normal. No respiratory distress.      Breath " sounds: Normal breath sounds. No stridor. No wheezing, rhonchi or rales.   Abdominal:      General: There is no distension.      Palpations: Abdomen is soft. There is no mass.      Tenderness: There is abdominal tenderness in the left upper quadrant. There is no guarding or rebound.      Hernia: No hernia is present.   Musculoskeletal:      Right lower leg: Edema present.      Left lower leg: Edema present.      Right ankle: Swelling present. No deformity, ecchymosis or lacerations. Tenderness present over the lateral malleolus. Normal range of motion. Normal pulse.        Legs:    Skin:     General: Skin is warm.   Neurological:      Mental Status: She is alert and oriented to person, place, and time.   Psychiatric:         Mood and Affect: Mood normal.         Behavior: Behavior normal.         Thought Content: Thought content normal.

## 2024-08-06 ENCOUNTER — EVALUATION (OUTPATIENT)
Dept: PHYSICAL THERAPY | Facility: REHABILITATION | Age: 64
End: 2024-08-06
Payer: COMMERCIAL

## 2024-08-06 DIAGNOSIS — M54.50 CHRONIC BILATERAL LOW BACK PAIN WITHOUT SCIATICA: Primary | ICD-10-CM

## 2024-08-06 DIAGNOSIS — G89.29 CHRONIC BILATERAL LOW BACK PAIN WITHOUT SCIATICA: Primary | ICD-10-CM

## 2024-08-06 PROCEDURE — 97161 PT EVAL LOW COMPLEX 20 MIN: CPT

## 2024-08-06 PROCEDURE — 97110 THERAPEUTIC EXERCISES: CPT

## 2024-08-06 NOTE — PROGRESS NOTES
PT Evaluation     Today's date: 2024  Patient name: Florecita Ramirez  : 1960  MRN: [id number]  Referring provider: Sena Martin MD  Dx:   Encounter Diagnosis     ICD-10-CM    1. Chronic bilateral low back pain without sciatica  M54.50 Ambulatory Referral to Physical Therapy    G89.29     Chronic lower back pain with flares, PT helped in the past. Will reorder PT          Start Time: 1245  Stop Time: 1340  Total time in clinic (min): 55 minutes    Assessment  Impairments: abnormal coordination, abnormal muscle firing, abnormal or restricted ROM, activity intolerance, impaired balance, impaired physical strength, lacks appropriate home exercise program, pain with function, weight-bearing intolerance, poor body mechanics, unable to perform ADL, participation limitations, activity limitations and endurance    Assessment details: Florecita Ramirez is a 64 y.o. female presenting with LBP that radiates down left leg. Primary impairments include reduced strength globally in LE, limited lumbar L lateral flexion/ rotation and extension ROM due to pain, balance impairments and general deconditioning secondary to increase in pain. Will benefit from skilled PT interventions for the previously listed impairments and HEP was provided and reviewed. Pt is planning to go away from - and will need HEP updated before this based on her response to exercises. Pt would benefit from any adjustments being made prior to leaving for 2 weeks. Patient is able to complete HEP with good technique and appropriate pain response. Patient expressed understanding of appropriate dosage and frequency of HEP. No additional referral necessary.     Understanding of Dx/Px/POC: good     Prognosis: good    Goals    Short Term Goals:  In 4 weeks, the patient will:  1. Decrease worst pain with activity by 2 points subjectively   2. Increase strength with hip flexion by one point  3. Supervision with HEP for self care    Long Term  Goals:  In 12 weeks, the patient will:  1. Be able to take leisure walks with pain no greater then 2/10 to increase QOL and to be able to participate in weight management activities   2. FOTO to greater than predicted value  3. Independent with HEP for selfcare        Plan  Patient would benefit from: skilled physical therapy and PT eval  Planned modality interventions: biofeedback, electrical stimulation/Russian stimulation, TENS, neuromuscular electric stimulation and unattended electrical stimulation    Planned therapy interventions: abdominal trunk stabilization, activity modification, ADL retraining, ADL training, balance, balance/weight bearing training, behavior modification, body mechanics training, functional ROM exercises, flexibility, fine motor coordination training, gait training, graded activity, graded exercise, graded motor, home exercise program, therapeutic training, therapeutic exercise, therapeutic activities, postural training, self care, strengthening, stretching, patient/caregiver education, motor coordination training, muscle pump exercises, nerve gliding, neuromuscular re-education, manual therapy, joint mobilization and kinesiology taping    Frequency: 2x week  Duration in weeks: 14  Plan of Care beginning date: 8/6/2024  Plan of Care expiration date: 11/12/2024  Treatment plan discussed with: patient        Subjective  Diagnosis:   DANTE: Exacerbated by ADL's recently, patient also noted that her balance has recently gotten worse as well in addition to increased back pain   DOI: Pain started years ago, pain increased recently with ADL's  Limitations: ADL's, leisure walks    Aggravating factors: LBP is increased with incline walking, sleeping on left side, getting into car    Easing factors: sitting at 45 degree angle, tylenol    PSHx: n/a, scoliosis   Pain: sometimes pain is so intense she has trouble getting up Best-  1/10, Worst-  9/10, Current-  7/10  PLOF: modified independent but  patient has been limited due to pain for several years, patient had therapy for LBP about 2 years ago and noted significant benefit and was able to use reciprocal gait upon discharge  Goals: exercise and lose some weight, get back to walking         Objective  OBJECTIVE:    Postural Findings:     Head Position x Protracted  Neutral  Retracted   Scapular Position x Protracted  Neutral  Retracted   Thoracic Spine x Inc Kyphosis  Neutral     Lumbar Spine x Inc Lordosis  Neutral  Dec Lordosis   Pelvis  Anterior Tilt x Neutral  Posterior Tilt   Iliac Crest  L elevated x Neutral  R elevated   Feet  Pronated x Neutral  Supinated   Lateral Shift  Right  Left x None     Strength and ROM evaluated B from a regional biomechanical perspective and values relevant to this episode recorded in tables below.    ROM:   Joint / Motion  Right  Left    Lumbar Flexion  Wnl      Lumbar Extension  50% !     Lumbar Sidebending  Wnl ! 25% !    Lumbar rotation Wnl  Wnl !   Hip ER  wnl  wnl    Hip IR  wnl wnl       LE Strength:  Test Action RIGHT  LEFT   Hip Flexion 2+/5 2+/5   Hip Extension  2+/5 2+/5   Knee Extension 4-/5 3/5   Ankle DF 4/5 4/5   Plantarflexion  3+/5 3+/5   Knee Flexion 3/5 2+/5     Dermatomes: normal throughout    Additional Assessments:  Pain with palpation noted:    Joint Mobility:   Balance:   SLS: L- not able, R- 2 seconds  TUG- 22sec  5xSTS- 18 seconds       Special Tests:  Lumbar Specific and Neural Tension                                                                            Test / Measure  Right 8/6/2024 Left 8/6/2024   Straight Leg raise - -   Crossed straight leg raise - -   Slump test - -   Prone instability test - -            Precautions:     POC expires Unit limit Auth Expiration date PT/OT + Visit Limit?   11/12/2024 BOMN 12/31/2024 BOMN         Visit/Unit Tracking  AUTH Status:  Date 8/6        After 24 V PCY Used 1         Remaining  23           Pertinent Findings:      POC End Date: 11/12/2024                                                                                           Test / Measure  8/6   FOTO (Predicted 45) 38   Hip F 2+/5   L Knee E 3/5   Hip E 2+/5     *Pt is going away from 8/11 till 8/24. Check in to see how HEP is going and make neccessary changes prior to going away for 2 weeks.*     8/6            Manuals                                                                 Neuro Re-Ed             Pball flexion              Unilateral Pallof             Unilateral Row             Unilateral Ext              Seated Pball Press                                        Ther Ex             LAQ HEP            Seated Marches HEP             Hooklying Marches              SHC             HR/TR             Seated Hamstring S' HEP                                      Ther Activity             NS/RB             Side stepping             Tandem walk                                       Gait Training                                                    Modalities

## 2024-08-13 ENCOUNTER — TELEPHONE (OUTPATIENT)
Dept: RADIOLOGY | Facility: HOSPITAL | Age: 64
End: 2024-08-13

## 2024-08-13 ENCOUNTER — TELEPHONE (OUTPATIENT)
Age: 64
End: 2024-08-13

## 2024-08-13 DIAGNOSIS — E11.9 TYPE 2 DIABETES MELLITUS WITHOUT COMPLICATION, WITHOUT LONG-TERM CURRENT USE OF INSULIN (HCC): Primary | ICD-10-CM

## 2024-08-13 NOTE — TELEPHONE ENCOUNTER
Flory, from  CT Dept., called and stated the patient will need a Basic Metabolic Panel done before her CT abdomen pelvis w/ contrast scheduled for tomorrow.  She is requesting the orders to be entered and the patient to be advised that she will need the labs done so that they receive results before her appointment tomorrow.  Please advise patient.  Thank you!

## 2024-08-13 NOTE — TELEPHONE ENCOUNTER
Called [facility] and spoke with Lucie at office to have a metabolic panel ordered and they will call the patient once order is placed to let her know to get them done. Any questions call [phone].

## 2024-08-16 DIAGNOSIS — L50.0 ALLERGIC URTICARIA: ICD-10-CM

## 2024-08-16 DIAGNOSIS — E11.9 TYPE 2 DIABETES MELLITUS WITHOUT COMPLICATION, WITHOUT LONG-TERM CURRENT USE OF INSULIN (HCC): ICD-10-CM

## 2024-08-16 RX ORDER — HYDROXYZINE HYDROCHLORIDE 25 MG/1
25 TABLET, FILM COATED ORAL 2 TIMES DAILY
Qty: 60 TABLET | Refills: 5 | Status: SHIPPED | OUTPATIENT
Start: 2024-08-16

## 2024-08-19 ENCOUNTER — ANTICOAG VISIT (OUTPATIENT)
Dept: CARDIOLOGY CLINIC | Facility: CLINIC | Age: 64
End: 2024-08-19

## 2024-08-19 ENCOUNTER — APPOINTMENT (OUTPATIENT)
Dept: LAB | Facility: CLINIC | Age: 64
End: 2024-08-19
Payer: COMMERCIAL

## 2024-08-19 DIAGNOSIS — E11.9 TYPE 2 DIABETES MELLITUS WITHOUT COMPLICATION, WITHOUT LONG-TERM CURRENT USE OF INSULIN (HCC): ICD-10-CM

## 2024-08-19 DIAGNOSIS — Z95.2 HISTORY OF PROSTHETIC HEART VALVE: Primary | ICD-10-CM

## 2024-08-19 DIAGNOSIS — Z95.2 HISTORY OF PROSTHETIC HEART VALVE: ICD-10-CM

## 2024-08-19 LAB
ANION GAP SERPL CALCULATED.3IONS-SCNC: 8 MMOL/L (ref 4–13)
BUN SERPL-MCNC: 14 MG/DL (ref 5–25)
CALCIUM SERPL-MCNC: 9.5 MG/DL (ref 8.4–10.2)
CHLORIDE SERPL-SCNC: 104 MMOL/L (ref 96–108)
CO2 SERPL-SCNC: 28 MMOL/L (ref 21–32)
CREAT SERPL-MCNC: 0.86 MG/DL (ref 0.6–1.3)
GFR SERPL CREATININE-BSD FRML MDRD: 71 ML/MIN/1.73SQ M
GLUCOSE P FAST SERPL-MCNC: 158 MG/DL (ref 65–99)
INR PPP: 3.43 (ref 0.85–1.19)
POTASSIUM SERPL-SCNC: 3.9 MMOL/L (ref 3.5–5.3)
PROTHROMBIN TIME: 34.1 SECONDS (ref 12.3–15)
SODIUM SERPL-SCNC: 140 MMOL/L (ref 135–147)

## 2024-08-19 PROCEDURE — 85610 PROTHROMBIN TIME: CPT

## 2024-08-19 PROCEDURE — 80048 BASIC METABOLIC PNL TOTAL CA: CPT

## 2024-08-19 PROCEDURE — 36415 COLL VENOUS BLD VENIPUNCTURE: CPT

## 2024-08-20 ENCOUNTER — TELEPHONE (OUTPATIENT)
Dept: CARDIOLOGY CLINIC | Facility: CLINIC | Age: 64
End: 2024-08-20

## 2024-08-20 NOTE — TELEPHONE ENCOUNTER
Pt came by the office today, wanted to know if she's okay to have a ct scan with dye since she has a metal valve replacement.     I spoke with Dr. Blackburn, pt is okay to have ct scan and dye based on her recent blood work.   The test she can't have is MRI because it's magnetic.     I advised pt of this information, pt verbally understood.

## 2024-08-26 ENCOUNTER — OFFICE VISIT (OUTPATIENT)
Dept: PHYSICAL THERAPY | Facility: REHABILITATION | Age: 64
End: 2024-08-26
Payer: COMMERCIAL

## 2024-08-26 DIAGNOSIS — M54.50 CHRONIC BILATERAL LOW BACK PAIN WITHOUT SCIATICA: Primary | ICD-10-CM

## 2024-08-26 DIAGNOSIS — G89.29 CHRONIC BILATERAL LOW BACK PAIN WITHOUT SCIATICA: Primary | ICD-10-CM

## 2024-08-26 PROCEDURE — 97110 THERAPEUTIC EXERCISES: CPT

## 2024-08-26 PROCEDURE — 97112 NEUROMUSCULAR REEDUCATION: CPT

## 2024-08-26 PROCEDURE — 97530 THERAPEUTIC ACTIVITIES: CPT

## 2024-08-26 NOTE — PROGRESS NOTES
"Daily Note     Today's date: 2024  Patient name: Florecita Ramirez  : 1960  MRN: [id number]  Referring provider: Sena Martin MD  Dx:   Encounter Diagnosis     ICD-10-CM    1. Chronic bilateral low back pain without sciatica  M54.50     G89.29           Start Time: 1215  Stop Time: 1255  Total time in clinic (min): 40 minutes    Subjective: Patient reports her left side hip pain is worse today more then usual.       Objective: See treatment diary below      Assessment: Patient required minimal cuing throughout session. Left knee pain was reported after completing the step ups. Patient felt benefit from cable rows, pball rollout, and lumbar extension. Tolerated treatment well. Patient demonstrated fatigue post treatment, exhibited good technique with therapeutic exercises, and would benefit from continued PT    Pain: 5/10 at start of session, 7/10 after session.     Plan: Continue per plan of care.      Precautions:     POC expires Unit limit Auth Expiration date PT/OT + Visit Limit?   2024 BOMN 2024 BOMN         Visit/Unit Tracking  AUTH Status:  Date        After 24 V PCY Used 1 2        Remaining            Pertinent Findings:      POC End Date: 2024                                                                                          Test / Measure     FOTO (Predicted 45) 38   Hip F 2+/5   L Knee E 3/5   Hip E 2+/5                   Manuals                                                                 Neuro Re-Ed             Pball flexion   10x10\"            Unilateral Pallof Circles   2x10 ea 7#           Unilateral Row  2x10 10#           Unilateral Ext              Standing Cable Crunch                                       Ther Ex             LAQ HEP            Seated Marches HEP             SHC             HR/TR  Seated 2x20 ea            Seated Hamstring S' HEP 5x10\" w/ strap            LTR             Seated Lumbar Ext   20x w/ exhale         "                 Ther Activity             NS  8'           Side stepping             Tandem walk             FSU  2x10 4in           LSU             Gait Training                                                    Modalities

## 2024-08-28 ENCOUNTER — HOSPITAL ENCOUNTER (OUTPATIENT)
Dept: RADIOLOGY | Facility: HOSPITAL | Age: 64
Discharge: HOME | End: 2024-08-28
Payer: COMMERCIAL

## 2024-08-28 ENCOUNTER — APPOINTMENT (OUTPATIENT)
Dept: PHYSICAL THERAPY | Facility: REHABILITATION | Age: 64
End: 2024-08-28
Payer: COMMERCIAL

## 2024-08-28 DIAGNOSIS — R10.12 LEFT UPPER QUADRANT ABDOMINAL PAIN: ICD-10-CM

## 2024-08-28 PROCEDURE — 74177 CT ABD & PELVIS W/CONTRAST: CPT

## 2024-08-28 RX ADMIN — IOHEXOL 75 ML: 350 INJECTION, SOLUTION INTRAVENOUS at 10:56

## 2024-09-02 NOTE — PROGRESS NOTES
Cardiology Follow Up    Florecita Ramirez  1960  [id number]  [facility]  [address]  [place] [id number] [phone]    1. Mixed hyperlipidemia        2. GERMAN (obstructive sleep apnea)        3. PAD (peripheral artery disease) (HCC)        4. H/O mitral valve replacement with mechanical valve        5. Dyslipidemia        6. Statin intolerance        7. Chronic anticoagulation            Interval History: Patient here for f/u.  Patient with RHD, with mitral balloon valvuloplasty 2014 in [facility] complicated by CVA.  She had MVR with mechanical valve, #27 mm [employer], in 6/2015 at [facility].  Patient has HTN, DM, mild COPD and PAF with LOUIE/CV done 2015.  Cardiac catheterization 5/14/2015 was normal.  Echocardiogram 4/2023 demonstrated LVEF of 60% with mild LVH.  There was ROGELIO.  An appropriately functioning mechanical prosthesis was noted in the mitral position with a mean gradient of 5 mmHg.  Mild to moderate TR with mild to moderate elevation in PASP was noted.  Goal PT/INR is 2.5-3.5.  Patient has had no chest pain or significant dyspnea.   I see her sister Veronica.  Echocardiogram 4/23/2024 demonstrated LVEF of 60%.  A #27 mechanical MVR was noted with appropriate function.  Compared to prior study 4/10/2023, TR severity appeared less.  Patient has had no chest pain or significant dyspnea.  Her vital signs are stable today.  Patient'sHLD and MVR are stable on her current medicine.      Patient Active Problem List   Diagnosis    History of prosthetic heart valve    Abnormal mammogram    Cervical high risk HPV (human papillomavirus) test positive    Depression with anxiety    Elevated blood pressure reading    Esophageal reflux    Excessive daytime sleepiness    Fatigue    Hypokalemia    Fecal occult blood test positive    Insomnia    Intertrigo    Iron deficiency anemia    Localization-related epilepsy, intractable  ([facility])    Low grade squamous intraepithelial lesion (LGSIL) on cervical Pap smear    Microcytic hypochromic anemia    Obesity    Primary osteoarthritis of right knee    Overactive bladder    Plantar fasciitis    Sleep disorder, circadian, delayed sleep phase type    Urge incontinence of urine    Urinary retention    Urticaria, chronic    Uterine fibroid    Vitamin D insufficiency    Xerosis of skin    History of stroke    Wrist swelling, left    Chest wall pain    Bilateral lower extremity edema    H/O mitral valve replacement with mechanical valve    Posterior knee pain, right    Left sided abdominal pain    Vertigo    GERMAN (obstructive sleep apnea)    Panic reaction    Secondary hypertension    Dyslipidemia    Acute right ankle pain    Mild intermittent asthma    Sprain of anterior talofibular ligament of right ankle    Peroneal tendonitis    Posterior tibial tendinitis of right lower extremity    BPPV (benign paroxysmal positional vertigo)    Bilateral leg pain    Lymphedema of both lower extremities    Tricuspid regurgitation    Morbid obesity with BMI of 45.0-49.9, adult (HCC)    PAD (peripheral artery disease) ([facility])    Diarrhea    Herpes zoster without complication    Chronic diarrhea    Type 2 diabetes mellitus without complication, without long-term current use of insulin ([facility])    Tinea pedis of left foot    Rheumatic heart disease    Anticoagulation goal of INR 2.5 to 3.5    Chronic anticoagulation    Statin intolerance    Mild intermittent asthma without complication     Past Medical History:   Diagnosis Date    Abnormal ultrasound of pelvis     last assessed - 26Mar2015    Asthma     Asthma, persistent 08/01/2014    Cerebral infarction (HCC)     unspecified; last assessed - [id number]    COVID-19 virus infection 01/07/2021    Dizziness     last assessed - [id number]    Dysuria     last assessed - [id number]    Exposure to potentially hazardous body fluids     last assessed - [id number]    Heart disease      Hematoma     of arm; last assessed - 20Jun2016    Mitral valve stenosis     Personal history of scoliosis     Pneumonia     Scar, hypertrophic     last assessed - 17Nov2015    Seizures (HCC)     Shingles 2021    Type 2 diabetes mellitus (HCC)     Vision problems      Social History     Socioeconomic History    Marital status: Single     Spouse name: Not on file    Number of children: Not on file    Years of education: Not on file    Highest education level: Not on file   Occupational History    Not on file   Tobacco Use    Smoking status: Never     Passive exposure: Never    Smokeless tobacco: Never   Vaping Use    Vaping status: Never Used   Substance and Sexual Activity    Alcohol use: No    Drug use: No    Sexual activity: Not Currently     Partners: Male   Other Topics Concern    Not on file   Social History Narrative    Caffeine use     Social Determinants of Health     Financial Resource Strain: Low Risk  (3/15/2021)    Overall Financial Resource Strain (CARDIA)     Difficulty of Paying Living Expenses: Not hard at all   Food Insecurity: No Food Insecurity (6/4/2024)    Hunger Vital Sign     Worried About Running Out of Food in the Last Year: Never true     Ran Out of Food in the Last Year: Never true   Transportation Needs: No Transportation Needs (6/4/2024)    PRAPARE - Transportation     Lack of Transportation (Medical): No     Lack of Transportation (Non-Medical): No   Physical Activity: Not on file   Stress: Not on file   Social Connections: Not on file   Intimate Partner Violence: Not on file   Housing Stability: Unknown (6/4/2024)    Housing Stability Vital Sign     Unable to Pay for Housing in the Last Year: No     Number of Times Moved in the Last Year: Not on file     Homeless in the Last Year: No      Family History   Problem Relation Age of Onset    Cancer Mother     Dementia Mother     Diabetes Mother     Hypertension Mother     Hyperlipidemia Mother     Cervical cancer Mother     Heart disease  Father     Coronary artery disease Father     Hypertension Father     Heart attack Father     Diabetes Sister     Breast cancer Sister 62    No Known Problems Daughter     Diabetes Maternal Grandmother     Breast cancer Maternal Grandmother     Diabetes Brother     Hypertension Brother     Hyperlipidemia Brother     Seizures Son     Psoriasis Son     Heart disease Paternal Aunt     Heart disease Paternal Aunt     Diabetes Other      Past Surgical History:   Procedure Laterality Date    BREAST BIOPSY Left     many years ago in [place]-pt not really sure    MITRAL VALVE REPLACEMENT  06/17/2015    MVR with 27 mm [employer] Optiform mechanical prostethic;  last assessed - 03Feb2016    TONSILLECTOMY      TRANSLUMINAL ANGIOPLASTY      mitral valve; last assessed - 01Aug2014    TUBAL LIGATION  1986    last assessed - 01Aug2014       Current Outpatient Medications:     albuterol (PROVENTIL HFA,VENTOLIN HFA) 90 mcg/act inhaler, Inhale 2 puffs every 4 (four) hours as needed for wheezing, Disp: 18 g, Rfl: 0    Blood Glucose Monitoring Suppl (ACCU-CHEK EDILMA PLUS) w/Device KIT, by Does not apply route 2 (two) times a day, Disp: 1 kit, Rfl: 0    cetirizine (ZyrTEC) 10 mg tablet, Take 1 tablet (10 mg total) by mouth daily, Disp: 90 tablet, Rfl: 1    clotrimazole (LOTRIMIN) 1 % cream, Apply topically 2 (two) times a day, Disp: 60 g, Rfl: 1    cyclobenzaprine (FLEXERIL) 5 mg tablet, Take 1 tablet (5 mg total) by mouth daily at bedtime, Disp: 15 tablet, Rfl: 0    dorzolamide-timolol (COSOPT) 22.3-6.8 MG/ML ophthalmic solution, , Disp: , Rfl:     Empagliflozin 25 MG TABS, Take 1 tablet (25 mg total) by mouth daily, Disp: 90 tablet, Rfl: 1    ferrous sulfate 324 (65 Fe) mg, Take 1 tablet (324 mg total) by mouth daily before breakfast, Disp: 90 tablet, Rfl: 1    fluticasone (FLONASE) 50 mcg/act nasal spray, 1 spray into each nostril daily (Patient taking differently: 1 spray into each nostril if needed), Disp: 9.9 mL, Rfl: 0     fluticasone (Flovent HFA) 110 MCG/ACT inhaler, Inhale 2 puffs 2 (two) times a day Rinse mouth after use., Disp: 12 g, Rfl: 0    hydrOXYzine HCL (ATARAX) 25 mg tablet, Take 1 tablet (25 mg total) by mouth 2 (two) times a day, Disp: 60 tablet, Rfl: 5    Keppra 500 MG tablet, Take 1 tablet (500 mg total) by mouth 3 (three) times a day, Disp: 270 tablet, Rfl: 3    Lancets (OneTouch Delica Plus [id number]) MISC, USE ONCE DAILY AS DIRECTED, Disp: 100 each, Rfl: 1    metoprolol tartrate (LOPRESSOR) 25 mg tablet, Take 0.5 tablets (12.5 mg total) by mouth every 12 (twelve) hours, Disp: 90 tablet, Rfl: 1    omeprazole (PriLOSEC) 40 MG capsule, TAKE 1 CAPSULE BY MOUTH DAILY, Disp: 90 capsule, Rfl: 1    OneTouch Verio test strip, USE DAILY AS INSTRUCTED, Disp: 100 strip, Rfl: 9    rosuvastatin (CRESTOR) 5 mg tablet, TAKE ONE TABLET BY MOUTH THREE TIMES A WEEK, Disp: 30 tablet, Rfl: 8    sitaGLIPtin (Januvia) 25 mg tablet, Take 1 tablet (25 mg total) by mouth daily, Disp: 100 tablet, Rfl: 5    torsemide (DEMADEX) 20 mg tablet, TAKE ONE TABLET BY MOUTH AS NEEDED AS DIRECTED, Disp: 90 tablet, Rfl: 10    warfarin (COUMADIN) 5 mg tablet, TAKE 1/2 TO 1 TABLET DAILY BY MOUTH OR AS ORDERED BY PHYSICIAN., Disp: 90 tablet, Rfl: 2    acetaminophen (TYLENOL) 500 mg tablet, Take 2 tablets by mouth daily as needed for pain, Disp: 180 tablet, Rfl: 3  Allergies   Allergen Reactions    Codeine     Methylprednisolone GI Intolerance     Diarrhea, nausea, hot flashes    Prednisone Itching    Statins Headache    Codeine Polt-Chlorphen Polt Er Irritability, Itching and Rash       Labs:not applicable  Imaging: CT abdomen pelvis w contrast    Result Date: 8/30/2024  Narrative: CT ABDOMEN AND PELVIS WITH IV CONTRAST INDICATION: R10.12: Left upper quadrant pain. COMPARISON: CT chest/abdomen/pelvis with contrast 6/19/2021; correlation with ultrasound pelvis 6/23/2022 TECHNIQUE: CT examination of the abdomen and pelvis was performed. Dual energy CT scan  technique (DECT) was employed. Multiplanar 2D reformatted images were created from the source data. This examination, like all CT scans performed in the [facility], was performed utilizing techniques to minimize radiation dose exposure, including the use of iterative reconstruction and automated exposure control. Radiation dose length product (DLP) for this visit: 840.674 mGy-cm IV Contrast: 75 mL of iohexol (OMNIPAQUE) Enteric Contrast: Not administered. FINDINGS: ABDOMEN LOWER CHEST: Median sternotomy wires. Mitral valve replacement. Coronary artery calcifications. Small hiatal hernia. LIVER/BILIARY TREE: Unremarkable. GALLBLADDER: Cholelithiasis without findings of acute cholecystitis. SPLEEN: Unremarkable. PANCREAS: Fatty parenchymal involution. Otherwise, unremarkable. ADRENAL GLANDS: Unremarkable. KIDNEYS/URETERS: No hydronephrosis or urinary tract calculi. Subcentimeter hypoattenuating renal lesion(s), too small to characterize but statistically likely benign, which do not warrant follow-up (Radiology June 2019). STOMACH AND BOWEL: Colonic diverticulosis without findings of acute diverticulitis. APPENDIX: Normal. ABDOMINOPELVIC CAVITY: No ascites. No pneumoperitoneum. No lymphadenopathy. VESSELS: Retroaortic left renal vein, normal anatomical variant. PELVIS REPRODUCTIVE ORGANS: Anteverted uterus. No adnexal mass. URINARY BLADDER: Unremarkable. ABDOMINAL WALL/INGUINAL REGIONS: Substernal diastases of the linea alba. Tiny fat-containing umbilical hernia. BONES: No acute fracture or suspicious osseous lesion. Spinal degenerative changes. Dextroscoliosis of the thoracolumbar spine.     Impression: No acute findings in the abdomen or pelvis. Resident: FÁTIMA LANCASTER I, the attending radiologist, have reviewed the images and agree with the final report above. Workstation performed: [id number]       Review of Systems:  Review of Systems   All other systems reviewed and are negative.      Physical  "Exam:  /66   Pulse 69   Ht 4' 11\" (1.499 m)   Wt 97.1 kg (214 lb)   SpO2 98%   BMI 43.22 kg/m²   Physical Exam  Vitals reviewed.   Constitutional:       Appearance: She is well-developed.   HENT:      Head: Normocephalic and atraumatic.   Eyes:      Conjunctiva/sclera: Conjunctivae normal.      Pupils: Pupils are equal, round, and reactive to light.   Cardiovascular:      Rate and Rhythm: Normal rate.      Heart sounds: Normal heart sounds.      Comments: S1-S2 with crisp prosthetic heart sounds  Pulmonary:      Effort: Pulmonary effort is normal.      Breath sounds: Normal breath sounds.   Musculoskeletal:      Cervical back: Normal range of motion and neck supple.   Skin:     General: Skin is warm and dry.   Neurological:      Mental Status: She is alert and oriented to person, place, and time.         Discussion/Summary:I will continue the patient's present medical regimen.  The patient appears well compensated.  I have asked the patient to call if there is a problem in the interim otherwise I will see the patient in six months time.  Will schedule follow-up echo at next visit.  "

## 2024-09-03 ENCOUNTER — OFFICE VISIT (OUTPATIENT)
Dept: PHYSICAL THERAPY | Facility: REHABILITATION | Age: 64
End: 2024-09-03
Payer: COMMERCIAL

## 2024-09-03 DIAGNOSIS — M54.50 CHRONIC BILATERAL LOW BACK PAIN WITHOUT SCIATICA: Primary | ICD-10-CM

## 2024-09-03 DIAGNOSIS — G89.29 CHRONIC BILATERAL LOW BACK PAIN WITHOUT SCIATICA: Primary | ICD-10-CM

## 2024-09-03 PROCEDURE — 97110 THERAPEUTIC EXERCISES: CPT

## 2024-09-03 PROCEDURE — 97530 THERAPEUTIC ACTIVITIES: CPT

## 2024-09-03 PROCEDURE — 97112 NEUROMUSCULAR REEDUCATION: CPT

## 2024-09-03 NOTE — PROGRESS NOTES
"Daily Note     Today's date: 9/3/2024  Patient name: Florecita Ramirez  : 1960  MRN: [id number]  Referring provider: Sena Martin MD  Dx:   Encounter Diagnosis     ICD-10-CM    1. Chronic bilateral low back pain without sciatica  M54.50     G89.29             Start Time: 1300  Stop Time: 1340  Total time in clinic (min): 40 minutes    Subjective: Patient reports her left side hip pain is worse today more then usual.       Objective: See treatment diary below      Assessment: Patient required minimal cuing throughout session. Left knee pain was reported after completing the step ups. Pt noted pain lying flat on table, felt better with a wedge. Tolerated treatment well. Patient demonstrated fatigue post treatment, exhibited good technique with therapeutic exercises, and would benefit from continued PT    Pain: 5/10 at start of session, 7/10 after session.     Plan: Continue per plan of care.      Precautions:     POC expires Unit limit Auth Expiration date PT/OT + Visit Limit?   2024 BOMN 2024 BOMN         Visit/Unit Tracking  AUTH Status:  Date 8/6 8/26 9/3      After 24 V PCY Used 1 2 3       Remaining  23 22 21         Pertinent Findings:      POC End Date: 2024                                                                                          Test / Measure     FOTO (Predicted 45) 38   Hip F 2+/5   L Knee E 3/5   Hip E 2+/5        8/6 8/26 9/3          Manuals                                                                 Neuro Re-Ed             Pball flexion   10x10\"  10x10\"           Unilateral Pallof Circles   2x10 ea 7# 2x10 ea 7#          Unilateral Row  2x10 10# 2x10 10#          Unilateral Ext              Standing Cable Crunch                                       Ther Ex             LAQ HEP            Seated Marches HEP             SHC             HR/TR  Seated 2x20 ea  Standing 2x10 ea          Seated Hamstring S' HEP 5x10\" w/ strap  5x10\" w/ strap           LTR   " "10 ea           Seated Lumbar Ext   20x w/ exhale  10x5\"           Pball Roll out    10x10\"           Ther Activity             NS  8' 8'           Side stepping             Tandem walk             FSU  2x10 4in 2x10 4in          LSU             Gait Training                                                    Modalities                                            "

## 2024-09-04 ENCOUNTER — TELEPHONE (OUTPATIENT)
Dept: NEUROLOGY | Facility: CLINIC | Age: 64
End: 2024-09-04

## 2024-09-04 NOTE — TELEPHONE ENCOUNTER
Spoke to Florecita.  Jordyn no longer w/dept.  Canceled appointment and rescheduled this appointment with Dr Dangelo for 10/25/24 2:30

## 2024-09-11 ENCOUNTER — OFFICE VISIT (OUTPATIENT)
Dept: CARDIOLOGY CLINIC | Facility: CLINIC | Age: 64
End: 2024-09-11
Payer: COMMERCIAL

## 2024-09-11 VITALS
DIASTOLIC BLOOD PRESSURE: 66 MMHG | HEART RATE: 69 BPM | OXYGEN SATURATION: 98 % | BODY MASS INDEX: 43.14 KG/M2 | SYSTOLIC BLOOD PRESSURE: 132 MMHG | HEIGHT: 59 IN | WEIGHT: 214 LBS

## 2024-09-11 DIAGNOSIS — Z79.01 CHRONIC ANTICOAGULATION: ICD-10-CM

## 2024-09-11 DIAGNOSIS — E78.5 DYSLIPIDEMIA: ICD-10-CM

## 2024-09-11 DIAGNOSIS — I73.9 PAD (PERIPHERAL ARTERY DISEASE) (HCC): ICD-10-CM

## 2024-09-11 DIAGNOSIS — Z95.2 H/O MITRAL VALVE REPLACEMENT WITH MECHANICAL VALVE: ICD-10-CM

## 2024-09-11 DIAGNOSIS — Z78.9 STATIN INTOLERANCE: ICD-10-CM

## 2024-09-11 DIAGNOSIS — G47.33 OSA (OBSTRUCTIVE SLEEP APNEA): ICD-10-CM

## 2024-09-11 DIAGNOSIS — E78.2 MIXED HYPERLIPIDEMIA: Primary | ICD-10-CM

## 2024-09-11 PROCEDURE — 99214 OFFICE O/P EST MOD 30 MIN: CPT | Performed by: INTERNAL MEDICINE

## 2024-09-12 ENCOUNTER — APPOINTMENT (OUTPATIENT)
Dept: PHYSICAL THERAPY | Facility: REHABILITATION | Age: 64
End: 2024-09-12
Payer: COMMERCIAL

## 2024-09-23 ENCOUNTER — ANNUAL EXAM (OUTPATIENT)
Dept: OBGYN CLINIC | Facility: CLINIC | Age: 64
End: 2024-09-23
Payer: COMMERCIAL

## 2024-09-23 VITALS
DIASTOLIC BLOOD PRESSURE: 78 MMHG | HEIGHT: 59 IN | BODY MASS INDEX: 43.34 KG/M2 | WEIGHT: 215 LBS | SYSTOLIC BLOOD PRESSURE: 124 MMHG

## 2024-09-23 DIAGNOSIS — L30.9 DERMATITIS: ICD-10-CM

## 2024-09-23 DIAGNOSIS — R87.610 ATYPICAL SQUAMOUS CELLS OF UNDETERMINED SIGNIFICANCE (ASCUS) ON PAPANICOLAOU SMEAR OF CERVIX: ICD-10-CM

## 2024-09-23 DIAGNOSIS — Z01.419 WELL WOMAN EXAM WITH ROUTINE GYNECOLOGICAL EXAM: Primary | ICD-10-CM

## 2024-09-23 PROCEDURE — G0101 CA SCREEN;PELVIC/BREAST EXAM: HCPCS | Performed by: STUDENT IN AN ORGANIZED HEALTH CARE EDUCATION/TRAINING PROGRAM

## 2024-09-23 RX ORDER — NYSTATIN AND TRIAMCINOLONE ACETONIDE 100000; 1 [USP'U]/G; MG/G
OINTMENT TOPICAL 2 TIMES DAILY
Qty: 30 G | Refills: 0 | Status: SHIPPED | OUTPATIENT
Start: 2024-09-23 | End: 2024-09-26

## 2024-09-23 NOTE — PROGRESS NOTES
[facility]  Well Woman Annual Exam  Misty Ashford MD  24      Assessment   64 y.o. postmenopausal female who is sexually active presenting for annual exam.    Plan     Cervical cancer screening: Pap history significant on ASCUS for last Pap and also prior to that.  Pap smear and cotesting was completed today.  STD screening: The patient declines STD testing given she is not sexually active.  Breast cancer screening: Last Mammogram: BIRADS 2, mammogram ordered by primary care physician.  Reviewed ACOG recommendations of yearly mammogram for breast cancer screening   Colon cancer screening: Last Colonoscopy , Patient due in   BMD screening: Last DEXA screen in  was normal.  Mycolog ointment ordered given dermatitis to the intertriginous fold.  I emphasized the importance of an annual pelvic and breast exam.   I have discussed the importance of monthly self-breast exams, exercise and healthy diet as well as adequate intake of calcium and vitamin D.     All questions have been answered to her satisfaction.    __________________________________________________________________      Subjective     64 y.o. postmenopausal female who is sexually active presenting for annual exam.    GYN  Complaints: has pruritus in intertriginous fold on right- was prescribed lotrimin which has not helped.  Denies genital discharge, genital ulcers, irregular/heavy menses, pelvic pain, and vulvar/vaginal symptoms  No bleeding since menopause  Menopausal symptoms: occasional VMS  Sexually active: No  Hx Abnormal pap: last pap and LSIL pap prior, h/o colpo  Last pap: 2022- ASCUS, HPV neg    OB     x3      Complaints: denies  Denies urinary frequency, hematuria, urinary hesitancy, urinary retention, and dysuria  Does have incontinence and does wear pads    BREAST  Complaints: denies  Denies: breast lump, breast tenderness, changed mole, dryness, nipple discharge, pruritus, rash, skin color change, and  "skin lesion(s)  Last mammogram: 2022- BIRADS 2  Personal hx:  left breast biopsy \"long ago\"- normal per patient   Family hx: denies fhx of ovarian, or colon cancers  Sister had breast cancer - 60  Mother had uterine cancer  Grandmother had breast cancer  Patient does practice breast self awareness.    GENERAL  PMH reviewed/updated and is as below.   Patient does follow with a PCP.  Exercise: walking on treadmill  Diet: has been doing well with her diet  Denies domestic violence.    Past Medical History:   Diagnosis Date    Abnormal ultrasound of pelvis     last assessed - 26Mar2015    Asthma     Asthma, persistent 08/01/2014    Cerebral infarction (HCC)     unspecified; last assessed - 30Aug2017    COVID-19 virus infection 01/07/2021    Dizziness     last assessed - [id number]    Dysuria     last assessed - 12Aug2016    Exposure to potentially hazardous body fluids     last assessed - 11Sep2014    Heart disease     Hematoma     of arm; last assessed - 20Jun2016    Mitral valve stenosis     Personal history of scoliosis     Pneumonia     Scar, hypertrophic     last assessed - 17Nov2015    Seizures (HCC)     Shingles 2021    Type 2 diabetes mellitus (HCC)     Vision problems        Past Surgical History:   Procedure Laterality Date    BREAST BIOPSY Left     many years ago in [place]-pt not really sure    MITRAL VALVE REPLACEMENT  06/17/2015    MVR with 27 mm [employer] Optiform mechanical prostethic;  last assessed - 03Feb2016    TONSILLECTOMY      TRANSLUMINAL ANGIOPLASTY      mitral valve; last assessed - 01Aug2014    TUBAL LIGATION  1986    last assessed - 01Aug2014         Current Outpatient Medications:     albuterol (PROVENTIL HFA,VENTOLIN HFA) 90 mcg/act inhaler, Inhale 2 puffs every 4 (four) hours as needed for wheezing, Disp: 18 g, Rfl: 0    Blood Glucose Monitoring Suppl (ACCU-CHEK EDILMA PLUS) w/Device KIT, by Does not apply route 2 (two) times a day, Disp: 1 kit, Rfl: 0    cetirizine (ZyrTEC) 10 mg " tablet, Take 1 tablet (10 mg total) by mouth daily, Disp: 90 tablet, Rfl: 1    clotrimazole (LOTRIMIN) 1 % cream, Apply topically 2 (two) times a day, Disp: 60 g, Rfl: 1    Cyanocobalamin (VITAMIN B 12 PO), Take by mouth, Disp: , Rfl:     cyclobenzaprine (FLEXERIL) 5 mg tablet, Take 1 tablet (5 mg total) by mouth daily at bedtime, Disp: 15 tablet, Rfl: 0    dorzolamide-timolol (COSOPT) 22.3-6.8 MG/ML ophthalmic solution, , Disp: , Rfl:     Empagliflozin 25 MG TABS, Take 1 tablet (25 mg total) by mouth daily, Disp: 90 tablet, Rfl: 1    ferrous sulfate 324 (65 Fe) mg, Take 1 tablet (324 mg total) by mouth daily before breakfast, Disp: 90 tablet, Rfl: 1    fluticasone (FLONASE) 50 mcg/act nasal spray, 1 spray into each nostril daily (Patient taking differently: 1 spray into each nostril if needed), Disp: 9.9 mL, Rfl: 0    fluticasone (Flovent HFA) 110 MCG/ACT inhaler, Inhale 2 puffs 2 (two) times a day Rinse mouth after use., Disp: 12 g, Rfl: 0    hydrOXYzine HCL (ATARAX) 25 mg tablet, Take 1 tablet (25 mg total) by mouth 2 (two) times a day, Disp: 60 tablet, Rfl: 5    Keppra 500 MG tablet, Take 1 tablet (500 mg total) by mouth 3 (three) times a day, Disp: 270 tablet, Rfl: 3    Lancets (OneTouch Delica Plus [id number]) MISC, USE ONCE DAILY AS DIRECTED, Disp: 100 each, Rfl: 1    MAGNESIUM PO, Take by mouth, Disp: , Rfl:     metoprolol tartrate (LOPRESSOR) 25 mg tablet, Take 0.5 tablets (12.5 mg total) by mouth every 12 (twelve) hours, Disp: 90 tablet, Rfl: 1    Omega 3-6-9 Fatty Acids (OMEGA 3-6-9 PO), Take by mouth, Disp: , Rfl:     omeprazole (PriLOSEC) 40 MG capsule, TAKE 1 CAPSULE BY MOUTH DAILY, Disp: 90 capsule, Rfl: 1    OneTouch Verio test strip, USE DAILY AS INSTRUCTED, Disp: 100 strip, Rfl: 9    rosuvastatin (CRESTOR) 5 mg tablet, TAKE ONE TABLET BY MOUTH THREE TIMES A WEEK, Disp: 30 tablet, Rfl: 8    sitaGLIPtin (Januvia) 25 mg tablet, Take 1 tablet (25 mg total) by mouth daily, Disp: 100 tablet, Rfl: 5     torsemide (DEMADEX) 20 mg tablet, TAKE ONE TABLET BY MOUTH AS NEEDED AS DIRECTED, Disp: 90 tablet, Rfl: 10    warfarin (COUMADIN) 5 mg tablet, TAKE 1/2 TO 1 TABLET DAILY BY MOUTH OR AS ORDERED BY PHYSICIAN., Disp: 90 tablet, Rfl: 2    acetaminophen (TYLENOL) 500 mg tablet, Take 2 tablets by mouth daily as needed for pain, Disp: 180 tablet, Rfl: 3    Allergies   Allergen Reactions    Codeine     Methylprednisolone GI Intolerance     Diarrhea, nausea, hot flashes    Prednisone Itching    Statins Headache    Codeine Polt-Chlorphen Polt Er Irritability, Itching and Rash       Social History     Socioeconomic History    Marital status: Single     Spouse name: Not on file    Number of children: Not on file    Years of education: Not on file    Highest education level: Not on file   Occupational History    Not on file   Tobacco Use    Smoking status: Never     Passive exposure: Never    Smokeless tobacco: Never   Vaping Use    Vaping status: Never Used   Substance and Sexual Activity    Alcohol use: No    Drug use: No    Sexual activity: Not Currently     Partners: Male   Other Topics Concern    Not on file   Social History Narrative    Caffeine use     Social Determinants of Health     Financial Resource Strain: Low Risk  (3/15/2021)    Overall Financial Resource Strain (CARDIA)     Difficulty of Paying Living Expenses: Not hard at all   Food Insecurity: No Food Insecurity (6/4/2024)    Hunger Vital Sign     Worried About Running Out of Food in the Last Year: Never true     Ran Out of Food in the Last Year: Never true   Transportation Needs: No Transportation Needs (6/4/2024)    PRAPARE - Transportation     Lack of Transportation (Medical): No     Lack of Transportation (Non-Medical): No   Physical Activity: Not on file   Stress: Not on file   Social Connections: Not on file   Intimate Partner Violence: Not on file   Housing Stability: Unknown (6/4/2024)    Housing Stability Vital Sign     Unable to Pay for Housing in the  "Last Year: No     Number of Times Moved in the Last Year: Not on file     Homeless in the Last Year: No            Review of Systems  Review of Systems  Per HPI    Objective  /78 (BP Location: Left arm, Patient Position: Sitting, Cuff Size: Large)   Ht 4' 11\" (1.499 m)   Wt 97.5 kg (215 lb)   BMI 43.42 kg/m²      Physical Exam:  Physical Exam  Vitals reviewed.   Constitutional:       General: She is not in acute distress.     Appearance: Normal appearance. She is not ill-appearing or diaphoretic.   HENT:      Head: Normocephalic and atraumatic.   Cardiovascular:      Rate and Rhythm: Normal rate.   Pulmonary:      Effort: Pulmonary effort is normal. No respiratory distress.   Abdominal:      Palpations: Abdomen is soft.      Tenderness: There is no guarding or rebound.      Comments: Mild tenderness to palpation to the left abdomen.  No abdominal masses appreciated.   Genitourinary:     Comments: Right intertriginous fold with no erythema, areas that appeared later than the rest of the surrounding skin.  No excoriations or other lesions noted.  Vulva appears normal without lesions, urethra is midline without prolapse.  On speculum exam the vagina and cervix appear grossly normal, no bleeding, no discharge.  On bimanual exam there is an anteverted and mobile uterus with no tenderness to palpation.  No adnexal masses or fullness appreciated, exam limited by patient BH.  Musculoskeletal:      Right lower leg: Edema present.      Left lower leg: Edema present.   Skin:     General: Skin is warm and dry.   Neurological:      Mental Status: She is alert and oriented to person, place, and time.   Psychiatric:         Mood and Affect: Mood normal.         Behavior: Behavior normal.       Breast inspection negative, no nipple discharge or bleeding, no masses or nodularity palpable, prior scar from open heart surgery                     "

## 2024-09-26 ENCOUNTER — TELEPHONE (OUTPATIENT)
Dept: OBGYN CLINIC | Facility: CLINIC | Age: 64
End: 2024-09-26

## 2024-09-26 ENCOUNTER — TELEPHONE (OUTPATIENT)
Age: 64
End: 2024-09-26

## 2024-09-26 DIAGNOSIS — L30.9 DERMATITIS: Primary | ICD-10-CM

## 2024-09-26 DIAGNOSIS — J45.20 MILD INTERMITTENT ASTHMA WITHOUT COMPLICATION: ICD-10-CM

## 2024-09-26 LAB
CLINICAL INFO: ABNORMAL
CYTO CVX: ABNORMAL
CYTOLOGY CMNT CVX/VAG CYTO-IMP: ABNORMAL
DATE PREVIOUS BX: ABNORMAL
HPV E6+E7 MRNA CVX QL NAA+PROBE: DETECTED
LMP START DATE: ABNORMAL
SL AMB PREV. PAP:: ABNORMAL
SPECIMEN SOURCE CVX/VAG CYTO: ABNORMAL

## 2024-09-26 RX ORDER — FLUTICASONE PROPIONATE 110 UG/1
2 AEROSOL, METERED RESPIRATORY (INHALATION) 2 TIMES DAILY
Qty: 12 G | Refills: 5 | Status: SHIPPED | OUTPATIENT
Start: 2024-09-26 | End: 2024-09-27

## 2024-09-26 RX ORDER — NYSTATIN 100000 U/G
OINTMENT TOPICAL 2 TIMES DAILY
Qty: 30 G | Refills: 0 | Status: SHIPPED | OUTPATIENT
Start: 2024-09-26

## 2024-09-26 NOTE — TELEPHONE ENCOUNTER
Incoming call from [facility] pharmacy. States that patients insurance will not cover Mycolog. Insurance would cover Nystatin and Triamcinolone prescribed separately.     Routing to provider for review.

## 2024-09-26 NOTE — TELEPHONE ENCOUNTER
Message from Dr. Murillo relayed to patient regarding nystatin ointment  
Nystatin ointment sent, would try that alone first  If insufficient can call back for topical steroid  
Patient called back states she spoke with the pharmacy and ins will not cover the nystatin and triamcinolone together as one cream. Patient states they will cover it if sent as 2 separate creams.     [facility]  
Patient called in regards to medication nystatin-triamcinolone (MYCOLOG-II) ointment . Patient stated that she never received script and was wondering if it was ever sent to the pharmacy. Informed patient that it was sent on 09/23/2024 at 1:51 pm and to contact the pharmacy to speak to someone live. Patient verbalized understanding and will contact the pharmacy.   
No

## 2024-09-27 ENCOUNTER — TELEPHONE (OUTPATIENT)
Dept: FAMILY MEDICINE CLINIC | Facility: CLINIC | Age: 64
End: 2024-09-27

## 2024-09-27 DIAGNOSIS — J45.20 MILD INTERMITTENT ASTHMA WITHOUT COMPLICATION: Primary | ICD-10-CM

## 2024-09-27 RX ORDER — FLUTICASONE FUROATE 100 UG/1
1 POWDER RESPIRATORY (INHALATION) DAILY
Qty: 30 BLISTER | Refills: 2 | Status: SHIPPED | OUTPATIENT
Start: 2024-09-27

## 2024-09-27 NOTE — TELEPHONE ENCOUNTER
[facility] called in stating that Patient's insurance did not approved   Flovent HFA) inhaler but will cover Ar[employer]ity Ellipta instead. Please review and send in if appropriate.

## 2024-10-01 ENCOUNTER — TELEPHONE (OUTPATIENT)
Dept: OTHER | Facility: HOSPITAL | Age: 64
End: 2024-10-01

## 2024-10-01 DIAGNOSIS — L29.2 VULVAR PRURITUS: Primary | ICD-10-CM

## 2024-10-01 RX ORDER — TRIAMCINOLONE ACETONIDE 5 MG/G
OINTMENT TOPICAL 2 TIMES DAILY
Qty: 15 G | Refills: 0 | Status: SHIPPED | OUTPATIENT
Start: 2024-10-01

## 2024-10-01 RX ORDER — NYSTATIN 100000 U/G
OINTMENT TOPICAL 2 TIMES DAILY
Qty: 15 G | Refills: 0 | Status: SHIPPED | OUTPATIENT
Start: 2024-10-01

## 2024-10-02 ENCOUNTER — TELEPHONE (OUTPATIENT)
Age: 64
End: 2024-10-02

## 2024-10-02 NOTE — TELEPHONE ENCOUNTER
Patient requests thin prep result to be reviewed. Informed her once reviewed by provider, she will be notified.

## 2024-10-02 NOTE — TELEPHONE ENCOUNTER
Pt stated she received a message from April and wanted to speak with her. Tried to transfer to [facility] none available. Pt asked if April can call her back.

## 2024-10-02 NOTE — TELEPHONE ENCOUNTER
"Called patient back. Provided recommendation per Dr. Ashford :   \" Sent into pharmacy for patient- can mix both together and apply 2x daily. \"    She verbalized understanding and states she picked prescription up from pharmacy.   "

## 2024-10-08 ENCOUNTER — TELEPHONE (OUTPATIENT)
Dept: OBGYN CLINIC | Facility: CLINIC | Age: 64
End: 2024-10-08

## 2024-10-09 NOTE — TELEPHONE ENCOUNTER
PA for nystatin (MYCOSTATIN) ointment SUBMITTED     via    [x]CMM-KEY: [id number]  []Surescripts-Case ID #   []Availity-Auth ID # NDC #   []Faxed to plan   []Other website  []Phone call Case ID #     Office notes sent, clinical questions answered. Awaiting determination    Turnaround time for your insurance to make a decision on your Prior Authorization can take 7-21 business days.

## 2024-10-10 NOTE — TELEPHONE ENCOUNTER
PA for nystatin (MYCOSTATIN) ointment APPROVED     Date(s) approved 3/1/2024 - 12/31/2024    Case #    Patient advised by          [x]MyChart Message  []Phone call   []LMOM  []L/M to call office as no active Communication consent on file  []Unable to leave detailed message as VM not approved on Communication consent       Pharmacy advised by    [x]Fax  []Phone call    Approval letter scanned into Media Yes

## 2024-10-18 ENCOUNTER — APPOINTMENT (OUTPATIENT)
Dept: LAB | Facility: CLINIC | Age: 64
End: 2024-10-18
Payer: COMMERCIAL

## 2024-10-18 ENCOUNTER — OFFICE VISIT (OUTPATIENT)
Dept: FAMILY MEDICINE CLINIC | Facility: CLINIC | Age: 64
End: 2024-10-18
Payer: COMMERCIAL

## 2024-10-18 ENCOUNTER — ANTICOAG VISIT (OUTPATIENT)
Dept: CARDIOLOGY CLINIC | Facility: CLINIC | Age: 64
End: 2024-10-18

## 2024-10-18 VITALS
RESPIRATION RATE: 16 BRPM | OXYGEN SATURATION: 98 % | TEMPERATURE: 97.1 F | DIASTOLIC BLOOD PRESSURE: 80 MMHG | SYSTOLIC BLOOD PRESSURE: 122 MMHG | HEIGHT: 59 IN | WEIGHT: 215.4 LBS | BODY MASS INDEX: 43.42 KG/M2 | HEART RATE: 71 BPM

## 2024-10-18 DIAGNOSIS — M70.62 GREATER TROCHANTERIC BURSITIS, LEFT: ICD-10-CM

## 2024-10-18 DIAGNOSIS — E11.9 TYPE 2 DIABETES MELLITUS WITHOUT COMPLICATION, WITHOUT LONG-TERM CURRENT USE OF INSULIN (HCC): ICD-10-CM

## 2024-10-18 DIAGNOSIS — M70.62 GREATER TROCHANTERIC BURSITIS OF LEFT HIP: ICD-10-CM

## 2024-10-18 DIAGNOSIS — M25.572 ACUTE LEFT ANKLE PAIN: Primary | ICD-10-CM

## 2024-10-18 DIAGNOSIS — M76.32 IT BAND SYNDROME, LEFT: ICD-10-CM

## 2024-10-18 DIAGNOSIS — J45.20 MILD INTERMITTENT ASTHMA WITHOUT COMPLICATION: ICD-10-CM

## 2024-10-18 DIAGNOSIS — Z95.2 HISTORY OF PROSTHETIC HEART VALVE: Primary | ICD-10-CM

## 2024-10-18 DIAGNOSIS — E13.9 DIABETES 1.5, MANAGED AS TYPE 2 (HCC): ICD-10-CM

## 2024-10-18 DIAGNOSIS — Z95.2 HISTORY OF PROSTHETIC HEART VALVE: ICD-10-CM

## 2024-10-18 DIAGNOSIS — Z23 ENCOUNTER FOR IMMUNIZATION: ICD-10-CM

## 2024-10-18 LAB
INR PPP: 3.85 (ref 0.85–1.19)
PROTHROMBIN TIME: 37.2 SECONDS (ref 12.3–15)
SL AMB POCT HEMOGLOBIN AIC: 7.7 (ref ?–6.5)

## 2024-10-18 PROCEDURE — 85610 PROTHROMBIN TIME: CPT

## 2024-10-18 PROCEDURE — 36415 COLL VENOUS BLD VENIPUNCTURE: CPT

## 2024-10-18 PROCEDURE — 90662 IIV NO PRSV INCREASED AG IM: CPT

## 2024-10-18 PROCEDURE — 99214 OFFICE O/P EST MOD 30 MIN: CPT | Performed by: FAMILY MEDICINE

## 2024-10-18 PROCEDURE — 83036 HEMOGLOBIN GLYCOSYLATED A1C: CPT | Performed by: FAMILY MEDICINE

## 2024-10-18 PROCEDURE — G0008 ADMIN INFLUENZA VIRUS VAC: HCPCS

## 2024-10-18 RX ORDER — CICLESONIDE 80 UG/1
1 AEROSOL, METERED RESPIRATORY (INHALATION) 2 TIMES DAILY
Qty: 6.1 G | Refills: 0 | Status: SHIPPED | OUTPATIENT
Start: 2024-10-18

## 2024-10-18 NOTE — PROGRESS NOTES
Ambulatory Visit  Name: Florecita Ramirez      : 1960      MRN: [id number]  Encounter Provider: Sena Martin MD  Encounter Date: 10/18/2024   Encounter department: [facility]  Assessment & Plan  Type 2 diabetes mellitus without complication, without long-term current use of insulin (HCC)    Lab Results   Component Value Date    HGBA1C 7.7 (A) 10/18/2024   A1c unchanged.  Counseled patient on diabetic diet.  She admits she has not been compliant completely.  Does want to incorporate more exercise and plans to rejoin the gym.  Return to clinic in 3 to 6 months.  Flu shot administered.  Follows with dietary and eye specialist.  Increase Januvia from 25-50 and continue Jardiance 25 mg daily.    Orders:    POCT hemoglobin A1c    Mild intermittent asthma without complication  Previously on Flovent which worked well.  Unfortunately no longer available.  Switch prescription to a Diskus inhaler but prefers HFA.  Alvesco 80 mg twice daily prescribed.  Orders:    ciclesonide (Alvesco) 80 MCG/ACT inhaler; Inhale 1 puff 2 (two) times a day    Diabetes 1.5, managed as type 2 (HCC)    Lab Results   Component Value Date    HGBA1C 7.7 (A) 10/18/2024       Orders:    sitaGLIPtin (Januvia) 50 mg tablet; Take 1 tablet (50 mg total) by mouth daily    Encounter for immunization  Administered  Orders:    influenza vaccine, high-dose, PF 0.5 mL (Fluzone High Dose)    Greater trochanteric bursitis, left  Tenderness along the lateral left hip that extends from the trochanteric bursa to the lateral left knee.  Suspect the combination of trochanteric with situs and IT band syndrome.       Acute left ankle pain  Tender point just above the medial malleolus.  No known injuries.  Will get an x-ray to evaluate.  Orders:    XR ankle 3+ vw left; Future    It band syndrome, left         Greater trochanteric bursitis of left hip            History of Present Illness     Patient is here for follow-up.  History of  Open Access Colonoscopy    PROCEDURE: Colonoscopy (49761)    KIDNEY CONCERNS: NONE - Follow provider preferred prep    DIAGNOSIS: Colon Cancer Screening Z12.11    DIABETIC: no    PROCEDURE LOCATION: [facility]    SEDATION: IV Sedation Eligible    ANTIPLATELET / ANTICOAGULATION: MEDICATION:  None    MEDICATION HOLDS:  -N/A    BMI 33.22    SPECIAL INSTRUCTIONS: None    Patient's chart reviewed, please contact to schedule.    depression with anxiety, fatigue, insomnia, iron deficiency anemia, arthritis, overactive bladder, mitral valve replacement, CAD, and asthma.  She reports left leg pain and hip pain for 2 days.  She started using a cane again for better support.  Feels that the leg is getting weaker and will sometimes buckle.  Left leg pain was worse yesterday but better this morning.  Hammond was recently discontinued and was switched to a Diskus inhaler.  Patient prefers a MDI or HFA form inhaler.  Patient also shared that she is waking up in the melanite to urinate.  This often disturbs her sleep.  She does take Jardiance at night.         History obtained from : patient  Review of Systems  Medical History Reviewed by provider this encounter:       Past Medical History   Past Medical History:   Diagnosis Date    Abnormal ultrasound of pelvis     last assessed - 26Mar2015    Asthma     Asthma, persistent 08/01/2014    Cerebral infarction (HCC)     unspecified; last assessed - 30Aug2017    COVID-19 virus infection 01/07/2021    Dizziness     last assessed - [id number]    Dysuria     last assessed - [id number]    Exposure to potentially hazardous body fluids     last assessed - [id number]    Heart disease     Hematoma     of arm; last assessed - 20Jun2016    Mitral valve stenosis     Personal history of scoliosis     Pneumonia     Scar, hypertrophic     last assessed - 17Nov2015    Seizures (HCC)     Shingles 2021    Type 2 diabetes mellitus (HCC)     Vision problems      Past Surgical History:   Procedure Laterality Date    BREAST BIOPSY Left     many years ago in [place]-pt not really sure    MITRAL VALVE REPLACEMENT  06/17/2015    MVR with 27 mm [employer] Optiform mechanical prostethic;  last assessed - [id number]    TONSILLECTOMY      TRANSLUMINAL ANGIOPLASTY      mitral valve; last assessed - 01Aug2014    TUBAL LIGATION  1986    last assessed - [id number]     Family History   Problem Relation Age of Onset    Cancer Mother      Dementia Mother     Diabetes Mother     Hypertension Mother     Hyperlipidemia Mother     Cervical cancer Mother     Heart disease Father     Coronary artery disease Father     Hypertension Father     Heart attack Father     Diabetes Sister     Breast cancer Sister 62    No Known Problems Daughter     Diabetes Maternal Grandmother     Breast cancer Maternal Grandmother     Diabetes Brother     Hypertension Brother     Hyperlipidemia Brother     Seizures Son     Psoriasis Son     Heart disease Paternal Aunt     Heart disease Paternal Aunt     Diabetes Other      Current Outpatient Medications on File Prior to Visit   Medication Sig Dispense Refill    albuterol (PROVENTIL HFA,VENTOLIN HFA) 90 mcg/act inhaler Inhale 2 puffs every 4 (four) hours as needed for wheezing 18 g 0    Blood Glucose Monitoring Suppl (ACCU-CHEK EDILMA PLUS) w/Device KIT by Does not apply route 2 (two) times a day 1 kit 0    cetirizine (ZyrTEC) 10 mg tablet Take 1 tablet (10 mg total) by mouth daily 90 tablet 1    clotrimazole (LOTRIMIN) 1 % cream Apply topically 2 (two) times a day 60 g 1    Cyanocobalamin (VITAMIN B 12 PO) Take by mouth      cyclobenzaprine (FLEXERIL) 5 mg tablet Take 1 tablet (5 mg total) by mouth daily at bedtime 15 tablet 0    dorzolamide-timolol (COSOPT) 22.3-6.8 MG/ML ophthalmic solution       Empagliflozin 25 MG TABS Take 1 tablet (25 mg total) by mouth daily 90 tablet 1    ferrous sulfate 324 (65 Fe) mg Take 1 tablet (324 mg total) by mouth daily before breakfast 90 tablet 1    fluticasone (FLONASE) 50 mcg/act nasal spray 1 spray into each nostril daily (Patient taking differently: 1 spray into each nostril if needed) 9.9 mL 0    hydrOXYzine HCL (ATARAX) 25 mg tablet Take 1 tablet (25 mg total) by mouth 2 (two) times a day 60 tablet 5    Keppra 500 MG tablet Take 1 tablet (500 mg total) by mouth 3 (three) times a day 270 tablet 3    Lancets (OneTouch Delica Plus [id number]) MISC USE ONCE DAILY AS DIRECTED 100 each 1     MAGNESIUM PO Take by mouth      metoprolol tartrate (LOPRESSOR) 25 mg tablet Take 0.5 tablets (12.5 mg total) by mouth every 12 (twelve) hours 90 tablet 1    nystatin (MYCOSTATIN) ointment Apply topically 2 (two) times a day 15 g 0    Omega 3-6-9 Fatty Acids (OMEGA 3-6-9 PO) Take by mouth      omeprazole (PriLOSEC) 40 MG capsule TAKE 1 CAPSULE BY MOUTH DAILY 90 capsule 1    OneTouch Verio test strip USE DAILY AS INSTRUCTED 100 strip 9    rosuvastatin (CRESTOR) 5 mg tablet TAKE ONE TABLET BY MOUTH THREE TIMES A WEEK 30 tablet 8    torsemide (DEMADEX) 20 mg tablet TAKE ONE TABLET BY MOUTH AS NEEDED AS DIRECTED 90 tablet 10    triamcinolone (KENALOG) 0.5 % ointment Apply topically 2 (two) times a day 15 g 0    warfarin (COUMADIN) 5 mg tablet TAKE 1/2 TO 1 TABLET DAILY BY MOUTH OR AS ORDERED BY PHYSICIAN. 90 tablet 2    [DISCONTINUED] sitaGLIPtin (Januvia) 25 mg tablet Take 1 tablet (25 mg total) by mouth daily 100 tablet 5    nystatin (MYCOSTATIN) ointment Apply topically 2 (two) times a day (Patient not taking: Reported on 10/18/2024) 30 g 0    [DISCONTINUED] fluticasone (Arnuity Ellipta) 100 MCG/ACT AEPB inhaler Inhale 1 puff daily Rinse mouth after use. (Patient not taking: Reported on 10/18/2024) 30 blister 2     No current facility-administered medications on file prior to visit.     Allergies   Allergen Reactions    Codeine     Methylprednisolone GI Intolerance     Diarrhea, nausea, hot flashes    Prednisone Itching    Statins Headache    Codeine Polt-Chlorphen Polt Er Irritability, Itching and Rash      Current Outpatient Medications on File Prior to Visit   Medication Sig Dispense Refill    albuterol (PROVENTIL HFA,VENTOLIN HFA) 90 mcg/act inhaler Inhale 2 puffs every 4 (four) hours as needed for wheezing 18 g 0    Blood Glucose Monitoring Suppl (ACCU-CHEK EDILMA PLUS) w/Device KIT by Does not apply route 2 (two) times a day 1 kit 0    cetirizine (ZyrTEC) 10 mg tablet Take 1 tablet (10 mg total) by mouth daily  90 tablet 1    clotrimazole (LOTRIMIN) 1 % cream Apply topically 2 (two) times a day 60 g 1    Cyanocobalamin (VITAMIN B 12 PO) Take by mouth      cyclobenzaprine (FLEXERIL) 5 mg tablet Take 1 tablet (5 mg total) by mouth daily at bedtime 15 tablet 0    dorzolamide-timolol (COSOPT) 22.3-6.8 MG/ML ophthalmic solution       Empagliflozin 25 MG TABS Take 1 tablet (25 mg total) by mouth daily 90 tablet 1    ferrous sulfate 324 (65 Fe) mg Take 1 tablet (324 mg total) by mouth daily before breakfast 90 tablet 1    fluticasone (FLONASE) 50 mcg/act nasal spray 1 spray into each nostril daily (Patient taking differently: 1 spray into each nostril if needed) 9.9 mL 0    hydrOXYzine HCL (ATARAX) 25 mg tablet Take 1 tablet (25 mg total) by mouth 2 (two) times a day 60 tablet 5    Keppra 500 MG tablet Take 1 tablet (500 mg total) by mouth 3 (three) times a day 270 tablet 3    Lancets (OneTouch Delica Plus [id number]) MISC USE ONCE DAILY AS DIRECTED 100 each 1    MAGNESIUM PO Take by mouth      metoprolol tartrate (LOPRESSOR) 25 mg tablet Take 0.5 tablets (12.5 mg total) by mouth every 12 (twelve) hours 90 tablet 1    nystatin (MYCOSTATIN) ointment Apply topically 2 (two) times a day 15 g 0    Omega 3-6-9 Fatty Acids (OMEGA 3-6-9 PO) Take by mouth      omeprazole (PriLOSEC) 40 MG capsule TAKE 1 CAPSULE BY MOUTH DAILY 90 capsule 1    OneTouch Verio test strip USE DAILY AS INSTRUCTED 100 strip 9    rosuvastatin (CRESTOR) 5 mg tablet TAKE ONE TABLET BY MOUTH THREE TIMES A WEEK 30 tablet 8    torsemide (DEMADEX) 20 mg tablet TAKE ONE TABLET BY MOUTH AS NEEDED AS DIRECTED 90 tablet 10    triamcinolone (KENALOG) 0.5 % ointment Apply topically 2 (two) times a day 15 g 0    warfarin (COUMADIN) 5 mg tablet TAKE 1/2 TO 1 TABLET DAILY BY MOUTH OR AS ORDERED BY PHYSICIAN. 90 tablet 2    [DISCONTINUED] sitaGLIPtin (Januvia) 25 mg tablet Take 1 tablet (25 mg total) by mouth daily 100 tablet 5    nystatin (MYCOSTATIN) ointment Apply topically 2  "(two) times a day (Patient not taking: Reported on 10/18/2024) 30 g 0    [DISCONTINUED] fluticasone (Arnuity Ellipta) 100 MCG/ACT AEPB inhaler Inhale 1 puff daily Rinse mouth after use. (Patient not taking: Reported on 10/18/2024) 30 blister 2     No current facility-administered medications on file prior to visit.      Social History     Tobacco Use    Smoking status: Never     Passive exposure: Never    Smokeless tobacco: Never   Vaping Use    Vaping status: Never Used   Substance and Sexual Activity    Alcohol use: No    Drug use: No    Sexual activity: Not Currently     Partners: Male         Objective     /80 (BP Location: Left arm, Patient Position: Sitting, Cuff Size: Large)   Pulse 71   Temp (!) 97.1 °F (36.2 °C) (Tympanic)   Resp 16   Ht 4' 11\" (1.499 m)   Wt 97.7 kg (215 lb 6.4 oz)   SpO2 98%   BMI 43.51 kg/m²     Physical Exam  Vitals reviewed.   Constitutional:       General: She is not in acute distress.     Appearance: Normal appearance. She is not ill-appearing or toxic-appearing.   Eyes:      Extraocular Movements: Extraocular movements intact.   Cardiovascular:      Rate and Rhythm: Normal rate and regular rhythm.      Heart sounds: No murmur heard.  Pulmonary:      Effort: Pulmonary effort is normal.      Breath sounds: Rales (bases) present.   Musculoskeletal:        Legs:       Comments: Troanchanteric brusa and IT band syndrome   Skin:     General: Skin is warm.   Neurological:      Mental Status: She is alert.   Psychiatric:         Mood and Affect: Mood normal.         Behavior: Behavior normal.         Thought Content: Thought content normal.       "

## 2024-10-18 NOTE — ASSESSMENT & PLAN NOTE
Lab Results   Component Value Date    HGBA1C 7.7 (A) 10/18/2024   A1c unchanged.  Counseled patient on diabetic diet.  She admits she has not been compliant completely.  Does want to incorporate more exercise and plans to rejoin the gym.  Return to clinic in 3 to 6 months.  Flu shot administered.  Follows with dietary and eye specialist.  Increase Januvia from 25-50 and continue Jardiance 25 mg daily.    Orders:    POCT hemoglobin A1c

## 2024-10-19 NOTE — ASSESSMENT & PLAN NOTE
Previously on Flovent which worked well.  Unfortunately no longer available.  Switch prescription to a Diskus inhaler but prefers HFA.  Alvesco 80 mg twice daily prescribed.  Orders:    ciclesonide (Alvesco) 80 MCG/ACT inhaler; Inhale 1 puff 2 (two) times a day

## 2024-10-25 ENCOUNTER — OFFICE VISIT (OUTPATIENT)
Dept: NEUROLOGY | Facility: CLINIC | Age: 64
End: 2024-10-25
Payer: COMMERCIAL

## 2024-10-25 VITALS
HEIGHT: 59 IN | WEIGHT: 213.6 LBS | DIASTOLIC BLOOD PRESSURE: 72 MMHG | TEMPERATURE: 97.9 F | BODY MASS INDEX: 43.06 KG/M2 | HEART RATE: 60 BPM | SYSTOLIC BLOOD PRESSURE: 136 MMHG | OXYGEN SATURATION: 98 %

## 2024-10-25 DIAGNOSIS — G40.019 LOCALIZATION-RELATED EPILEPSY, INTRACTABLE (HCC): ICD-10-CM

## 2024-10-25 PROCEDURE — 99213 OFFICE O/P EST LOW 20 MIN: CPT | Performed by: NURSE PRACTITIONER

## 2024-10-25 RX ORDER — LEVETIRACETAM 500 MG/1
500 TABLET, FILM COATED ORAL 3 TIMES DAILY
Qty: 270 TABLET | Refills: 3 | Status: SHIPPED | OUTPATIENT
Start: 2024-10-25

## 2024-10-25 RX ORDER — FERROUS SULFATE 324(65)MG
324 TABLET, DELAYED RELEASE (ENTERIC COATED) ORAL
COMMUNITY
Start: 2024-09-27

## 2024-10-25 NOTE — PROGRESS NOTES
Review of Systems   Constitutional:  Negative for appetite change, fatigue and fever.   HENT: Negative.  Negative for hearing loss, tinnitus, trouble swallowing and voice change.    Eyes: Negative.  Negative for photophobia, pain and visual disturbance.   Respiratory: Negative.  Negative for shortness of breath.    Cardiovascular: Negative.  Negative for palpitations.   Gastrointestinal: Negative.  Negative for nausea and vomiting.   Endocrine: Negative.  Negative for cold intolerance.   Genitourinary: Negative.  Negative for dysuria, frequency and urgency.   Musculoskeletal:  Negative for back pain, gait problem, myalgias, neck pain and neck stiffness.   Skin: Negative.  Negative for rash.   Allergic/Immunologic: Negative.    Neurological: Negative.  Negative for dizziness, tremors, seizures, syncope, facial asymmetry, speech difficulty, weakness, light-headedness, numbness and headaches.   Hematological: Negative.  Does not bruise/bleed easily.   Psychiatric/Behavioral: Negative.  Negative for confusion, hallucinations and sleep disturbance.    All other systems reviewed and are negative.    Neurology Ambulatory Visit  Name: Florecita Ramirez       : 1960       MRN: [id number]   Encounter Provider: JOSE Pringle   Encounter Date: 10/25/2024  Encounter department: [facility]      Assessment & Plan  Localization-related epilepsy, intractable (HCC)             History of Present Illness   Florecita Ramirez is a 64 y.o. female, with localization related epilepsy (She had Focal Impaired Awareness Seizures that originated from the left hemisphere, seen on EMU study in ) who is presenting to [facility] for follow-up of her seizures.      Patient reports no seizures since her last office visit 10/17/23 with Dr. Chapman. She is experiencing blurry vision and being  when she crochets.  She knows what is happening at the time, but is unable to communicate.        She recently received the flu shot and now feels tired, like she is getting a cold.       No seizures- eyes chrochets- blurry vision relaxes and it  it goes away. Knows what is happening but can't communicate. Doesn't happen when not crocheting.     Current Antiepileptic Medications:  1. Keppra 500mg tid. (Brand Necessary)  Other medications as per Epic      Event/Seizure Semiology:  Big seizure - passes out and shakes  2.   Smaller seizure - loss of awareness, staring, unable to talk, fumbling with hands  3.   Aura - feeling lightheaded, feeling like she is going to fall       Prior AEDs:  Dilantin, carbamazepine, gabapentin, lamotrigine, oxcarbazepine, topiramate, valproate, and Keppra    Prior Evaluation:  Imaging:  - MRI brain: MRI brain performed at [facility]  2000  Volume and signal intensity at the hippocampus on both sides is within normal limits normal intracranial study and usual left scalp soft tissue lesion suggesting calcification    EEGs:  - Routine EE20- normal                           18- normal    - Ambulatory EE/29-20- normal    - Video EEG: Video EEG study performed 2010 until 2010 ([facility])  Background shows bilateral 9-9.5 Hz posterior dominant rhythm  Event #1 2010   Left partial with secondary generalization, limited video showed head deviation to the right head retro-extension in stiffening followed by convulsive movements of all 4 extremities  Admixed slow theta and fast frequencies is seen in the left hemisphere followed by secondary generalization     Event #2 2010  Left partial with secondary generalization  Patient wakes up from sleep or automatism right hand automatism stared for eye deviation to the right and head deviation to the right and right facial pulling covers her face with the right hand briefly and secondary generalization  ictal rhythm is seen in both parasagittal regions more well formed on the left than left  hemispheric channels maximum the left temporal area for 5-6 seconds this quickly spreads diffusely to the right hemisphere with secondary generalization poly-spikes and slow waves are seen in both hemispheres or tonic-clonic seizure     Event #3 11/4/2010  Left partial with secondary generalization  The patient woke up oral and bilateral automatisms staring head deviation to the right and right upper extremity stiffening  Once again obscured by muscle artifact 5-6 Hz slowing on the left anterior temporal leads and both parasagittal regions followed by slowing admixed with spikes and spike polyspike and slow wave discharges were seen  Frontotemporal regions with phase reversal at F7 T3 secondary generalization     The above findings are consistent with a localization-related partial epilepsy originating from the left hemisphere  Video are suggestive of left temporal onset.      - Labs: 3/29/24 LEV 12.0 wasn't taking as prescribed due to insurance      Social History:  Driving:no  Working:no   Lives at [facility]    I reviewed Allergies, Medical History, Surgical History,  Family History and problem list as documented in Epic/Care Everywhere.     Review of Systems:  Constitutional:  Negative for appetite change, fatigue and fever.   HENT: Negative for hearing loss, tinnitus, trouble swallowing and voice change.    Eyes: Negative for photophobia, pain and visual disturbance.   Respiratory: Negative for shortness of breath.    Cardiovascular: Negative for palpitations.   Gastrointestinal: Negative for nausea and vomiting.   Endocrine: Negative for cold intolerance.   Genitourinary: Negative for dysuria, frequency and urgency.   Musculoskeletal:  Negative for back pain, gait problem, myalgias, neck pain and neck stiffness.   Skin: Negative for rash.   Allergic/Immunologic: Negative for hives.  Neurological: Negative for dizziness, tremors, syncope, speech difficulty, weakness, light-headedness, numbness and headaches.  "  Hematological: Negative for easy bruising and bleeding  Psychiatric/Behavioral: Negative for confusion, hallucinations and sleep disturbance.         Objective   /72 (BP Location: Right arm, Patient Position: Sitting, Cuff Size: Large)   Pulse 60   Temp 97.9 °F (36.6 °C) (Temporal)   Ht 4' 11\" (1.499 m)   Wt 96.9 kg (213 lb 9.6 oz)   SpO2 98%   BMI 43.14 kg/m²      General Exam  In general, patient is well appearing and in no distress.    Neurologic Exam  Mental Status: Alert and oriented x 3  Language: normal fluency and comprehension  Cranial Nerves:  PERRL, EOMI, no nystagmus, Face symmetric, Tongue/palate midline, No dysarthria   Motor: No pronator drift. Normal bulk and tone. Strength 5/5 throughout  DTRs: Normal and symmetric  Coordination: Finger to nose intact  Gait: normal casual gait, able to tandem walk without difficulty  Romberg negative      Administrative Statements       Voice recognition software was used in the generation of this note. There may be unintentional errors including grammatical errors, spelling errors, or pronoun errors.   "

## 2024-10-25 NOTE — PATIENT INSTRUCTIONS
Continue Keppra at current dose.    Have levetiracetam level done.     Call office if you experience any seizures.

## 2024-11-08 DIAGNOSIS — J30.9 ALLERGIC RHINITIS, UNSPECIFIED SEASONALITY, UNSPECIFIED TRIGGER: ICD-10-CM

## 2024-11-08 RX ORDER — CETIRIZINE HCL 10 MG/1
10 TABLET ORAL DAILY
Qty: 90 TABLET | Refills: 1 | Status: SHIPPED | OUTPATIENT
Start: 2024-11-08

## 2024-11-11 ENCOUNTER — TELEPHONE (OUTPATIENT)
Age: 64
End: 2024-11-11

## 2024-11-11 ENCOUNTER — OFFICE VISIT (OUTPATIENT)
Dept: PODIATRY | Facility: CLINIC | Age: 64
End: 2024-11-11
Payer: COMMERCIAL

## 2024-11-11 VITALS — HEIGHT: 59 IN | RESPIRATION RATE: 18 BRPM | WEIGHT: 213 LBS | BODY MASS INDEX: 42.94 KG/M2

## 2024-11-11 DIAGNOSIS — E11.9 CONTROLLED TYPE 2 DIABETES MELLITUS WITHOUT COMPLICATION, WITHOUT LONG-TERM CURRENT USE OF INSULIN (HCC): Primary | ICD-10-CM

## 2024-11-11 DIAGNOSIS — B35.1 ONYCHOMYCOSIS: ICD-10-CM

## 2024-11-11 PROCEDURE — 11720 DEBRIDE NAIL 1-5: CPT | Performed by: PODIATRIST

## 2024-11-11 PROCEDURE — RECHECK: Performed by: PODIATRIST

## 2024-11-11 NOTE — TELEPHONE ENCOUNTER
Contacted patient for Talk Therapy  to verify needs of services in attempts to offer patient an appointment at Available Office. Writer verified N/A - NO ANSWER. Writer SUSHMAM and left callback number [phone]; option 3.    1st call attempt

## 2024-11-18 NOTE — TELEPHONE ENCOUNTER
Contacted patient for Talk Therapy  to verify needs of services in attempts to offer patient an appointment at Available Office. Writer verified Full Name and . Writer spoke with patient who stated that she wants to continue services with her old therapist.  Writer confirmed that previous therapist is not affiliated with [facility].    2nd call attempt, patient removed from wait list

## 2024-12-07 DIAGNOSIS — K21.9 GASTROESOPHAGEAL REFLUX DISEASE: ICD-10-CM

## 2024-12-09 RX ORDER — OMEPRAZOLE 40 MG/1
40 CAPSULE, DELAYED RELEASE ORAL DAILY
Qty: 90 CAPSULE | Refills: 1 | Status: SHIPPED | OUTPATIENT
Start: 2024-12-09

## 2024-12-10 ENCOUNTER — PROCEDURE VISIT (OUTPATIENT)
Dept: OBGYN CLINIC | Facility: CLINIC | Age: 64
End: 2024-12-10
Payer: COMMERCIAL

## 2024-12-10 VITALS
HEIGHT: 59 IN | BODY MASS INDEX: 42.54 KG/M2 | DIASTOLIC BLOOD PRESSURE: 80 MMHG | WEIGHT: 211 LBS | SYSTOLIC BLOOD PRESSURE: 128 MMHG

## 2024-12-10 DIAGNOSIS — B97.7 HIGH RISK HPV INFECTION: Primary | ICD-10-CM

## 2024-12-10 PROCEDURE — 57456 ENDOCERV CURETTAGE W/SCOPE: CPT | Performed by: STUDENT IN AN ORGANIZED HEALTH CARE EDUCATION/TRAINING PROGRAM

## 2024-12-10 PROCEDURE — 88305 TISSUE EXAM BY PATHOLOGIST: CPT | Performed by: STUDENT IN AN ORGANIZED HEALTH CARE EDUCATION/TRAINING PROGRAM

## 2024-12-10 PROCEDURE — 88342 IMHCHEM/IMCYTCHM 1ST ANTB: CPT | Performed by: STUDENT IN AN ORGANIZED HEALTH CARE EDUCATION/TRAINING PROGRAM

## 2024-12-10 NOTE — PROGRESS NOTES
"COLPOSCOPY PROCEDURE NOTE  Florecita Ramirez  [id number]  12/10/2024  9:02 AM    Subjective   Florecita Ramirez is a 64 y.o.  with abnormal pap result presenting for colposcopy.   She is currently without complaint. She denies fever/chills, abdominal pain, cramping, vaginal discharge or irritation.    Pap History:  2023: Pap- NILM, HR HPV positive  2022: Pap- ASCUS, HR HPV positive  3/2017: Colpo- Bx and ECC benign  2016: Pap- ASCUS, HR HPV positive  2016: Pap- NILM, HPV neg  2015: Pap- NILM, HR HPV positive    The following portions of the patient's history were reviewed and updated as appropriate: allergies, current medications, past family history, past medical history, past social history, past surgical history, and problem list.      Objective  /80 (BP Location: Left arm, Patient Position: Sitting, Cuff Size: Standard)   Ht 4' 11\" (1.499 m)   Wt 95.7 kg (211 lb)   BMI 42.62 kg/m²      Physical Exam:  Physical Exam  Vitals reviewed.   Constitutional:       General: She is not in acute distress.     Appearance: She is well-developed. She is obese. She is not ill-appearing or diaphoretic.   HENT:      Head: Normocephalic and atraumatic.   Cardiovascular:      Rate and Rhythm: Normal rate.   Pulmonary:      Effort: Pulmonary effort is normal. No respiratory distress.   Abdominal:      Palpations: Abdomen is soft.      Tenderness: There is no abdominal tenderness. There is no guarding or rebound.   Genitourinary:     Vagina: Normal.      Comments: Vulva atrophic without lesions  Speculum exam showing vagina and cervix appearing atrophic, no lesions  Following acetic acid application partial visualization of SCJ is noted- no aceto white changes noted.   Musculoskeletal:      Cervical back: Normal range of motion and neck supple.      Right lower leg: No edema.      Left lower leg: No edema.   Skin:     General: Skin is warm and dry.   Neurological:      Mental Status: She is alert and " oriented to person, place, and time.   Psychiatric:         Behavior: Behavior normal.         Colposcopy    Date/Time: 12/10/2024 9:30 AM    Performed by: Misty Ashford MD  Authorized by: Misty Ashford MD    Other Assisting Provider: Yes (comment)    Verbal consent obtained?: Yes    Risks and benefits: Risks, benefits and alternatives were discussed    Consent given by:  Patient  Patient states understanding of procedure being performed: Yes    Patient's understanding of procedure matches consent: Yes    Relevant documents present and verified: Yes    Test results available and properly labeled: Yes    Radiology Images displayed and confirmed. If images not available, report reviewed: Yes    Pre-procedure:     Premeds:  Acetaminophen    Prepped with: acetic acid    Indication:     Indications: High risk HPV.  Procedure:     Procedure: Colposcopy w/ endocervical curettage      Under satisfactory analgesia the patient was prepped and draped in the dorsal lithotomy position: yes      [place] speculum was placed in the vagina: yes      Entire transition zone visualized: partially.      Endocervix was curetted using a Kevorkian curette: yes      Allis/Tenaculum removed and cervix homostatic: yes      Specimen(s) to pathology: yes    Post-procedure:     Findings: Bleeding      Impression: Low grade cervical dysplasia      Patient tolerance of procedure:  Tolerated well, no immediate complications      Assessment/ Plan:  64 y.o.  presenting with high risk positive HPV pap with longstanding HPV infection.   Colposcopy performed, impression: low grade changes   Will call patient with results and next steps of management.    Misty Ashford MD  12/10/24

## 2024-12-11 ENCOUNTER — APPOINTMENT (OUTPATIENT)
Dept: LAB | Facility: CLINIC | Age: 64
End: 2024-12-11
Payer: COMMERCIAL

## 2024-12-11 ENCOUNTER — ANTICOAG VISIT (OUTPATIENT)
Dept: CARDIOLOGY CLINIC | Facility: CLINIC | Age: 64
End: 2024-12-11

## 2024-12-11 DIAGNOSIS — G40.019 LOCALIZATION-RELATED EPILEPSY, INTRACTABLE (HCC): ICD-10-CM

## 2024-12-11 DIAGNOSIS — Z95.2 HISTORY OF PROSTHETIC HEART VALVE: ICD-10-CM

## 2024-12-11 DIAGNOSIS — Z95.2 HISTORY OF PROSTHETIC HEART VALVE: Primary | ICD-10-CM

## 2024-12-11 LAB
INR PPP: 3.01 (ref 0.85–1.19)
LEVETIRACETAM SERPL-MCNC: 18 UG/ML (ref 12–46)
PROTHROMBIN TIME: 31 SECONDS (ref 12.3–15)

## 2024-12-11 PROCEDURE — 36415 COLL VENOUS BLD VENIPUNCTURE: CPT

## 2024-12-11 PROCEDURE — 83520 IMMUNOASSAY QUANT NOS NONAB: CPT

## 2024-12-11 PROCEDURE — 85610 PROTHROMBIN TIME: CPT

## 2024-12-12 ENCOUNTER — RESULTS FOLLOW-UP (OUTPATIENT)
Dept: NEUROLOGY | Facility: CLINIC | Age: 64
End: 2024-12-12

## 2024-12-13 ENCOUNTER — RESULTS FOLLOW-UP (OUTPATIENT)
Dept: OBGYN CLINIC | Facility: CLINIC | Age: 64
End: 2024-12-13

## 2024-12-13 PROCEDURE — 88305 TISSUE EXAM BY PATHOLOGIST: CPT | Performed by: STUDENT IN AN ORGANIZED HEALTH CARE EDUCATION/TRAINING PROGRAM

## 2024-12-13 PROCEDURE — 88342 IMHCHEM/IMCYTCHM 1ST ANTB: CPT | Performed by: STUDENT IN AN ORGANIZED HEALTH CARE EDUCATION/TRAINING PROGRAM

## 2024-12-17 LAB
LEFT EYE DIABETIC RETINOPATHY: NORMAL
RIGHT EYE DIABETIC RETINOPATHY: NORMAL

## 2025-01-06 DIAGNOSIS — L29.2 VULVAR PRURITUS: ICD-10-CM

## 2025-01-07 DIAGNOSIS — I05.0 MITRAL VALVE STENOSIS, UNSPECIFIED ETIOLOGY: ICD-10-CM

## 2025-01-07 DIAGNOSIS — Z95.2 H/O MITRAL VALVE REPLACEMENT: ICD-10-CM

## 2025-01-07 DIAGNOSIS — L50.0 ALLERGIC URTICARIA: ICD-10-CM

## 2025-01-07 RX ORDER — NYSTATIN 100000 U/G
OINTMENT TOPICAL
Qty: 15 G | Refills: 0 | Status: SHIPPED | OUTPATIENT
Start: 2025-01-07

## 2025-01-08 ENCOUNTER — TELEPHONE (OUTPATIENT)
Dept: FAMILY MEDICINE CLINIC | Facility: CLINIC | Age: 65
End: 2025-01-08

## 2025-01-08 RX ORDER — METOPROLOL TARTRATE 25 MG/1
12.5 TABLET, FILM COATED ORAL EVERY 12 HOURS
Qty: 90 TABLET | Refills: 1 | Status: SHIPPED | OUTPATIENT
Start: 2025-01-08

## 2025-01-08 RX ORDER — HYDROXYZINE HYDROCHLORIDE 25 MG/1
25 TABLET, FILM COATED ORAL 2 TIMES DAILY
Qty: 60 TABLET | Refills: 2 | Status: SHIPPED | OUTPATIENT
Start: 2025-01-08

## 2025-01-08 RX ORDER — WARFARIN SODIUM 5 MG/1
TABLET ORAL
Qty: 90 TABLET | Refills: 2 | Status: SHIPPED | OUTPATIENT
Start: 2025-01-08

## 2025-01-08 NOTE — TELEPHONE ENCOUNTER
Medication Prior Authorization requests are to be forwarded as a Telephone Encounter, please put this message as a telephone encounter and route appropriately Thank You.

## 2025-01-08 NOTE — TELEPHONE ENCOUNTER
PA for Hydroxyzine 25 mg  DENIED    Reason:(Screenshot if applicable)        Message sent to office clinical pool Yes    Denial letter scanned into Media Yes    Appeal started Yes (Provider will need to decide if appeal is warranted and send clinical documentation to Prior Authorization Team for initiation.)    **Please follow up with your patient regarding denial and next steps**

## 2025-01-08 NOTE — TELEPHONE ENCOUNTER
PA for hydrOXYzine HCl [facility]MG [facility]     via    [x]CMM-KEY: [id number]  []Surescripts-Case ID #   []Availity-Auth ID # NDC #   []Faxed to plan   []Other website   []Phone call Case ID #     [x]PA sent as URGENT    All office notes, labs and other pertaining documents and studies sent. Clinical questions answered. Awaiting determination from insurance company.     Turnaround time for your insurance to make a decision on your Prior Authorization can take 7-21 business days.

## 2025-01-09 NOTE — TELEPHONE ENCOUNTER
MYC message sent to pt to follow up with pharmacy to make sure medication refill was sent to the pharmacy .

## 2025-01-15 ENCOUNTER — TELEPHONE (OUTPATIENT)
Dept: FAMILY MEDICINE CLINIC | Facility: CLINIC | Age: 65
End: 2025-01-15

## 2025-01-15 DIAGNOSIS — L50.0 ALLERGIC URTICARIA: ICD-10-CM

## 2025-01-15 NOTE — TELEPHONE ENCOUNTER
Patient called Rx refill line checking in on this auth- advised will call when staff has any updates

## 2025-01-15 NOTE — TELEPHONE ENCOUNTER
Please re submit per the PCP    The letter states the the medication was denied because the PA team answered no for the question regarding whether pt is taking medication for pruritus. The diagnosis is for urtucaria ( rash and itching). Please have the team resubmit. Also she has been on this medication since March 2024 and helps

## 2025-01-16 RX ORDER — HYDROXYZINE HYDROCHLORIDE 25 MG/1
25 TABLET, FILM COATED ORAL 2 TIMES DAILY
Qty: 60 TABLET | Refills: 0 | OUTPATIENT
Start: 2025-01-16

## 2025-01-17 NOTE — TELEPHONE ENCOUNTER
PA Hydroxyzine 25 mg  APPEALED     via     [x]CMM  []SS  []Letter sent to insurance via fax    []Other site or means      All necessary records sent. Will await response from insurance company    Turnaround time for a decision to be made on an appeal could take up to 30 business days

## 2025-01-21 DIAGNOSIS — G40.019 LOCALIZATION-RELATED EPILEPSY, INTRACTABLE (HCC): ICD-10-CM

## 2025-01-21 DIAGNOSIS — Z95.2 H/O MITRAL VALVE REPLACEMENT: ICD-10-CM

## 2025-01-21 DIAGNOSIS — E11.9 TYPE 2 DIABETES MELLITUS WITHOUT COMPLICATION, WITHOUT LONG-TERM CURRENT USE OF INSULIN (HCC): ICD-10-CM

## 2025-01-21 DIAGNOSIS — E11.8 TYPE 2 DIABETES MELLITUS WITH COMPLICATION, WITH LONG-TERM CURRENT USE OF INSULIN (HCC): ICD-10-CM

## 2025-01-21 DIAGNOSIS — Z79.4 TYPE 2 DIABETES MELLITUS WITH COMPLICATION, WITH LONG-TERM CURRENT USE OF INSULIN (HCC): ICD-10-CM

## 2025-01-21 RX ORDER — BLOOD-GLUCOSE METER
EACH MISCELLANEOUS 2 TIMES DAILY
Qty: 1 KIT | Refills: 0 | Status: SHIPPED | OUTPATIENT
Start: 2025-01-21

## 2025-01-21 RX ORDER — LEVETIRACETAM 500 MG/1
500 TABLET, FILM COATED ORAL 3 TIMES DAILY
Qty: 270 TABLET | Refills: 0 | OUTPATIENT
Start: 2025-01-21

## 2025-01-21 RX ORDER — LANCETS 33 GAUGE
EACH MISCELLANEOUS
Qty: 100 EACH | Refills: 1 | Status: SHIPPED | OUTPATIENT
Start: 2025-01-21

## 2025-01-21 RX ORDER — WARFARIN SODIUM 5 MG/1
TABLET ORAL
Qty: 90 TABLET | Refills: 2 | Status: SHIPPED | OUTPATIENT
Start: 2025-01-21

## 2025-01-21 RX ORDER — BLOOD SUGAR DIAGNOSTIC
1 STRIP MISCELLANEOUS DAILY
Qty: 100 STRIP | Refills: 2 | Status: SHIPPED | OUTPATIENT
Start: 2025-01-21

## 2025-01-21 NOTE — TELEPHONE ENCOUNTER
Keppra 500 MG, was sent to [facility] on 10/25/24 with a qty of 270 with 3 refills

## 2025-01-27 ENCOUNTER — TELEPHONE (OUTPATIENT)
Dept: OTHER | Facility: OTHER | Age: 65
End: 2025-01-27

## 2025-01-27 DIAGNOSIS — D50.9 IRON DEFICIENCY ANEMIA, UNSPECIFIED IRON DEFICIENCY ANEMIA TYPE: Primary | ICD-10-CM

## 2025-01-27 NOTE — TELEPHONE ENCOUNTER
Patient called today regarding she will be out of her Medication Keppra tomorrow morning, patient called her Pharmacy and they told her to call her Doctor's Office because something is wrong with getting it filled. Please call Patient back to discuss.

## 2025-01-28 RX ORDER — FERROUS SULFATE 324(65)MG
324 TABLET, DELAYED RELEASE (ENTERIC COATED) ORAL
Qty: 90 TABLET | Refills: 1 | Status: SHIPPED | OUTPATIENT
Start: 2025-01-28

## 2025-01-28 NOTE — TELEPHONE ENCOUNTER
Urgent brand Keppra PA submitted in CMM and received an immediate approval through 12/31/25. Please see approval letter under Media tab.    Key: [id number]    Called pharmacy to make aware and spoke with Saba, who stated that the claim went through and they would order it in for pt for tomorrow.    Called pt to make aware and left a detailed VM with a call back # if any further assistance is required.

## 2025-01-28 NOTE — TELEPHONE ENCOUNTER
Refill must be reviewed and completed by the office or provider. The refill is unable to be approved or denied by the medication management team.    Patient requesting refill, last refilled by another provider (Neurology). Please review to see if the refill is appropriate.

## 2025-02-07 ENCOUNTER — OFFICE VISIT (OUTPATIENT)
Dept: FAMILY MEDICINE CLINIC | Facility: CLINIC | Age: 65
End: 2025-02-07
Payer: COMMERCIAL

## 2025-02-07 VITALS
HEART RATE: 62 BPM | OXYGEN SATURATION: 96 % | BODY MASS INDEX: 42.74 KG/M2 | TEMPERATURE: 98 F | SYSTOLIC BLOOD PRESSURE: 124 MMHG | HEIGHT: 59 IN | RESPIRATION RATE: 18 BRPM | WEIGHT: 212 LBS | DIASTOLIC BLOOD PRESSURE: 80 MMHG

## 2025-02-07 DIAGNOSIS — E11.8 TYPE 2 DIABETES MELLITUS WITH COMPLICATION, WITH LONG-TERM CURRENT USE OF INSULIN (HCC): ICD-10-CM

## 2025-02-07 DIAGNOSIS — Z79.4 TYPE 2 DIABETES MELLITUS WITH COMPLICATION, WITH LONG-TERM CURRENT USE OF INSULIN (HCC): ICD-10-CM

## 2025-02-07 DIAGNOSIS — K52.9 GASTROENTERITIS: ICD-10-CM

## 2025-02-07 DIAGNOSIS — R42 DIZZY SPELLS: Primary | ICD-10-CM

## 2025-02-07 DIAGNOSIS — I73.9 PAD (PERIPHERAL ARTERY DISEASE) (HCC): ICD-10-CM

## 2025-02-07 DIAGNOSIS — G40.019 LOCALIZATION-RELATED EPILEPSY, INTRACTABLE (HCC): ICD-10-CM

## 2025-02-07 DIAGNOSIS — E66.813 CLASS 3 SEVERE OBESITY WITH SERIOUS COMORBIDITY AND BODY MASS INDEX (BMI) OF 40.0 TO 44.9 IN ADULT, UNSPECIFIED OBESITY TYPE (HCC): ICD-10-CM

## 2025-02-07 DIAGNOSIS — E66.01 CLASS 3 SEVERE OBESITY WITH SERIOUS COMORBIDITY AND BODY MASS INDEX (BMI) OF 40.0 TO 44.9 IN ADULT, UNSPECIFIED OBESITY TYPE (HCC): ICD-10-CM

## 2025-02-07 DIAGNOSIS — L30.4 INTERTRIGO: ICD-10-CM

## 2025-02-07 PROCEDURE — G2211 COMPLEX E/M VISIT ADD ON: HCPCS | Performed by: FAMILY MEDICINE

## 2025-02-07 PROCEDURE — 99214 OFFICE O/P EST MOD 30 MIN: CPT | Performed by: FAMILY MEDICINE

## 2025-02-07 RX ORDER — DICYCLOMINE HYDROCHLORIDE 10 MG/1
10 CAPSULE ORAL
Qty: 90 CAPSULE | Refills: 0 | Status: SHIPPED | OUTPATIENT
Start: 2025-02-07

## 2025-02-07 RX ORDER — TRIAMCINOLONE ACETONIDE 1 MG/G
CREAM TOPICAL 2 TIMES DAILY
Qty: 15 G | Refills: 0 | Status: SHIPPED | OUTPATIENT
Start: 2025-02-07 | End: 2025-02-14

## 2025-02-07 NOTE — PROGRESS NOTES
Name: Florecita Ramirez      : 1960      MRN: [id number]  Encounter Provider: Sena Martin MD  Encounter Date: 2025   Encounter department: [facility]  :    Dmedex 20 for 2 more days due to rales  Order labs seeing endo    Assessment & Plan  Dizzy spells  Possibly due to dehydration from recent illness vs anemia. Ordered labs. Maintain hydration. BP today at goal. May continue to hold jardiance until we get labs  Orders:    Comprehensive metabolic panel; Future    Hemoglobin A1C; Future    CBC and differential; Future    Iron Panel (Includes Ferritin, Iron Sat%, Iron, and TIBC); Future    Albumin / creatinine urine ratio    Type 2 diabetes mellitus with complication, with long-term current use of insulin ([facility])    Lab Results   Component Value Date    HGBA1C 7.7 (A) 10/18/2024     Due for repeat labs. Plans to see endocrine and requesting labs to be done at the hospital instead of POCT. Increased januvia 50 qs--> BID. Hold jardiance until we get labs   Orders:    Comprehensive metabolic panel; Future    Hemoglobin A1C; Future    CBC and differential; Future    Iron Panel (Includes Ferritin, Iron Sat%, Iron, and TIBC); Future    Gastroenteritis  Likey the cause for the sudden diarrhea. Still complains of cramping. Will get labs and ordered bentyl for pain   Orders:    dicyclomine (BENTYL) 10 mg capsule; Take 1 capsule (10 mg total) by mouth 3 (three) times a day before meals    Comprehensive metabolic panel; Future    PAD (peripheral artery disease) ([facility])  Asymptomatic. Continue statin        Localization-related epilepsy, intractable (HCC)  No recent seizure activities s       Class 3 severe obesity with serious comorbidity and body mass index (BMI) of 40.0 to 44.9 in adult, unspecified obesity type (HCC)  Counseled on diet and exercise     Orders:    Vitamin D 25 hydroxy; Future           History of Present Illness   Here for follow up   Reports dizziness and headaches  Also  "experienced abdominal pain 2 weeks ago  Fllt a sharp pain and developed acute onset diarrhea   Diarrhea lasted 1.5 days  She had another episode of diarrhea on Monday that resolved after a few hours  Still having pain but no longer having diarrhea     Has been feeling dizziness   Stopped the jardiance 2 weeks ago as she thought the medication was making the diarrhea worse   Still has the rash in the groin area    Used to have psoariais    BS have fluctated 132-144- 177 -130 .   This am 180  Taking januvia in the morning  Denies Sob or chest symptoms        Review of Systems   Respiratory: Negative.     Cardiovascular: Negative.    Gastrointestinal:  Positive for abdominal pain and diarrhea.   Neurological:  Positive for dizziness and headaches.       Objective   /80 (BP Location: Right arm, Patient Position: Sitting, Cuff Size: Large)   Pulse 62   Temp 98 °F (36.7 °C) (Tympanic)   Resp 18   Ht 4' 11\" (1.499 m)   Wt 96.2 kg (212 lb)   SpO2 96%   BMI 42.82 kg/m²      Physical Exam  Vitals reviewed.   Constitutional:       General: She is not in acute distress.     Appearance: Normal appearance. She is not ill-appearing or toxic-appearing.   HENT:      Head: Normocephalic and atraumatic.   Eyes:      Extraocular Movements: Extraocular movements intact.   Cardiovascular:      Rate and Rhythm: Normal rate and regular rhythm.      Heart sounds: No murmur heard.  Pulmonary:      Effort: Pulmonary effort is normal.      Breath sounds: Normal breath sounds.   Abdominal:      General: There is no distension.      Palpations: Abdomen is soft. There is no mass.      Tenderness: There is abdominal tenderness. There is no guarding or rebound.      Hernia: No hernia is present.   Musculoskeletal:      Right lower leg: No edema.      Left lower leg: No edema.   Skin:     General: Skin is warm.   Neurological:      Mental Status: She is alert and oriented to person, place, and time.   Psychiatric:         Mood and " Affect: Mood normal.         Behavior: Behavior normal.

## 2025-02-08 ENCOUNTER — APPOINTMENT (OUTPATIENT)
Dept: LAB | Facility: CLINIC | Age: 65
End: 2025-02-08
Payer: COMMERCIAL

## 2025-02-08 DIAGNOSIS — R42 DIZZY SPELLS: ICD-10-CM

## 2025-02-08 DIAGNOSIS — E66.01 CLASS 3 SEVERE OBESITY WITH SERIOUS COMORBIDITY AND BODY MASS INDEX (BMI) OF 40.0 TO 44.9 IN ADULT, UNSPECIFIED OBESITY TYPE (HCC): ICD-10-CM

## 2025-02-08 DIAGNOSIS — E66.813 CLASS 3 SEVERE OBESITY WITH SERIOUS COMORBIDITY AND BODY MASS INDEX (BMI) OF 40.0 TO 44.9 IN ADULT, UNSPECIFIED OBESITY TYPE (HCC): ICD-10-CM

## 2025-02-08 DIAGNOSIS — Z95.2 HISTORY OF PROSTHETIC HEART VALVE: ICD-10-CM

## 2025-02-08 DIAGNOSIS — K52.9 GASTROENTERITIS: ICD-10-CM

## 2025-02-08 DIAGNOSIS — E11.8 TYPE 2 DIABETES MELLITUS WITH COMPLICATION, WITH LONG-TERM CURRENT USE OF INSULIN (HCC): ICD-10-CM

## 2025-02-08 DIAGNOSIS — Z79.4 TYPE 2 DIABETES MELLITUS WITH COMPLICATION, WITH LONG-TERM CURRENT USE OF INSULIN (HCC): ICD-10-CM

## 2025-02-08 LAB
25(OH)D3 SERPL-MCNC: 17.1 NG/ML (ref 30–100)
ALBUMIN SERPL BCG-MCNC: 3.9 G/DL (ref 3.5–5)
ALP SERPL-CCNC: 140 U/L (ref 34–104)
ALT SERPL W P-5'-P-CCNC: 11 U/L (ref 7–52)
ANION GAP SERPL CALCULATED.3IONS-SCNC: 10 MMOL/L (ref 4–13)
AST SERPL W P-5'-P-CCNC: 17 U/L (ref 13–39)
BASOPHILS # BLD AUTO: 0.08 THOUSANDS/ΜL (ref 0–0.1)
BASOPHILS NFR BLD AUTO: 1 % (ref 0–1)
BILIRUB SERPL-MCNC: 0.7 MG/DL (ref 0.2–1)
BUN SERPL-MCNC: 14 MG/DL (ref 5–25)
CALCIUM SERPL-MCNC: 9.3 MG/DL (ref 8.4–10.2)
CHLORIDE SERPL-SCNC: 103 MMOL/L (ref 96–108)
CO2 SERPL-SCNC: 27 MMOL/L (ref 21–32)
CREAT SERPL-MCNC: 0.77 MG/DL (ref 0.6–1.3)
CREAT UR-MCNC: 128.9 MG/DL
EOSINOPHIL # BLD AUTO: 0.16 THOUSAND/ΜL (ref 0–0.61)
EOSINOPHIL NFR BLD AUTO: 3 % (ref 0–6)
ERYTHROCYTE [DISTWIDTH] IN BLOOD BY AUTOMATED COUNT: 13.9 % (ref 11.6–15.1)
EST. AVERAGE GLUCOSE BLD GHB EST-MCNC: 189 MG/DL
FERRITIN SERPL-MCNC: 79 NG/ML (ref 11–307)
GFR SERPL CREATININE-BSD FRML MDRD: 80 ML/MIN/1.73SQ M
GLUCOSE P FAST SERPL-MCNC: 183 MG/DL (ref 65–99)
HBA1C MFR BLD: 8.2 %
HCT VFR BLD AUTO: 41.1 % (ref 34.8–46.1)
HGB BLD-MCNC: 13 G/DL (ref 11.5–15.4)
IMM GRANULOCYTES # BLD AUTO: 0.01 THOUSAND/UL (ref 0–0.2)
IMM GRANULOCYTES NFR BLD AUTO: 0 % (ref 0–2)
INR PPP: 3.33 (ref 0.85–1.19)
IRON SATN MFR SERPL: 17 % (ref 15–50)
IRON SERPL-MCNC: 56 UG/DL (ref 50–212)
LYMPHOCYTES # BLD AUTO: 1.85 THOUSANDS/ΜL (ref 0.6–4.47)
LYMPHOCYTES NFR BLD AUTO: 32 % (ref 14–44)
MCH RBC QN AUTO: 26.6 PG (ref 26.8–34.3)
MCHC RBC AUTO-ENTMCNC: 31.6 G/DL (ref 31.4–37.4)
MCV RBC AUTO: 84 FL (ref 82–98)
MICROALBUMIN UR-MCNC: 12.2 MG/L
MICROALBUMIN/CREAT 24H UR: 9 MG/G CREATININE (ref 0–30)
MONOCYTES # BLD AUTO: 0.4 THOUSAND/ΜL (ref 0.17–1.22)
MONOCYTES NFR BLD AUTO: 7 % (ref 4–12)
NEUTROPHILS # BLD AUTO: 3.28 THOUSANDS/ΜL (ref 1.85–7.62)
NEUTS SEG NFR BLD AUTO: 57 % (ref 43–75)
NRBC BLD AUTO-RTO: 0 /100 WBCS
PLATELET # BLD AUTO: 231 THOUSANDS/UL (ref 149–390)
PMV BLD AUTO: 10.8 FL (ref 8.9–12.7)
POTASSIUM SERPL-SCNC: 3.8 MMOL/L (ref 3.5–5.3)
PROT SERPL-MCNC: 6.9 G/DL (ref 6.4–8.4)
PROTHROMBIN TIME: 33.3 SECONDS (ref 12.3–15)
RBC # BLD AUTO: 4.88 MILLION/UL (ref 3.81–5.12)
SODIUM SERPL-SCNC: 140 MMOL/L (ref 135–147)
TIBC SERPL-MCNC: 329 UG/DL (ref 250–450)
TRANSFERRIN SERPL-MCNC: 235 MG/DL (ref 203–362)
UIBC SERPL-MCNC: 273 UG/DL (ref 155–355)
WBC # BLD AUTO: 5.78 THOUSAND/UL (ref 4.31–10.16)

## 2025-02-08 PROCEDURE — 82043 UR ALBUMIN QUANTITATIVE: CPT | Performed by: FAMILY MEDICINE

## 2025-02-08 PROCEDURE — 82570 ASSAY OF URINE CREATININE: CPT | Performed by: FAMILY MEDICINE

## 2025-02-08 PROCEDURE — 36415 COLL VENOUS BLD VENIPUNCTURE: CPT

## 2025-02-08 PROCEDURE — 82728 ASSAY OF FERRITIN: CPT

## 2025-02-08 PROCEDURE — 85025 COMPLETE CBC W/AUTO DIFF WBC: CPT

## 2025-02-08 PROCEDURE — 80053 COMPREHEN METABOLIC PANEL: CPT

## 2025-02-08 PROCEDURE — 82306 VITAMIN D 25 HYDROXY: CPT

## 2025-02-08 PROCEDURE — 83036 HEMOGLOBIN GLYCOSYLATED A1C: CPT

## 2025-02-08 PROCEDURE — 85610 PROTHROMBIN TIME: CPT

## 2025-02-08 PROCEDURE — 83550 IRON BINDING TEST: CPT

## 2025-02-08 PROCEDURE — 83540 ASSAY OF IRON: CPT

## 2025-02-10 ENCOUNTER — ANTICOAG VISIT (OUTPATIENT)
Dept: CARDIOLOGY CLINIC | Facility: CLINIC | Age: 65
End: 2025-02-10

## 2025-02-10 ENCOUNTER — RESULTS FOLLOW-UP (OUTPATIENT)
Dept: FAMILY MEDICINE CLINIC | Facility: CLINIC | Age: 65
End: 2025-02-10

## 2025-02-10 ENCOUNTER — OFFICE VISIT (OUTPATIENT)
Dept: PODIATRY | Facility: CLINIC | Age: 65
End: 2025-02-10
Payer: COMMERCIAL

## 2025-02-10 ENCOUNTER — TELEPHONE (OUTPATIENT)
Dept: FAMILY MEDICINE CLINIC | Facility: CLINIC | Age: 65
End: 2025-02-10

## 2025-02-10 DIAGNOSIS — Z95.2 HISTORY OF PROSTHETIC HEART VALVE: Primary | ICD-10-CM

## 2025-02-10 DIAGNOSIS — E55.9 VITAMIN D DEFICIENCY: Primary | ICD-10-CM

## 2025-02-10 DIAGNOSIS — B35.1 ONYCHOMYCOSIS: ICD-10-CM

## 2025-02-10 DIAGNOSIS — J30.9 ALLERGIC RHINITIS, UNSPECIFIED SEASONALITY, UNSPECIFIED TRIGGER: ICD-10-CM

## 2025-02-10 DIAGNOSIS — E11.9 CONTROLLED TYPE 2 DIABETES MELLITUS WITHOUT COMPLICATION, WITHOUT LONG-TERM CURRENT USE OF INSULIN (HCC): Primary | ICD-10-CM

## 2025-02-10 PROCEDURE — 11720 DEBRIDE NAIL 1-5: CPT | Performed by: PODIATRIST

## 2025-02-10 PROCEDURE — RECHECK: Performed by: PODIATRIST

## 2025-02-10 RX ORDER — ERGOCALCIFEROL 1.25 MG/1
50000 CAPSULE, LIQUID FILLED ORAL WEEKLY
Qty: 8 CAPSULE | Refills: 0 | Status: SHIPPED | OUTPATIENT
Start: 2025-02-10

## 2025-02-11 DIAGNOSIS — J30.9 ALLERGIC RHINITIS, UNSPECIFIED SEASONALITY, UNSPECIFIED TRIGGER: ICD-10-CM

## 2025-02-11 RX ORDER — CETIRIZINE HYDROCHLORIDE 10 MG/1
10 TABLET ORAL DAILY
Qty: 90 TABLET | Refills: 1 | Status: SHIPPED | OUTPATIENT
Start: 2025-02-11

## 2025-02-12 RX ORDER — CETIRIZINE HYDROCHLORIDE 10 MG/1
10 TABLET ORAL DAILY
Qty: 90 TABLET | Refills: 0 | OUTPATIENT
Start: 2025-02-12

## 2025-02-13 NOTE — TELEPHONE ENCOUNTER
PA for Bentyl 10 mg SUBMITTED to[facility]     via    [x]CMM-KEY: [id number]  []Surescripts-Case ID #   []Availity-Auth ID # NDC #   []Faxed to plan   []Other website   []Phone call Case ID #     []PA sent as URGENT    All office notes, labs and other pertaining documents and studies sent. Clinical questions answered. Awaiting determination from insurance company.     Turnaround time for your insurance to make a decision on your Prior Authorization can take 7-21 business days.

## 2025-02-17 NOTE — PROGRESS NOTES
Florecita Ramirez 65 y.o. female MRN: [id number]    Encounter: [id number]  Referring Provider  Referral Self  No address on file    Assessment & Plan     1. Type 2 diabetes mellitus without complication, without long-term current use of insulin ([facility])  Assessment & Plan:  Diabetic for 10 years, suboptimally controlled  Lab Results   Component Value Date    HGBA1C 8.2 (H) 02/08/2025     Discussed potential for GLP-1 injectable therapy.  Patient would prefer to avoid at this time as she is scared of needles, would rather try pill based options first  Did also discussed diet.  Patient watches portions and does not think she is badly, does not think she might benefit from diabetes education, regardless encouraged to go    Will continue Januvia 50 mg twice daily  Will stop Jardiance as patient has recently had a groin rash, frequent yeast infections, concerned it is the Jardiance which is certainly a concern  Will start metformin  mg once daily, titrating up to twice daily with meals  Have asked patient to check blood sugars more frequently and send us in a sugar log  Referred patient to diabetes education for MNT    3-month follow-up with A1c  Orders:  -     Ambulatory referral to Diabetic Education; Future  -     metFORMIN (GLUCOPHAGE-XR) 500 mg 24 hr tablet; Take 1 tablet (500 mg total) by mouth 2 (two) times a day with meals  -     Hemoglobin A1C; Future    3m fu    CC: Diabetes    History of Present Illness     HPI:  Florecita Ramirez is a 65 y.o. female presents for a new visit regarding diabetes management. Recent A1c 8.2% 2/8/2025  Also has a h/o HTN, HLD, PAD, mechanical MV replacement on Coumadin GERMAN, distant CVA right before valve replacement ,asthma, epilepsy on keppra, BPPV, depression, anxiety, iron deficiency anemia, lymphedema, morbid obesity, vitamin D deficiency.     Patient distantly saw endocrinology Dr. Julio in 2020 for diabetes.  Records reviewed.  At the time was on metformin 500 mg  twice daily only due to GI side effects.  Pioglitazone 15 mg once daily was started.    2 weeks ago, felt real tired, hungry, was told D deficient and started tab. Iron was ok.  Sugars were worse. Now better in last week.    Also lots stress December bc of life insurance/SSI issues,m which has been handled     DM history:   Diagnosed in 2016    Mom , brother, sister, MGM with diabetes  With complications of PAD, (CVA embolic from valve dysfunction?)  Last Eye exam: 12/2024 neg for retinopathy   Last foot exam: 4/2024, follows w podiatry annually   Diabetes education: 2016 group classes, maybe 1 MNT  Urine protein: neg 2/2025     Current regimen: Januvia 50 mg twice daily, recently restarted Jardiance 25 mg every other day  Jardiance started 2023, pt paused when got a groin rash, then recently restarted as was told it was not a fungal rash, but did [employer] and was worried  Previously on meformin x years. Stopped couple years back due to GI issues  Previously on Actos, stopped years ago, does not remember why  Never insulin , never injectables      Statin: crestor 5  ACE-I/ARB: --  Diet: chicken fish seafood, avoids red meat. Not as much fruit and veg. Watches portions.   Breakfast- eggs, trye toast, OR cereal banana   Almost never lunch   Dinner- mac and cheese and meat OR rice and beans OR takeout OR soup  Exercise: not in cold. Lives in senior center. Able to do some walking inside  [facility] to [place]   Does not drive, uses Uber    Home glucose monitoring: are performed regularly  Before breakfast: 144-200 in past week  Hyperglycemia- checks when headache-- 262  Symptoms of hypoglycemia : never  Scared of needles, does not look at lancet     Historical Information   Past Medical History:   Diagnosis Date    Abnormal ultrasound of pelvis     last assessed - 26Mar2015    Asthma     Asthma, persistent 08/01/2014    Cerebral infarction (HCC)     unspecified; last assessed - [id number]    COVID-19 virus infection 01/07/2021     Dizziness     last assessed - 25Aug2015    Dysuria     last assessed - 12Aug2016    Exposure to potentially hazardous body fluids     last assessed - [id number]    Heart disease     Hematoma     of arm; last assessed - 20Jun2016    Mitral valve stenosis     Personal history of scoliosis     Pneumonia     Scar, hypertrophic     last assessed - 17Nov2015    Seizures (HCC)     Shingles 2021    Type 2 diabetes mellitus (HCC)     Vision problems      Past Surgical History:   Procedure Laterality Date    BREAST BIOPSY Left     many years ago in [place]-pt not really sure    MITRAL VALVE REPLACEMENT  06/17/2015    MVR with 27 mm [employer] Optiform mechanical prostethic;  last assessed - 03Feb2016    TONSILLECTOMY      TRANSLUMINAL ANGIOPLASTY      mitral valve; last assessed - 01Aug2014    TUBAL LIGATION  1986    last assessed - 01Aug2014     Social History   Social History     Substance and Sexual Activity   Alcohol Use No     Social History     Substance and Sexual Activity   Drug Use No     Social History     Tobacco Use   Smoking Status Never    Passive exposure: Never   Smokeless Tobacco Never     Family History:   Family History   Problem Relation Age of Onset    Cancer Mother     Dementia Mother     Diabetes Mother     Hypertension Mother     Hyperlipidemia Mother     Cervical cancer Mother     Heart disease Father     Coronary artery disease Father     Hypertension Father     Heart attack Father     Diabetes Sister     Breast cancer Sister 62    No Known Problems Daughter     Diabetes Maternal Grandmother     Breast cancer Maternal Grandmother     Diabetes Brother     Hypertension Brother     Hyperlipidemia Brother     Seizures Son     Psoriasis Son     Heart disease Paternal Aunt     Heart disease Paternal Aunt     Diabetes Other        Meds/Allergies   Current Outpatient Medications   Medication Sig Dispense Refill    Blood Glucose Monitoring Suppl (Accu-Chek Alexus Plus) w/Device KIT Use 2 (two) times a day  1 kit 0    cetirizine (Allergy Relief/Indoor/Outdoor) 10 mg tablet Take 1 tablet (10 mg total) by mouth daily 90 tablet 1    ciclesonide (Alvesco) 80 MCG/ACT inhaler Inhale 1 puff 2 (two) times a day 6.1 g 0    clotrimazole (LOTRIMIN) 1 % cream Apply topically 2 (two) times a day 60 g 1    Cyanocobalamin (VITAMIN B 12 PO) Take by mouth      dorzolamide-timolol (COSOPT) 22.3-6.8 MG/ML ophthalmic solution       ergocalciferol (VITAMIN D2) 50,000 units Take 1 capsule (50,000 Units total) by mouth once a week 8 capsule 0    ferrous sulfate 324 MG TBEC Take 1 tablet (324 mg total) by mouth daily with breakfast 90 tablet 1    fluticasone (FLONASE) 50 mcg/act nasal spray 1 spray into each nostril daily 9.9 mL 0    glucose blood (OneTouch Verio) test strip Use 1 each in the morning USE DAILY AS INSTRUCTED 100 strip 2    hydrOXYzine HCL (ATARAX) 25 mg tablet Take 1 tablet (25 mg total) by mouth 2 (two) times a day 60 tablet 2    Keppra 500 MG tablet Take 1 tablet (500 mg total) by mouth 3 (three) times a day 270 tablet 3    Lancets (OneTouch Delica Plus Jcvsie12T) MISC USE ONCE DAILY AS DIRECTED 100 each 1    MAGNESIUM PO Take by mouth      metFORMIN (GLUCOPHAGE-XR) 500 mg 24 hr tablet Take 1 tablet (500 mg total) by mouth 2 (two) times a day with meals 180 tablet 3    metoprolol tartrate (LOPRESSOR) 25 mg tablet Take 0.5 tablets (12.5 mg total) by mouth every 12 (twelve) hours 90 tablet 1    nystatin (MYCOSTATIN) ointment APPLY TOPICALLY TWO (TWO) TIMES A DAY 15 g 0    Omega 3-6-9 Fatty Acids (OMEGA 3-6-9 PO) Take by mouth      omeprazole (PriLOSEC) 40 MG capsule TAKE ONE CAPSULE BY MOUTH DAILY 90 capsule 1    rosuvastatin (CRESTOR) 5 mg tablet TAKE ONE TABLET BY MOUTH THREE TIMES A WEEK 30 tablet 8    sitaGLIPtin (Januvia) 50 mg tablet Take 1 tablet (50 mg total) by mouth daily 90 tablet 1    torsemide (DEMADEX) 20 mg tablet TAKE ONE TABLET BY MOUTH AS NEEDED AS DIRECTED 90 tablet 10    warfarin (COUMADIN) 5 mg tablet TAKE  "1/2 TO 1 TABLET DAILY BY MOUTH OR AS ORDERED BY PHYSICIAN. 90 tablet 2     No current facility-administered medications for this visit.     Allergies   Allergen Reactions    Codeine     Methylprednisolone GI Intolerance     Diarrhea, nausea, hot flashes    Prednisone Itching    Statins Headache    Codeine Polt-Chlorphen Polt Er Irritability, Itching and Rash       Objective   Vitals: Blood pressure 120/80, pulse 61, height 4' 11\" (1.499 m), weight 93.9 kg (207 lb), SpO2 98%.    Physical Exam  Constitutional:       General: She is not in acute distress.     Appearance: Normal appearance. She is obese. She is not ill-appearing, toxic-appearing or diaphoretic.   HENT:      Head: Normocephalic and atraumatic.      Nose: Nose normal.   Eyes:      Extraocular Movements: Extraocular movements intact.      Conjunctiva/sclera: Conjunctivae normal.      Pupils: Pupils are equal, round, and reactive to light.   Pulmonary:      Effort: Pulmonary effort is normal. No respiratory distress.   Abdominal:      General: There is no distension.   Musculoskeletal:         General: No deformity.      Right lower leg: No edema.      Left lower leg: No edema.   Skin:     General: Skin is warm and dry.      Findings: Rash (Healing rash groin/upper thigh) present.   Neurological:      General: No focal deficit present.      Mental Status: She is alert. Mental status is at baseline.   Psychiatric:         Mood and Affect: Mood normal.         Behavior: Behavior normal.         Thought Content: Thought content normal.         The history was obtained from the review of the chart, patient.    Lab Results:   Lab Results   Component Value Date/Time    Hemoglobin A1C 8.2 (H) 02/08/2025 09:10 AM    Hemoglobin A1C 7.7 (A) 10/18/2024 11:41 AM    Hemoglobin A1C 7.7 (A) 06/04/2024 12:55 PM    Hemoglobin A1C 8.1 (A) 03/04/2024 11:40 AM    WBC 5.78 02/08/2025 09:10 AM    Hemoglobin 13.0 02/08/2025 09:10 AM    Hematocrit 41.1 02/08/2025 09:10 AM    MCV " "84 02/08/2025 09:10 AM    Platelets 231 02/08/2025 09:10 AM    BUN 14 02/08/2025 09:10 AM    BUN 14 08/19/2024 11:51 AM    BUN 15 03/29/2024 10:31 AM    Potassium 3.8 02/08/2025 09:10 AM    Potassium 3.9 08/19/2024 11:51 AM    Potassium 3.9 03/29/2024 10:31 AM    Chloride 103 02/08/2025 09:10 AM    Chloride 104 08/19/2024 11:51 AM    Chloride 105 03/29/2024 10:31 AM    CO2 27 02/08/2025 09:10 AM    CO2 28 08/19/2024 11:51 AM    CO2 28 03/29/2024 10:31 AM    Creatinine 0.77 02/08/2025 09:10 AM    Creatinine 0.86 08/19/2024 11:51 AM    Creatinine 0.73 03/29/2024 10:31 AM    AST 17 02/08/2025 09:10 AM    AST 20 03/29/2024 10:31 AM    ALT 11 02/08/2025 09:10 AM    ALT 15 03/29/2024 10:31 AM    Total Protein 6.9 02/08/2025 09:10 AM    Total Protein 7.0 03/29/2024 10:31 AM    Albumin 3.9 02/08/2025 09:10 AM    Albumin 4.1 03/29/2024 10:31 AM    HDL, Direct 55 03/29/2024 10:31 AM    Triglycerides 119 03/29/2024 10:31 AM           Imaging Studies: Results Review Statement: No pertinent imaging studies reviewed.    Portions of the record may have been created with voice recognition software. Occasional wrong word or \"sound a like\" substitutions may have occurred due to the inherent limitations of voice recognition software. Read the chart carefully and recognize, using context, where substitutions have occurred.  "

## 2025-02-19 ENCOUNTER — OFFICE VISIT (OUTPATIENT)
Dept: ENDOCRINOLOGY | Facility: CLINIC | Age: 65
End: 2025-02-19
Payer: COMMERCIAL

## 2025-02-19 VITALS
DIASTOLIC BLOOD PRESSURE: 80 MMHG | OXYGEN SATURATION: 98 % | HEIGHT: 59 IN | SYSTOLIC BLOOD PRESSURE: 120 MMHG | BODY MASS INDEX: 41.73 KG/M2 | WEIGHT: 207 LBS | HEART RATE: 61 BPM

## 2025-02-19 DIAGNOSIS — E66.01 CLASS 3 SEVERE OBESITY DUE TO EXCESS CALORIES WITH SERIOUS COMORBIDITY AND BODY MASS INDEX (BMI) OF 40.0 TO 44.9 IN ADULT (HCC): ICD-10-CM

## 2025-02-19 DIAGNOSIS — E55.9 VITAMIN D INSUFFICIENCY: ICD-10-CM

## 2025-02-19 DIAGNOSIS — E11.65 TYPE 2 DIABETES MELLITUS WITH HYPERGLYCEMIA, WITHOUT LONG-TERM CURRENT USE OF INSULIN (HCC): Primary | ICD-10-CM

## 2025-02-19 DIAGNOSIS — E78.5 DYSLIPIDEMIA: ICD-10-CM

## 2025-02-19 DIAGNOSIS — E66.813 CLASS 3 SEVERE OBESITY DUE TO EXCESS CALORIES WITH SERIOUS COMORBIDITY AND BODY MASS INDEX (BMI) OF 40.0 TO 44.9 IN ADULT (HCC): ICD-10-CM

## 2025-02-19 PROCEDURE — 99204 OFFICE O/P NEW MOD 45 MIN: CPT | Performed by: INTERNAL MEDICINE

## 2025-02-19 PROCEDURE — G2211 COMPLEX E/M VISIT ADD ON: HCPCS | Performed by: INTERNAL MEDICINE

## 2025-02-19 RX ORDER — METFORMIN HYDROCHLORIDE 500 MG/1
500 TABLET, EXTENDED RELEASE ORAL 2 TIMES DAILY WITH MEALS
Qty: 180 TABLET | Refills: 3 | Status: SHIPPED | OUTPATIENT
Start: 2025-02-19

## 2025-02-19 NOTE — PATIENT INSTRUCTIONS
--continue Januvia  --stop Jardiance     --We talked about once-weekly GLP-1RA medications like Ozempic, Mounjaro, Trulicity. Some of these come with hidden needles!    --see diabetes educators for nutrition therapy     --start metformin extended release:  --Take metformin 500 mg once a day with dinner for 1 week.     --If no discomfort, increase to metformin 500 mg twice a day with breakfast and dinner     --send us sugar log!    --A1c before next follow up

## 2025-02-19 NOTE — ASSESSMENT & PLAN NOTE
Diabetic for 10 years, suboptimally controlled  Lab Results   Component Value Date    HGBA1C 8.2 (H) 02/08/2025     Discussed potential for GLP-1 injectable therapy.  Patient would prefer to avoid at this time as she is scared of needles, would rather try pill based options first  Did also discussed diet.  Patient watches portions and does not think she is badly, does not think she might benefit from diabetes education, regardless encouraged to go    Will continue Januvia 50 mg twice daily  Will stop Jardiance as patient has recently had a groin rash, frequent yeast infections, concerned it is the Jardiance which is certainly a concern  Will start metformin  mg once daily, titrating up to twice daily with meals  Have asked patient to check blood sugars more frequently and send us in a sugar log  Referred patient to diabetes education for MNT    3-month follow-up with A1c

## 2025-02-21 ENCOUNTER — HOSPITAL ENCOUNTER (OUTPATIENT)
Dept: MAMMOGRAPHY | Facility: CLINIC | Age: 65
End: 2025-02-21
Payer: COMMERCIAL

## 2025-02-21 VITALS — HEIGHT: 59 IN | WEIGHT: 207 LBS | BODY MASS INDEX: 41.73 KG/M2

## 2025-02-21 DIAGNOSIS — Z12.31 ENCOUNTER FOR SCREENING MAMMOGRAM FOR BREAST CANCER: ICD-10-CM

## 2025-02-21 PROCEDURE — 77067 SCR MAMMO BI INCL CAD: CPT

## 2025-02-21 PROCEDURE — 77063 BREAST TOMOSYNTHESIS BI: CPT

## 2025-02-28 DIAGNOSIS — D50.9 IRON DEFICIENCY ANEMIA, UNSPECIFIED IRON DEFICIENCY ANEMIA TYPE: ICD-10-CM

## 2025-02-28 RX ORDER — FERROUS SULFATE 324(65)MG
324 TABLET, DELAYED RELEASE (ENTERIC COATED) ORAL
Qty: 90 TABLET | Refills: 1 | Status: SHIPPED | OUTPATIENT
Start: 2025-02-28 | End: 2025-03-05 | Stop reason: SDUPTHER

## 2025-03-04 RX ORDER — DORZOLAMIDE HYDROCHLORIDE AND TIMOLOL MALEATE 20; 5 MG/ML; MG/ML
SOLUTION/ DROPS OPHTHALMIC
Refills: 0 | OUTPATIENT
Start: 2025-03-04

## 2025-03-05 DIAGNOSIS — D50.9 IRON DEFICIENCY ANEMIA, UNSPECIFIED IRON DEFICIENCY ANEMIA TYPE: ICD-10-CM

## 2025-03-06 RX ORDER — FERROUS SULFATE 324(65)MG
324 TABLET, DELAYED RELEASE (ENTERIC COATED) ORAL
Qty: 90 TABLET | Refills: 0 | Status: SHIPPED | OUTPATIENT
Start: 2025-03-06

## 2025-03-07 NOTE — PROGRESS NOTES
Cardiology Follow Up    Florecita Zepeda  1960  [id number]  [facility]  [address]  [place] [id number] [phone]    1. H/O mitral valve replacement  Echo complete w/ contrast if indicated      2. Mixed hyperlipidemia  Echo complete w/ contrast if indicated      3. PAD (peripheral artery disease) (HCC)  Echo complete w/ contrast if indicated      4. GERMAN (obstructive sleep apnea)  Echo complete w/ contrast if indicated      5. Statin intolerance  Echo complete w/ contrast if indicated      6. Type 2 diabetes mellitus without complication, without long-term current use of insulin (HCC)  Echo complete w/ contrast if indicated      7. Secondary hypertension  Echo complete w/ contrast if indicated        Interval History: Patient here for f/u.  Patient with RHD, with mitral balloon valvuloplasty 2014 in [facility] complicated by CVA.  She had MVR with mechanical valve, #27 mm [employer], in 6/2015 at [facility].  Patient has HTN, DM, mild COPD and PAF with LOUIE/CV done 2015.  Cardiac catheterization 5/14/2015 was normal.  Echocardiogram 4/2023 demonstrated LVEF of 60% with mild LVH.  There was ROGELIO.  An appropriately functioning mechanical prosthesis was noted in the mitral position with a mean gradient of 5 mmHg.  Mild to moderate TR with mild to moderate elevation in PASP was noted.  Goal PT/INR is 2.5-3.5.  Patient has had no chest pain or significant dyspnea.   I see her sister Veronica.  Echocardiogram 4/23/2024 demonstrated LVEF of 60%.  A #27 mechanical MVR was noted with appropriate function.  Compared to 4/10/2023, TR severity appeared less.  Patient has had no CP or significant dyspnea.  Her VS are stable today.  Patient's HLD and MVR are stable on her current medicine.    Patient Active Problem List   Diagnosis   • History of prosthetic heart valve   • Abnormal mammogram   • Cervical high risk HPV (human  papillomavirus) test positive   • Depression with anxiety   • Elevated blood pressure reading   • Esophageal reflux   • Excessive daytime sleepiness   • Fatigue   • Hypokalemia   • Fecal occult blood test positive   • Insomnia   • Intertrigo   • Iron deficiency anemia   • Localization-related epilepsy, intractable ([facility])   • Low grade squamous intraepithelial lesion (LGSIL) on cervical Pap smear   • Microcytic hypochromic anemia   • Class 3 severe obesity due to excess calories with serious comorbidity and body mass index (BMI) of 40.0 to 44.9 in adult ([facility])   • Primary osteoarthritis of right knee   • Overactive bladder   • Plantar fasciitis   • Sleep disorder, circadian, delayed sleep phase type   • Urge incontinence of urine   • Urinary retention   • Urticaria, chronic   • Uterine fibroid   • Vitamin D insufficiency   • Xerosis of skin   • History of stroke   • Wrist swelling, left   • Chest wall pain   • Bilateral lower extremity edema   • H/O mitral valve replacement with mechanical valve   • Posterior knee pain, right   • Left sided abdominal pain   • Vertigo   • GERMAN (obstructive sleep apnea)   • Panic reaction   • Secondary hypertension   • Dyslipidemia   • Acute right ankle pain   • Mild intermittent asthma   • Sprain of anterior talofibular ligament of right ankle   • Peroneal tendonitis   • Posterior tibial tendinitis of right lower extremity   • BPPV (benign paroxysmal positional vertigo)   • Bilateral leg pain   • Lymphedema of both lower extremities   • Tricuspid regurgitation   • Morbid obesity with BMI of 45.0-49.9, adult ([facility])   • PAD (peripheral artery disease) ([facility])   • Diarrhea   • Herpes zoster without complication   • Chronic diarrhea   • Type 2 diabetes mellitus without complication, without long-term current use of insulin ([facility])   • Tinea pedis of left foot   • Rheumatic heart disease   • Anticoagulation goal of INR 2.5 to 3.5   • Chronic anticoagulation   • Statin intolerance   • Mild  intermittent asthma without complication     Past Medical History:   Diagnosis Date   • Abnormal ultrasound of pelvis     last assessed - 26Mar2015   • Asthma    • Asthma, persistent 08/01/2014   • Cerebral infarction (HCC)     unspecified; last assessed - 30Aug2017   • COVID-19 virus infection 01/07/2021   • Dizziness     last assessed - 25Aug2015   • Dysuria     last assessed - 12Aug2016   • Exposure to potentially hazardous body fluids     last assessed - 11Sep2014   • Heart disease    • Hematoma     of arm; last assessed - 20Jun2016   • Mitral valve stenosis    • Personal history of scoliosis    • Pneumonia    • Scar, hypertrophic     last assessed - 17Nov2015   • Seizures (HCC)    • Shingles 2021   • Type 2 diabetes mellitus (HCC)    • Vision problems      Social History     Socioeconomic History   • Marital status: Single     Spouse name: Not on file   • Number of children: Not on file   • Years of education: Not on file   • Highest education level: Not on file   Occupational History   • Not on file   Tobacco Use   • Smoking status: Never     Passive exposure: Never   • Smokeless tobacco: Never   Vaping Use   • Vaping status: Never Used   Substance and Sexual Activity   • Alcohol use: No   • Drug use: No   • Sexual activity: Not Currently     Partners: Male   Other Topics Concern   • Not on file   Social History Narrative    Caffeine use     Social Drivers of Health     Financial Resource Strain: Low Risk  (3/15/2021)    Overall Financial Resource Strain (CARDIA)    • Difficulty of Paying Living Expenses: Not hard at all   Food Insecurity: No Food Insecurity (6/4/2024)    Nursing - Inadequate Food Risk Classification    • Worried About Running Out of Food in the Last Year: Never true    • Ran Out of Food in the Last Year: Never true    • Ran Out of Food in the Last Year: Not on file   Transportation Needs: No Transportation Needs (6/4/2024)    PRAPARE - Transportation    • Lack of Transportation (Medical): No     • Lack of Transportation (Non-Medical): No   Physical Activity: Not on file   Stress: Not on file   Social Connections: Not on file   Intimate Partner Violence: Not on file   Housing Stability: Unknown (6/4/2024)    Housing Stability Vital Sign    • Unable to Pay for Housing in the Last Year: No    • Number of Times Moved in the Last Year: Not on file    • Homeless in the Last Year: No      Family History   Problem Relation Age of Onset   • Cancer Mother    • Dementia Mother    • Diabetes Mother    • Hypertension Mother    • Hyperlipidemia Mother    • Cervical cancer Mother    • Heart disease Father    • Coronary artery disease Father    • Hypertension Father    • Heart attack Father    • Diabetes Sister    • Breast cancer Sister 62   • No Known Problems Sister    • No Known Problems Daughter    • Diabetes Maternal Grandmother    • Breast cancer Maternal Grandmother    • Diabetes Brother    • Hypertension Brother    • Hyperlipidemia Brother    • Seizures Son    • Psoriasis Son    • No Known Problems Son    • No Known Problems Maternal Aunt    • No Known Problems Maternal Aunt    • No Known Problems Maternal Aunt    • No Known Problems Maternal Aunt    • No Known Problems Maternal Aunt    • Heart disease Paternal Aunt    • Heart disease Paternal Aunt    • Diabetes Other      Past Surgical History:   Procedure Laterality Date   • BREAST BIOPSY Left     many years ago in [facility] not really sure   • MITRAL VALVE REPLACEMENT  06/17/2015    MVR with 27 mm [employer] Optiform mechanical prostethic;  last assessed - 03Feb2016   • TONSILLECTOMY     • TRANSLUMINAL ANGIOPLASTY      mitral valve; last assessed - 01Aug2014   • TUBAL LIGATION  1986    last assessed - 01Aug2014       Current Outpatient Medications:   •  Blood Glucose Monitoring Suppl (Accu-Chek Alexus Plus) w/Device KIT, Use 2 (two) times a day, Disp: 1 kit, Rfl: 0  •  cetirizine (Allergy Relief/Indoor/Outdoor) 10 mg tablet, Take 1 tablet (10 mg total) by  mouth daily, Disp: 90 tablet, Rfl: 1  •  ciclesonide (Alvesco) 80 MCG/ACT inhaler, Inhale 1 puff 2 (two) times a day, Disp: 6.1 g, Rfl: 0  •  clotrimazole (LOTRIMIN) 1 % cream, Apply topically 2 (two) times a day, Disp: 60 g, Rfl: 1  •  Cyanocobalamin (VITAMIN B 12 PO), Take by mouth, Disp: , Rfl:   •  dorzolamide-timolol (COSOPT) 22.3-6.8 MG/ML ophthalmic solution, , Disp: , Rfl:   •  ergocalciferol (VITAMIN D2) 50,000 units, Take 1 capsule (50,000 Units total) by mouth once a week, Disp: 8 capsule, Rfl: 0  •  ferrous sulfate 324 MG TBEC, Take 1 tablet (324 mg total) by mouth daily with breakfast, Disp: 90 tablet, Rfl: 0  •  fluticasone (FLONASE) 50 mcg/act nasal spray, 1 spray into each nostril daily, Disp: 9.9 mL, Rfl: 0  •  glucose blood (OneTouch Verio) test strip, Use 1 each in the morning USE DAILY AS INSTRUCTED, Disp: 100 strip, Rfl: 2  •  hydrOXYzine HCL (ATARAX) 25 mg tablet, Take 1 tablet (25 mg total) by mouth 2 (two) times a day, Disp: 60 tablet, Rfl: 2  •  Keppra 500 MG tablet, Take 1 tablet (500 mg total) by mouth 3 (three) times a day, Disp: 270 tablet, Rfl: 3  •  Lancets (OneTouch Delica Plus [id number]) MISC, USE ONCE DAILY AS DIRECTED, Disp: 100 each, Rfl: 1  •  MAGNESIUM PO, Take by mouth, Disp: , Rfl:   •  metFORMIN (GLUCOPHAGE-XR) 500 mg 24 hr tablet, Take 1 tablet (500 mg total) by mouth 2 (two) times a day with meals, Disp: 180 tablet, Rfl: 3  •  metoprolol tartrate (LOPRESSOR) 25 mg tablet, Take 0.5 tablets (12.5 mg total) by mouth every 12 (twelve) hours, Disp: 90 tablet, Rfl: 1  •  nystatin (MYCOSTATIN) ointment, APPLY TOPICALLY TWO (TWO) TIMES A DAY, Disp: 15 g, Rfl: 0  •  Omega 3-6-9 Fatty Acids (OMEGA 3-6-9 PO), Take by mouth, Disp: , Rfl:   •  omeprazole (PriLOSEC) 40 MG capsule, TAKE ONE CAPSULE BY MOUTH DAILY, Disp: 90 capsule, Rfl: 1  •  rosuvastatin (CRESTOR) 5 mg tablet, TAKE ONE TABLET BY MOUTH THREE TIMES A WEEK, Disp: 30 tablet, Rfl: 8  •  sitaGLIPtin (Januvia) 50 mg tablet,  Take 1 tablet (50 mg total) by mouth daily, Disp: 90 tablet, Rfl: 1  •  torsemide (DEMADEX) 20 mg tablet, TAKE ONE TABLET BY MOUTH AS NEEDED AS DIRECTED, Disp: 90 tablet, Rfl: 10  •  warfarin (COUMADIN) 5 mg tablet, TAKE 1/2 TO 1 TABLET DAILY BY MOUTH OR AS ORDERED BY PHYSICIAN., Disp: 90 tablet, Rfl: 2  Allergies   Allergen Reactions   • Codeine    • Methylprednisolone GI Intolerance     Diarrhea, nausea, hot flashes   • Prednisone Itching   • Statins Headache   • Codeine Polt-Chlorphen Polt Er Irritability, Itching and Rash       Labs:not applicable  Imaging: Mammo screening bilateral w 3d and cad  Result Date: 2/25/2025  Narrative: DIAGNOSIS: Encounter for screening mammogram for breast cancer TECHNIQUE: Digital screening mammography was performed. Computer Aided Detection (CAD) analyzed all applicable images. COMPARISONS: Prior breast imaging dated: 06/22/2022, 08/05/2020, 12/22/2016, 12/22/2016, 05/25/2016, 05/25/2016, 05/25/2016, and 05/03/2016 RELEVANT HISTORY: Family Breast Cancer History: History of breast cancer in Sister, Maternal Grandmother. Family Medical History: Family medical history includes breast cancer in 2 relatives (maternal grandmother, sister). Personal History: No known relevant hormone history. Surgical history includes breast biopsy. No known relevant medical history. The patient is scheduled in a reminder system for screening mammography. 8-10% of cancers will be missed on mammography. Management of a palpable abnormality must be based on clinical grounds.  Patients will be notified of their results via letter from our facility. Accredited by American College of Radiology and FDA. RISK ASSESSMENT: 5 Year Tyrer-Cuzick: 2.55% 10 Year Tyrer-Cuzick: 4.81% Lifetime Tyrer-Cuzick: 9.53% TISSUE DENSITY: There are scattered areas of fibroglandular density. INDICATION: Florecita Ramirez is a 65 y.o. female presenting for screening mammography. FINDINGS: There are no suspicious masses, grouped  "microcalcifications or areas of architectural distortion. The skin and nipple areolar complex are unremarkable.     Impression: No mammographic evidence of malignancy. ASSESSMENT/BI-RADS CATEGORY: Left: 1 - Negative Right: 1 - Negative Overall: 1 - Negative RECOMMENDATION:      - Routine screening mammogram in 1 year for both breasts. Workstation ID: [id number] Signed by:  Valeria Trinh MD       Review of Systems:  Review of Systems   All other systems reviewed and are negative.      Physical Exam:  /64 (BP Location: Right arm, Patient Position: Sitting, Cuff Size: Large)   Pulse 63   Ht 4' 11\" (1.499 m)   Wt 95.3 kg (210 lb)   SpO2 99%   BMI 42.41 kg/m²   Physical Exam  Vitals reviewed.   Constitutional:       Appearance: She is well-developed.   HENT:      Head: Normocephalic and atraumatic.   Eyes:      Conjunctiva/sclera: Conjunctivae normal.   Cardiovascular:      Rate and Rhythm: Normal rate.      Heart sounds: Normal heart sounds.      Comments: S1-S2 with crisp prosthetic heart sounds heard  Pulmonary:      Effort: Pulmonary effort is normal.      Breath sounds: Normal breath sounds.   Musculoskeletal:      Cervical back: Normal range of motion and neck supple.   Skin:     General: Skin is warm and dry.   Neurological:      Mental Status: She is alert and oriented to person, place, and time.       Discussion/Summary:I will continue the patient's current medical regimen.  The patient appears well compensated.  I have asked the patient to call if there is a problem in the interim otherwise I will see the patient in six months time. The patient is due for an echo prior to the next visit.  "

## 2025-03-17 ENCOUNTER — OFFICE VISIT (OUTPATIENT)
Dept: CARDIOLOGY CLINIC | Facility: CLINIC | Age: 65
End: 2025-03-17
Payer: MEDICARE

## 2025-03-17 VITALS
DIASTOLIC BLOOD PRESSURE: 64 MMHG | SYSTOLIC BLOOD PRESSURE: 120 MMHG | HEIGHT: 59 IN | OXYGEN SATURATION: 99 % | BODY MASS INDEX: 42.33 KG/M2 | HEART RATE: 63 BPM | WEIGHT: 210 LBS

## 2025-03-17 DIAGNOSIS — I15.9 SECONDARY HYPERTENSION: ICD-10-CM

## 2025-03-17 DIAGNOSIS — Z78.9 STATIN INTOLERANCE: ICD-10-CM

## 2025-03-17 DIAGNOSIS — E11.9 TYPE 2 DIABETES MELLITUS WITHOUT COMPLICATION, WITHOUT LONG-TERM CURRENT USE OF INSULIN (HCC): ICD-10-CM

## 2025-03-17 DIAGNOSIS — Z95.2 H/O MITRAL VALVE REPLACEMENT: Primary | ICD-10-CM

## 2025-03-17 DIAGNOSIS — G47.33 OSA (OBSTRUCTIVE SLEEP APNEA): ICD-10-CM

## 2025-03-17 DIAGNOSIS — E78.2 MIXED HYPERLIPIDEMIA: ICD-10-CM

## 2025-03-17 DIAGNOSIS — I73.9 PAD (PERIPHERAL ARTERY DISEASE) (HCC): ICD-10-CM

## 2025-03-17 PROCEDURE — 99214 OFFICE O/P EST MOD 30 MIN: CPT | Performed by: INTERNAL MEDICINE

## 2025-03-17 NOTE — PATIENT INSTRUCTIONS
I will continue the patient's current medical regimen.  The patient appears well compensated.  I have asked the patient to call if there is a problem in the interim otherwise I will see the patient in six months time. The patient is due for an echo prior to the next visit.

## 2025-03-26 ENCOUNTER — TELEPHONE (OUTPATIENT)
Age: 65
End: 2025-03-26

## 2025-03-26 NOTE — TELEPHONE ENCOUNTER
Pt called to advise our office she was told by her insurance that they will no longer cover her keppra unless our office can provide them with a letter of medical necessity. She's requesting a status update once we speak with the insurance .

## 2025-03-26 NOTE — TELEPHONE ENCOUNTER
"Aury, are you agreeable to a letter of medical necessity to patient's insurance for Brand Keppra?       Per Dr. Chapman's office notes from 4-19-16, \"Currently, on brand name Keppra she has not experience another seizure and her mood is stable.\"    Per Dr. Chapman's office notes from 3-10-20: \"She is on Brand name Keppra as she is concerned that changing to generic levetiracetam will worsen the frequency of her seizures.  She has not had a generalized convulsive seizure ever since she was started on Brand Name Keppra.\"       Please advise. Thank you!  "

## 2025-03-31 NOTE — TELEPHONE ENCOUNTER
Prior Authorization approval from [facility] for Keppra 1-1-25 to 12-31-25.  ____________________________________  Phone call to [facility] Pharmacist. Per pharmacist, [employer] BRAND was last filled on 3-26-25.   -------------------------------------------------------------  Phone call to patient. Patient stated she no longer has [employer] insurance. Patient now how [employer] insurance and was told [employer] needs a letter of Medical Necessity. Advised I will submit a prior authorization first. Patient voiced clear understanding.       [employer]  BIN# [id number]  N# [id number]  GROUP# [id number]  MEMBER ID # [id number]

## 2025-03-31 NOTE — TELEPHONE ENCOUNTER
"King BARAJAS Prior Authorization:    KEY: [id number]    \"Member Not Found.\"  Will resubmit.  ------------------------------------------------------------------  KEY: [id number]     \"Member Not Found.\"   "

## 2025-04-02 NOTE — TELEPHONE ENCOUNTER
Called plan to see what the issue was and spoke with Riana, who initiated an urgent PA for brand Keppra over the phone. TAT 24 hours.    Case ID #: [id number]

## 2025-04-03 NOTE — TELEPHONE ENCOUNTER
Called [employer] at # [phone] to check on PA status and spoke with Anjana. Per ludwig Yeung approved from 4/2/25 to 4/2/26. Approval letter was faxed yesterday, however it has not been received by our office yet.    Called pharmacy to make aware and spoke with Saba, who verbalized an understanding. She could not run a claim as it was too soon to do so.    Called pt to make aware and she verbalized an understanding. She was very appreciative of the call.

## 2025-04-08 DIAGNOSIS — I05.0 MITRAL VALVE STENOSIS, UNSPECIFIED ETIOLOGY: ICD-10-CM

## 2025-04-09 RX ORDER — METOPROLOL TARTRATE 25 MG/1
TABLET, FILM COATED ORAL
Qty: 90 TABLET | Refills: 1 | Status: SHIPPED | OUTPATIENT
Start: 2025-04-09

## 2025-04-24 ENCOUNTER — HOSPITAL ENCOUNTER (OUTPATIENT)
Dept: NON INVASIVE DIAGNOSTICS | Facility: CLINIC | Age: 65
Discharge: HOME | End: 2025-04-24
Payer: MEDICARE

## 2025-04-24 VITALS
DIASTOLIC BLOOD PRESSURE: 64 MMHG | WEIGHT: 210.1 LBS | SYSTOLIC BLOOD PRESSURE: 120 MMHG | HEIGHT: 59 IN | BODY MASS INDEX: 42.36 KG/M2 | HEART RATE: 63 BPM

## 2025-04-24 DIAGNOSIS — E55.9 VITAMIN D DEFICIENCY: ICD-10-CM

## 2025-04-24 DIAGNOSIS — I73.9 PAD (PERIPHERAL ARTERY DISEASE) (HCC): ICD-10-CM

## 2025-04-24 DIAGNOSIS — Z95.2 H/O MITRAL VALVE REPLACEMENT: ICD-10-CM

## 2025-04-24 DIAGNOSIS — G47.33 OSA (OBSTRUCTIVE SLEEP APNEA): ICD-10-CM

## 2025-04-24 DIAGNOSIS — E11.9 TYPE 2 DIABETES MELLITUS WITHOUT COMPLICATION, WITHOUT LONG-TERM CURRENT USE OF INSULIN (HCC): ICD-10-CM

## 2025-04-24 DIAGNOSIS — I15.9 SECONDARY HYPERTENSION: ICD-10-CM

## 2025-04-24 DIAGNOSIS — Z78.9 STATIN INTOLERANCE: ICD-10-CM

## 2025-04-24 DIAGNOSIS — E78.2 MIXED HYPERLIPIDEMIA: ICD-10-CM

## 2025-04-24 LAB
AORTIC ROOT: 2.6 CM
AORTIC VALVE MEAN VELOCITY: 11.5 M/S
ASCENDING AORTA: 2.9 CM
AV AREA BY CONTINUOUS VTI: 1.7 CM2
AV AREA PEAK VELOCITY: 1.3 CM2
AV LVOT MEAN GRADIENT: 2 MMHG
AV LVOT PEAK GRADIENT: 5 MMHG
AV MEAN PRESS GRAD SYS DOP V1V2: 6 MMHG
AV ORIFICE AREA US: 1.7 CM2
AV PEAK GRADIENT: 13 MMHG
AV REGURGITATION PRESSURE HALF TIME: 472 MS
AV VELOCITY RATIO: 0.75
AV VMAX SYS DOP: 1.79 M/S
BSA FOR ECHO PROCEDURE: 1.88 M2
DOP CALC AO VTI: 34.87 CM
DOP CALC LVOT AREA: 2.27 CM2
DOP CALC LVOT CARDIAC INDEX: 1.92 L/MIN/M2
DOP CALC LVOT CARDIAC OUTPUT: 3.61 L/MIN
DOP CALC LVOT DIAMETER: 1.7 CM
DOP CALC LVOT PEAK VEL VTI: 26.17 CM
DOP CALC LVOT PEAK VEL: 1.06 M/S
DOP CALC LVOT STROKE INDEX: 30.9 ML/M2
DOP CALC LVOT STROKE VOLUME: 58
DOP CALC MV VTI: 48.91 CM
E WAVE DECELERATION TIME: 244 MS
E/A RATIO: 3.45
FRACTIONAL SHORTENING: 46 (ref 28–44)
INTERVENTRICULAR SEPTUM IN DIASTOLE (PARASTERNAL SHORT AXIS VIEW): 1 CM
INTERVENTRICULAR SEPTUM: 1 CM (ref 0.6–1.1)
LAAS-AP2: 23.4 CM2
LAAS-AP4: 23.1 CM2
LEFT ATRIUM SIZE: 4.5 CM
LEFT ATRIUM VOLUME (MOD BIPLANE): 76 ML
LEFT ATRIUM VOLUME INDEX (MOD BIPLANE): 40.4 ML/M2
LEFT INTERNAL DIMENSION IN SYSTOLE: 2.5 CM (ref 2.1–4)
LEFT VENTRICULAR INTERNAL DIMENSION IN DIASTOLE: 4.6 CM (ref 3.5–6)
LEFT VENTRICULAR POSTERIOR WALL IN END DIASTOLE: 1 CM
LEFT VENTRICULAR STROKE VOLUME: 74 ML
LV EF US.2D.A4C+ESTIMATED: 65 %
LVSV (TEICH): 74 ML
MV E'TISSUE VEL-LAT: 11 CM/S
MV E'TISSUE VEL-SEP: 5 CM/S
MV MEAN GRADIENT: 4 MMHG
MV PEAK A VEL: 0.44 M/S
MV PEAK E VEL: 152 CM/S
MV PEAK GRADIENT: 13 MMHG
MV STENOSIS PRESSURE HALF TIME: 83 MS
MV VALVE AREA BY CONTINUITY EQUATION: 1.21 CM2
MV VALVE AREA P 1/2 METHOD: 2.7
RA PRESSURE ESTIMATED: 3 MMHG
RIGHT ATRIUM AREA SYSTOLE A4C: 10.2 CM2
RIGHT VENTRICLE ID DIMENSION: 3.3 CM
RV PSP: 29 MMHG
SINOTUBULAR JUNCTION: 2.3 CM
SL CV AV DECELERATION TIME RETROGRADE: 1626 MS
SL CV AV PEAK GRADIENT RETROGRADE: 52 MMHG
SL CV LEFT ATRIUM LENGTH A2C: 6.6 CM
SL CV LV EF: 65
SL CV PED ECHO LEFT VENTRICLE DIASTOLIC VOLUME (MOD BIPLANE) 2D: 95 ML
SL CV PED ECHO LEFT VENTRICLE SYSTOLIC VOLUME (MOD BIPLANE) 2D: 22 ML
SL CV SINUS OF VALSALVA 2D: 2.7 CM
STJ: 2.3 CM
TR MAX PG: 26 MMHG
TR PEAK VELOCITY: 2.6 M/S
TRICUSPID ANNULAR PLANE SYSTOLIC EXCURSION: 1.7 CM
TRICUSPID VALVE PEAK REGURGITATION VELOCITY: 2.57 M/S

## 2025-04-24 PROCEDURE — 93306 TTE W/DOPPLER COMPLETE: CPT | Performed by: INTERNAL MEDICINE

## 2025-04-24 PROCEDURE — 93306 TTE W/DOPPLER COMPLETE: CPT

## 2025-04-24 RX ORDER — ERGOCALCIFEROL 1.25 MG/1
50000 CAPSULE, LIQUID FILLED ORAL WEEKLY
Qty: 8 CAPSULE | Refills: 0 | OUTPATIENT
Start: 2025-04-24

## 2025-04-29 ENCOUNTER — RESULTS FOLLOW-UP (OUTPATIENT)
Dept: ENDOCRINOLOGY | Facility: CLINIC | Age: 65
End: 2025-04-29

## 2025-04-29 ENCOUNTER — ANTICOAG VISIT (OUTPATIENT)
Dept: CARDIOLOGY CLINIC | Facility: CLINIC | Age: 65
End: 2025-04-29

## 2025-04-29 ENCOUNTER — APPOINTMENT (OUTPATIENT)
Dept: LAB | Facility: CLINIC | Age: 65
End: 2025-04-29
Attending: INTERNAL MEDICINE
Payer: COMMERCIAL

## 2025-04-29 DIAGNOSIS — Z95.2 HISTORY OF PROSTHETIC HEART VALVE: ICD-10-CM

## 2025-04-29 DIAGNOSIS — E11.65 TYPE 2 DIABETES MELLITUS WITH HYPERGLYCEMIA, WITHOUT LONG-TERM CURRENT USE OF INSULIN (HCC): ICD-10-CM

## 2025-04-29 DIAGNOSIS — Z95.2 HISTORY OF PROSTHETIC HEART VALVE: Primary | ICD-10-CM

## 2025-04-29 LAB
EST. AVERAGE GLUCOSE BLD GHB EST-MCNC: 183 MG/DL
HBA1C MFR BLD: 8 %
INR PPP: 2.62 (ref 0.85–1.19)
PROTHROMBIN TIME: 27.9 SECONDS (ref 12.3–15)

## 2025-04-29 PROCEDURE — 36415 COLL VENOUS BLD VENIPUNCTURE: CPT

## 2025-04-29 PROCEDURE — 83036 HEMOGLOBIN GLYCOSYLATED A1C: CPT

## 2025-04-29 PROCEDURE — 85610 PROTHROMBIN TIME: CPT

## 2025-05-05 NOTE — PROGRESS NOTES
A&O x4. VSS on 1 O2 (unable to wean off) Tele SR. Denies CP/SOB/pain. Up A1. Continue to monitor.       Assessment/Plan:    Vertigo  · Likely BPPV in the setting of +Viv hallpike with slight nystagmus that lasted a few secs  · /78 --> 110/70, unlikely 2/2 orthostatic hypotension  · Patient reports continuous dizziness, headaches, nausea, and hot flashes with no improvement with increased water intake  · Patient believes condition is worsening due to episodes now occurring in the sitting position with no movement  · Patient educated on BPPV and benefits of vestibular therapy  · Patient agreed and plans to attend therapy  She denies use of meclizine at this time due to fear of increased palpitations and headaches from side effect list  · To follow up in 4 weeks to see improvement post therapy         Problem List Items Addressed This Visit        Other    Vertigo - Primary     · Likely BPPV in the setting of +Viv hallpike with slight nystagmus that lasted a few secs  · /78 --> 110/70, unlikely 2/2 orthostatic hypotension  · Patient reports continuous dizziness, headaches, nausea, and hot flashes with no improvement with increased water intake  · Patient believes condition is worsening due to episodes now occurring in the sitting position with no movement  · Patient educated on BPPV and benefits of vestibular therapy  · Patient agreed and plans to attend therapy  She denies use of meclizine at this time due to fear of increased palpitations and headaches from side effect list  · To follow up in 4 weeks to see improvement post therapy         Relevant Orders    Ambulatory referral to Physical Therapy            Subjective:      Patient ID: Sheng Chavez is a 63 y o  female  63year old female presents for follow up for vertigo  She states she has increased her water intake to 3 16 ounce bottles a day  She reports that it has not helped her symptoms  She feels dizzy and has severe headaches  She cannot move quickly from sitting to standing position  Denies any syncopal episodes   She reports she still gets nauseous and still gets hot flashes  Denies any vomiting episodes  She notes that the episodes are happening more often, and can sometimes occur after just turning her neck slightly  She reports she has been taking tylenol for the headaches with little improvement  Dizziness   Associated symptoms include coughing, fatigue, headaches and nausea  Pertinent negatives include no abdominal pain, chest pain, chills, fever, numbness, vomiting or weakness  The following portions of the patient's history were reviewed and updated as appropriate: allergies, current medications, past family history, past medical history, past social history, past surgical history and problem list     Review of Systems   Review of Systems   Constitutional: Positive for fatigue  Negative for appetite change, chills and fever  Respiratory: Positive for cough (Hx of asthma)  Negative for shortness of breath  Cardiovascular: Positive for leg swelling (chronic)  Negative for chest pain and palpitations  Gastrointestinal: Positive for nausea  Negative for abdominal pain, diarrhea and vomiting  Endocrine:        Hot flashes   Neurological: Positive for headaches  Objective:      /78 (BP Location: Left arm, Patient Position: Sitting, Cuff Size: Standard)   Pulse 80   Temp (!) 97 4 °F (36 3 °C) (Tympanic)   Resp 18   Ht 4' 11 5" (1 511 m)   Wt 108 kg (239 lb)   BMI 47 46 kg/m²          Physical Exam  Constitutional:       Appearance: Normal appearance  She is obese  HENT:      Head: Normocephalic and atraumatic  Cardiovascular:      Rate and Rhythm: Normal rate and regular rhythm  Pulses: Normal pulses  Heart sounds: No murmur  Comments: Mechanical valve  Pulmonary:      Effort: Pulmonary effort is normal       Breath sounds: Normal breath sounds  Neurological:      Mental Status: She is alert  Fremont-Hallpike maneuver performed   Patient's head was turned to the right and then she was laid down on the table with her head hanging slightly off  There was a slight flick of her eyes, but no severe nystagmus  Patient became nauseous and dizzy  She could not tolerate maneuver to the otherside

## 2025-05-07 ENCOUNTER — OFFICE VISIT (OUTPATIENT)
Dept: FAMILY MEDICINE CLINIC | Facility: CLINIC | Age: 65
End: 2025-05-07
Payer: MEDICARE

## 2025-05-07 VITALS
HEIGHT: 59 IN | BODY MASS INDEX: 41.73 KG/M2 | SYSTOLIC BLOOD PRESSURE: 126 MMHG | HEART RATE: 59 BPM | DIASTOLIC BLOOD PRESSURE: 60 MMHG | RESPIRATION RATE: 17 BRPM | WEIGHT: 207 LBS | OXYGEN SATURATION: 100 % | TEMPERATURE: 96.6 F

## 2025-05-07 DIAGNOSIS — Z65.8 LACK OF SOCIAL SUPPORT: ICD-10-CM

## 2025-05-07 DIAGNOSIS — E55.9 VITAMIN D DEFICIENCY: Primary | ICD-10-CM

## 2025-05-07 DIAGNOSIS — E11.9 TYPE 2 DIABETES MELLITUS WITHOUT COMPLICATION, WITHOUT LONG-TERM CURRENT USE OF INSULIN (HCC): ICD-10-CM

## 2025-05-07 DIAGNOSIS — E66.813 CLASS 3 SEVERE OBESITY WITH SERIOUS COMORBIDITY AND BODY MASS INDEX (BMI) OF 40.0 TO 44.9 IN ADULT, UNSPECIFIED OBESITY TYPE: ICD-10-CM

## 2025-05-07 DIAGNOSIS — L30.4 INTERTRIGO: ICD-10-CM

## 2025-05-07 PROCEDURE — 99214 OFFICE O/P EST MOD 30 MIN: CPT | Performed by: FAMILY MEDICINE

## 2025-05-07 PROCEDURE — G2211 COMPLEX E/M VISIT ADD ON: HCPCS | Performed by: FAMILY MEDICINE

## 2025-05-07 RX ORDER — TRIAMCINOLONE ACETONIDE 1 MG/G
CREAM TOPICAL 2 TIMES DAILY
Qty: 45 G | Refills: 1 | Status: SHIPPED | OUTPATIENT
Start: 2025-05-07

## 2025-05-07 RX ORDER — KETOCONAZOLE 20 MG/G
CREAM TOPICAL DAILY
Qty: 30 G | Refills: 1 | Status: SHIPPED | OUTPATIENT
Start: 2025-05-07

## 2025-05-07 NOTE — PATIENT INSTRUCTIONS
Meals on wheels    [employer] [facility]  [address] [place] [address] [phone]    [employer]  [place] [facility] [employer] [facility]  [address], [place] [address] [phone]    [facility]  [address], [place] [address] [phone]    [facility]  [address], [place] [address] [phone]    [employer]  [address] [place] [address] [phone]    Eply maneuver for vertigo on youtube   This is a recent snapshot of the patient's [facility] medical record.  For current drug dose and complete information and questions, call [phone] or In Basket pool, [facility] home infusion ([id number])  CSN Number:  [id number]

## 2025-05-07 NOTE — PROGRESS NOTES
Diabetic Foot Exam    Patient's shoes and socks removed.    Right Foot/Ankle   Right Foot Inspection  Skin Exam: skin normal, skin intact, dry skin, callus and callus. No warmth, no erythema, no maceration, no abnormal color, no pre-ulcer and no ulcer.     Toe Exam: ROM and strength within normal limits.     Sensory   Vibration: intact  Proprioception: intact  Monofilament testing: intact    Vascular  Capillary refills: < 3 seconds  The right DP pulse is 2+. The right PT pulse is 2+.     Left Foot/Ankle  Left Foot Inspection  Skin Exam: skin normal, skin intact, dry skin and callus. No warmth, no erythema, no maceration, normal color, no pre-ulcer and no ulcer.     Toe Exam: ROM and strength within normal limits.     Sensory   Vibration: intact  Proprioception: intact  Monofilament testing: intact    Vascular  Capillary refills: < 3 seconds  The left DP pulse is 2+. The left PT pulse is 2+.     Assign Risk Category  No deformity present  No loss of protective sensation  No weak pulses  Risk: 0        Name: Florecita Wu      : 1960      MRN: [id number]  Encounter Provider: Sena Martin MD  Encounter Date: 2025   Encounter department: [facility]  :  Assessment & Plan  Vitamin D deficiency    Orders:    Cholecalciferol (VITAMIN D3) 1,000 units tablet; Take 1 tablet (1,000 Units total) by mouth daily    Type 2 diabetes mellitus without complication, without long-term current use of insulin ([facility])    Lab Results   Component Value Date    HGBA1C 8.0 (H) 2025   Endocrine following.  Suboptimal for age.  Metformin was recently increased to 500 mg twice a day.  Patient says she is currently taking 500 mg in the morning and 250 at night.  Recommend metformin  twice a day.  Also reinforced the importance of regular exercise.  Patient is unmotivated and feels lonely.  Did recommend that she join a senior center where she can interact with others.  Provided telephone numbers  "for local senior 's.         [facility]  Recommend antifungal and if ineffective, switching to topical steroid.  Orders:    ketoconazole (NIZORAL) 2 % cream; Apply topically daily    triamcinolone (KENALOG) 0.1 % cream; Apply topically 2 (two) times a day    Lack of social support  Without family in the area and feels lonely.  Depression has gotten worse.  Do recommend joining a senior center.  Also suggest walking daily as tolerated.       Class 3 severe obesity with serious comorbidity and body mass index (BMI) of 40.0 to 44.9 in adult, unspecified obesity type  Counseled on diet and exercise.                History of Present Illness   Feeling more depressed  Hasn't started metformin XR because she was afraid  When she saw her endocrinologist, they recommended 500 mL of twice daily.  Currently taking 500 mg in morning and 250 at night.  Rash noted in the groin area.  Steroid cream helps but the rash returns if she misses a dose.  Area is dry and itchy.  Completed weekly vitamin D and now taking 1000 IU daily.  Complains of back pain.  Had shingles in the past.  Pain along the same dermatome.  No recent rash.  Notes vertigo when she reclines in the bed.  Has had some balance issues in the past that last a few seconds.        Review of Systems   Cardiovascular: Negative.    Gastrointestinal: Negative.    Musculoskeletal:  Positive for arthralgias, back pain and gait problem.   Skin:  Negative for rash.   Psychiatric/Behavioral:  Positive for dysphoric mood.        Objective   /60 (BP Location: Left arm, Patient Position: Sitting, Cuff Size: Large)   Pulse 59   Temp (!) 96.6 °F (35.9 °C) (Tympanic)   Resp 17   Ht 4' 11\" (1.499 m)   Wt 93.9 kg (207 lb)   SpO2 100%   BMI 41.81 kg/m²      Physical Exam  Cardiovascular:      Pulses: no weak pulses.           Dorsalis pedis pulses are 2+ on the right side and 2+ on the left side.        Posterior tibial pulses are 2+ on the right side and 2+ on the " left side.   Feet:      Right foot:      Skin integrity: Callus and dry skin present. No ulcer, skin breakdown, erythema or warmth.      Left foot:      Skin integrity: Callus and dry skin present. No ulcer, skin breakdown, erythema or warmth.       Administrative Statements   I have spent a total time of 30 minutes in caring for this patient on the day of the visit/encounter including Risks and benefits of tx options, Instructions for management, Importance of tx compliance, Risk factor reductions, Impressions, Counseling / Coordination of care, Documenting in the medical record, Reviewing/placing orders in the medical record (including tests, medications, and/or procedures), and Obtaining or reviewing history  .

## 2025-05-14 NOTE — ASSESSMENT & PLAN NOTE
Lab Results   Component Value Date    HGBA1C 8.0 (H) 04/29/2025   Endocrine following.  Suboptimal for age.  Metformin was recently increased to 500 mg twice a day.  Patient says she is currently taking 500 mg in the morning and 250 at night.  Recommend metformin  twice a day.  Also reinforced the importance of regular exercise.  Patient is unmotivated and feels lonely.  Did recommend that she join a senior center where she can interact with others.  Provided telephone numbers for local senior 's.

## 2025-05-14 NOTE — ASSESSMENT & PLAN NOTE
Recommend antifungal and if ineffective, switching to topical steroid.  Orders:    ketoconazole (NIZORAL) 2 % cream; Apply topically daily    triamcinolone (KENALOG) 0.1 % cream; Apply topically 2 (two) times a day

## 2025-05-16 ENCOUNTER — TELEPHONE (OUTPATIENT)
Age: 65
End: 2025-05-16

## 2025-05-16 NOTE — TELEPHONE ENCOUNTER
Patient calling in to verify the address for both her upcmoing diabetic education appointment and endo appointment. Verified both addresses with patient. She voiced no further questions/concerns.

## 2025-06-11 DIAGNOSIS — K21.9 GASTROESOPHAGEAL REFLUX DISEASE: ICD-10-CM

## 2025-06-11 DIAGNOSIS — E78.2 MIXED HYPERLIPIDEMIA: ICD-10-CM

## 2025-06-11 RX ORDER — OMEPRAZOLE 40 MG/1
40 CAPSULE, DELAYED RELEASE ORAL DAILY
Qty: 90 CAPSULE | Refills: 1 | Status: SHIPPED | OUTPATIENT
Start: 2025-06-11

## 2025-06-11 RX ORDER — ROSUVASTATIN CALCIUM 5 MG/1
TABLET, COATED ORAL
Qty: 30 TABLET | Refills: 7 | Status: SHIPPED | OUTPATIENT
Start: 2025-06-11

## 2025-06-12 DIAGNOSIS — E11.8 TYPE 2 DIABETES MELLITUS WITH COMPLICATION, WITH LONG-TERM CURRENT USE OF INSULIN (HCC): ICD-10-CM

## 2025-06-12 DIAGNOSIS — E78.5 DYSLIPIDEMIA: Primary | ICD-10-CM

## 2025-06-12 DIAGNOSIS — Z79.4 TYPE 2 DIABETES MELLITUS WITH COMPLICATION, WITH LONG-TERM CURRENT USE OF INSULIN (HCC): ICD-10-CM

## 2025-06-12 RX ORDER — PERPHENAZINE 2 MG
1 TABLET ORAL DAILY
Qty: 30 CAPSULE | Refills: 1 | Status: SHIPPED | OUTPATIENT
Start: 2025-06-12 | End: 2025-09-10

## 2025-06-12 NOTE — TELEPHONE ENCOUNTER
Patient calling stated the pharmacy told her that her insurance will cover her lancets but not her test strips.    Patient is using one touch verio test strips.   She no long has any left and cannot test her blood sugar. She is asking to figure out why it wont cover. Or if she can have a new system that is entirely covered by her insurance. Please advise.     Patient using [facility] pharmacy in [place] -on file

## 2025-06-13 DIAGNOSIS — E11.8 TYPE 2 DIABETES MELLITUS WITH COMPLICATION, WITH LONG-TERM CURRENT USE OF INSULIN (HCC): Primary | ICD-10-CM

## 2025-06-13 DIAGNOSIS — Z79.4 TYPE 2 DIABETES MELLITUS WITH COMPLICATION, WITH LONG-TERM CURRENT USE OF INSULIN (HCC): Primary | ICD-10-CM

## 2025-06-16 RX ORDER — BLOOD SUGAR DIAGNOSTIC
1 STRIP MISCELLANEOUS 3 TIMES DAILY
Qty: 100 STRIP | Refills: 3 | Status: SHIPPED | OUTPATIENT
Start: 2025-06-16

## 2025-06-16 RX ORDER — BLOOD SUGAR DIAGNOSTIC
1 STRIP MISCELLANEOUS DAILY
Qty: 100 STRIP | Refills: 2 | Status: SHIPPED | OUTPATIENT
Start: 2025-06-16

## 2025-06-16 NOTE — TELEPHONE ENCOUNTER
Situation unclear    Called and spoke nila Mcelroy pharmacist-- they said test supplies are covered through medicare  part B and they are not sure why lancets were covered at all     I filled the strips as requested, not sure if they will be covered either

## 2025-06-18 ENCOUNTER — TELEPHONE (OUTPATIENT)
Age: 65
End: 2025-06-18

## 2025-06-18 DIAGNOSIS — Z98.890 HISTORY OF MITRAL VALVE REPAIR: Primary | ICD-10-CM

## 2025-06-18 RX ORDER — AMOXICILLIN 500 MG/1
2000 TABLET, FILM COATED ORAL ONCE
Qty: 4 TABLET | Refills: 0 | Status: SHIPPED | OUTPATIENT
Start: 2025-06-18 | End: 2025-06-18

## 2025-06-18 NOTE — TELEPHONE ENCOUNTER
PT scheduled for a dental appt tomorrow, and is requesting amoxilcillin to be taken 2 hrs before her visit, sent to [facility].    Please advise    ThankYou

## 2025-06-23 ENCOUNTER — TELEPHONE (OUTPATIENT)
Dept: DIABETES SERVICES | Facility: CLINIC | Age: 65
End: 2025-06-23

## 2025-06-24 DIAGNOSIS — L50.0 ALLERGIC URTICARIA: ICD-10-CM

## 2025-06-24 DIAGNOSIS — D50.9 IRON DEFICIENCY ANEMIA, UNSPECIFIED IRON DEFICIENCY ANEMIA TYPE: ICD-10-CM

## 2025-06-24 DIAGNOSIS — I05.0 MITRAL VALVE STENOSIS, UNSPECIFIED ETIOLOGY: ICD-10-CM

## 2025-06-24 RX ORDER — FERROUS SULFATE 324(65)MG
324 TABLET, DELAYED RELEASE (ENTERIC COATED) ORAL
Qty: 90 TABLET | Refills: 1 | Status: SHIPPED | OUTPATIENT
Start: 2025-06-24

## 2025-06-24 RX ORDER — METOPROLOL TARTRATE 25 MG/1
TABLET, FILM COATED ORAL
Qty: 90 TABLET | Refills: 0 | OUTPATIENT
Start: 2025-06-24

## 2025-06-24 RX ORDER — HYDROXYZINE HYDROCHLORIDE 25 MG/1
25 TABLET, FILM COATED ORAL 2 TIMES DAILY
Qty: 60 TABLET | Refills: 1 | Status: SHIPPED | OUTPATIENT
Start: 2025-06-24

## 2025-07-03 ENCOUNTER — APPOINTMENT (OUTPATIENT)
Dept: LAB | Facility: CLINIC | Age: 65
End: 2025-07-03
Payer: MEDICARE

## 2025-07-03 ENCOUNTER — TELEPHONE (OUTPATIENT)
Age: 65
End: 2025-07-03

## 2025-07-03 DIAGNOSIS — Z95.2 H/O MITRAL VALVE REPLACEMENT WITH MECHANICAL VALVE: ICD-10-CM

## 2025-07-03 DIAGNOSIS — Z95.2 H/O MITRAL VALVE REPLACEMENT WITH MECHANICAL VALVE: Primary | ICD-10-CM

## 2025-07-03 LAB
INR PPP: 2.72 (ref 0.85–1.19)
PROTHROMBIN TIME: 28.6 SECONDS (ref 12.3–15)

## 2025-07-03 PROCEDURE — 85610 PROTHROMBIN TIME: CPT

## 2025-07-03 PROCEDURE — 36415 COLL VENOUS BLD VENIPUNCTURE: CPT

## 2025-07-03 NOTE — TELEPHONE ENCOUNTER
Patient calling about  PT/INR prescription, asking to speak with Yola. Warm transferred to Yola in [place] [facility].

## 2025-07-07 ENCOUNTER — ANTICOAG VISIT (OUTPATIENT)
Dept: CARDIOLOGY CLINIC | Facility: CLINIC | Age: 65
End: 2025-07-07

## 2025-07-07 DIAGNOSIS — Z95.2 HISTORY OF PROSTHETIC HEART VALVE: Primary | ICD-10-CM

## 2025-07-10 DIAGNOSIS — Z95.2 HISTORY OF PROSTHETIC HEART VALVE: ICD-10-CM

## 2025-07-10 DIAGNOSIS — Z95.2 H/O MITRAL VALVE REPLACEMENT WITH MECHANICAL VALVE: Primary | ICD-10-CM

## 2025-07-10 DIAGNOSIS — Z95.2 H/O MITRAL VALVE REPLACEMENT: ICD-10-CM

## 2025-07-10 NOTE — TELEPHONE ENCOUNTER
Caller: Florecita Wu    Doctor: Dr. Blackburn    Reason for call: Patient has a dentist appointment 7/14/2025 and is asking for an antibiotic to be called in to her pharmacy.  Please call her.    Thank you    Call back#: [phone]

## 2025-07-10 NOTE — TELEPHONE ENCOUNTER
Please call in amoxicillin 2000 mg to be taken 30 to 60 minutes prior to appointment.  She can take  #4 500 mg tablets.  Thank you.

## 2025-07-11 RX ORDER — AMOXICILLIN 500 MG/1
TABLET, FILM COATED ORAL
Qty: 4 TABLET | Refills: 1 | Status: SHIPPED | OUTPATIENT
Start: 2025-07-11 | End: 2025-07-16 | Stop reason: ALTCHOICE

## 2025-07-15 ENCOUNTER — OFFICE VISIT (OUTPATIENT)
Dept: PULMONOLOGY | Facility: CLINIC | Age: 65
End: 2025-07-15
Payer: MEDICARE

## 2025-07-15 VITALS
OXYGEN SATURATION: 98 % | HEART RATE: 73 BPM | DIASTOLIC BLOOD PRESSURE: 74 MMHG | WEIGHT: 207 LBS | BODY MASS INDEX: 41.73 KG/M2 | HEIGHT: 59 IN | TEMPERATURE: 98.5 F | SYSTOLIC BLOOD PRESSURE: 112 MMHG

## 2025-07-15 DIAGNOSIS — J45.909 UNCOMPLICATED ASTHMA, UNSPECIFIED ASTHMA SEVERITY, UNSPECIFIED WHETHER PERSISTENT: Primary | ICD-10-CM

## 2025-07-15 DIAGNOSIS — E66.01 MORBID OBESITY (HCC): ICD-10-CM

## 2025-07-15 PROCEDURE — 99204 OFFICE O/P NEW MOD 45 MIN: CPT | Performed by: INTERNAL MEDICINE

## 2025-07-15 PROCEDURE — G2211 COMPLEX E/M VISIT ADD ON: HCPCS | Performed by: INTERNAL MEDICINE

## 2025-07-15 RX ORDER — AMOXICILLIN 500 MG/1
CAPSULE ORAL
COMMUNITY
Start: 2025-07-11

## 2025-07-15 RX ORDER — BUDESONIDE AND FORMOTEROL FUMARATE DIHYDRATE 80; 4.5 UG/1; UG/1
2 AEROSOL RESPIRATORY (INHALATION) 2 TIMES DAILY
Qty: 10.2 G | Refills: 5 | Status: SHIPPED | OUTPATIENT
Start: 2025-07-15

## 2025-07-16 ENCOUNTER — OFFICE VISIT (OUTPATIENT)
Dept: ENDOCRINOLOGY | Facility: CLINIC | Age: 65
End: 2025-07-16
Payer: MEDICARE

## 2025-07-16 VITALS
HEART RATE: 72 BPM | OXYGEN SATURATION: 99 % | HEIGHT: 59 IN | WEIGHT: 203 LBS | BODY MASS INDEX: 40.92 KG/M2 | DIASTOLIC BLOOD PRESSURE: 78 MMHG | SYSTOLIC BLOOD PRESSURE: 110 MMHG

## 2025-07-16 DIAGNOSIS — E66.813 CLASS 3 SEVERE OBESITY DUE TO EXCESS CALORIES WITH SERIOUS COMORBIDITY AND BODY MASS INDEX (BMI) OF 40.0 TO 44.9 IN ADULT: ICD-10-CM

## 2025-07-16 DIAGNOSIS — E55.9 VITAMIN D INSUFFICIENCY: ICD-10-CM

## 2025-07-16 DIAGNOSIS — E11.65 TYPE 2 DIABETES MELLITUS WITH HYPERGLYCEMIA, WITHOUT LONG-TERM CURRENT USE OF INSULIN (HCC): Primary | ICD-10-CM

## 2025-07-16 DIAGNOSIS — E78.5 DYSLIPIDEMIA: ICD-10-CM

## 2025-07-16 PROBLEM — Z79.4 TYPE 2 DIABETES MELLITUS WITH COMPLICATION, WITH LONG-TERM CURRENT USE OF INSULIN (HCC): Status: ACTIVE | Noted: 2025-07-16

## 2025-07-16 PROBLEM — E11.8 TYPE 2 DIABETES MELLITUS WITH COMPLICATION, WITH LONG-TERM CURRENT USE OF INSULIN (HCC): Status: ACTIVE | Noted: 2025-07-16

## 2025-07-16 PROCEDURE — 99214 OFFICE O/P EST MOD 30 MIN: CPT | Performed by: INTERNAL MEDICINE

## 2025-07-16 PROCEDURE — G2211 COMPLEX E/M VISIT ADD ON: HCPCS | Performed by: INTERNAL MEDICINE

## 2025-07-16 NOTE — ASSESSMENT & PLAN NOTE
Continue vitamin D supplementations   Orders:  •  Vitamin D 25 hydroxy; Future  •  Comprehensive metabolic panel; Future

## 2025-07-16 NOTE — PROGRESS NOTES
Name: Florecita Wu      : 1960      MRN: [id number]  Encounter Provider: Lisa Ramirez MD  Encounter Date: 2025   Encounter department: [facility] [place]    Chief Complaint   Patient presents with   • Diabetes Type 2   :  Assessment & Plan  Type 2 diabetes mellitus with hyperglycemia, without long-term current use of insulin ([facility])    Lab Results   Component Value Date    HGBA1C 8.0 (H) 2025     Diabetes is  poorly controlled , she wants to focus on dietary modifications before starting another medication  Referred for MNT   Orders:  •  Ambulatory referral to Diabetic Education; Future  •  Hemoglobin A1C; Future  •  Comprehensive metabolic panel; Future  •  Albumin / creatinine urine ratio; Future    Class 3 severe obesity due to excess calories with serious comorbidity and body mass index (BMI) of 40.0 to 44.9 in adult  Counseled on dietary and lifestyle modifications and weight loss   Referred for MNT   Orders:  •  Ambulatory referral to Diabetic Education; Future  •  Comprehensive metabolic panel; Future    Vitamin D insufficiency  Continue vitamin D supplementations   Orders:  •  Vitamin D 25 hydroxy; Future  •  Comprehensive metabolic panel; Future    Dyslipidemia  Continue statins   Orders:  •  Lipid Panel with Direct LDL reflex; Future              History of Present Illness     64 y/o female with type 2 DM , obesity , hyperlipidemia  seen in f/u     Current diabetic regimen:   Metformin  500 MG BID     Not checking for the past month - she has a one touch verio meter but insurance not covering strips - she doesn't know which meter/strips insurance covers   Before that fasting 130-200      C/o polydypsia , no polyuria , c/o blurry vision   No numbness and tingling in feet     Weight loss - 4 lbs since last visit     + diarrhea occasionally          Review of Systems as per HPI    Medications Ordered Prior to Encounter[1]   Social  "History[2]     Medical History Reviewed by provider this encounter:  Meds     .    Objective   /78   Pulse 72   Ht 4' 11\" (1.499 m)   Wt 92.1 kg (203 lb)   SpO2 99%   BMI 41.00 kg/m²      Body mass index is 41 kg/m².  Wt Readings from Last 3 Encounters:   07/16/25 92.1 kg (203 lb)   07/15/25 93.9 kg (207 lb)   05/07/25 93.9 kg (207 lb)     Physical Exam  Vitals reviewed.   Constitutional:       General: She is not in acute distress.     Appearance: Normal appearance. She is obese. She is not ill-appearing, toxic-appearing or diaphoretic.   HENT:      Head: Normocephalic and atraumatic.     Eyes:      General: No scleral icterus.     Extraocular Movements: Extraocular movements intact.       Cardiovascular:      Rate and Rhythm: Normal rate and regular rhythm.      Heart sounds: Normal heart sounds. No murmur heard.  Pulmonary:      Effort: Pulmonary effort is normal. No respiratory distress.      Breath sounds: Normal breath sounds. No wheezing or rales.     Musculoskeletal:      Cervical back: Neck supple.      Right lower leg: No edema.      Left lower leg: No edema.   Lymphadenopathy:      Cervical: No cervical adenopathy.     Skin:     General: Skin is warm and dry.     Neurological:      General: No focal deficit present.      Mental Status: She is alert and oriented to person, place, and time.     Psychiatric:         Mood and Affect: Mood normal.         Behavior: Behavior normal.         Thought Content: Thought content normal.         Judgment: Judgment normal.             Last Eye Exam: 12/17/2024  Last Foot Exam: 05/07/2025  Health Maintenance   Topic Date Due   • Diabetic Foot Exam  05/07/2026   • Diabetic Eye Exam  12/17/2026         Labs: I have reviewed pertinent labs including:   Lab Results   Component Value Date    HGBA1C 8.0 (H) 04/29/2025    HGBA1C 8.2 (H) 02/08/2025    HGBA1C 7.7 (A) 10/18/2024      Cholesterol   Date Value Ref Range Status   06/07/2017 202 (H) 125 - 200 mg/dL Final " "    HDL   Date Value Ref Range Status   01/31/2018 54 >50 mg/dL Final     HDL, Direct   Date Value Ref Range Status   03/29/2024 55 >=50 mg/dL Final     Triglycerides   Date Value Ref Range Status   03/29/2024 119 See Comment mg/dL Final     Comment:     Triglyceride:     0-9Y            <75mg/dL     10Y-17Y         <90 mg/dL       >=18Y     Normal          <150 mg/dL     Borderline High 150-199 mg/dL     High            200-499 mg/dL        Very High       >499 mg/dL    Specimen collection should occur prior to Metamizole administration due to the potential for falsely depressed results.   01/31/2018 192 (H) <150 mg/dL Final      ALT   Date Value Ref Range Status   02/08/2025 11 7 - 52 U/L Final     Comment:     Specimen collection should occur prior to Sulfasalazine administration due to the potential for falsely depressed results.    09/19/2018 25 <56 U/L Final     AST   Date Value Ref Range Status   02/08/2025 17 13 - 39 U/L Final   09/19/2018 34 <41 U/L Final     GGT   Date Value Ref Range Status   07/27/2014 21 0 - 85 U/L Final     Comment:     The above 1 analytes [place] [address]       Alkaline Phosphatase   Date Value Ref Range Status   02/08/2025 140 (H) 34 - 104 U/L Final   09/19/2018 166 (H) 35 - 120 U/L Final     Total Bilirubin   Date Value Ref Range Status   06/07/2017 0.8 0.2 - 1.2 mg/dL Final      Lab Results   Component Value Date    ONV1KCGWODHK 1.610 04/07/2023      No results found for: \"FREET4\", \"T3FREE\", \"TSI\", \"ANTITPOAB\"   Lab Results   Component Value Date    [id number] 17.1 (L) 02/08/2025    BCLL32FTPIZX 29 (L) 01/05/2016    OXKQ28DMVDGE 27 (L) 09/11/2015    [id number] 27 (L) 09/11/2015    [id number] 27 (L) 09/11/2015    [id number] 27 (L) 09/11/2015        There are no Patient Instructions on file for this visit.    Discussed with the patient and all questioned fully answered. She will call me if any problems arise.         "     [1]  Current Outpatient Medications on File Prior to Visit   Medication Sig Dispense Refill   • Blood Glucose Monitoring Suppl (Accu-Chek Alexus Plus) w/Device KIT Use 2 (two) times a day 1 kit 0   • budesonide-formoterol (Symbicort) 80-4.5 MCG/ACT inhaler Inhale 2 puffs 2 (two) times a day Rinse mouth after use. 10.2 g 5   • cetirizine (Allergy Relief/Indoor/Outdoor) 10 mg tablet Take 1 tablet (10 mg total) by mouth daily 90 tablet 1   • Cholecalciferol (VITAMIN D3) 1,000 units tablet Take 1 tablet (1,000 Units total) by mouth daily 90 tablet 1   • Cyanocobalamin (VITAMIN B 12 PO) Take by mouth     • dorzolamide-timolol (COSOPT) 22.3-6.8 MG/ML ophthalmic solution      • ferrous sulfate 324 (65 Fe) mg TAKE 1 TABLET BY MOUTH DAILY WITH BREAKFAST 90 tablet 1   • fluticasone (FLONASE) 50 mcg/act nasal spray 1 spray into each nostril daily 9.9 mL 0   • hydrOXYzine HCL (ATARAX) 25 mg tablet TAKE 1 TABLET BY MOUTH TWO TIMES A DAY 60 tablet 1   • Keppra 500 MG tablet Take 1 tablet (500 mg total) by mouth 3 (three) times a day 270 tablet 3   • ketoconazole (NIZORAL) 2 % cream Apply topically daily 30 g 1   • MAGNESIUM PO Take by mouth     • metFORMIN (GLUCOPHAGE-XR) 500 mg 24 hr tablet Take 1 tablet (500 mg total) by mouth 2 (two) times a day with meals 180 tablet 3   • metoprolol tartrate (LOPRESSOR) 25 mg tablet TAKE HALF A TABLET BY MOUTH TWICE A DAY 90 tablet 1   • Omega 3-6-9 CAPS Take 1 capsule by mouth in the morning 30 capsule 1   • omeprazole (PriLOSEC) 40 MG capsule TAKE ONE CAPSULE BY MOUTH DAILY 90 capsule 1   • rosuvastatin (CRESTOR) 5 mg tablet TAKE ONE TABLET BY MOUTH THREE TIMES A WEEK 30 tablet 7   • torsemide (DEMADEX) 20 mg tablet TAKE ONE TABLET BY MOUTH AS NEEDED AS DIRECTED 90 tablet 10   • triamcinolone (KENALOG) 0.1 % cream Apply topically 2 (two) times a day 45 g 1   • warfarin (COUMADIN) 5 mg tablet TAKE 1/2 TO 1 TABLET DAILY BY MOUTH OR AS ORDERED BY PHYSICIAN. 90 tablet 2   • amoxicillin  (AMOXIL) 500 mg capsule  (Patient not taking: Reported on 7/16/2025)     • glucose blood (OneTouch Verio) test strip Use 1 each in the morning USE DAILY AS INSTRUCTED (Patient not taking: Reported on 7/16/2025) 100 strip 2   • Lancets (OneTouch Delica Plus [id number]) MISC USE ONCE DAILY AS DIRECTED (Patient not taking: Reported on 7/15/2025) 100 each 1   • [DISCONTINUED] amoxicillin (AMOXIL) 500 MG tablet Please take 4 (four) 500 mg tablets 30 to 60 minutes prior to dental procedure. 4 tablet 1   • [DISCONTINUED] ergocalciferol (VITAMIN D2) 50,000 units Take 1 capsule (50,000 Units total) by mouth once a week (Patient not taking: Reported on 5/7/2025) 8 capsule 0   • [DISCONTINUED] Glucose Blood (Blood Glucose Test Strips 333) STRP Test 1 strip 3 (three) times a day (Patient not taking: Reported on 7/16/2025) 100 strip 3     No current facility-administered medications on file prior to visit.   [2]  Social History  Tobacco Use   • Smoking status: Never     Passive exposure: Never   • Smokeless tobacco: Never   Vaping Use   • Vaping status: Never Used   Substance and Sexual Activity   • Alcohol use: No   • Drug use: No   • Sexual activity: Not Currently     Partners: Male

## 2025-07-16 NOTE — ASSESSMENT & PLAN NOTE
Lab Results   Component Value Date    HGBA1C 8.0 (H) 04/29/2025     Diabetes is  poorly controlled , she wants to focus on dietary modifications before starting another medication  Referred for MNT   Orders:  •  Ambulatory referral to Diabetic Education; Future  •  Hemoglobin A1C; Future  •  Comprehensive metabolic panel; Future  •  Albumin / creatinine urine ratio; Future

## 2025-07-16 NOTE — ASSESSMENT & PLAN NOTE
Counseled on dietary and lifestyle modifications and weight loss   Referred for MNT   Orders:  •  Ambulatory referral to Diabetic Education; Future  •  Comprehensive metabolic panel; Future

## 2025-07-30 DIAGNOSIS — L50.0 ALLERGIC URTICARIA: ICD-10-CM

## 2025-07-31 DIAGNOSIS — E78.5 DYSLIPIDEMIA: ICD-10-CM

## 2025-07-31 RX ORDER — HYDROXYZINE HYDROCHLORIDE 25 MG/1
25 TABLET, FILM COATED ORAL 2 TIMES DAILY
Qty: 60 TABLET | Refills: 0 | Status: SHIPPED | OUTPATIENT
Start: 2025-07-31

## 2025-08-01 RX ORDER — PERPHENAZINE 2 MG
1 TABLET ORAL DAILY
Qty: 30 CAPSULE | Refills: 0 | OUTPATIENT
Start: 2025-08-01 | End: 2025-10-30

## 2025-08-06 ENCOUNTER — OFFICE VISIT (OUTPATIENT)
Dept: FAMILY MEDICINE CLINIC | Facility: CLINIC | Age: 65
End: 2025-08-06
Payer: MEDICARE

## 2025-08-06 VITALS
HEIGHT: 59 IN | OXYGEN SATURATION: 98 % | HEART RATE: 70 BPM | TEMPERATURE: 97.8 F | WEIGHT: 209.2 LBS | DIASTOLIC BLOOD PRESSURE: 82 MMHG | SYSTOLIC BLOOD PRESSURE: 120 MMHG | RESPIRATION RATE: 16 BRPM | BODY MASS INDEX: 42.17 KG/M2

## 2025-08-06 DIAGNOSIS — N39.46 MIXED STRESS AND URGE URINARY INCONTINENCE: ICD-10-CM

## 2025-08-06 DIAGNOSIS — Z00.00 ENCOUNTER FOR ANNUAL WELLNESS VISIT (AWV) IN MEDICARE PATIENT: Primary | ICD-10-CM

## 2025-08-06 DIAGNOSIS — Z79.4 TYPE 2 DIABETES MELLITUS WITH COMPLICATION, WITH LONG-TERM CURRENT USE OF INSULIN (HCC): ICD-10-CM

## 2025-08-06 DIAGNOSIS — R29.898 WEAKNESS OF BOTH LOWER EXTREMITIES: ICD-10-CM

## 2025-08-06 DIAGNOSIS — E11.8 TYPE 2 DIABETES MELLITUS WITH COMPLICATION, WITH LONG-TERM CURRENT USE OF INSULIN (HCC): ICD-10-CM

## 2025-08-06 DIAGNOSIS — K52.9 GASTROENTERITIS: ICD-10-CM

## 2025-08-06 DIAGNOSIS — E55.9 VITAMIN D DEFICIENCY: ICD-10-CM

## 2025-08-06 PROCEDURE — G0439 PPPS, SUBSEQ VISIT: HCPCS | Performed by: FAMILY MEDICINE

## 2025-08-06 RX ORDER — BLOOD SUGAR DIAGNOSTIC
1 STRIP MISCELLANEOUS DAILY
Qty: 100 STRIP | Refills: 2 | Status: SHIPPED | OUTPATIENT
Start: 2025-08-06 | End: 2025-08-07 | Stop reason: CLARIF

## 2025-08-07 ENCOUNTER — NURSE TRIAGE (OUTPATIENT)
Age: 65
End: 2025-08-07

## 2025-08-07 DIAGNOSIS — E55.9 VITAMIN D DEFICIENCY: ICD-10-CM

## 2025-08-07 DIAGNOSIS — Z79.4 TYPE 2 DIABETES MELLITUS WITH COMPLICATION, WITH LONG-TERM CURRENT USE OF INSULIN (HCC): ICD-10-CM

## 2025-08-07 DIAGNOSIS — E11.8 TYPE 2 DIABETES MELLITUS WITH COMPLICATION, WITH LONG-TERM CURRENT USE OF INSULIN (HCC): ICD-10-CM

## 2025-08-07 RX ORDER — BLOOD SUGAR DIAGNOSTIC
1 STRIP MISCELLANEOUS DAILY
Qty: 100 STRIP | Refills: 2 | Status: SHIPPED | OUTPATIENT
Start: 2025-08-07

## 2025-08-08 ENCOUNTER — ANTICOAG VISIT (OUTPATIENT)
Dept: CARDIOLOGY CLINIC | Facility: CLINIC | Age: 65
End: 2025-08-08

## 2025-08-08 ENCOUNTER — APPOINTMENT (OUTPATIENT)
Dept: LAB | Facility: CLINIC | Age: 65
End: 2025-08-08
Attending: INTERNAL MEDICINE
Payer: MEDICARE

## 2025-08-08 DIAGNOSIS — Z95.2 HISTORY OF PROSTHETIC HEART VALVE: Primary | ICD-10-CM

## 2025-08-18 ENCOUNTER — PATIENT OUTREACH (OUTPATIENT)
Dept: CASE MANAGEMENT | Facility: OTHER | Age: 65
End: 2025-08-18

## 2025-08-18 DIAGNOSIS — Z13.9 ENCOUNTER FOR SCREENING INVOLVING SOCIAL DETERMINANTS OF HEALTH (SDOH): Primary | ICD-10-CM

## 2025-08-20 ENCOUNTER — DOCUMENTATION (OUTPATIENT)
Dept: CARDIOLOGY CLINIC | Facility: CLINIC | Age: 65
End: 2025-08-20

## 2025-08-22 ENCOUNTER — NURSE TRIAGE (OUTPATIENT)
Dept: OTHER | Facility: OTHER | Age: 65
End: 2025-08-22

## 2025-08-22 DIAGNOSIS — E11.65 TYPE 2 DIABETES MELLITUS WITH HYPERGLYCEMIA, WITHOUT LONG-TERM CURRENT USE OF INSULIN (HCC): ICD-10-CM

## 2025-08-22 RX ORDER — METFORMIN HYDROCHLORIDE 500 MG/1
500 TABLET, EXTENDED RELEASE ORAL 2 TIMES DAILY WITH MEALS
Qty: 200 TABLET | Refills: 1 | Status: SHIPPED | OUTPATIENT
Start: 2025-08-22 | End: 2025-08-25 | Stop reason: SINTOL